# Patient Record
Sex: FEMALE | Race: BLACK OR AFRICAN AMERICAN | Employment: OTHER | URBAN - METROPOLITAN AREA
[De-identification: names, ages, dates, MRNs, and addresses within clinical notes are randomized per-mention and may not be internally consistent; named-entity substitution may affect disease eponyms.]

---

## 2017-01-01 ENCOUNTER — HOSPITAL ENCOUNTER (OUTPATIENT)
Dept: OTHER | Age: 77
Discharge: HOME | End: 2017-01-31
Attending: INTERNAL MEDICINE | Admitting: INTERNAL MEDICINE

## 2017-01-25 LAB
ALBUMIN SERPL-MCNC: 3.3 GM/DL (ref 3.4–5)
ALP BLD-CCNC: 65 IU/L (ref 40–128)
ALT SERPL-CCNC: 5 U/L (ref 10–40)
ANION GAP SERPL CALCULATED.3IONS-SCNC: 10 MMOL/L (ref 4–16)
AST SERPL-CCNC: 13 IU/L (ref 15–37)
BASOPHILS ABSOLUTE: 0 K/CU MM
BASOPHILS RELATIVE PERCENT: 0.1 % (ref 0–1)
BILIRUB SERPL-MCNC: 0.2 MG/DL (ref 0–1)
BUN BLDV-MCNC: 15 MG/DL (ref 6–23)
CALCIUM SERPL-MCNC: 8.3 MG/DL (ref 8.3–10.6)
CHLORIDE BLD-SCNC: 101 MMOL/L (ref 99–110)
CO2: 31 MMOL/L (ref 21–32)
CREAT SERPL-MCNC: 1.1 MG/DL (ref 0.6–1.1)
DIFFERENTIAL TYPE: ABNORMAL
EOSINOPHILS ABSOLUTE: 0.5 K/CU MM
EOSINOPHILS RELATIVE PERCENT: 7.1 % (ref 0–3)
GFR AFRICAN AMERICAN: 59 ML/MIN/1.73M2
GFR NON-AFRICAN AMERICAN: 48 ML/MIN/1.73M2
GLUCOSE FASTING: 85 MG/DL (ref 70–99)
HCT VFR BLD CALC: 30.3 % (ref 37–47)
HEMOGLOBIN: 8.4 GM/DL (ref 12.5–16)
IMMATURE NEUTROPHIL %: 0.3 % (ref 0–0.43)
LYMPHOCYTES ABSOLUTE: 0.9 K/CU MM
LYMPHOCYTES RELATIVE PERCENT: 12.5 % (ref 24–44)
MCH RBC QN AUTO: 27.3 PG (ref 27–31)
MCHC RBC AUTO-ENTMCNC: 27.7 % (ref 32–36)
MCV RBC AUTO: 98.4 FL (ref 78–100)
MONOCYTES ABSOLUTE: 0.6 K/CU MM
MONOCYTES RELATIVE PERCENT: 8.8 % (ref 0–4)
NUCLEATED RBC %: 0 %
PDW BLD-RTO: 15.6 % (ref 11.7–14.9)
PLATELET # BLD: 209 K/CU MM (ref 140–440)
PMV BLD AUTO: 12 FL (ref 7.5–11.1)
POTASSIUM SERPL-SCNC: 4.2 MMOL/L (ref 3.5–5.1)
RBC # BLD: 3.08 M/CU MM (ref 4.2–5.4)
SEGMENTED NEUTROPHILS ABSOLUTE COUNT: 5 K/CU MM
SEGMENTED NEUTROPHILS RELATIVE PERCENT: 71.2 % (ref 36–66)
SODIUM BLD-SCNC: 142 MMOL/L (ref 135–145)
TOTAL IMMATURE NEUTOROPHIL: 0.02 K/CU MM
TOTAL NUCLEATED RBC: 0 K/CU MM
TOTAL PROTEIN: 5.6 GM/DL (ref 6.4–8.2)
WBC # BLD: 7 K/CU MM (ref 4–10.5)

## 2017-02-01 ENCOUNTER — HOSPITAL ENCOUNTER (OUTPATIENT)
Dept: OTHER | Age: 77
Discharge: HOME | End: 2017-02-28
Attending: INTERNAL MEDICINE | Admitting: INTERNAL MEDICINE

## 2017-02-01 LAB — TSH HIGH SENSITIVITY: 1.37 UIU/ML (ref 0.27–4.2)

## 2017-02-08 LAB
ALBUMIN SERPL-MCNC: 2.9 GM/DL (ref 3.4–5)
ALP BLD-CCNC: 70 IU/L (ref 40–129)
ALT SERPL-CCNC: <5 U/L (ref 10–40)
ANION GAP SERPL CALCULATED.3IONS-SCNC: 11 MMOL/L (ref 4–16)
AST SERPL-CCNC: 13 IU/L (ref 15–37)
BASOPHILS ABSOLUTE: 0 K/CU MM
BASOPHILS RELATIVE PERCENT: 0.4 % (ref 0–1)
BILIRUB SERPL-MCNC: 0.2 MG/DL (ref 0–1)
BUN BLDV-MCNC: 10 MG/DL (ref 6–23)
CALCIUM SERPL-MCNC: 8.2 MG/DL (ref 8.3–10.6)
CHLORIDE BLD-SCNC: 104 MMOL/L (ref 99–110)
CO2: 27 MMOL/L (ref 21–32)
CREAT SERPL-MCNC: 1.1 MG/DL (ref 0.6–1.1)
DIFFERENTIAL TYPE: ABNORMAL
EOSINOPHILS ABSOLUTE: 0.3 K/CU MM
EOSINOPHILS RELATIVE PERCENT: 7.5 % (ref 0–3)
GFR AFRICAN AMERICAN: 59 ML/MIN/1.73M2
GFR NON-AFRICAN AMERICAN: 48 ML/MIN/1.73M2
GLUCOSE FASTING: 80 MG/DL (ref 70–99)
HCT VFR BLD CALC: 28.2 % (ref 37–47)
HEMOGLOBIN: 7.9 GM/DL (ref 12.5–16)
IMMATURE NEUTROPHIL %: 0.4 % (ref 0–0.43)
LYMPHOCYTES ABSOLUTE: 1 K/CU MM
LYMPHOCYTES RELATIVE PERCENT: 22.2 % (ref 24–44)
MCH RBC QN AUTO: 27.2 PG (ref 27–31)
MCHC RBC AUTO-ENTMCNC: 28 % (ref 32–36)
MCV RBC AUTO: 97.2 FL (ref 78–100)
MONOCYTES ABSOLUTE: 0.6 K/CU MM
MONOCYTES RELATIVE PERCENT: 13.6 % (ref 0–4)
NUCLEATED RBC %: 0 %
PDW BLD-RTO: 14.9 % (ref 11.7–14.9)
PLATELET # BLD: 212 K/CU MM (ref 140–440)
PMV BLD AUTO: 11.5 FL (ref 7.5–11.1)
POTASSIUM SERPL-SCNC: 4.4 MMOL/L (ref 3.5–5.1)
RBC # BLD: 2.9 M/CU MM (ref 4.2–5.4)
SEGMENTED NEUTROPHILS ABSOLUTE COUNT: 2.5 K/CU MM
SEGMENTED NEUTROPHILS RELATIVE PERCENT: 55.9 % (ref 36–66)
SODIUM BLD-SCNC: 142 MMOL/L (ref 135–145)
TOTAL IMMATURE NEUTOROPHIL: 0.02 K/CU MM
TOTAL NUCLEATED RBC: 0 K/CU MM
TOTAL PROTEIN: 5.1 GM/DL (ref 6.4–8.2)
WBC # BLD: 4.6 K/CU MM (ref 4–10.5)

## 2017-02-17 LAB
HCT VFR BLD CALC: 31.6 % (ref 37–47)
HEMOGLOBIN: 8.9 GM/DL (ref 12.5–16)

## 2017-02-22 LAB
ALBUMIN SERPL-MCNC: 3.2 GM/DL (ref 3.4–5)
ALP BLD-CCNC: 76 IU/L (ref 40–128)
ALT SERPL-CCNC: <5 U/L (ref 10–40)
ANION GAP SERPL CALCULATED.3IONS-SCNC: 15 MMOL/L (ref 4–16)
AST SERPL-CCNC: 14 IU/L (ref 15–37)
BASOPHILS ABSOLUTE: 0 K/CU MM
BASOPHILS RELATIVE PERCENT: 0.3 % (ref 0–1)
BILIRUB SERPL-MCNC: 0.2 MG/DL (ref 0–1)
BUN BLDV-MCNC: 12 MG/DL (ref 6–23)
CALCIUM SERPL-MCNC: 8.6 MG/DL (ref 8.3–10.6)
CHLORIDE BLD-SCNC: 100 MMOL/L (ref 99–110)
CO2: 27 MMOL/L (ref 21–32)
CREAT SERPL-MCNC: 1.1 MG/DL (ref 0.6–1.1)
DIFFERENTIAL TYPE: ABNORMAL
DIFFERENTIAL TYPE: ABNORMAL
EOSINOPHILS ABSOLUTE: 0.4 K/CU MM
EOSINOPHILS RELATIVE PERCENT: 6.1 % (ref 0–3)
GFR AFRICAN AMERICAN: 59 ML/MIN/1.73M2
GFR NON-AFRICAN AMERICAN: 48 ML/MIN/1.73M2
GLUCOSE FASTING: 85 MG/DL (ref 70–99)
HCT VFR BLD CALC: 31 % (ref 37–47)
HEMOGLOBIN: 8.7 GM/DL (ref 12.5–16)
IMMATURE NEUTROPHIL %: 0.2 % (ref 0–0.43)
LYMPHOCYTES ABSOLUTE: 1.1 K/CU MM
LYMPHOCYTES RELATIVE PERCENT: 17.9 % (ref 24–44)
MCH RBC QN AUTO: 27.1 PG (ref 27–31)
MCHC RBC AUTO-ENTMCNC: 28.1 % (ref 32–36)
MCV RBC AUTO: 96.6 FL (ref 78–100)
MONOCYTES ABSOLUTE: 0.6 K/CU MM
MONOCYTES RELATIVE PERCENT: 10.5 % (ref 0–4)
NUCLEATED RBC %: 0 %
PDW BLD-RTO: 15 % (ref 11.7–14.9)
PLATELET # BLD: 213 K/CU MM (ref 140–440)
PMV BLD AUTO: 11.5 FL (ref 7.5–11.1)
POTASSIUM SERPL-SCNC: 4.3 MMOL/L (ref 3.5–5.1)
RBC # BLD: 3.21 M/CU MM (ref 4.2–5.4)
SEGMENTED NEUTROPHILS ABSOLUTE COUNT: 3.8 K/CU MM
SEGMENTED NEUTROPHILS ABSOLUTE COUNT: ABNORMAL
SEGMENTED NEUTROPHILS RELATIVE PERCENT: 65 % (ref 36–66)
SODIUM BLD-SCNC: 142 MMOL/L (ref 135–145)
TOTAL IMMATURE NEUTOROPHIL: 0.01 K/CU MM
TOTAL NUCLEATED RBC: 0 K/CU MM
TOTAL PROTEIN: 5.6 GM/DL (ref 6.4–8.2)
WBC # BLD: 5.9 K/CU MM (ref 4–10.5)

## 2017-03-01 ENCOUNTER — HOSPITAL ENCOUNTER (OUTPATIENT)
Dept: OTHER | Age: 77
Discharge: HOME | End: 2017-03-31
Attending: INTERNAL MEDICINE | Admitting: INTERNAL MEDICINE

## 2017-03-01 LAB — TSH HIGH SENSITIVITY: 1.16 UIU/ML (ref 0.27–4.2)

## 2017-03-08 LAB
ALBUMIN SERPL-MCNC: 3.3 GM/DL (ref 3.4–5)
ALP BLD-CCNC: 64 IU/L (ref 40–128)
ALT SERPL-CCNC: <5 U/L (ref 10–40)
ANION GAP SERPL CALCULATED.3IONS-SCNC: 14 MMOL/L (ref 4–16)
AST SERPL-CCNC: 12 IU/L (ref 15–37)
BASOPHILS ABSOLUTE: 0 K/CU MM
BASOPHILS RELATIVE PERCENT: 0.3 % (ref 0–1)
BILIRUB SERPL-MCNC: 0.6 MG/DL (ref 0–1)
BUN BLDV-MCNC: 13 MG/DL (ref 6–23)
CALCIUM SERPL-MCNC: 8.2 MG/DL (ref 8.3–10.6)
CHLORIDE BLD-SCNC: 94 MMOL/L (ref 99–110)
CO2: 31 MMOL/L (ref 21–32)
CREAT SERPL-MCNC: 1.1 MG/DL (ref 0.6–1.1)
DIFFERENTIAL TYPE: ABNORMAL
EOSINOPHILS ABSOLUTE: 0.2 K/CU MM
EOSINOPHILS RELATIVE PERCENT: 3 % (ref 0–3)
GFR AFRICAN AMERICAN: 59 ML/MIN/1.73M2
GFR NON-AFRICAN AMERICAN: 48 ML/MIN/1.73M2
GLUCOSE BLD-MCNC: 86 MG/DL (ref 70–140)
HCT VFR BLD CALC: 31.3 % (ref 37–47)
HEMOGLOBIN: 9.3 GM/DL (ref 12.5–16)
IMMATURE NEUTROPHIL %: 0.4 % (ref 0–0.43)
LYMPHOCYTES ABSOLUTE: 1 K/CU MM
LYMPHOCYTES RELATIVE PERCENT: 14 % (ref 24–44)
MCH RBC QN AUTO: 27.4 PG (ref 27–31)
MCHC RBC AUTO-ENTMCNC: 29.7 % (ref 32–36)
MCV RBC AUTO: 92.1 FL (ref 78–100)
MONOCYTES ABSOLUTE: 0.8 K/CU MM
MONOCYTES RELATIVE PERCENT: 11.2 % (ref 0–4)
NUCLEATED RBC %: 0 %
PDW BLD-RTO: 15.1 % (ref 11.7–14.9)
PLATELET # BLD: 138 K/CU MM (ref 140–440)
PMV BLD AUTO: 12.1 FL (ref 7.5–11.1)
POTASSIUM SERPL-SCNC: 3.6 MMOL/L (ref 3.5–5.1)
RBC # BLD: 3.4 M/CU MM (ref 4.2–5.4)
SEGMENTED NEUTROPHILS ABSOLUTE COUNT: 5.2 K/CU MM
SEGMENTED NEUTROPHILS RELATIVE PERCENT: 71.1 % (ref 36–66)
SODIUM BLD-SCNC: 139 MMOL/L (ref 135–145)
TOTAL IMMATURE NEUTOROPHIL: 0.03 K/CU MM
TOTAL NUCLEATED RBC: 0 K/CU MM
TOTAL PROTEIN: 5.5 GM/DL (ref 6.4–8.2)
WBC # BLD: 7.3 K/CU MM (ref 4–10.5)

## 2017-03-22 LAB
ALBUMIN SERPL-MCNC: 3.2 GM/DL (ref 3.4–5)
ALP BLD-CCNC: 74 IU/L (ref 40–128)
ALT SERPL-CCNC: <5 U/L (ref 10–40)
ANION GAP SERPL CALCULATED.3IONS-SCNC: 11 MMOL/L (ref 4–16)
AST SERPL-CCNC: 10 IU/L (ref 15–37)
BASOPHILS ABSOLUTE: 0 K/CU MM
BASOPHILS RELATIVE PERCENT: 0.2 % (ref 0–1)
BILIRUB SERPL-MCNC: 0.4 MG/DL (ref 0–1)
BUN BLDV-MCNC: 12 MG/DL (ref 6–23)
CALCIUM SERPL-MCNC: 8.2 MG/DL (ref 8.3–10.6)
CHLORIDE BLD-SCNC: 99 MMOL/L (ref 99–110)
CO2: 28 MMOL/L (ref 21–32)
CREAT SERPL-MCNC: 1.1 MG/DL (ref 0.6–1.1)
DIFFERENTIAL TYPE: ABNORMAL
EOSINOPHILS ABSOLUTE: 0.3 K/CU MM
EOSINOPHILS RELATIVE PERCENT: 4.6 % (ref 0–3)
GFR AFRICAN AMERICAN: 59 ML/MIN/1.73M2
GFR NON-AFRICAN AMERICAN: 48 ML/MIN/1.73M2
GLUCOSE BLD-MCNC: 84 MG/DL (ref 70–140)
HCT VFR BLD CALC: 28.8 % (ref 37–47)
HEMOGLOBIN: 8.2 GM/DL (ref 12.5–16)
IMMATURE NEUTROPHIL %: 0.3 % (ref 0–0.43)
LYMPHOCYTES ABSOLUTE: 0.9 K/CU MM
LYMPHOCYTES RELATIVE PERCENT: 13.7 % (ref 24–44)
MCH RBC QN AUTO: 27 PG (ref 27–31)
MCHC RBC AUTO-ENTMCNC: 28.5 % (ref 32–36)
MCV RBC AUTO: 94.7 FL (ref 78–100)
MONOCYTES ABSOLUTE: 0.6 K/CU MM
MONOCYTES RELATIVE PERCENT: 9.6 % (ref 0–4)
NUCLEATED RBC %: 0 %
PDW BLD-RTO: 14.6 % (ref 11.7–14.9)
PLATELET # BLD: 195 K/CU MM (ref 140–440)
PMV BLD AUTO: 11.7 FL (ref 7.5–11.1)
POTASSIUM SERPL-SCNC: 4 MMOL/L (ref 3.5–5.1)
RBC # BLD: 3.04 M/CU MM (ref 4.2–5.4)
SEGMENTED NEUTROPHILS ABSOLUTE COUNT: 4.5 K/CU MM
SEGMENTED NEUTROPHILS RELATIVE PERCENT: 71.6 % (ref 36–66)
SODIUM BLD-SCNC: 138 MMOL/L (ref 135–145)
TOTAL IMMATURE NEUTOROPHIL: 0.02 K/CU MM
TOTAL NUCLEATED RBC: 0 K/CU MM
TOTAL PROTEIN: 5.2 GM/DL (ref 6.4–8.2)
WBC # BLD: 6.3 K/CU MM (ref 4–10.5)

## 2017-04-01 ENCOUNTER — HOSPITAL ENCOUNTER (OUTPATIENT)
Dept: OTHER | Age: 77
Discharge: HOME | End: 2017-04-30
Attending: INTERNAL MEDICINE | Admitting: INTERNAL MEDICINE

## 2017-04-05 LAB
ALBUMIN SERPL-MCNC: 3.4 GM/DL (ref 3.4–5)
ALP BLD-CCNC: 75 IU/L (ref 40–128)
ALT SERPL-CCNC: <5 U/L (ref 10–40)
ANION GAP SERPL CALCULATED.3IONS-SCNC: 15 MMOL/L (ref 4–16)
AST SERPL-CCNC: 15 IU/L (ref 15–37)
BASOPHILS ABSOLUTE: 0 K/CU MM
BASOPHILS RELATIVE PERCENT: 0.2 % (ref 0–1)
BILIRUB SERPL-MCNC: 0.2 MG/DL (ref 0–1)
BUN BLDV-MCNC: 13 MG/DL (ref 6–23)
CALCIUM SERPL-MCNC: 8.4 MG/DL (ref 8.3–10.6)
CHLORIDE BLD-SCNC: 101 MMOL/L (ref 99–110)
CO2: 26 MMOL/L (ref 21–32)
CREAT SERPL-MCNC: 1.1 MG/DL (ref 0.6–1.1)
DIFFERENTIAL TYPE: ABNORMAL
DIFFERENTIAL TYPE: ABNORMAL
EOSINOPHILS ABSOLUTE: 0.3 K/CU MM
EOSINOPHILS RELATIVE PERCENT: 4.8 % (ref 0–3)
GFR AFRICAN AMERICAN: 59 ML/MIN/1.73M2
GFR NON-AFRICAN AMERICAN: 48 ML/MIN/1.73M2
GLUCOSE BLD-MCNC: 91 MG/DL (ref 70–140)
HCT VFR BLD CALC: 31.4 % (ref 37–47)
HEMOGLOBIN: 8.6 GM/DL (ref 12.5–16)
IMMATURE NEUTROPHIL %: 0.4 % (ref 0–0.43)
LYMPHOCYTES ABSOLUTE: 1.1 K/CU MM
LYMPHOCYTES RELATIVE PERCENT: 19.2 % (ref 24–44)
MCH RBC QN AUTO: 27.1 PG (ref 27–31)
MCHC RBC AUTO-ENTMCNC: 27.4 % (ref 32–36)
MCV RBC AUTO: 99.1 FL (ref 78–100)
MONOCYTES ABSOLUTE: 0.5 K/CU MM
MONOCYTES RELATIVE PERCENT: 8.2 % (ref 0–4)
NUCLEATED RBC %: 0 %
PDW BLD-RTO: 15.3 % (ref 11.7–14.9)
PLATELET # BLD: 196 K/CU MM (ref 140–440)
PMV BLD AUTO: 12.3 FL (ref 7.5–11.1)
POTASSIUM SERPL-SCNC: 4.3 MMOL/L (ref 3.5–5.1)
RBC # BLD: 3.17 M/CU MM (ref 4.2–5.4)
RBC # BLD: SLIGHT 10*6/UL
SEGMENTED NEUTROPHILS ABSOLUTE COUNT: 3.7 K/CU MM
SEGMENTED NEUTROPHILS ABSOLUTE COUNT: ABNORMAL
SEGMENTED NEUTROPHILS RELATIVE PERCENT: 67.2 % (ref 36–66)
SODIUM BLD-SCNC: 142 MMOL/L (ref 135–145)
TOTAL IMMATURE NEUTOROPHIL: 0.02 K/CU MM
TOTAL NUCLEATED RBC: 0 K/CU MM
TOTAL PROTEIN: 5.6 GM/DL (ref 6.4–8.2)
TSH HIGH SENSITIVITY: 1.61 UIU/ML (ref 0.27–4.2)
WBC # BLD: 5.6 K/CU MM (ref 4–10.5)

## 2017-04-19 LAB
ALBUMIN SERPL-MCNC: 3.3 GM/DL (ref 3.4–5)
ALP BLD-CCNC: 73 IU/L (ref 40–128)
ALT SERPL-CCNC: <5 U/L (ref 10–40)
ANION GAP SERPL CALCULATED.3IONS-SCNC: 12 MMOL/L (ref 4–16)
AST SERPL-CCNC: 11 IU/L (ref 15–37)
BASOPHILS ABSOLUTE: 0 K/CU MM
BASOPHILS RELATIVE PERCENT: 0.3 % (ref 0–1)
BILIRUB SERPL-MCNC: 0.2 MG/DL (ref 0–1)
BUN BLDV-MCNC: 13 MG/DL (ref 6–23)
CALCIUM SERPL-MCNC: 8.2 MG/DL (ref 8.3–10.6)
CHLORIDE BLD-SCNC: 102 MMOL/L (ref 99–110)
CO2: 29 MMOL/L (ref 21–32)
CREAT SERPL-MCNC: 1 MG/DL (ref 0.6–1.1)
DIFFERENTIAL TYPE: ABNORMAL
EOSINOPHILS ABSOLUTE: 0.3 K/CU MM
EOSINOPHILS RELATIVE PERCENT: 8.6 % (ref 0–3)
GFR AFRICAN AMERICAN: >60 ML/MIN/1.73M2
GFR NON-AFRICAN AMERICAN: 54 ML/MIN/1.73M2
GLUCOSE BLD-MCNC: 86 MG/DL (ref 70–140)
HCT VFR BLD CALC: 29.3 % (ref 37–47)
HEMOGLOBIN: 8.4 GM/DL (ref 12.5–16)
HYPOCHROMIA: ABNORMAL
IMMATURE NEUTROPHIL %: 0.3 % (ref 0–0.43)
LYMPHOCYTES ABSOLUTE: 0.9 K/CU MM
LYMPHOCYTES RELATIVE PERCENT: 23.2 % (ref 24–44)
MACROCYTES: ABNORMAL
MCH RBC QN AUTO: 27.7 PG (ref 27–31)
MCHC RBC AUTO-ENTMCNC: 28.7 % (ref 32–36)
MCV RBC AUTO: 96.7 FL (ref 78–100)
MONOCYTES ABSOLUTE: 0.5 K/CU MM
MONOCYTES RELATIVE PERCENT: 13.1 % (ref 0–4)
NUCLEATED RBC %: 0 %
PDW BLD-RTO: 14.5 % (ref 11.7–14.9)
PLATELET # BLD: 204 K/CU MM (ref 140–440)
PMV BLD AUTO: 11.3 FL (ref 7.5–11.1)
POLYCHROMASIA: ABNORMAL
POTASSIUM SERPL-SCNC: 4.1 MMOL/L (ref 3.5–5.1)
RBC # BLD: 3.03 M/CU MM (ref 4.2–5.4)
SEGMENTED NEUTROPHILS ABSOLUTE COUNT: 2.3 K/CU MM
SEGMENTED NEUTROPHILS ABSOLUTE COUNT: ABNORMAL
SEGMENTED NEUTROPHILS RELATIVE PERCENT: 54.5 % (ref 36–66)
SODIUM BLD-SCNC: 143 MMOL/L (ref 135–145)
TOTAL IMMATURE NEUTOROPHIL: 0.01 K/CU MM
TOTAL NUCLEATED RBC: 0 K/CU MM
TOTAL PROTEIN: 5.6 GM/DL (ref 6.4–8.2)
WBC # BLD: 4 K/CU MM (ref 4–10.5)

## 2017-05-01 ENCOUNTER — HOSPITAL ENCOUNTER (OUTPATIENT)
Dept: OTHER | Age: 77
Discharge: HOME | End: 2017-05-31
Attending: INTERNAL MEDICINE | Admitting: INTERNAL MEDICINE

## 2017-05-03 LAB
ALBUMIN SERPL-MCNC: 3.4 GM/DL (ref 3.4–5)
ALP BLD-CCNC: 71 IU/L (ref 40–128)
ALT SERPL-CCNC: <5 U/L (ref 10–40)
ANION GAP SERPL CALCULATED.3IONS-SCNC: 12 MMOL/L (ref 4–16)
AST SERPL-CCNC: 12 IU/L (ref 15–37)
BASOPHILS ABSOLUTE: 0 K/CU MM
BASOPHILS RELATIVE PERCENT: 0.2 % (ref 0–1)
BILIRUB SERPL-MCNC: 0.2 MG/DL (ref 0–1)
BUN BLDV-MCNC: 14 MG/DL (ref 6–23)
CALCIUM SERPL-MCNC: 8.5 MG/DL (ref 8.3–10.6)
CHLORIDE BLD-SCNC: 103 MMOL/L (ref 99–110)
CO2: 29 MMOL/L (ref 21–32)
CREAT SERPL-MCNC: 1.1 MG/DL (ref 0.6–1.1)
DIFFERENTIAL TYPE: ABNORMAL
EOSINOPHILS ABSOLUTE: 0.3 K/CU MM
EOSINOPHILS RELATIVE PERCENT: 5.3 % (ref 0–3)
GFR AFRICAN AMERICAN: 59 ML/MIN/1.73M2
GFR NON-AFRICAN AMERICAN: 48 ML/MIN/1.73M2
GLUCOSE BLD-MCNC: 81 MG/DL (ref 70–140)
HCT VFR BLD CALC: 28.5 % (ref 37–47)
HEMOGLOBIN: 8.2 GM/DL (ref 12.5–16)
IMMATURE NEUTROPHIL %: 0.2 % (ref 0–0.43)
LYMPHOCYTES ABSOLUTE: 1 K/CU MM
LYMPHOCYTES RELATIVE PERCENT: 20 % (ref 24–44)
MCH RBC QN AUTO: 28 PG (ref 27–31)
MCHC RBC AUTO-ENTMCNC: 28.8 % (ref 32–36)
MCV RBC AUTO: 97.3 FL (ref 78–100)
MONOCYTES ABSOLUTE: 0.5 K/CU MM
MONOCYTES RELATIVE PERCENT: 10.9 % (ref 0–4)
NUCLEATED RBC %: 0 %
PDW BLD-RTO: 14.1 % (ref 11.7–14.9)
PLATELET # BLD: 196 K/CU MM (ref 140–440)
PMV BLD AUTO: 11.6 FL (ref 7.5–11.1)
POTASSIUM SERPL-SCNC: 4 MMOL/L (ref 3.5–5.1)
RBC # BLD: 2.93 M/CU MM (ref 4.2–5.4)
SEGMENTED NEUTROPHILS ABSOLUTE COUNT: 3 K/CU MM
SEGMENTED NEUTROPHILS RELATIVE PERCENT: 63.4 % (ref 36–66)
SODIUM BLD-SCNC: 144 MMOL/L (ref 135–145)
TOTAL IMMATURE NEUTOROPHIL: 0.01 K/CU MM
TOTAL NUCLEATED RBC: 0 K/CU MM
TOTAL PROTEIN: 5.8 GM/DL (ref 6.4–8.2)
TSH HIGH SENSITIVITY: 1.39 UIU/ML (ref 0.27–4.2)
WBC # BLD: 4.8 K/CU MM (ref 4–10.5)

## 2017-05-17 LAB
ALBUMIN SERPL-MCNC: 3.4 GM/DL (ref 3.4–5)
ALP BLD-CCNC: 77 IU/L (ref 40–129)
ALT SERPL-CCNC: 6 U/L (ref 10–40)
ANION GAP SERPL CALCULATED.3IONS-SCNC: 11 MMOL/L (ref 4–16)
AST SERPL-CCNC: 15 IU/L (ref 15–37)
BASOPHILS ABSOLUTE: 0 K/CU MM
BASOPHILS RELATIVE PERCENT: 0.4 % (ref 0–1)
BILIRUB SERPL-MCNC: 0.1 MG/DL (ref 0–1)
BUN BLDV-MCNC: 23 MG/DL (ref 6–23)
CALCIUM SERPL-MCNC: 8.1 MG/DL (ref 8.3–10.6)
CHLORIDE BLD-SCNC: 101 MMOL/L (ref 99–110)
CO2: 27 MMOL/L (ref 21–32)
CREAT SERPL-MCNC: 1.3 MG/DL (ref 0.6–1.1)
DIFFERENTIAL TYPE: ABNORMAL
EOSINOPHILS ABSOLUTE: 0.3 K/CU MM
EOSINOPHILS RELATIVE PERCENT: 5.7 % (ref 0–3)
GFR AFRICAN AMERICAN: 48 ML/MIN/1.73M2
GFR NON-AFRICAN AMERICAN: 40 ML/MIN/1.73M2
GLUCOSE BLD-MCNC: 121 MG/DL (ref 70–140)
HCT VFR BLD CALC: 29.2 % (ref 37–47)
HEMOGLOBIN: 8.2 GM/DL (ref 12.5–16)
IMMATURE NEUTROPHIL %: 0.4 % (ref 0–0.43)
LYMPHOCYTES ABSOLUTE: 1 K/CU MM
LYMPHOCYTES RELATIVE PERCENT: 19.1 % (ref 24–44)
MCH RBC QN AUTO: 28 PG (ref 27–31)
MCHC RBC AUTO-ENTMCNC: 28.1 % (ref 32–36)
MCV RBC AUTO: 99.7 FL (ref 78–100)
MONOCYTES ABSOLUTE: 0.5 K/CU MM
MONOCYTES RELATIVE PERCENT: 10 % (ref 0–4)
NUCLEATED RBC %: 0 %
PDW BLD-RTO: 14 % (ref 11.7–14.9)
PLATELET # BLD: 219 K/CU MM (ref 140–440)
PMV BLD AUTO: 11 FL (ref 7.5–11.1)
POTASSIUM SERPL-SCNC: 4.3 MMOL/L (ref 3.5–5.1)
RBC # BLD: 2.93 M/CU MM (ref 4.2–5.4)
SEGMENTED NEUTROPHILS ABSOLUTE COUNT: 3.3 K/CU MM
SEGMENTED NEUTROPHILS RELATIVE PERCENT: 64.4 % (ref 36–66)
SODIUM BLD-SCNC: 139 MMOL/L (ref 135–145)
TOTAL IMMATURE NEUTOROPHIL: 0.02 K/CU MM
TOTAL NUCLEATED RBC: 0 K/CU MM
TOTAL PROTEIN: 5.6 GM/DL (ref 6.4–8.2)
WBC # BLD: 5.1 K/CU MM (ref 4–10.5)

## 2017-05-31 LAB
ALBUMIN SERPL-MCNC: 3.8 GM/DL (ref 3.4–5)
ALP BLD-CCNC: 94 IU/L (ref 40–129)
ALT SERPL-CCNC: 6 U/L (ref 10–40)
ANION GAP SERPL CALCULATED.3IONS-SCNC: 14 MMOL/L (ref 4–16)
AST SERPL-CCNC: 19 IU/L (ref 15–37)
BASOPHILS ABSOLUTE: 0 K/CU MM
BASOPHILS RELATIVE PERCENT: 0.2 % (ref 0–1)
BILIRUB SERPL-MCNC: 0.1 MG/DL (ref 0–1)
BUN BLDV-MCNC: 20 MG/DL (ref 6–23)
CALCIUM SERPL-MCNC: 8.6 MG/DL (ref 8.3–10.6)
CHLORIDE BLD-SCNC: 96 MMOL/L (ref 99–110)
CO2: 28 MMOL/L (ref 21–32)
CREAT SERPL-MCNC: 1.3 MG/DL (ref 0.6–1.1)
DIFFERENTIAL TYPE: ABNORMAL
EOSINOPHILS ABSOLUTE: 0.2 K/CU MM
EOSINOPHILS RELATIVE PERCENT: 4.8 % (ref 0–3)
GFR AFRICAN AMERICAN: 48 ML/MIN/1.73M2
GFR NON-AFRICAN AMERICAN: 40 ML/MIN/1.73M2
GLUCOSE BLD-MCNC: 96 MG/DL (ref 70–140)
HCT VFR BLD CALC: 32.9 % (ref 37–47)
HEMOGLOBIN: 9.3 GM/DL (ref 12.5–16)
IMMATURE NEUTROPHIL %: 0.2 % (ref 0–0.43)
LYMPHOCYTES ABSOLUTE: 0.8 K/CU MM
LYMPHOCYTES RELATIVE PERCENT: 17.6 % (ref 24–44)
MCH RBC QN AUTO: 28 PG (ref 27–31)
MCHC RBC AUTO-ENTMCNC: 28.3 % (ref 32–36)
MCV RBC AUTO: 99.1 FL (ref 78–100)
MONOCYTES ABSOLUTE: 0.5 K/CU MM
MONOCYTES RELATIVE PERCENT: 10.1 % (ref 0–4)
NUCLEATED RBC %: 0 %
PDW BLD-RTO: 13.3 % (ref 11.7–14.9)
PLATELET # BLD: 224 K/CU MM (ref 140–440)
PMV BLD AUTO: 11.1 FL (ref 7.5–11.1)
POTASSIUM SERPL-SCNC: 4.6 MMOL/L (ref 3.5–5.1)
RBC # BLD: 3.32 M/CU MM (ref 4.2–5.4)
SEGMENTED NEUTROPHILS ABSOLUTE COUNT: 3.2 K/CU MM
SEGMENTED NEUTROPHILS RELATIVE PERCENT: 67.1 % (ref 36–66)
SODIUM BLD-SCNC: 138 MMOL/L (ref 135–145)
TOTAL IMMATURE NEUTOROPHIL: 0.01 K/CU MM
TOTAL NUCLEATED RBC: 0 K/CU MM
TOTAL PROTEIN: 6.8 GM/DL (ref 6.4–8.2)
WBC # BLD: 4.8 K/CU MM (ref 4–10.5)

## 2017-06-01 ENCOUNTER — HOSPITAL ENCOUNTER (OUTPATIENT)
Dept: OTHER | Age: 77
Discharge: HOME | End: 2017-06-30
Attending: INTERNAL MEDICINE | Admitting: INTERNAL MEDICINE

## 2017-06-07 LAB — TSH HIGH SENSITIVITY: 2.06 UIU/ML (ref 0.27–4.2)

## 2017-06-14 LAB
ALBUMIN SERPL-MCNC: 3.5 GM/DL (ref 3.4–5)
ALP BLD-CCNC: 89 IU/L (ref 40–128)
ALT SERPL-CCNC: <5 U/L (ref 10–40)
ANION GAP SERPL CALCULATED.3IONS-SCNC: 12 MMOL/L (ref 4–16)
AST SERPL-CCNC: 12 IU/L (ref 15–37)
BASOPHILS ABSOLUTE: 0 K/CU MM
BASOPHILS RELATIVE PERCENT: 0.1 % (ref 0–1)
BILIRUB SERPL-MCNC: 0.4 MG/DL (ref 0–1)
BUN BLDV-MCNC: 13 MG/DL (ref 6–23)
CALCIUM SERPL-MCNC: 8.2 MG/DL (ref 8.3–10.6)
CHLORIDE BLD-SCNC: 99 MMOL/L (ref 99–110)
CO2: 28 MMOL/L (ref 21–32)
CREAT SERPL-MCNC: 1.1 MG/DL (ref 0.6–1.1)
DIFFERENTIAL TYPE: ABNORMAL
EOSINOPHILS ABSOLUTE: 0.2 K/CU MM
EOSINOPHILS RELATIVE PERCENT: 2.1 % (ref 0–3)
GFR AFRICAN AMERICAN: 59 ML/MIN/1.73M2
GFR NON-AFRICAN AMERICAN: 48 ML/MIN/1.73M2
GLUCOSE BLD-MCNC: 102 MG/DL (ref 70–140)
HCT VFR BLD CALC: 30.1 % (ref 37–47)
HEMOGLOBIN: 8.9 GM/DL (ref 12.5–16)
IMMATURE NEUTROPHIL %: 0.4 % (ref 0–0.43)
LYMPHOCYTES ABSOLUTE: 0.7 K/CU MM
LYMPHOCYTES RELATIVE PERCENT: 8.2 % (ref 24–44)
MCH RBC QN AUTO: 27.9 PG (ref 27–31)
MCHC RBC AUTO-ENTMCNC: 29.6 % (ref 32–36)
MCV RBC AUTO: 94.4 FL (ref 78–100)
MONOCYTES ABSOLUTE: 0.9 K/CU MM
MONOCYTES RELATIVE PERCENT: 10.4 % (ref 0–4)
NUCLEATED RBC %: 0 %
PDW BLD-RTO: 13.1 % (ref 11.7–14.9)
PLATELET # BLD: 204 K/CU MM (ref 140–440)
PMV BLD AUTO: 11 FL (ref 7.5–11.1)
POTASSIUM SERPL-SCNC: 4.1 MMOL/L (ref 3.5–5.1)
RBC # BLD: 3.19 M/CU MM (ref 4.2–5.4)
SEGMENTED NEUTROPHILS ABSOLUTE COUNT: 6.7 K/CU MM
SEGMENTED NEUTROPHILS RELATIVE PERCENT: 78.8 % (ref 36–66)
SODIUM BLD-SCNC: 139 MMOL/L (ref 135–145)
TOTAL IMMATURE NEUTOROPHIL: 0.03 K/CU MM
TOTAL NUCLEATED RBC: 0 K/CU MM
TOTAL PROTEIN: 6.2 GM/DL (ref 6.4–8.2)
WBC # BLD: 8.5 K/CU MM (ref 4–10.5)

## 2017-06-28 LAB
ALBUMIN SERPL-MCNC: 3.3 GM/DL (ref 3.4–5)
ALP BLD-CCNC: 83 IU/L (ref 40–129)
ALT SERPL-CCNC: <5 U/L (ref 10–40)
ANION GAP SERPL CALCULATED.3IONS-SCNC: 11 MMOL/L (ref 4–16)
AST SERPL-CCNC: 15 IU/L (ref 15–37)
BASOPHILS ABSOLUTE: 0 K/CU MM
BASOPHILS RELATIVE PERCENT: 0.3 % (ref 0–1)
BILIRUB SERPL-MCNC: 0.2 MG/DL (ref 0–1)
BUN BLDV-MCNC: 22 MG/DL (ref 6–23)
CALCIUM SERPL-MCNC: 8 MG/DL (ref 8.3–10.6)
CHLORIDE BLD-SCNC: 100 MMOL/L (ref 99–110)
CO2: 31 MMOL/L (ref 21–32)
CREAT SERPL-MCNC: 1.2 MG/DL (ref 0.6–1.1)
DIFFERENTIAL TYPE: ABNORMAL
EOSINOPHILS ABSOLUTE: 0.2 K/CU MM
EOSINOPHILS RELATIVE PERCENT: 4.1 % (ref 0–3)
GFR AFRICAN AMERICAN: 53 ML/MIN/1.73M2
GFR NON-AFRICAN AMERICAN: 44 ML/MIN/1.73M2
GLUCOSE BLD-MCNC: 126 MG/DL (ref 70–140)
HCT VFR BLD CALC: 28.6 % (ref 37–47)
HEMOGLOBIN: 8.3 GM/DL (ref 12.5–16)
IMMATURE NEUTROPHIL %: 0.3 % (ref 0–0.43)
LYMPHOCYTES ABSOLUTE: 1 K/CU MM
LYMPHOCYTES RELATIVE PERCENT: 16.3 % (ref 24–44)
MCH RBC QN AUTO: 27.7 PG (ref 27–31)
MCHC RBC AUTO-ENTMCNC: 29 % (ref 32–36)
MCV RBC AUTO: 95.3 FL (ref 78–100)
MONOCYTES ABSOLUTE: 0.5 K/CU MM
MONOCYTES RELATIVE PERCENT: 8.5 % (ref 0–4)
NUCLEATED RBC %: 0 %
PDW BLD-RTO: 14.6 % (ref 11.7–14.9)
PLATELET # BLD: 248 K/CU MM (ref 140–440)
PMV BLD AUTO: 10.7 FL (ref 7.5–11.1)
POTASSIUM SERPL-SCNC: 4.2 MMOL/L (ref 3.5–5.1)
RBC # BLD: 3 M/CU MM (ref 4.2–5.4)
SEGMENTED NEUTROPHILS ABSOLUTE COUNT: 4.2 K/CU MM
SEGMENTED NEUTROPHILS RELATIVE PERCENT: 70.5 % (ref 36–66)
SODIUM BLD-SCNC: 142 MMOL/L (ref 135–145)
TOTAL IMMATURE NEUTOROPHIL: 0.02 K/CU MM
TOTAL NUCLEATED RBC: 0 K/CU MM
TOTAL PROTEIN: 5.9 GM/DL (ref 6.4–8.2)
WBC # BLD: 5.9 K/CU MM (ref 4–10.5)

## 2017-07-01 ENCOUNTER — HOSPITAL ENCOUNTER (OUTPATIENT)
Dept: OTHER | Age: 77
Discharge: HOME | End: 2017-07-31
Attending: INTERNAL MEDICINE | Admitting: INTERNAL MEDICINE

## 2017-07-05 LAB
CHOLESTEROL: 111 MG/DL
HDLC SERPL-MCNC: 62 MG/DL
LDL CHOLESTEROL CALCULATED: 36 MG/DL
TRIGL SERPL-MCNC: 66 MG/DL
TSH HIGH SENSITIVITY: 1.55 UIU/ML (ref 0.27–4.2)

## 2017-07-12 LAB
ALBUMIN SERPL-MCNC: 3.3 GM/DL (ref 3.4–5)
ALP BLD-CCNC: 84 IU/L (ref 40–128)
ALT SERPL-CCNC: 5 U/L (ref 10–40)
ANION GAP SERPL CALCULATED.3IONS-SCNC: 11 MMOL/L (ref 4–16)
AST SERPL-CCNC: 16 IU/L (ref 15–37)
BASOPHILS ABSOLUTE: 0 K/CU MM
BASOPHILS RELATIVE PERCENT: 0.5 % (ref 0–1)
BILIRUB SERPL-MCNC: 0.2 MG/DL (ref 0–1)
BUN BLDV-MCNC: 20 MG/DL (ref 6–23)
CALCIUM SERPL-MCNC: 8.2 MG/DL (ref 8.3–10.6)
CHLORIDE BLD-SCNC: 102 MMOL/L (ref 99–110)
CO2: 28 MMOL/L (ref 21–32)
CREAT SERPL-MCNC: 1.3 MG/DL (ref 0.6–1.1)
DIFFERENTIAL TYPE: ABNORMAL
EOSINOPHILS ABSOLUTE: 0.3 K/CU MM
EOSINOPHILS RELATIVE PERCENT: 7.4 % (ref 0–3)
GFR AFRICAN AMERICAN: 48 ML/MIN/1.73M2
GFR NON-AFRICAN AMERICAN: 40 ML/MIN/1.73M2
GLUCOSE BLD-MCNC: 87 MG/DL (ref 70–140)
HCT VFR BLD CALC: 30.4 % (ref 37–47)
HEMOGLOBIN: 8.6 GM/DL (ref 12.5–16)
IMMATURE NEUTROPHIL %: 0.2 % (ref 0–0.43)
LYMPHOCYTES ABSOLUTE: 0.8 K/CU MM
LYMPHOCYTES RELATIVE PERCENT: 19.3 % (ref 24–44)
MCH RBC QN AUTO: 27.7 PG (ref 27–31)
MCHC RBC AUTO-ENTMCNC: 28.3 % (ref 32–36)
MCV RBC AUTO: 98.1 FL (ref 78–100)
MONOCYTES ABSOLUTE: 0.5 K/CU MM
MONOCYTES RELATIVE PERCENT: 11.3 % (ref 0–4)
NUCLEATED RBC %: 0 %
PDW BLD-RTO: 13.9 % (ref 11.7–14.9)
PLATELET # BLD: 193 K/CU MM (ref 140–440)
PMV BLD AUTO: 11.2 FL (ref 7.5–11.1)
POTASSIUM SERPL-SCNC: 4.5 MMOL/L (ref 3.5–5.1)
RBC # BLD: 3.1 M/CU MM (ref 4.2–5.4)
SEGMENTED NEUTROPHILS ABSOLUTE COUNT: 2.7 K/CU MM
SEGMENTED NEUTROPHILS RELATIVE PERCENT: 61.3 % (ref 36–66)
SODIUM BLD-SCNC: 141 MMOL/L (ref 135–145)
TOTAL IMMATURE NEUTOROPHIL: 0.01 K/CU MM
TOTAL NUCLEATED RBC: 0 K/CU MM
TOTAL PROTEIN: 6 GM/DL (ref 6.4–8.2)
WBC # BLD: 4.4 K/CU MM (ref 4–10.5)

## 2017-07-26 LAB
ALBUMIN SERPL-MCNC: 3.1 GM/DL (ref 3.4–5)
ALP BLD-CCNC: 79 IU/L (ref 40–129)
ALT SERPL-CCNC: <5 U/L (ref 10–40)
ANION GAP SERPL CALCULATED.3IONS-SCNC: 8 MMOL/L (ref 4–16)
AST SERPL-CCNC: 11 IU/L (ref 15–37)
BASOPHILS ABSOLUTE: 0 K/CU MM
BASOPHILS RELATIVE PERCENT: 0.4 % (ref 0–1)
BILIRUB SERPL-MCNC: 0.2 MG/DL (ref 0–1)
BUN BLDV-MCNC: 12 MG/DL (ref 6–23)
CALCIUM SERPL-MCNC: 8 MG/DL (ref 8.3–10.6)
CHLORIDE BLD-SCNC: 104 MMOL/L (ref 99–110)
CO2: 30 MMOL/L (ref 21–32)
CREAT SERPL-MCNC: 1 MG/DL (ref 0.6–1.1)
DIFFERENTIAL TYPE: ABNORMAL
EOSINOPHILS ABSOLUTE: 0.3 K/CU MM
EOSINOPHILS RELATIVE PERCENT: 5.6 % (ref 0–3)
GFR AFRICAN AMERICAN: >60 ML/MIN/1.73M2
GFR NON-AFRICAN AMERICAN: 54 ML/MIN/1.73M2
GLUCOSE BLD-MCNC: 88 MG/DL (ref 70–140)
HCT VFR BLD CALC: 28.8 % (ref 37–47)
HEMOGLOBIN: 8.3 GM/DL (ref 12.5–16)
IMMATURE NEUTROPHIL %: 0.2 % (ref 0–0.43)
LYMPHOCYTES ABSOLUTE: 0.9 K/CU MM
LYMPHOCYTES RELATIVE PERCENT: 18.6 % (ref 24–44)
MCH RBC QN AUTO: 27.8 PG (ref 27–31)
MCHC RBC AUTO-ENTMCNC: 28.8 % (ref 32–36)
MCV RBC AUTO: 96.3 FL (ref 78–100)
MONOCYTES ABSOLUTE: 0.5 K/CU MM
MONOCYTES RELATIVE PERCENT: 9.8 % (ref 0–4)
NUCLEATED RBC %: 0 %
PDW BLD-RTO: 13.8 % (ref 11.7–14.9)
PLATELET # BLD: 185 K/CU MM (ref 140–440)
PMV BLD AUTO: 11.3 FL (ref 7.5–11.1)
POTASSIUM SERPL-SCNC: 4.2 MMOL/L (ref 3.5–5.1)
RBC # BLD: 2.99 M/CU MM (ref 4.2–5.4)
SEGMENTED NEUTROPHILS ABSOLUTE COUNT: 3.1 K/CU MM
SEGMENTED NEUTROPHILS RELATIVE PERCENT: 65.4 % (ref 36–66)
SODIUM BLD-SCNC: 142 MMOL/L (ref 135–145)
TOTAL IMMATURE NEUTOROPHIL: 0.01 K/CU MM
TOTAL NUCLEATED RBC: 0 K/CU MM
TOTAL PROTEIN: 5.3 GM/DL (ref 6.4–8.2)
WBC # BLD: 4.8 K/CU MM (ref 4–10.5)

## 2017-08-01 ENCOUNTER — HOSPITAL ENCOUNTER (OUTPATIENT)
Dept: OTHER | Age: 77
Discharge: HOME | End: 2017-08-31
Attending: INTERNAL MEDICINE | Admitting: INTERNAL MEDICINE

## 2017-08-02 LAB — TSH HIGH SENSITIVITY: 2.54 UIU/ML (ref 0.27–4.2)

## 2017-08-09 LAB
ALBUMIN SERPL-MCNC: 3.3 GM/DL (ref 3.4–5)
ALP BLD-CCNC: 83 IU/L (ref 40–129)
ALT SERPL-CCNC: 6 U/L (ref 10–40)
ANION GAP SERPL CALCULATED.3IONS-SCNC: 12 MMOL/L (ref 4–16)
AST SERPL-CCNC: 15 IU/L (ref 15–37)
BASOPHILS ABSOLUTE: 0 K/CU MM
BASOPHILS RELATIVE PERCENT: 0.5 % (ref 0–1)
BILIRUB SERPL-MCNC: 0.1 MG/DL (ref 0–1)
BUN BLDV-MCNC: 12 MG/DL (ref 6–23)
CALCIUM SERPL-MCNC: 8.1 MG/DL (ref 8.3–10.6)
CHLORIDE BLD-SCNC: 101 MMOL/L (ref 99–110)
CO2: 29 MMOL/L (ref 21–32)
CREAT SERPL-MCNC: 1 MG/DL (ref 0.6–1.1)
DIFFERENTIAL TYPE: ABNORMAL
EOSINOPHILS ABSOLUTE: 0.3 K/CU MM
EOSINOPHILS RELATIVE PERCENT: 5.8 % (ref 0–3)
GFR AFRICAN AMERICAN: >60 ML/MIN/1.73M2
GFR NON-AFRICAN AMERICAN: 54 ML/MIN/1.73M2
GLUCOSE BLD-MCNC: 121 MG/DL (ref 70–140)
HCT VFR BLD CALC: 30.4 % (ref 37–47)
HEMOGLOBIN: 8.7 GM/DL (ref 12.5–16)
IMMATURE NEUTROPHIL %: 0.2 % (ref 0–0.43)
LYMPHOCYTES ABSOLUTE: 0.8 K/CU MM
LYMPHOCYTES RELATIVE PERCENT: 18.8 % (ref 24–44)
MCH RBC QN AUTO: 27.4 PG (ref 27–31)
MCHC RBC AUTO-ENTMCNC: 28.6 % (ref 32–36)
MCV RBC AUTO: 95.6 FL (ref 78–100)
MONOCYTES ABSOLUTE: 0.6 K/CU MM
MONOCYTES RELATIVE PERCENT: 13.7 % (ref 0–4)
NUCLEATED RBC %: 0 %
PDW BLD-RTO: 13.3 % (ref 11.7–14.9)
PLATELET # BLD: 181 K/CU MM (ref 140–440)
PMV BLD AUTO: 11.3 FL (ref 7.5–11.1)
POTASSIUM SERPL-SCNC: 4.3 MMOL/L (ref 3.5–5.1)
RBC # BLD: 3.18 M/CU MM (ref 4.2–5.4)
SEGMENTED NEUTROPHILS ABSOLUTE COUNT: 2.6 K/CU MM
SEGMENTED NEUTROPHILS RELATIVE PERCENT: 61 % (ref 36–66)
SODIUM BLD-SCNC: 142 MMOL/L (ref 135–145)
TOTAL IMMATURE NEUTOROPHIL: 0.01 K/CU MM
TOTAL NUCLEATED RBC: 0 K/CU MM
TOTAL PROTEIN: 5.6 GM/DL (ref 6.4–8.2)
WBC # BLD: 4.3 K/CU MM (ref 4–10.5)

## 2017-08-23 LAB
ALBUMIN SERPL-MCNC: 3.3 GM/DL (ref 3.4–5)
ALP BLD-CCNC: 84 IU/L (ref 40–128)
ALT SERPL-CCNC: 5 U/L (ref 10–40)
ANION GAP SERPL CALCULATED.3IONS-SCNC: 10 MMOL/L (ref 4–16)
AST SERPL-CCNC: 13 IU/L (ref 15–37)
BASOPHILS ABSOLUTE: 0 K/CU MM
BASOPHILS RELATIVE PERCENT: 0.2 % (ref 0–1)
BILIRUB SERPL-MCNC: 0.2 MG/DL (ref 0–1)
BUN BLDV-MCNC: 13 MG/DL (ref 6–23)
CALCIUM SERPL-MCNC: 8 MG/DL (ref 8.3–10.6)
CHLORIDE BLD-SCNC: 103 MMOL/L (ref 99–110)
CO2: 28 MMOL/L (ref 21–32)
CREAT SERPL-MCNC: 1 MG/DL (ref 0.6–1.1)
DIFFERENTIAL TYPE: ABNORMAL
EOSINOPHILS ABSOLUTE: 0.3 K/CU MM
EOSINOPHILS RELATIVE PERCENT: 5.6 % (ref 0–3)
GFR AFRICAN AMERICAN: >60 ML/MIN/1.73M2
GFR NON-AFRICAN AMERICAN: 54 ML/MIN/1.73M2
GLUCOSE BLD-MCNC: 88 MG/DL (ref 70–140)
HCT VFR BLD CALC: 32 % (ref 37–47)
HEMOGLOBIN: 9.3 GM/DL (ref 12.5–16)
IMMATURE NEUTROPHIL %: 0.4 % (ref 0–0.43)
LYMPHOCYTES ABSOLUTE: 1 K/CU MM
LYMPHOCYTES RELATIVE PERCENT: 22.2 % (ref 24–44)
MCH RBC QN AUTO: 27.6 PG (ref 27–31)
MCHC RBC AUTO-ENTMCNC: 29.1 % (ref 32–36)
MCV RBC AUTO: 95 FL (ref 78–100)
MONOCYTES ABSOLUTE: 0.5 K/CU MM
MONOCYTES RELATIVE PERCENT: 10.6 % (ref 0–4)
NUCLEATED RBC %: 0 %
PDW BLD-RTO: 13.2 % (ref 11.7–14.9)
PLATELET # BLD: 179 K/CU MM (ref 140–440)
PMV BLD AUTO: 11.4 FL (ref 7.5–11.1)
POTASSIUM SERPL-SCNC: 4.3 MMOL/L (ref 3.5–5.1)
RBC # BLD: 3.37 M/CU MM (ref 4.2–5.4)
SEGMENTED NEUTROPHILS ABSOLUTE COUNT: 2.8 K/CU MM
SEGMENTED NEUTROPHILS RELATIVE PERCENT: 61 % (ref 36–66)
SODIUM BLD-SCNC: 141 MMOL/L (ref 135–145)
TOTAL IMMATURE NEUTOROPHIL: 0.02 K/CU MM
TOTAL NUCLEATED RBC: 0 K/CU MM
TOTAL PROTEIN: 5.9 GM/DL (ref 6.4–8.2)
WBC # BLD: 4.6 K/CU MM (ref 4–10.5)

## 2017-09-01 ENCOUNTER — HOSPITAL ENCOUNTER (OUTPATIENT)
Dept: OTHER | Age: 77
Discharge: HOME | End: 2017-09-30
Attending: INTERNAL MEDICINE | Admitting: INTERNAL MEDICINE

## 2017-09-06 LAB
ALBUMIN SERPL-MCNC: 3.1 GM/DL (ref 3.4–5)
ALP BLD-CCNC: 84 IU/L (ref 40–129)
ALT SERPL-CCNC: <5 U/L (ref 10–40)
ANION GAP SERPL CALCULATED.3IONS-SCNC: 9 MMOL/L (ref 4–16)
ANISOCYTOSIS: ABNORMAL
AST SERPL-CCNC: 12 IU/L (ref 15–37)
BASOPHILS ABSOLUTE: 0 K/CU MM
BASOPHILS RELATIVE PERCENT: 0.2 % (ref 0–1)
BILIRUB SERPL-MCNC: 0.3 MG/DL (ref 0–1)
BUN BLDV-MCNC: 14 MG/DL (ref 6–23)
CALCIUM SERPL-MCNC: 8.1 MG/DL (ref 8.3–10.6)
CHLORIDE BLD-SCNC: 104 MMOL/L (ref 99–110)
CO2: 30 MMOL/L (ref 21–32)
CREAT SERPL-MCNC: 1 MG/DL (ref 0.6–1.1)
DIFFERENTIAL TYPE: ABNORMAL
EOSINOPHILS ABSOLUTE: 0.3 K/CU MM
EOSINOPHILS RELATIVE PERCENT: 5.9 % (ref 0–3)
GFR AFRICAN AMERICAN: >60 ML/MIN/1.73M2
GFR NON-AFRICAN AMERICAN: 54 ML/MIN/1.73M2
GLUCOSE BLD-MCNC: 84 MG/DL (ref 70–140)
HCT VFR BLD CALC: 30.2 % (ref 37–47)
HEMOGLOBIN: 8.6 GM/DL (ref 12.5–16)
HYPOCHROMIA: ABNORMAL
IMMATURE NEUTROPHIL %: 0.4 % (ref 0–0.43)
LYMPHOCYTES ABSOLUTE: 1 K/CU MM
LYMPHOCYTES RELATIVE PERCENT: 19.5 % (ref 24–44)
MCH RBC QN AUTO: 27.2 PG (ref 27–31)
MCHC RBC AUTO-ENTMCNC: 28.5 % (ref 32–36)
MCV RBC AUTO: 95.6 FL (ref 78–100)
MONOCYTES ABSOLUTE: 0.6 K/CU MM
MONOCYTES RELATIVE PERCENT: 12 % (ref 0–4)
PDW BLD-RTO: 13.3 % (ref 11.7–14.9)
PLATELET # BLD: 161 K/CU MM (ref 140–440)
PMV BLD AUTO: 11.3 FL (ref 7.5–11.1)
POTASSIUM SERPL-SCNC: 4.3 MMOL/L (ref 3.5–5.1)
RBC # BLD: 3.16 M/CU MM (ref 4.2–5.4)
RBC # BLD: ABNORMAL 10*6/UL
SEGMENTED NEUTROPHILS ABSOLUTE COUNT: 3.2 K/CU MM
SEGMENTED NEUTROPHILS RELATIVE PERCENT: 62 % (ref 36–66)
SODIUM BLD-SCNC: 143 MMOL/L (ref 135–145)
TOTAL IMMATURE NEUTOROPHIL: 0.02 K/CU MM
TOTAL PROTEIN: 5.5 GM/DL (ref 6.4–8.2)
TSH HIGH SENSITIVITY: 1.04 UIU/ML (ref 0.27–4.2)
WBC # BLD: 5.1 K/CU MM (ref 4–10.5)

## 2017-10-01 ENCOUNTER — HOSPITAL ENCOUNTER (OUTPATIENT)
Dept: OTHER | Age: 77
Discharge: HOME | End: 2017-10-31
Attending: INTERNAL MEDICINE | Admitting: INTERNAL MEDICINE

## 2017-10-04 LAB
ALBUMIN SERPL-MCNC: 3.8 GM/DL (ref 3.4–5)
ALP BLD-CCNC: 99 IU/L (ref 40–129)
ALT SERPL-CCNC: 6 U/L (ref 10–40)
ANION GAP SERPL CALCULATED.3IONS-SCNC: 14 MMOL/L (ref 4–16)
AST SERPL-CCNC: 17 IU/L (ref 15–37)
BASOPHILS ABSOLUTE: 0 K/CU MM
BASOPHILS RELATIVE PERCENT: 0.4 % (ref 0–1)
BILIRUB SERPL-MCNC: 0.3 MG/DL (ref 0–1)
BUN BLDV-MCNC: 16 MG/DL (ref 6–23)
CALCIUM SERPL-MCNC: 8.3 MG/DL (ref 8.3–10.6)
CHLORIDE BLD-SCNC: 101 MMOL/L (ref 99–110)
CO2: 26 MMOL/L (ref 21–32)
CREAT SERPL-MCNC: 1 MG/DL (ref 0.6–1.1)
DIFFERENTIAL TYPE: ABNORMAL
EOSINOPHILS ABSOLUTE: 0.3 K/CU MM
EOSINOPHILS RELATIVE PERCENT: 5.5 % (ref 0–3)
GFR AFRICAN AMERICAN: >60 ML/MIN/1.73M2
GFR NON-AFRICAN AMERICAN: 54 ML/MIN/1.73M2
GLUCOSE BLD-MCNC: 97 MG/DL (ref 70–140)
HCT VFR BLD CALC: 30.4 % (ref 37–47)
HEMOGLOBIN: 9.1 GM/DL (ref 12.5–16)
IMMATURE NEUTROPHIL %: 0.2 % (ref 0–0.43)
LYMPHOCYTES ABSOLUTE: 0.8 K/CU MM
LYMPHOCYTES RELATIVE PERCENT: 14.3 % (ref 24–44)
MCH RBC QN AUTO: 29 PG (ref 27–31)
MCHC RBC AUTO-ENTMCNC: 29.9 % (ref 32–36)
MCV RBC AUTO: 96.8 FL (ref 78–100)
MONOCYTES ABSOLUTE: 0.5 K/CU MM
MONOCYTES RELATIVE PERCENT: 8.5 % (ref 0–4)
NUCLEATED RBC %: 0 %
PDW BLD-RTO: 14 % (ref 11.7–14.9)
PLATELET # BLD: 188 K/CU MM (ref 140–440)
PMV BLD AUTO: 11.3 FL (ref 7.5–11.1)
POTASSIUM SERPL-SCNC: 4.2 MMOL/L (ref 3.5–5.1)
RBC # BLD: 3.14 M/CU MM (ref 4.2–5.4)
SEGMENTED NEUTROPHILS ABSOLUTE COUNT: 4.1 K/CU MM
SEGMENTED NEUTROPHILS RELATIVE PERCENT: 71.1 % (ref 36–66)
SODIUM BLD-SCNC: 141 MMOL/L (ref 135–145)
TOTAL IMMATURE NEUTOROPHIL: 0.01 K/CU MM
TOTAL NUCLEATED RBC: 0 K/CU MM
TOTAL PROTEIN: 6.3 GM/DL (ref 6.4–8.2)
TSH HIGH SENSITIVITY: 2.2 UIU/ML (ref 0.27–4.2)
WBC # BLD: 5.7 K/CU MM (ref 4–10.5)

## 2017-10-18 LAB
ALBUMIN SERPL-MCNC: 3.7 GM/DL (ref 3.4–5)
ALP BLD-CCNC: 93 IU/L (ref 40–128)
ALT SERPL-CCNC: <5 U/L (ref 10–40)
ANION GAP SERPL CALCULATED.3IONS-SCNC: 12 MMOL/L (ref 4–16)
AST SERPL-CCNC: 12 IU/L (ref 15–37)
BASOPHILS ABSOLUTE: 0 K/CU MM
BASOPHILS RELATIVE PERCENT: 0.2 % (ref 0–1)
BILIRUB SERPL-MCNC: 0.2 MG/DL (ref 0–1)
BUN BLDV-MCNC: 18 MG/DL (ref 6–23)
CALCIUM SERPL-MCNC: 8.2 MG/DL (ref 8.3–10.6)
CHLORIDE BLD-SCNC: 101 MMOL/L (ref 99–110)
CO2: 29 MMOL/L (ref 21–32)
CREAT SERPL-MCNC: 1.1 MG/DL (ref 0.6–1.1)
DIFFERENTIAL TYPE: ABNORMAL
EOSINOPHILS ABSOLUTE: 0.3 K/CU MM
EOSINOPHILS RELATIVE PERCENT: 5.3 % (ref 0–3)
GFR AFRICAN AMERICAN: 58 ML/MIN/1.73M2
GFR NON-AFRICAN AMERICAN: 48 ML/MIN/1.73M2
GLUCOSE BLD-MCNC: 98 MG/DL (ref 70–140)
HCT VFR BLD CALC: 30.4 % (ref 37–47)
HEMOGLOBIN: 8.8 GM/DL (ref 12.5–16)
IMMATURE NEUTROPHIL %: 0.3 % (ref 0–0.43)
LYMPHOCYTES ABSOLUTE: 0.9 K/CU MM
LYMPHOCYTES RELATIVE PERCENT: 14.4 % (ref 24–44)
MCH RBC QN AUTO: 28.4 PG (ref 27–31)
MCHC RBC AUTO-ENTMCNC: 28.9 % (ref 32–36)
MCV RBC AUTO: 98.1 FL (ref 78–100)
MONOCYTES ABSOLUTE: 0.6 K/CU MM
MONOCYTES RELATIVE PERCENT: 9.3 % (ref 0–4)
NUCLEATED RBC %: 0 %
PDW BLD-RTO: 13.7 % (ref 11.7–14.9)
PLATELET # BLD: 199 K/CU MM (ref 140–440)
PMV BLD AUTO: 11.3 FL (ref 7.5–11.1)
POTASSIUM SERPL-SCNC: 4.1 MMOL/L (ref 3.5–5.1)
RBC # BLD: 3.1 M/CU MM (ref 4.2–5.4)
SEGMENTED NEUTROPHILS ABSOLUTE COUNT: 4.2 K/CU MM
SEGMENTED NEUTROPHILS RELATIVE PERCENT: 70.5 % (ref 36–66)
SODIUM BLD-SCNC: 142 MMOL/L (ref 135–145)
TOTAL IMMATURE NEUTOROPHIL: 0.02 K/CU MM
TOTAL NUCLEATED RBC: 0 K/CU MM
TOTAL PROTEIN: 6.1 GM/DL (ref 6.4–8.2)
WBC # BLD: 5.9 K/CU MM (ref 4–10.5)

## 2017-11-01 ENCOUNTER — HOSPITAL ENCOUNTER (OUTPATIENT)
Dept: OTHER | Age: 77
Discharge: HOME | End: 2017-11-30
Attending: INTERNAL MEDICINE | Admitting: INTERNAL MEDICINE

## 2017-11-01 ENCOUNTER — HOSPITAL ENCOUNTER (OUTPATIENT)
Dept: OTHER | Age: 77
Discharge: HOME | End: 2017-11-01
Attending: INTERNAL MEDICINE | Admitting: INTERNAL MEDICINE

## 2017-11-01 LAB
ALBUMIN SERPL-MCNC: 3.5 GM/DL (ref 3.4–5)
ALP BLD-CCNC: 88 IU/L (ref 40–128)
ALT SERPL-CCNC: <5 U/L (ref 10–40)
ANION GAP SERPL CALCULATED.3IONS-SCNC: 11 MMOL/L (ref 4–16)
AST SERPL-CCNC: 12 IU/L (ref 15–37)
BASOPHILS ABSOLUTE: 0 K/CU MM
BASOPHILS RELATIVE PERCENT: 0.2 % (ref 0–1)
BILIRUB SERPL-MCNC: 0.2 MG/DL (ref 0–1)
BUN BLDV-MCNC: 18 MG/DL (ref 6–23)
CALCIUM SERPL-MCNC: 7.9 MG/DL (ref 8.3–10.6)
CHLORIDE BLD-SCNC: 100 MMOL/L (ref 99–110)
CO2: 32 MMOL/L (ref 21–32)
CREAT SERPL-MCNC: 1.1 MG/DL (ref 0.6–1.1)
DIFFERENTIAL TYPE: ABNORMAL
EOSINOPHILS ABSOLUTE: 0.4 K/CU MM
EOSINOPHILS RELATIVE PERCENT: 6.6 % (ref 0–3)
GFR AFRICAN AMERICAN: 58 ML/MIN/1.73M2
GFR NON-AFRICAN AMERICAN: 48 ML/MIN/1.73M2
GLUCOSE BLD-MCNC: 133 MG/DL (ref 70–140)
HCT VFR BLD CALC: 29.2 % (ref 37–47)
HEMOGLOBIN: 8.3 GM/DL (ref 12.5–16)
IMMATURE NEUTROPHIL %: 0.2 % (ref 0–0.43)
LYMPHOCYTES ABSOLUTE: 0.9 K/CU MM
LYMPHOCYTES RELATIVE PERCENT: 16.5 % (ref 24–44)
MCH RBC QN AUTO: 28.3 PG (ref 27–31)
MCHC RBC AUTO-ENTMCNC: 28.4 % (ref 32–36)
MCV RBC AUTO: 99.7 FL (ref 78–100)
MONOCYTES ABSOLUTE: 0.7 K/CU MM
MONOCYTES RELATIVE PERCENT: 12.2 % (ref 0–4)
NUCLEATED RBC %: 0 %
PDW BLD-RTO: 13.1 % (ref 11.7–14.9)
PLATELET # BLD: 195 K/CU MM (ref 140–440)
PMV BLD AUTO: 11.1 FL (ref 7.5–11.1)
POTASSIUM SERPL-SCNC: 4.3 MMOL/L (ref 3.5–5.1)
RBC # BLD: 2.93 M/CU MM (ref 4.2–5.4)
SEGMENTED NEUTROPHILS ABSOLUTE COUNT: 3.4 K/CU MM
SEGMENTED NEUTROPHILS RELATIVE PERCENT: 64.3 % (ref 36–66)
SODIUM BLD-SCNC: 143 MMOL/L (ref 135–145)
TOTAL IMMATURE NEUTOROPHIL: 0.01 K/CU MM
TOTAL NUCLEATED RBC: 0 K/CU MM
TOTAL PROTEIN: 5.9 GM/DL (ref 6.4–8.2)
TSH HIGH SENSITIVITY: 1.53 UIU/ML (ref 0.27–4.2)
WBC # BLD: 5.3 K/CU MM (ref 4–10.5)

## 2017-11-15 LAB
ALBUMIN SERPL-MCNC: 3.3 GM/DL (ref 3.4–5)
ALP BLD-CCNC: 87 IU/L (ref 40–128)
ALT SERPL-CCNC: 8 U/L (ref 10–40)
ANION GAP SERPL CALCULATED.3IONS-SCNC: 11 MMOL/L (ref 4–16)
AST SERPL-CCNC: 11 IU/L (ref 15–37)
BASOPHILS ABSOLUTE: 0 K/CU MM
BASOPHILS RELATIVE PERCENT: 0.1 % (ref 0–1)
BILIRUB SERPL-MCNC: 0.3 MG/DL (ref 0–1)
BUN BLDV-MCNC: 12 MG/DL (ref 6–23)
CALCIUM SERPL-MCNC: 8 MG/DL (ref 8.3–10.6)
CHLORIDE BLD-SCNC: 102 MMOL/L (ref 99–110)
CO2: 31 MMOL/L (ref 21–32)
CREAT SERPL-MCNC: 0.9 MG/DL (ref 0.6–1.1)
DIFFERENTIAL TYPE: ABNORMAL
EOSINOPHILS ABSOLUTE: 0.4 K/CU MM
EOSINOPHILS RELATIVE PERCENT: 6.1 % (ref 0–3)
GFR AFRICAN AMERICAN: >60 ML/MIN/1.73M2
GFR NON-AFRICAN AMERICAN: >60 ML/MIN/1.73M2
GLUCOSE BLD-MCNC: 99 MG/DL (ref 70–140)
HCT VFR BLD CALC: 26.3 % (ref 37–47)
HEMOGLOBIN: 7.6 GM/DL (ref 12.5–16)
IMMATURE NEUTROPHIL %: 0.1 % (ref 0–0.43)
LYMPHOCYTES ABSOLUTE: 0.6 K/CU MM
LYMPHOCYTES RELATIVE PERCENT: 9.5 % (ref 24–44)
MCH RBC QN AUTO: 27.4 PG (ref 27–31)
MCHC RBC AUTO-ENTMCNC: 28.9 % (ref 32–36)
MCV RBC AUTO: 94.9 FL (ref 78–100)
MONOCYTES ABSOLUTE: 1 K/CU MM
MONOCYTES RELATIVE PERCENT: 14.8 % (ref 0–4)
NUCLEATED RBC %: 0 %
PDW BLD-RTO: 13.4 % (ref 11.7–14.9)
PLATELET # BLD: 244 K/CU MM (ref 140–440)
PMV BLD AUTO: 11.2 FL (ref 7.5–11.1)
POTASSIUM SERPL-SCNC: 4.1 MMOL/L (ref 3.5–5.1)
RBC # BLD: 2.77 M/CU MM (ref 4.2–5.4)
SEGMENTED NEUTROPHILS ABSOLUTE COUNT: 4.7 K/CU MM
SEGMENTED NEUTROPHILS RELATIVE PERCENT: 69.4 % (ref 36–66)
SODIUM BLD-SCNC: 144 MMOL/L (ref 135–145)
TOTAL IMMATURE NEUTOROPHIL: 0.01 K/CU MM
TOTAL NUCLEATED RBC: 0 K/CU MM
TOTAL PROTEIN: 6 GM/DL (ref 6.4–8.2)
WBC # BLD: 6.7 K/CU MM (ref 4–10.5)

## 2017-11-20 PROBLEM — J18.9 PNEUMONIA: Status: ACTIVE | Noted: 2017-11-20

## 2017-11-29 LAB
ALBUMIN SERPL-MCNC: 3.3 GM/DL (ref 3.4–5)
ALP BLD-CCNC: 78 IU/L (ref 40–128)
ALT SERPL-CCNC: 6 U/L (ref 10–40)
ANION GAP SERPL CALCULATED.3IONS-SCNC: 12 MMOL/L (ref 4–16)
AST SERPL-CCNC: 15 IU/L (ref 15–37)
BASOPHILS ABSOLUTE: 0 K/CU MM
BASOPHILS RELATIVE PERCENT: 0.3 % (ref 0–1)
BILIRUB SERPL-MCNC: 0.2 MG/DL (ref 0–1)
BUN BLDV-MCNC: 15 MG/DL (ref 6–23)
CALCIUM SERPL-MCNC: 7.7 MG/DL (ref 8.3–10.6)
CHLORIDE BLD-SCNC: 99 MMOL/L (ref 99–110)
CO2: 32 MMOL/L (ref 21–32)
CREAT SERPL-MCNC: 1 MG/DL (ref 0.6–1.1)
DIFFERENTIAL TYPE: ABNORMAL
EOSINOPHILS ABSOLUTE: 0.5 K/CU MM
EOSINOPHILS RELATIVE PERCENT: 5.8 % (ref 0–3)
GFR AFRICAN AMERICAN: >60 ML/MIN/1.73M2
GFR NON-AFRICAN AMERICAN: 54 ML/MIN/1.73M2
GLUCOSE BLD-MCNC: 127 MG/DL (ref 70–99)
HCT VFR BLD CALC: 30.1 % (ref 37–47)
HEMOGLOBIN: 8.4 GM/DL (ref 12.5–16)
IMMATURE NEUTROPHIL %: 0.4 % (ref 0–0.43)
LYMPHOCYTES ABSOLUTE: 0.8 K/CU MM
LYMPHOCYTES RELATIVE PERCENT: 9.9 % (ref 24–44)
MCH RBC QN AUTO: 26.3 PG (ref 27–31)
MCHC RBC AUTO-ENTMCNC: 27.9 % (ref 32–36)
MCV RBC AUTO: 94.4 FL (ref 78–100)
MONOCYTES ABSOLUTE: 0.9 K/CU MM
MONOCYTES RELATIVE PERCENT: 11.3 % (ref 0–4)
NUCLEATED RBC %: 0 %
PDW BLD-RTO: 15.5 % (ref 11.7–14.9)
PLATELET # BLD: 254 K/CU MM (ref 140–440)
PMV BLD AUTO: 11.7 FL (ref 7.5–11.1)
POTASSIUM SERPL-SCNC: 4 MMOL/L (ref 3.5–5.1)
RBC # BLD: 3.19 M/CU MM (ref 4.2–5.4)
SEGMENTED NEUTROPHILS ABSOLUTE COUNT: 5.7 K/CU MM
SEGMENTED NEUTROPHILS RELATIVE PERCENT: 72.3 % (ref 36–66)
SODIUM BLD-SCNC: 143 MMOL/L (ref 135–145)
TOTAL IMMATURE NEUTOROPHIL: 0.03 K/CU MM
TOTAL NUCLEATED RBC: 0 K/CU MM
TOTAL PROTEIN: 5.7 GM/DL (ref 6.4–8.2)
WBC # BLD: 7.9 K/CU MM (ref 4–10.5)

## 2017-12-01 ENCOUNTER — HOSPITAL ENCOUNTER (OUTPATIENT)
Dept: OTHER | Age: 77
Discharge: HOME | End: 2017-12-31
Attending: INTERNAL MEDICINE | Admitting: INTERNAL MEDICINE

## 2017-12-01 ENCOUNTER — HOSPITAL ENCOUNTER (OUTPATIENT)
Dept: OTHER | Age: 77
Discharge: HOME | End: 2017-12-01
Attending: INTERNAL MEDICINE | Admitting: INTERNAL MEDICINE

## 2017-12-06 LAB — TSH HIGH SENSITIVITY: 1.12 UIU/ML (ref 0.27–4.2)

## 2017-12-13 LAB
ALBUMIN SERPL-MCNC: 3.1 GM/DL (ref 3.4–5)
ALP BLD-CCNC: 76 IU/L (ref 40–128)
ALT SERPL-CCNC: <5 U/L (ref 10–40)
ANION GAP SERPL CALCULATED.3IONS-SCNC: 11 MMOL/L (ref 4–16)
AST SERPL-CCNC: 10 IU/L (ref 15–37)
BASOPHILS ABSOLUTE: 0 K/CU MM
BASOPHILS RELATIVE PERCENT: 0.5 % (ref 0–1)
BILIRUB SERPL-MCNC: 0.4 MG/DL (ref 0–1)
BUN BLDV-MCNC: 19 MG/DL (ref 6–23)
CALCIUM SERPL-MCNC: 8.1 MG/DL (ref 8.3–10.6)
CHLORIDE BLD-SCNC: 100 MMOL/L (ref 99–110)
CO2: 31 MMOL/L (ref 21–32)
CREAT SERPL-MCNC: 1.1 MG/DL (ref 0.6–1.1)
DIFFERENTIAL TYPE: ABNORMAL
EOSINOPHILS ABSOLUTE: 0.5 K/CU MM
EOSINOPHILS RELATIVE PERCENT: 8.1 % (ref 0–3)
GFR AFRICAN AMERICAN: 58 ML/MIN/1.73M2
GFR NON-AFRICAN AMERICAN: 48 ML/MIN/1.73M2
GLUCOSE BLD-MCNC: 87 MG/DL (ref 70–99)
HCT VFR BLD CALC: 34 % (ref 37–47)
HEMOGLOBIN: 9.9 GM/DL (ref 12.5–16)
IMMATURE NEUTROPHIL %: 0.3 % (ref 0–0.43)
LYMPHOCYTES ABSOLUTE: 1 K/CU MM
LYMPHOCYTES RELATIVE PERCENT: 16.9 % (ref 24–44)
MCH RBC QN AUTO: 26.1 PG (ref 27–31)
MCHC RBC AUTO-ENTMCNC: 29.1 % (ref 32–36)
MCV RBC AUTO: 89.5 FL (ref 78–100)
MONOCYTES ABSOLUTE: 0.6 K/CU MM
MONOCYTES RELATIVE PERCENT: 10.5 % (ref 0–4)
NUCLEATED RBC %: 0 %
PDW BLD-RTO: 14.8 % (ref 11.7–14.9)
PLATELET # BLD: 149 K/CU MM (ref 140–440)
PMV BLD AUTO: 12.3 FL (ref 7.5–11.1)
POTASSIUM SERPL-SCNC: 3.4 MMOL/L (ref 3.5–5.1)
RBC # BLD: 3.8 M/CU MM (ref 4.2–5.4)
SEGMENTED NEUTROPHILS ABSOLUTE COUNT: 3.8 K/CU MM
SEGMENTED NEUTROPHILS RELATIVE PERCENT: 63.7 % (ref 36–66)
SODIUM BLD-SCNC: 142 MMOL/L (ref 135–145)
TOTAL IMMATURE NEUTOROPHIL: 0.02 K/CU MM
TOTAL NUCLEATED RBC: 0 K/CU MM
TOTAL PROTEIN: 5.5 GM/DL (ref 6.4–8.2)
WBC # BLD: 5.9 K/CU MM (ref 4–10.5)

## 2017-12-22 PROBLEM — I50.33 ACUTE ON CHRONIC DIASTOLIC ACC/AHA STAGE C CONGESTIVE HEART FAILURE (HCC): Status: ACTIVE | Noted: 2017-12-22

## 2017-12-27 LAB
ALBUMIN SERPL-MCNC: 3.3 GM/DL (ref 3.4–5)
ALP BLD-CCNC: 78 IU/L (ref 40–128)
ALT SERPL-CCNC: <5 U/L (ref 10–40)
ANION GAP SERPL CALCULATED.3IONS-SCNC: 11 MMOL/L (ref 4–16)
ANISOCYTOSIS: ABNORMAL
AST SERPL-CCNC: 14 IU/L (ref 15–37)
BASOPHILS ABSOLUTE: 0 K/CU MM
BASOPHILS RELATIVE PERCENT: 0.2 % (ref 0–1)
BILIRUB SERPL-MCNC: 0.3 MG/DL (ref 0–1)
BUN BLDV-MCNC: 16 MG/DL (ref 6–23)
CALCIUM SERPL-MCNC: 8.1 MG/DL (ref 8.3–10.6)
CHLORIDE BLD-SCNC: 100 MMOL/L (ref 99–110)
CO2: 29 MMOL/L (ref 21–32)
CREAT SERPL-MCNC: 1.2 MG/DL (ref 0.6–1.1)
DIFFERENTIAL TYPE: ABNORMAL
EOSINOPHILS ABSOLUTE: 0.4 K/CU MM
EOSINOPHILS RELATIVE PERCENT: 7.5 % (ref 0–3)
GFR AFRICAN AMERICAN: 53 ML/MIN/1.73M2
GFR NON-AFRICAN AMERICAN: 44 ML/MIN/1.73M2
GLUCOSE BLD-MCNC: 90 MG/DL (ref 70–99)
HCT VFR BLD CALC: 34.9 % (ref 37–47)
HEMOGLOBIN: 9.7 GM/DL (ref 12.5–16)
IMMATURE NEUTROPHIL %: 0.4 % (ref 0–0.43)
LYMPHOCYTES ABSOLUTE: 0.8 K/CU MM
LYMPHOCYTES RELATIVE PERCENT: 14.9 % (ref 24–44)
MCH RBC QN AUTO: 25.6 PG (ref 27–31)
MCHC RBC AUTO-ENTMCNC: 27.8 % (ref 32–36)
MCV RBC AUTO: 92.1 FL (ref 78–100)
MONOCYTES ABSOLUTE: 0.6 K/CU MM
MONOCYTES RELATIVE PERCENT: 11.6 % (ref 0–4)
NUCLEATED RBC %: 0 %
OVALOCYTES: ABNORMAL
PDW BLD-RTO: 15 % (ref 11.7–14.9)
PLATELET # BLD: 256 K/CU MM (ref 140–440)
PMV BLD AUTO: 11.5 FL (ref 7.5–11.1)
POLYCHROMASIA: ABNORMAL
POTASSIUM SERPL-SCNC: 4.5 MMOL/L (ref 3.5–5.1)
RBC # BLD: 3.79 M/CU MM (ref 4.2–5.4)
SEGMENTED NEUTROPHILS ABSOLUTE COUNT: 3.3 K/CU MM
SEGMENTED NEUTROPHILS RELATIVE PERCENT: 65.4 % (ref 36–66)
SODIUM BLD-SCNC: 140 MMOL/L (ref 135–145)
TOTAL IMMATURE NEUTOROPHIL: 0.02 K/CU MM
TOTAL NUCLEATED RBC: 0 K/CU MM
TOTAL PROTEIN: 5.7 GM/DL (ref 6.4–8.2)
WBC # BLD: 5.1 K/CU MM (ref 4–10.5)

## 2018-01-01 ENCOUNTER — HOSPITAL ENCOUNTER (OUTPATIENT)
Dept: OTHER | Age: 78
Discharge: HOME | End: 2018-01-31
Attending: INTERNAL MEDICINE | Admitting: INTERNAL MEDICINE

## 2018-01-03 LAB — TSH HIGH SENSITIVITY: 0.93 UIU/ML (ref 0.27–4.2)

## 2018-01-10 LAB
ALBUMIN SERPL-MCNC: 3.3 GM/DL (ref 3.4–5)
ALP BLD-CCNC: 81 IU/L (ref 40–128)
ALT SERPL-CCNC: <5 U/L (ref 10–40)
ANION GAP SERPL CALCULATED.3IONS-SCNC: 11 MMOL/L (ref 4–16)
AST SERPL-CCNC: 10 IU/L (ref 15–37)
BASOPHILS ABSOLUTE: 0 K/CU MM
BASOPHILS RELATIVE PERCENT: 0.5 % (ref 0–1)
BILIRUB SERPL-MCNC: 0.3 MG/DL (ref 0–1)
BUN BLDV-MCNC: 16 MG/DL (ref 6–23)
CALCIUM SERPL-MCNC: 8.7 MG/DL (ref 8.3–10.6)
CHLORIDE BLD-SCNC: 106 MMOL/L (ref 99–110)
CO2: 27 MMOL/L (ref 21–32)
CREAT SERPL-MCNC: 1.2 MG/DL (ref 0.6–1.1)
DIFFERENTIAL TYPE: ABNORMAL
EOSINOPHILS ABSOLUTE: 0.4 K/CU MM
EOSINOPHILS RELATIVE PERCENT: 10 % (ref 0–3)
GFR AFRICAN AMERICAN: 53 ML/MIN/1.73M2
GFR NON-AFRICAN AMERICAN: 44 ML/MIN/1.73M2
GLUCOSE BLD-MCNC: 91 MG/DL (ref 70–99)
HCT VFR BLD CALC: 29.9 % (ref 37–47)
HEMOGLOBIN: 8.4 GM/DL (ref 12.5–16)
IMMATURE NEUTROPHIL %: 0.5 % (ref 0–0.43)
LYMPHOCYTES ABSOLUTE: 0.7 K/CU MM
LYMPHOCYTES RELATIVE PERCENT: 16.2 % (ref 24–44)
MCH RBC QN AUTO: 25.2 PG (ref 27–31)
MCHC RBC AUTO-ENTMCNC: 28.1 % (ref 32–36)
MCV RBC AUTO: 89.8 FL (ref 78–100)
MONOCYTES ABSOLUTE: 0.7 K/CU MM
MONOCYTES RELATIVE PERCENT: 15 % (ref 0–4)
NUCLEATED RBC %: 0 %
PDW BLD-RTO: 15.2 % (ref 11.7–14.9)
PLATELET # BLD: 208 K/CU MM (ref 140–440)
PMV BLD AUTO: 11.8 FL (ref 7.5–11.1)
POTASSIUM SERPL-SCNC: 4.8 MMOL/L (ref 3.5–5.1)
RBC # BLD: 3.33 M/CU MM (ref 4.2–5.4)
SEGMENTED NEUTROPHILS ABSOLUTE COUNT: 2.5 K/CU MM
SEGMENTED NEUTROPHILS RELATIVE PERCENT: 57.8 % (ref 36–66)
SODIUM BLD-SCNC: 144 MMOL/L (ref 135–145)
TOTAL IMMATURE NEUTOROPHIL: 0.02 K/CU MM
TOTAL NUCLEATED RBC: 0 K/CU MM
TOTAL PROTEIN: 5.6 GM/DL (ref 6.4–8.2)
WBC # BLD: 4.4 K/CU MM (ref 4–10.5)

## 2018-01-24 LAB
ALBUMIN SERPL-MCNC: 3.4 GM/DL (ref 3.4–5)
ALP BLD-CCNC: 67 IU/L (ref 40–129)
ALT SERPL-CCNC: <5 U/L (ref 10–40)
ANION GAP SERPL CALCULATED.3IONS-SCNC: 12 MMOL/L (ref 4–16)
ANISOCYTOSIS: ABNORMAL
AST SERPL-CCNC: 10 IU/L (ref 15–37)
BASOPHILS ABSOLUTE: 0 K/CU MM
BASOPHILS RELATIVE PERCENT: 0.4 % (ref 0–1)
BILIRUB SERPL-MCNC: 0.2 MG/DL (ref 0–1)
BUN BLDV-MCNC: 25 MG/DL (ref 6–23)
CALCIUM SERPL-MCNC: 8.5 MG/DL (ref 8.3–10.6)
CHLORIDE BLD-SCNC: 103 MMOL/L (ref 99–110)
CO2: 24 MMOL/L (ref 21–32)
CREAT SERPL-MCNC: 1.5 MG/DL (ref 0.6–1.1)
DIFFERENTIAL TYPE: ABNORMAL
EOSINOPHILS ABSOLUTE: 0.4 K/CU MM
EOSINOPHILS RELATIVE PERCENT: 8 % (ref 0–3)
GFR AFRICAN AMERICAN: 41 ML/MIN/1.73M2
GFR NON-AFRICAN AMERICAN: 34 ML/MIN/1.73M2
GLUCOSE BLD-MCNC: 97 MG/DL (ref 70–99)
HCT VFR BLD CALC: 30 % (ref 37–47)
HEMOGLOBIN: 8.5 GM/DL (ref 12.5–16)
HYPOCHROMIA: ABNORMAL
IMMATURE NEUTROPHIL %: 0.4 % (ref 0–0.43)
LYMPHOCYTES ABSOLUTE: 0.8 K/CU MM
LYMPHOCYTES RELATIVE PERCENT: 15 % (ref 24–44)
MCH RBC QN AUTO: 25.7 PG (ref 27–31)
MCHC RBC AUTO-ENTMCNC: 28.3 % (ref 32–36)
MCV RBC AUTO: 90.6 FL (ref 78–100)
MONOCYTES ABSOLUTE: 0.6 K/CU MM
MONOCYTES RELATIVE PERCENT: 12.8 % (ref 0–4)
NUCLEATED RBC %: 0 %
OVALOCYTES: ABNORMAL
PDW BLD-RTO: 15.3 % (ref 11.7–14.9)
PLATELET # BLD: 198 K/CU MM (ref 140–440)
PMV BLD AUTO: 12 FL (ref 7.5–11.1)
POLYCHROMASIA: ABNORMAL
POTASSIUM SERPL-SCNC: 4.8 MMOL/L (ref 3.5–5.1)
RBC # BLD: 3.31 M/CU MM (ref 4.2–5.4)
SEGMENTED NEUTROPHILS ABSOLUTE COUNT: 3.2 K/CU MM
SEGMENTED NEUTROPHILS RELATIVE PERCENT: 63.4 % (ref 36–66)
SODIUM BLD-SCNC: 139 MMOL/L (ref 135–145)
TOTAL IMMATURE NEUTOROPHIL: 0.02 K/CU MM
TOTAL NUCLEATED RBC: 0 K/CU MM
TOTAL PROTEIN: 5.4 GM/DL (ref 6.4–8.2)
WBC # BLD: 5 K/CU MM (ref 4–10.5)

## 2018-02-01 ENCOUNTER — HOSPITAL ENCOUNTER (OUTPATIENT)
Dept: OTHER | Age: 78
Discharge: HOME | End: 2018-02-28
Attending: INTERNAL MEDICINE | Admitting: INTERNAL MEDICINE

## 2018-02-07 LAB
ALBUMIN SERPL-MCNC: 3.5 GM/DL (ref 3.4–5)
ALP BLD-CCNC: 62 IU/L (ref 40–128)
ALT SERPL-CCNC: <5 U/L (ref 10–40)
ANION GAP SERPL CALCULATED.3IONS-SCNC: 13 MMOL/L (ref 4–16)
AST SERPL-CCNC: 10 IU/L (ref 15–37)
BASOPHILS ABSOLUTE: 0 K/CU MM
BASOPHILS RELATIVE PERCENT: 0.7 % (ref 0–1)
BILIRUB SERPL-MCNC: 0.2 MG/DL (ref 0–1)
BUN BLDV-MCNC: 22 MG/DL (ref 6–23)
CALCIUM SERPL-MCNC: 8.3 MG/DL (ref 8.3–10.6)
CHLORIDE BLD-SCNC: 102 MMOL/L (ref 99–110)
CO2: 26 MMOL/L (ref 21–32)
CREAT SERPL-MCNC: 1.3 MG/DL (ref 0.6–1.1)
DIFFERENTIAL TYPE: ABNORMAL
EOSINOPHILS ABSOLUTE: 0.3 K/CU MM
EOSINOPHILS RELATIVE PERCENT: 7.4 % (ref 0–3)
GFR AFRICAN AMERICAN: 48 ML/MIN/1.73M2
GFR NON-AFRICAN AMERICAN: 40 ML/MIN/1.73M2
GLUCOSE BLD-MCNC: 91 MG/DL (ref 70–99)
HCT VFR BLD CALC: 29.9 % (ref 37–47)
HEMOGLOBIN: 8.6 GM/DL (ref 12.5–16)
IMMATURE NEUTROPHIL %: 0.2 % (ref 0–0.43)
LYMPHOCYTES ABSOLUTE: 0.7 K/CU MM
LYMPHOCYTES RELATIVE PERCENT: 17.5 % (ref 24–44)
MCH RBC QN AUTO: 26.1 PG (ref 27–31)
MCHC RBC AUTO-ENTMCNC: 28.8 % (ref 32–36)
MCV RBC AUTO: 90.9 FL (ref 78–100)
MONOCYTES ABSOLUTE: 0.6 K/CU MM
MONOCYTES RELATIVE PERCENT: 13.9 % (ref 0–4)
NUCLEATED RBC %: 0 %
PDW BLD-RTO: 15.7 % (ref 11.7–14.9)
PLATELET # BLD: 170 K/CU MM (ref 140–440)
PMV BLD AUTO: 11.8 FL (ref 7.5–11.1)
POTASSIUM SERPL-SCNC: 4.5 MMOL/L (ref 3.5–5.1)
RBC # BLD: 3.29 M/CU MM (ref 4.2–5.4)
SEGMENTED NEUTROPHILS ABSOLUTE COUNT: 2.5 K/CU MM
SEGMENTED NEUTROPHILS RELATIVE PERCENT: 60.3 % (ref 36–66)
SODIUM BLD-SCNC: 141 MMOL/L (ref 135–145)
TOTAL IMMATURE NEUTOROPHIL: 0.01 K/CU MM
TOTAL NUCLEATED RBC: 0 K/CU MM
TOTAL PROTEIN: 5.7 GM/DL (ref 6.4–8.2)
TSH HIGH SENSITIVITY: 1.14 UIU/ML (ref 0.27–4.2)
WBC # BLD: 4.2 K/CU MM (ref 4–10.5)

## 2018-02-21 LAB
ALBUMIN SERPL-MCNC: 3.4 GM/DL (ref 3.4–5)
ALP BLD-CCNC: 55 IU/L (ref 40–128)
ALT SERPL-CCNC: <5 U/L (ref 10–40)
ANION GAP SERPL CALCULATED.3IONS-SCNC: 11 MMOL/L (ref 4–16)
AST SERPL-CCNC: 10 IU/L (ref 15–37)
BASOPHILS ABSOLUTE: 0 K/CU MM
BASOPHILS RELATIVE PERCENT: 0.5 % (ref 0–1)
BILIRUB SERPL-MCNC: 0.2 MG/DL (ref 0–1)
BUN BLDV-MCNC: 34 MG/DL (ref 6–23)
CALCIUM SERPL-MCNC: 7.9 MG/DL (ref 8.3–10.6)
CHLORIDE BLD-SCNC: 103 MMOL/L (ref 99–110)
CO2: 24 MMOL/L (ref 21–32)
CREAT SERPL-MCNC: 1.4 MG/DL (ref 0.6–1.1)
DIFFERENTIAL TYPE: ABNORMAL
EOSINOPHILS ABSOLUTE: 0.4 K/CU MM
EOSINOPHILS RELATIVE PERCENT: 8.1 % (ref 0–3)
GFR AFRICAN AMERICAN: 44 ML/MIN/1.73M2
GFR NON-AFRICAN AMERICAN: 37 ML/MIN/1.73M2
GLUCOSE BLD-MCNC: 85 MG/DL (ref 70–99)
HCT VFR BLD CALC: 28.8 % (ref 37–47)
HEMOGLOBIN: 8.4 GM/DL (ref 12.5–16)
IMMATURE NEUTROPHIL %: 0 % (ref 0–0.43)
LYMPHOCYTES ABSOLUTE: 0.9 K/CU MM
LYMPHOCYTES RELATIVE PERCENT: 20.6 % (ref 24–44)
MCH RBC QN AUTO: 27.2 PG (ref 27–31)
MCHC RBC AUTO-ENTMCNC: 29.2 % (ref 32–36)
MCV RBC AUTO: 93.2 FL (ref 78–100)
MONOCYTES ABSOLUTE: 0.6 K/CU MM
MONOCYTES RELATIVE PERCENT: 13.4 % (ref 0–4)
NUCLEATED RBC %: 0 %
PDW BLD-RTO: 15.1 % (ref 11.7–14.9)
PLATELET # BLD: 154 K/CU MM (ref 140–440)
PMV BLD AUTO: 12.2 FL (ref 7.5–11.1)
POTASSIUM SERPL-SCNC: 5 MMOL/L (ref 3.5–5.1)
RBC # BLD: 3.09 M/CU MM (ref 4.2–5.4)
SEGMENTED NEUTROPHILS ABSOLUTE COUNT: 2.5 K/CU MM
SEGMENTED NEUTROPHILS RELATIVE PERCENT: 57.4 % (ref 36–66)
SODIUM BLD-SCNC: 138 MMOL/L (ref 135–145)
TOTAL IMMATURE NEUTOROPHIL: 0 K/CU MM
TOTAL NUCLEATED RBC: 0 K/CU MM
TOTAL PROTEIN: 5.5 GM/DL (ref 6.4–8.2)
WBC # BLD: 4.3 K/CU MM (ref 4–10.5)

## 2018-03-01 ENCOUNTER — HOSPITAL ENCOUNTER (OUTPATIENT)
Dept: OTHER | Age: 78
Discharge: HOME | End: 2018-03-31
Attending: INTERNAL MEDICINE | Admitting: INTERNAL MEDICINE

## 2018-03-07 LAB
ALBUMIN SERPL-MCNC: 3.6 GM/DL (ref 3.4–5)
ALP BLD-CCNC: 59 IU/L (ref 40–128)
ALT SERPL-CCNC: <5 U/L (ref 10–40)
ANION GAP SERPL CALCULATED.3IONS-SCNC: 13 MMOL/L (ref 4–16)
AST SERPL-CCNC: 10 IU/L (ref 15–37)
BASOPHILS ABSOLUTE: 0 K/CU MM
BASOPHILS RELATIVE PERCENT: 0.5 % (ref 0–1)
BILIRUB SERPL-MCNC: 0.2 MG/DL (ref 0–1)
BUN BLDV-MCNC: 33 MG/DL (ref 6–23)
CALCIUM SERPL-MCNC: 8.4 MG/DL (ref 8.3–10.6)
CHLORIDE BLD-SCNC: 101 MMOL/L (ref 99–110)
CO2: 26 MMOL/L (ref 21–32)
CREAT SERPL-MCNC: 1.5 MG/DL (ref 0.6–1.1)
DIFFERENTIAL TYPE: ABNORMAL
EOSINOPHILS ABSOLUTE: 0.3 K/CU MM
EOSINOPHILS RELATIVE PERCENT: 4.8 % (ref 0–3)
GFR AFRICAN AMERICAN: 41 ML/MIN/1.73M2
GFR NON-AFRICAN AMERICAN: 34 ML/MIN/1.73M2
GLUCOSE BLD-MCNC: 95 MG/DL (ref 70–99)
HCT VFR BLD CALC: 28.6 % (ref 37–47)
HEMOGLOBIN: 8 GM/DL (ref 12.5–16)
IMMATURE NEUTROPHIL %: 0.5 % (ref 0–0.43)
LYMPHOCYTES ABSOLUTE: 1.1 K/CU MM
LYMPHOCYTES RELATIVE PERCENT: 19.3 % (ref 24–44)
MCH RBC QN AUTO: 26 PG (ref 27–31)
MCHC RBC AUTO-ENTMCNC: 28 % (ref 32–36)
MCV RBC AUTO: 92.9 FL (ref 78–100)
MONOCYTES ABSOLUTE: 0.6 K/CU MM
MONOCYTES RELATIVE PERCENT: 10.4 % (ref 0–4)
NUCLEATED RBC %: 0 %
PDW BLD-RTO: 14.4 % (ref 11.7–14.9)
PLATELET # BLD: 196 K/CU MM (ref 140–440)
PMV BLD AUTO: 11.1 FL (ref 7.5–11.1)
POTASSIUM SERPL-SCNC: 4.6 MMOL/L (ref 3.5–5.1)
RBC # BLD: 3.08 M/CU MM (ref 4.2–5.4)
SEGMENTED NEUTROPHILS ABSOLUTE COUNT: 3.7 K/CU MM
SEGMENTED NEUTROPHILS RELATIVE PERCENT: 64.5 % (ref 36–66)
SODIUM BLD-SCNC: 140 MMOL/L (ref 135–145)
TOTAL IMMATURE NEUTOROPHIL: 0.03 K/CU MM
TOTAL NUCLEATED RBC: 0 K/CU MM
TOTAL PROTEIN: 5.9 GM/DL (ref 6.4–8.2)
TSH HIGH SENSITIVITY: 2.37 UIU/ML (ref 0.27–4.2)
WBC # BLD: 5.7 K/CU MM (ref 4–10.5)

## 2018-03-21 LAB
ALBUMIN SERPL-MCNC: 3.5 GM/DL (ref 3.4–5)
ALP BLD-CCNC: 59 IU/L (ref 40–128)
ALT SERPL-CCNC: <5 U/L (ref 10–40)
ANION GAP SERPL CALCULATED.3IONS-SCNC: 13 MMOL/L (ref 4–16)
AST SERPL-CCNC: 11 IU/L (ref 15–37)
BASOPHILS ABSOLUTE: 0 K/CU MM
BASOPHILS RELATIVE PERCENT: 0.2 % (ref 0–1)
BILIRUB SERPL-MCNC: 0.3 MG/DL (ref 0–1)
BUN BLDV-MCNC: 27 MG/DL (ref 6–23)
CALCIUM SERPL-MCNC: 8.2 MG/DL (ref 8.3–10.6)
CHLORIDE BLD-SCNC: 103 MMOL/L (ref 99–110)
CO2: 24 MMOL/L (ref 21–32)
CREAT SERPL-MCNC: 1.4 MG/DL (ref 0.6–1.1)
DIFFERENTIAL TYPE: ABNORMAL
EOSINOPHILS ABSOLUTE: 0.3 K/CU MM
EOSINOPHILS RELATIVE PERCENT: 7.1 % (ref 0–3)
GFR AFRICAN AMERICAN: 44 ML/MIN/1.73M2
GFR NON-AFRICAN AMERICAN: 37 ML/MIN/1.73M2
GLUCOSE BLD-MCNC: 93 MG/DL (ref 70–99)
HCT VFR BLD CALC: 27.9 % (ref 37–47)
HEMOGLOBIN: 8.1 GM/DL (ref 12.5–16)
IMMATURE NEUTROPHIL %: 0.2 % (ref 0–0.43)
LYMPHOCYTES ABSOLUTE: 0.9 K/CU MM
LYMPHOCYTES RELATIVE PERCENT: 19.7 % (ref 24–44)
MCH RBC QN AUTO: 27.7 PG (ref 27–31)
MCHC RBC AUTO-ENTMCNC: 29 % (ref 32–36)
MCV RBC AUTO: 95.5 FL (ref 78–100)
MONOCYTES ABSOLUTE: 0.6 K/CU MM
MONOCYTES RELATIVE PERCENT: 14.4 % (ref 0–4)
NUCLEATED RBC %: 0 %
PDW BLD-RTO: 14.2 % (ref 11.7–14.9)
PLATELET # BLD: 177 K/CU MM (ref 140–440)
PMV BLD AUTO: 11.2 FL (ref 7.5–11.1)
POTASSIUM SERPL-SCNC: 4.9 MMOL/L (ref 3.5–5.1)
RBC # BLD: 2.92 M/CU MM (ref 4.2–5.4)
SEGMENTED NEUTROPHILS ABSOLUTE COUNT: 2.6 K/CU MM
SEGMENTED NEUTROPHILS RELATIVE PERCENT: 58.4 % (ref 36–66)
SODIUM BLD-SCNC: 140 MMOL/L (ref 135–145)
TOTAL IMMATURE NEUTOROPHIL: 0.01 K/CU MM
TOTAL NUCLEATED RBC: 0 K/CU MM
TOTAL PROTEIN: 5.8 GM/DL (ref 6.4–8.2)
WBC # BLD: 4.4 K/CU MM (ref 4–10.5)

## 2018-04-01 ENCOUNTER — HOSPITAL ENCOUNTER (OUTPATIENT)
Dept: OTHER | Age: 78
Discharge: HOME | End: 2018-04-30
Attending: INTERNAL MEDICINE | Admitting: INTERNAL MEDICINE

## 2018-04-04 LAB
ALBUMIN SERPL-MCNC: 3.4 GM/DL (ref 3.4–5)
ALP BLD-CCNC: 57 IU/L (ref 40–128)
ALT SERPL-CCNC: <5 U/L (ref 10–40)
ANION GAP SERPL CALCULATED.3IONS-SCNC: 13 MMOL/L (ref 4–16)
AST SERPL-CCNC: 10 IU/L (ref 15–37)
BASOPHILS ABSOLUTE: 0 K/CU MM
BASOPHILS RELATIVE PERCENT: 0.2 % (ref 0–1)
BILIRUB SERPL-MCNC: 0.3 MG/DL (ref 0–1)
BUN BLDV-MCNC: 29 MG/DL (ref 6–23)
CALCIUM SERPL-MCNC: 8.6 MG/DL (ref 8.3–10.6)
CHLORIDE BLD-SCNC: 100 MMOL/L (ref 99–110)
CHOLESTEROL: 110 MG/DL
CO2: 26 MMOL/L (ref 21–32)
CREAT SERPL-MCNC: 1.5 MG/DL (ref 0.6–1.1)
DIFFERENTIAL TYPE: ABNORMAL
EOSINOPHILS ABSOLUTE: 0.4 K/CU MM
EOSINOPHILS RELATIVE PERCENT: 6.4 % (ref 0–3)
GFR AFRICAN AMERICAN: 41 ML/MIN/1.73M2
GFR NON-AFRICAN AMERICAN: 34 ML/MIN/1.73M2
GLUCOSE BLD-MCNC: 90 MG/DL (ref 70–99)
HCT VFR BLD CALC: 27.4 % (ref 37–47)
HDLC SERPL-MCNC: 62 MG/DL
HEMOGLOBIN: 8.1 GM/DL (ref 12.5–16)
IMMATURE NEUTROPHIL %: 0.2 % (ref 0–0.43)
LDL CHOLESTEROL CALCULATED: 36 MG/DL
LYMPHOCYTES ABSOLUTE: 0.9 K/CU MM
LYMPHOCYTES RELATIVE PERCENT: 16.2 % (ref 24–44)
MCH RBC QN AUTO: 28.3 PG (ref 27–31)
MCHC RBC AUTO-ENTMCNC: 29.6 % (ref 32–36)
MCV RBC AUTO: 95.8 FL (ref 78–100)
MONOCYTES ABSOLUTE: 0.8 K/CU MM
MONOCYTES RELATIVE PERCENT: 13.6 % (ref 0–4)
NUCLEATED RBC %: 0 %
PDW BLD-RTO: 13.6 % (ref 11.7–14.9)
PLATELET # BLD: 167 K/CU MM (ref 140–440)
PMV BLD AUTO: 12.2 FL (ref 7.5–11.1)
POTASSIUM SERPL-SCNC: 4.6 MMOL/L (ref 3.5–5.1)
RBC # BLD: 2.86 M/CU MM (ref 4.2–5.4)
SEGMENTED NEUTROPHILS ABSOLUTE COUNT: 3.7 K/CU MM
SEGMENTED NEUTROPHILS RELATIVE PERCENT: 63.4 % (ref 36–66)
SODIUM BLD-SCNC: 139 MMOL/L (ref 135–145)
TOTAL IMMATURE NEUTOROPHIL: 0.01 K/CU MM
TOTAL NUCLEATED RBC: 0 K/CU MM
TOTAL PROTEIN: 5.7 GM/DL (ref 6.4–8.2)
TRIGL SERPL-MCNC: 58 MG/DL
TSH HIGH SENSITIVITY: 1.19 UIU/ML (ref 0.27–4.2)
WBC # BLD: 5.8 K/CU MM (ref 4–10.5)

## 2018-04-18 LAB
ALBUMIN SERPL-MCNC: 3.5 GM/DL (ref 3.4–5)
ALP BLD-CCNC: 51 IU/L (ref 40–128)
ALT SERPL-CCNC: <5 U/L (ref 10–40)
ANION GAP SERPL CALCULATED.3IONS-SCNC: 11 MMOL/L (ref 4–16)
AST SERPL-CCNC: 10 IU/L (ref 15–37)
BASOPHILS ABSOLUTE: 0 K/CU MM
BASOPHILS RELATIVE PERCENT: 0.4 % (ref 0–1)
BILIRUB SERPL-MCNC: 0.2 MG/DL (ref 0–1)
BUN BLDV-MCNC: 32 MG/DL (ref 6–23)
CALCIUM SERPL-MCNC: 8.2 MG/DL (ref 8.3–10.6)
CHLORIDE BLD-SCNC: 103 MMOL/L (ref 99–110)
CO2: 27 MMOL/L (ref 21–32)
CREAT SERPL-MCNC: 1.2 MG/DL (ref 0.6–1.1)
DIFFERENTIAL TYPE: ABNORMAL
EOSINOPHILS ABSOLUTE: 0.3 K/CU MM
EOSINOPHILS RELATIVE PERCENT: 7 % (ref 0–3)
GFR AFRICAN AMERICAN: 53 ML/MIN/1.73M2
GFR NON-AFRICAN AMERICAN: 44 ML/MIN/1.73M2
GLUCOSE BLD-MCNC: 93 MG/DL (ref 70–99)
HCT VFR BLD CALC: 27.5 % (ref 37–47)
HEMOGLOBIN: 7.9 GM/DL (ref 12.5–16)
IMMATURE NEUTROPHIL %: 0.2 % (ref 0–0.43)
LYMPHOCYTES ABSOLUTE: 0.8 K/CU MM
LYMPHOCYTES RELATIVE PERCENT: 17.5 % (ref 24–44)
MCH RBC QN AUTO: 27.4 PG (ref 27–31)
MCHC RBC AUTO-ENTMCNC: 28.7 % (ref 32–36)
MCV RBC AUTO: 95.5 FL (ref 78–100)
MONOCYTES ABSOLUTE: 0.6 K/CU MM
MONOCYTES RELATIVE PERCENT: 12.8 % (ref 0–4)
NUCLEATED RBC %: 0 %
PDW BLD-RTO: 13.3 % (ref 11.7–14.9)
PLATELET # BLD: 188 K/CU MM (ref 140–440)
PMV BLD AUTO: 11.8 FL (ref 7.5–11.1)
POTASSIUM SERPL-SCNC: 4.4 MMOL/L (ref 3.5–5.1)
RBC # BLD: 2.88 M/CU MM (ref 4.2–5.4)
SEGMENTED NEUTROPHILS ABSOLUTE COUNT: 2.8 K/CU MM
SEGMENTED NEUTROPHILS RELATIVE PERCENT: 62.1 % (ref 36–66)
SODIUM BLD-SCNC: 141 MMOL/L (ref 135–145)
TOTAL IMMATURE NEUTOROPHIL: 0.01 K/CU MM
TOTAL NUCLEATED RBC: 0 K/CU MM
TOTAL PROTEIN: 5.6 GM/DL (ref 6.4–8.2)
WBC # BLD: 4.5 K/CU MM (ref 4–10.5)

## 2018-04-20 LAB
ALBUMIN SERPL-MCNC: 3.9 GM/DL (ref 3.4–5)
ALP BLD-CCNC: 61 IU/L (ref 40–129)
ALT SERPL-CCNC: <5 U/L (ref 10–40)
ANION GAP SERPL CALCULATED.3IONS-SCNC: 13 MMOL/L (ref 4–16)
AST SERPL-CCNC: 14 IU/L (ref 15–37)
BASOPHILS ABSOLUTE: 0 K/CU MM
BASOPHILS RELATIVE PERCENT: 0.3 % (ref 0–1)
BILIRUB SERPL-MCNC: 0.1 MG/DL (ref 0–1)
BUN BLDV-MCNC: 37 MG/DL (ref 6–23)
CALCIUM SERPL-MCNC: 8.6 MG/DL (ref 8.3–10.6)
CHLORIDE BLD-SCNC: 99 MMOL/L (ref 99–110)
CO2: 25 MMOL/L (ref 21–32)
CREAT SERPL-MCNC: 1.6 MG/DL (ref 0.6–1.1)
DIFFERENTIAL TYPE: ABNORMAL
EOSINOPHILS ABSOLUTE: 0.3 K/CU MM
EOSINOPHILS RELATIVE PERCENT: 5.1 % (ref 0–3)
GFR AFRICAN AMERICAN: 38 ML/MIN/1.73M2
GFR NON-AFRICAN AMERICAN: 31 ML/MIN/1.73M2
GLUCOSE BLD-MCNC: 101 MG/DL (ref 70–99)
HCT VFR BLD CALC: 32.7 % (ref 37–47)
HEMOGLOBIN: 9.3 GM/DL (ref 12.5–16)
IMMATURE NEUTROPHIL %: 0.3 % (ref 0–0.43)
LYMPHOCYTES ABSOLUTE: 0.7 K/CU MM
LYMPHOCYTES RELATIVE PERCENT: 11 % (ref 24–44)
MCH RBC QN AUTO: 27.8 PG (ref 27–31)
MCHC RBC AUTO-ENTMCNC: 28.4 % (ref 32–36)
MCV RBC AUTO: 97.9 FL (ref 78–100)
MONOCYTES ABSOLUTE: 0.5 K/CU MM
MONOCYTES RELATIVE PERCENT: 8.6 % (ref 0–4)
NUCLEATED RBC %: 0 %
PDW BLD-RTO: 13.4 % (ref 11.7–14.9)
PLATELET # BLD: 215 K/CU MM (ref 140–440)
PMV BLD AUTO: 11.4 FL (ref 7.5–11.1)
POTASSIUM SERPL-SCNC: 4.5 MMOL/L (ref 3.5–5.1)
RBC # BLD: 3.34 M/CU MM (ref 4.2–5.4)
SEGMENTED NEUTROPHILS ABSOLUTE COUNT: 4.7 K/CU MM
SEGMENTED NEUTROPHILS RELATIVE PERCENT: 74.7 % (ref 36–66)
SODIUM BLD-SCNC: 137 MMOL/L (ref 135–145)
TOTAL IMMATURE NEUTOROPHIL: 0.02 K/CU MM
TOTAL NUCLEATED RBC: 0 K/CU MM
TOTAL PROTEIN: 6.6 GM/DL (ref 6.4–8.2)
WBC # BLD: 6.3 K/CU MM (ref 4–10.5)

## 2018-04-21 LAB
BACTERIA: ABNORMAL /HPF
BILIRUBIN URINE: NEGATIVE MG/DL
BLOOD, URINE: NEGATIVE
CELLULAR CASTS: 3 /LPF
CLARITY: CLEAR
COLOR: ABNORMAL
GLUCOSE, URINE: NEGATIVE MG/DL
HYALINE CASTS: >20 /LPF
KETONES, URINE: NEGATIVE MG/DL
LEUKOCYTE ESTERASE, URINE: ABNORMAL
MUCUS: ABNORMAL HPF
NITRITE URINE, QUANTITATIVE: NEGATIVE
PH, URINE: 5 (ref 5–8)
PROTEIN UA: NEGATIVE MG/DL
RBC URINE: ABNORMAL /HPF (ref 0–6)
SPECIFIC GRAVITY UA: 1.01 (ref 1–1.03)
SQUAMOUS EPITHELIAL: 1 /HPF
TRANSITIONAL EPITHELIAL: <1 /HPF
TRICHOMONAS: ABNORMAL /HPF
UROBILINOGEN, URINE: NORMAL MG/DL (ref 0.2–1)
WBC CLUMP: ABNORMAL /HPF
WBC UA: 15 /HPF (ref 0–5)

## 2018-04-23 LAB
CULTURE: NORMAL
ORGANISM: NORMAL
REPORT STATUS: NORMAL
REQUEST PROBLEM: NORMAL
SPECIMEN: NORMAL
TOTAL COLONY COUNT: NORMAL

## 2018-05-01 ENCOUNTER — HOSPITAL ENCOUNTER (OUTPATIENT)
Dept: OTHER | Age: 78
Discharge: HOME | End: 2018-05-31
Attending: INTERNAL MEDICINE | Admitting: INTERNAL MEDICINE

## 2018-05-02 LAB
ALBUMIN SERPL-MCNC: 3.5 GM/DL (ref 3.4–5)
ALP BLD-CCNC: 58 IU/L (ref 40–128)
ALT SERPL-CCNC: <5 U/L (ref 10–40)
ANION GAP SERPL CALCULATED.3IONS-SCNC: 13 MMOL/L (ref 4–16)
AST SERPL-CCNC: 10 IU/L (ref 15–37)
BASOPHILS ABSOLUTE: 0 K/CU MM
BASOPHILS RELATIVE PERCENT: 0.4 % (ref 0–1)
BILIRUB SERPL-MCNC: 0.3 MG/DL (ref 0–1)
BUN BLDV-MCNC: 18 MG/DL (ref 6–23)
CALCIUM SERPL-MCNC: 8.7 MG/DL (ref 8.3–10.6)
CHLORIDE BLD-SCNC: 104 MMOL/L (ref 99–110)
CHOLESTEROL: 117 MG/DL
CO2: 27 MMOL/L (ref 21–32)
CREAT SERPL-MCNC: 1.2 MG/DL (ref 0.6–1.1)
DIFFERENTIAL TYPE: ABNORMAL
EOSINOPHILS ABSOLUTE: 0.3 K/CU MM
EOSINOPHILS RELATIVE PERCENT: 5.9 % (ref 0–3)
GFR AFRICAN AMERICAN: 53 ML/MIN/1.73M2
GFR NON-AFRICAN AMERICAN: 44 ML/MIN/1.73M2
GLUCOSE BLD-MCNC: 90 MG/DL (ref 70–99)
HCT VFR BLD CALC: 29.6 % (ref 37–47)
HDLC SERPL-MCNC: 67 MG/DL
HEMOGLOBIN: 8.5 GM/DL (ref 12.5–16)
IMMATURE NEUTROPHIL %: 0.2 % (ref 0–0.43)
LDL CHOLESTEROL CALCULATED: 40 MG/DL
LYMPHOCYTES ABSOLUTE: 0.9 K/CU MM
LYMPHOCYTES RELATIVE PERCENT: 15.6 % (ref 24–44)
MCH RBC QN AUTO: 27.9 PG (ref 27–31)
MCHC RBC AUTO-ENTMCNC: 28.7 % (ref 32–36)
MCV RBC AUTO: 97 FL (ref 78–100)
MONOCYTES ABSOLUTE: 0.6 K/CU MM
MONOCYTES RELATIVE PERCENT: 11.5 % (ref 0–4)
NUCLEATED RBC %: 0 %
PDW BLD-RTO: 13.2 % (ref 11.7–14.9)
PLATELET # BLD: 172 K/CU MM (ref 140–440)
PMV BLD AUTO: 12 FL (ref 7.5–11.1)
POTASSIUM SERPL-SCNC: 4 MMOL/L (ref 3.5–5.1)
RBC # BLD: 3.05 M/CU MM (ref 4.2–5.4)
SEGMENTED NEUTROPHILS ABSOLUTE COUNT: 3.7 K/CU MM
SEGMENTED NEUTROPHILS RELATIVE PERCENT: 66.4 % (ref 36–66)
SODIUM BLD-SCNC: 144 MMOL/L (ref 135–145)
TOTAL IMMATURE NEUTOROPHIL: 0.01 K/CU MM
TOTAL NUCLEATED RBC: 0 K/CU MM
TOTAL PROTEIN: 5.9 GM/DL (ref 6.4–8.2)
TRIGL SERPL-MCNC: 51 MG/DL
TSH HIGH SENSITIVITY: 1.11 UIU/ML (ref 0.27–4.2)
WBC # BLD: 5.6 K/CU MM (ref 4–10.5)

## 2018-05-16 LAB
ALBUMIN SERPL-MCNC: 3.6 GM/DL (ref 3.4–5)
ALP BLD-CCNC: 62 IU/L (ref 40–128)
ALT SERPL-CCNC: <5 U/L (ref 10–40)
ANION GAP SERPL CALCULATED.3IONS-SCNC: 10 MMOL/L (ref 4–16)
AST SERPL-CCNC: 11 IU/L (ref 15–37)
BASOPHILS ABSOLUTE: 0 K/CU MM
BASOPHILS RELATIVE PERCENT: 0.4 % (ref 0–1)
BILIRUB SERPL-MCNC: 0.2 MG/DL (ref 0–1)
BUN BLDV-MCNC: 21 MG/DL (ref 6–23)
CALCIUM SERPL-MCNC: 8.2 MG/DL (ref 8.3–10.6)
CHLORIDE BLD-SCNC: 103 MMOL/L (ref 99–110)
CO2: 30 MMOL/L (ref 21–32)
CREAT SERPL-MCNC: 1 MG/DL (ref 0.6–1.1)
DIFFERENTIAL TYPE: ABNORMAL
EOSINOPHILS ABSOLUTE: 0.4 K/CU MM
EOSINOPHILS RELATIVE PERCENT: 7 % (ref 0–3)
GFR AFRICAN AMERICAN: >60 ML/MIN/1.73M2
GFR NON-AFRICAN AMERICAN: 54 ML/MIN/1.73M2
GLUCOSE BLD-MCNC: 94 MG/DL (ref 70–99)
HCT VFR BLD CALC: 29.1 % (ref 37–47)
HEMOGLOBIN: 8.4 GM/DL (ref 12.5–16)
IMMATURE NEUTROPHIL %: 0.4 % (ref 0–0.43)
LYMPHOCYTES ABSOLUTE: 0.8 K/CU MM
LYMPHOCYTES RELATIVE PERCENT: 15.9 % (ref 24–44)
MCH RBC QN AUTO: 28.1 PG (ref 27–31)
MCHC RBC AUTO-ENTMCNC: 28.9 % (ref 32–36)
MCV RBC AUTO: 97.3 FL (ref 78–100)
MONOCYTES ABSOLUTE: 0.5 K/CU MM
MONOCYTES RELATIVE PERCENT: 10.7 % (ref 0–4)
NUCLEATED RBC %: 0 %
PDW BLD-RTO: 13.2 % (ref 11.7–14.9)
PLATELET # BLD: 182 K/CU MM (ref 140–440)
PMV BLD AUTO: 12 FL (ref 7.5–11.1)
POTASSIUM SERPL-SCNC: 4.2 MMOL/L (ref 3.5–5.1)
RBC # BLD: 2.99 M/CU MM (ref 4.2–5.4)
SEGMENTED NEUTROPHILS ABSOLUTE COUNT: 3.3 K/CU MM
SEGMENTED NEUTROPHILS RELATIVE PERCENT: 65.6 % (ref 36–66)
SODIUM BLD-SCNC: 143 MMOL/L (ref 135–145)
TOTAL IMMATURE NEUTOROPHIL: 0.02 K/CU MM
TOTAL NUCLEATED RBC: 0 K/CU MM
TOTAL PROTEIN: 5.7 GM/DL (ref 6.4–8.2)
WBC # BLD: 5 K/CU MM (ref 4–10.5)

## 2018-05-30 LAB
ALBUMIN SERPL-MCNC: 3.6 GM/DL (ref 3.4–5)
ALP BLD-CCNC: 63 IU/L (ref 40–128)
ALT SERPL-CCNC: <5 U/L (ref 10–40)
ANION GAP SERPL CALCULATED.3IONS-SCNC: 11 MMOL/L (ref 4–16)
AST SERPL-CCNC: 12 IU/L (ref 15–37)
BASOPHILS ABSOLUTE: 0 K/CU MM
BASOPHILS RELATIVE PERCENT: 0.2 % (ref 0–1)
BILIRUB SERPL-MCNC: 0.2 MG/DL (ref 0–1)
BUN BLDV-MCNC: 28 MG/DL (ref 6–23)
CALCIUM SERPL-MCNC: 8.5 MG/DL (ref 8.3–10.6)
CHLORIDE BLD-SCNC: 104 MMOL/L (ref 99–110)
CO2: 28 MMOL/L (ref 21–32)
CREAT SERPL-MCNC: 1.1 MG/DL (ref 0.6–1.1)
DIFFERENTIAL TYPE: ABNORMAL
EOSINOPHILS ABSOLUTE: 0.3 K/CU MM
EOSINOPHILS RELATIVE PERCENT: 5.6 % (ref 0–3)
GFR AFRICAN AMERICAN: 58 ML/MIN/1.73M2
GFR NON-AFRICAN AMERICAN: 48 ML/MIN/1.73M2
GLUCOSE BLD-MCNC: 99 MG/DL (ref 70–99)
HCT VFR BLD CALC: 30.5 % (ref 37–47)
HEMOGLOBIN: 8.8 GM/DL (ref 12.5–16)
IMMATURE NEUTROPHIL %: 0.3 % (ref 0–0.43)
LYMPHOCYTES ABSOLUTE: 0.6 K/CU MM
LYMPHOCYTES RELATIVE PERCENT: 10 % (ref 24–44)
MACROCYTES: ABNORMAL
MCH RBC QN AUTO: 27.7 PG (ref 27–31)
MCHC RBC AUTO-ENTMCNC: 28.9 % (ref 32–36)
MCV RBC AUTO: 95.9 FL (ref 78–100)
MONOCYTES ABSOLUTE: 0.5 K/CU MM
MONOCYTES RELATIVE PERCENT: 9 % (ref 0–4)
NUCLEATED RBC %: 0 %
PDW BLD-RTO: 13.2 % (ref 11.7–14.9)
PLATELET # BLD: 195 K/CU MM (ref 140–440)
PMV BLD AUTO: 12.1 FL (ref 7.5–11.1)
POLYCHROMASIA: ABNORMAL
POTASSIUM SERPL-SCNC: 4.4 MMOL/L (ref 3.5–5.1)
RBC # BLD: 3.18 M/CU MM (ref 4.2–5.4)
SEGMENTED NEUTROPHILS ABSOLUTE COUNT: 4.6 K/CU MM
SEGMENTED NEUTROPHILS ABSOLUTE COUNT: ABNORMAL
SEGMENTED NEUTROPHILS RELATIVE PERCENT: 74.9 % (ref 36–66)
SODIUM BLD-SCNC: 143 MMOL/L (ref 135–145)
TOTAL IMMATURE NEUTOROPHIL: 0.02 K/CU MM
TOTAL NUCLEATED RBC: 0 K/CU MM
TOTAL PROTEIN: 5.9 GM/DL (ref 6.4–8.2)
WBC # BLD: 6 K/CU MM (ref 4–10.5)

## 2018-06-01 ENCOUNTER — HOSPITAL ENCOUNTER (OUTPATIENT)
Dept: OTHER | Age: 78
Discharge: HOME | End: 2018-06-30
Attending: INTERNAL MEDICINE | Admitting: INTERNAL MEDICINE

## 2018-06-06 LAB
CHOLESTEROL: 118 MG/DL
HDLC SERPL-MCNC: 67 MG/DL
LDL CHOLESTEROL CALCULATED: 39 MG/DL
TRIGL SERPL-MCNC: 60 MG/DL
TSH HIGH SENSITIVITY: 0.95 UIU/ML (ref 0.27–4.2)

## 2018-06-13 LAB
ALBUMIN SERPL-MCNC: 3.5 GM/DL (ref 3.4–5)
ALP BLD-CCNC: 68 IU/L (ref 40–128)
ALT SERPL-CCNC: 6 U/L (ref 10–40)
ANION GAP SERPL CALCULATED.3IONS-SCNC: 9 MMOL/L (ref 4–16)
AST SERPL-CCNC: 13 IU/L (ref 15–37)
BASOPHILS ABSOLUTE: 0 K/CU MM
BASOPHILS RELATIVE PERCENT: 0.4 % (ref 0–1)
BILIRUB SERPL-MCNC: 0.2 MG/DL (ref 0–1)
BUN BLDV-MCNC: 29 MG/DL (ref 6–23)
CALCIUM SERPL-MCNC: 8.1 MG/DL (ref 8.3–10.6)
CHLORIDE BLD-SCNC: 102 MMOL/L (ref 99–110)
CO2: 30 MMOL/L (ref 21–32)
CREAT SERPL-MCNC: 1.1 MG/DL (ref 0.6–1.1)
DIFFERENTIAL TYPE: ABNORMAL
EOSINOPHILS ABSOLUTE: 0.4 K/CU MM
EOSINOPHILS RELATIVE PERCENT: 6.8 % (ref 0–3)
GFR AFRICAN AMERICAN: 58 ML/MIN/1.73M2
GFR NON-AFRICAN AMERICAN: 48 ML/MIN/1.73M2
GLUCOSE BLD-MCNC: 102 MG/DL (ref 70–99)
HCT VFR BLD CALC: 30.5 % (ref 37–47)
HEMOGLOBIN: 8.7 GM/DL (ref 12.5–16)
IMMATURE NEUTROPHIL %: 0.2 % (ref 0–0.43)
LYMPHOCYTES ABSOLUTE: 0.8 K/CU MM
LYMPHOCYTES RELATIVE PERCENT: 15.3 % (ref 24–44)
MCH RBC QN AUTO: 28.1 PG (ref 27–31)
MCHC RBC AUTO-ENTMCNC: 28.5 % (ref 32–36)
MCV RBC AUTO: 98.4 FL (ref 78–100)
MONOCYTES ABSOLUTE: 0.6 K/CU MM
MONOCYTES RELATIVE PERCENT: 11.3 % (ref 0–4)
NUCLEATED RBC %: 0 %
PDW BLD-RTO: 13 % (ref 11.7–14.9)
PLATELET # BLD: 167 K/CU MM (ref 140–440)
PMV BLD AUTO: 11.7 FL (ref 7.5–11.1)
POTASSIUM SERPL-SCNC: 3.9 MMOL/L (ref 3.5–5.1)
RBC # BLD: 3.1 M/CU MM (ref 4.2–5.4)
SEGMENTED NEUTROPHILS ABSOLUTE COUNT: 3.5 K/CU MM
SEGMENTED NEUTROPHILS RELATIVE PERCENT: 66 % (ref 36–66)
SODIUM BLD-SCNC: 141 MMOL/L (ref 135–145)
TOTAL IMMATURE NEUTOROPHIL: 0.01 K/CU MM
TOTAL NUCLEATED RBC: 0 K/CU MM
TOTAL PROTEIN: 5.9 GM/DL (ref 6.4–8.2)
WBC # BLD: 5.3 K/CU MM (ref 4–10.5)

## 2018-06-27 LAB
ALBUMIN SERPL-MCNC: 3.5 GM/DL (ref 3.4–5)
ALP BLD-CCNC: 64 IU/L (ref 40–128)
ALT SERPL-CCNC: <5 U/L (ref 10–40)
ANION GAP SERPL CALCULATED.3IONS-SCNC: 12 MMOL/L (ref 4–16)
AST SERPL-CCNC: 13 IU/L (ref 15–37)
BASOPHILS ABSOLUTE: 0 K/CU MM
BASOPHILS RELATIVE PERCENT: 0.4 % (ref 0–1)
BILIRUB SERPL-MCNC: 0.2 MG/DL (ref 0–1)
BUN BLDV-MCNC: 26 MG/DL (ref 6–23)
CALCIUM SERPL-MCNC: 8.1 MG/DL (ref 8.3–10.6)
CHLORIDE BLD-SCNC: 101 MMOL/L (ref 99–110)
CO2: 27 MMOL/L (ref 21–32)
CREAT SERPL-MCNC: 1.1 MG/DL (ref 0.6–1.1)
DIFFERENTIAL TYPE: ABNORMAL
EOSINOPHILS ABSOLUTE: 0.4 K/CU MM
EOSINOPHILS RELATIVE PERCENT: 6.4 % (ref 0–3)
GFR AFRICAN AMERICAN: 58 ML/MIN/1.73M2
GFR NON-AFRICAN AMERICAN: 48 ML/MIN/1.73M2
GLUCOSE BLD-MCNC: 107 MG/DL (ref 70–99)
HCT VFR BLD CALC: 32.5 % (ref 37–47)
HEMOGLOBIN: 8.8 GM/DL (ref 12.5–16)
IMMATURE NEUTROPHIL %: 0.4 % (ref 0–0.43)
LYMPHOCYTES ABSOLUTE: 0.8 K/CU MM
LYMPHOCYTES RELATIVE PERCENT: 14.2 % (ref 24–44)
MCH RBC QN AUTO: 27.2 PG (ref 27–31)
MCHC RBC AUTO-ENTMCNC: 27.1 % (ref 32–36)
MCV RBC AUTO: 100.6 FL (ref 78–100)
MONOCYTES ABSOLUTE: 0.7 K/CU MM
MONOCYTES RELATIVE PERCENT: 12 % (ref 0–4)
NUCLEATED RBC %: 0 %
PDW BLD-RTO: 13.2 % (ref 11.7–14.9)
PLATELET # BLD: 178 K/CU MM (ref 140–440)
PMV BLD AUTO: 11.5 FL (ref 7.5–11.1)
POTASSIUM SERPL-SCNC: 4.4 MMOL/L (ref 3.5–5.1)
RBC # BLD: 3.23 M/CU MM (ref 4.2–5.4)
SEGMENTED NEUTROPHILS ABSOLUTE COUNT: 3.7 K/CU MM
SEGMENTED NEUTROPHILS RELATIVE PERCENT: 66.6 % (ref 36–66)
SODIUM BLD-SCNC: 140 MMOL/L (ref 135–145)
TOTAL IMMATURE NEUTOROPHIL: 0.02 K/CU MM
TOTAL NUCLEATED RBC: 0 K/CU MM
TOTAL PROTEIN: 5.9 GM/DL (ref 6.4–8.2)
WBC # BLD: 5.5 K/CU MM (ref 4–10.5)

## 2018-07-01 ENCOUNTER — HOSPITAL ENCOUNTER (OUTPATIENT)
Dept: OTHER | Age: 78
Discharge: HOME | End: 2018-07-31
Attending: INTERNAL MEDICINE | Admitting: INTERNAL MEDICINE

## 2018-07-03 LAB
CHOLESTEROL: 119 MG/DL
HDLC SERPL-MCNC: 67 MG/DL
LDL CHOLESTEROL CALCULATED: 41 MG/DL
TRIGL SERPL-MCNC: 55 MG/DL
TSH HIGH SENSITIVITY: 1.42 UIU/ML (ref 0.27–4.2)

## 2018-07-11 LAB
ALBUMIN SERPL-MCNC: 3.5 GM/DL (ref 3.4–5)
ALP BLD-CCNC: 70 IU/L (ref 40–128)
ALT SERPL-CCNC: 6 U/L (ref 10–40)
ANION GAP SERPL CALCULATED.3IONS-SCNC: 15 MMOL/L (ref 4–16)
AST SERPL-CCNC: 14 IU/L (ref 15–37)
BASOPHILS ABSOLUTE: 0 K/CU MM
BASOPHILS RELATIVE PERCENT: 0.5 % (ref 0–1)
BILIRUB SERPL-MCNC: 0.2 MG/DL (ref 0–1)
BUN BLDV-MCNC: 31 MG/DL (ref 6–23)
CALCIUM SERPL-MCNC: 8.4 MG/DL (ref 8.3–10.6)
CHLORIDE BLD-SCNC: 100 MMOL/L (ref 99–110)
CO2: 27 MMOL/L (ref 21–32)
CREAT SERPL-MCNC: 1.2 MG/DL (ref 0.6–1.1)
DIFFERENTIAL TYPE: ABNORMAL
EOSINOPHILS ABSOLUTE: 0.4 K/CU MM
EOSINOPHILS RELATIVE PERCENT: 6.2 % (ref 0–3)
GFR AFRICAN AMERICAN: 53 ML/MIN/1.73M2
GFR NON-AFRICAN AMERICAN: 44 ML/MIN/1.73M2
GLUCOSE BLD-MCNC: 88 MG/DL (ref 70–99)
HCT VFR BLD CALC: 34.1 % (ref 37–47)
HEMOGLOBIN: 9.6 GM/DL (ref 12.5–16)
IMMATURE NEUTROPHIL %: 0.5 % (ref 0–0.43)
LYMPHOCYTES ABSOLUTE: 0.9 K/CU MM
LYMPHOCYTES RELATIVE PERCENT: 15.7 % (ref 24–44)
MCH RBC QN AUTO: 27.7 PG (ref 27–31)
MCHC RBC AUTO-ENTMCNC: 28.2 % (ref 32–36)
MCV RBC AUTO: 98.6 FL (ref 78–100)
MONOCYTES ABSOLUTE: 0.6 K/CU MM
MONOCYTES RELATIVE PERCENT: 11 % (ref 0–4)
NUCLEATED RBC %: 0 %
PDW BLD-RTO: 13.1 % (ref 11.7–14.9)
PLATELET # BLD: 180 K/CU MM (ref 140–440)
PMV BLD AUTO: 11.9 FL (ref 7.5–11.1)
POTASSIUM SERPL-SCNC: 4.3 MMOL/L (ref 3.5–5.1)
RBC # BLD: 3.46 M/CU MM (ref 4.2–5.4)
SEGMENTED NEUTROPHILS ABSOLUTE COUNT: 3.8 K/CU MM
SEGMENTED NEUTROPHILS RELATIVE PERCENT: 66.1 % (ref 36–66)
SODIUM BLD-SCNC: 142 MMOL/L (ref 135–145)
TOTAL IMMATURE NEUTOROPHIL: 0.03 K/CU MM
TOTAL NUCLEATED RBC: 0 K/CU MM
TOTAL PROTEIN: 6.2 GM/DL (ref 6.4–8.2)
WBC # BLD: 5.8 K/CU MM (ref 4–10.5)

## 2018-07-25 LAB
ALBUMIN SERPL-MCNC: 3.6 GM/DL (ref 3.4–5)
ALP BLD-CCNC: 65 IU/L (ref 40–128)
ALT SERPL-CCNC: 6 U/L (ref 10–40)
ANION GAP SERPL CALCULATED.3IONS-SCNC: 15 MMOL/L (ref 4–16)
AST SERPL-CCNC: 13 IU/L (ref 15–37)
BASOPHILS ABSOLUTE: 0 K/CU MM
BASOPHILS RELATIVE PERCENT: 0.4 % (ref 0–1)
BILIRUB SERPL-MCNC: 0.2 MG/DL (ref 0–1)
BUN BLDV-MCNC: 30 MG/DL (ref 6–23)
CALCIUM SERPL-MCNC: 8.7 MG/DL (ref 8.3–10.6)
CHLORIDE BLD-SCNC: 99 MMOL/L (ref 99–110)
CO2: 29 MMOL/L (ref 21–32)
CREAT SERPL-MCNC: 1.2 MG/DL (ref 0.6–1.1)
DIFFERENTIAL TYPE: ABNORMAL
EOSINOPHILS ABSOLUTE: 0.4 K/CU MM
EOSINOPHILS RELATIVE PERCENT: 6.7 % (ref 0–3)
GFR AFRICAN AMERICAN: 53 ML/MIN/1.73M2
GFR NON-AFRICAN AMERICAN: 44 ML/MIN/1.73M2
GLUCOSE BLD-MCNC: 79 MG/DL (ref 70–99)
HCT VFR BLD CALC: 32.1 % (ref 37–47)
HEMOGLOBIN: 9.1 GM/DL (ref 12.5–16)
IMMATURE NEUTROPHIL %: 0.4 % (ref 0–0.43)
LYMPHOCYTES ABSOLUTE: 0.7 K/CU MM
LYMPHOCYTES RELATIVE PERCENT: 11.9 % (ref 24–44)
MCH RBC QN AUTO: 27.6 PG (ref 27–31)
MCHC RBC AUTO-ENTMCNC: 28.3 % (ref 32–36)
MCV RBC AUTO: 97.3 FL (ref 78–100)
MONOCYTES ABSOLUTE: 0.7 K/CU MM
MONOCYTES RELATIVE PERCENT: 11.9 % (ref 0–4)
NUCLEATED RBC %: 0 %
PDW BLD-RTO: 13.3 % (ref 11.7–14.9)
PLATELET # BLD: 190 K/CU MM (ref 140–440)
PMV BLD AUTO: 11.9 FL (ref 7.5–11.1)
POTASSIUM SERPL-SCNC: 4.1 MMOL/L (ref 3.5–5.1)
RBC # BLD: 3.3 M/CU MM (ref 4.2–5.4)
SEGMENTED NEUTROPHILS ABSOLUTE COUNT: 3.9 K/CU MM
SEGMENTED NEUTROPHILS RELATIVE PERCENT: 68.7 % (ref 36–66)
SODIUM BLD-SCNC: 143 MMOL/L (ref 135–145)
TOTAL IMMATURE NEUTOROPHIL: 0.02 K/CU MM
TOTAL NUCLEATED RBC: 0 K/CU MM
TOTAL PROTEIN: 6.1 GM/DL (ref 6.4–8.2)
TOTAL RETICULOCYTE COUNT: 0.11 K/CU MM
WBC # BLD: 5.7 K/CU MM (ref 4–10.5)

## 2018-08-01 ENCOUNTER — HOSPITAL ENCOUNTER (OUTPATIENT)
Dept: OTHER | Age: 78
Discharge: HOME | End: 2018-08-31
Attending: INTERNAL MEDICINE | Admitting: INTERNAL MEDICINE

## 2018-08-01 LAB
CHOLESTEROL: 122 MG/DL
HDLC SERPL-MCNC: 63 MG/DL
LDL CHOLESTEROL CALCULATED: 46 MG/DL
TRIGL SERPL-MCNC: 63 MG/DL
TSH HIGH SENSITIVITY: 1.58 UIU/ML (ref 0.27–4.2)

## 2018-08-08 LAB
ALBUMIN SERPL-MCNC: 3.6 GM/DL (ref 3.4–5)
ALP BLD-CCNC: 68 IU/L (ref 40–128)
ALT SERPL-CCNC: 6 U/L (ref 10–40)
ANION GAP SERPL CALCULATED.3IONS-SCNC: 12 MMOL/L (ref 4–16)
AST SERPL-CCNC: 13 IU/L (ref 15–37)
BASOPHILS ABSOLUTE: 0 K/CU MM
BASOPHILS RELATIVE PERCENT: 0.4 % (ref 0–1)
BILIRUB SERPL-MCNC: 0.2 MG/DL (ref 0–1)
BUN BLDV-MCNC: 35 MG/DL (ref 6–23)
CALCIUM SERPL-MCNC: 8.5 MG/DL (ref 8.3–10.6)
CHLORIDE BLD-SCNC: 102 MMOL/L (ref 99–110)
CO2: 28 MMOL/L (ref 21–32)
CREAT SERPL-MCNC: 1.1 MG/DL (ref 0.6–1.1)
DIFFERENTIAL TYPE: ABNORMAL
EOSINOPHILS ABSOLUTE: 0.4 K/CU MM
EOSINOPHILS RELATIVE PERCENT: 6.3 % (ref 0–3)
GFR AFRICAN AMERICAN: 58 ML/MIN/1.73M2
GFR NON-AFRICAN AMERICAN: 48 ML/MIN/1.73M2
GLUCOSE BLD-MCNC: 102 MG/DL (ref 70–99)
HCT VFR BLD CALC: 31.1 % (ref 37–47)
HEMOGLOBIN: 9.1 GM/DL (ref 12.5–16)
IMMATURE NEUTROPHIL %: 0.5 % (ref 0–0.43)
LYMPHOCYTES ABSOLUTE: 0.7 K/CU MM
LYMPHOCYTES RELATIVE PERCENT: 12.2 % (ref 24–44)
MCH RBC QN AUTO: 28.2 PG (ref 27–31)
MCHC RBC AUTO-ENTMCNC: 29.3 % (ref 32–36)
MCV RBC AUTO: 96.3 FL (ref 78–100)
MONOCYTES ABSOLUTE: 0.6 K/CU MM
MONOCYTES RELATIVE PERCENT: 10.2 % (ref 0–4)
NUCLEATED RBC %: 0 %
PDW BLD-RTO: 13.2 % (ref 11.7–14.9)
PLATELET # BLD: 191 K/CU MM (ref 140–440)
PMV BLD AUTO: 11.3 FL (ref 7.5–11.1)
POTASSIUM SERPL-SCNC: 4.1 MMOL/L (ref 3.5–5.1)
RBC # BLD: 3.23 M/CU MM (ref 4.2–5.4)
SEGMENTED NEUTROPHILS ABSOLUTE COUNT: 3.9 K/CU MM
SEGMENTED NEUTROPHILS RELATIVE PERCENT: 70.4 % (ref 36–66)
SODIUM BLD-SCNC: 142 MMOL/L (ref 135–145)
TOTAL IMMATURE NEUTOROPHIL: 0.03 K/CU MM
TOTAL NUCLEATED RBC: 0 K/CU MM
TOTAL PROTEIN: 5.7 GM/DL (ref 6.4–8.2)
WBC # BLD: 5.6 K/CU MM (ref 4–10.5)

## 2018-08-22 LAB
ALBUMIN SERPL-MCNC: 3.5 GM/DL (ref 3.4–5)
ALP BLD-CCNC: 65 IU/L (ref 40–128)
ALT SERPL-CCNC: 6 U/L (ref 10–40)
ANION GAP SERPL CALCULATED.3IONS-SCNC: 14 MMOL/L (ref 4–16)
AST SERPL-CCNC: 13 IU/L (ref 15–37)
BASOPHILS ABSOLUTE: 0 K/CU MM
BASOPHILS RELATIVE PERCENT: 0.2 % (ref 0–1)
BILIRUB SERPL-MCNC: 0.2 MG/DL (ref 0–1)
BUN BLDV-MCNC: 30 MG/DL (ref 6–23)
CALCIUM SERPL-MCNC: 8.7 MG/DL (ref 8.3–10.6)
CHLORIDE BLD-SCNC: 100 MMOL/L (ref 99–110)
CO2: 31 MMOL/L (ref 21–32)
CREAT SERPL-MCNC: 1.2 MG/DL (ref 0.6–1.1)
DIFFERENTIAL TYPE: ABNORMAL
EOSINOPHILS ABSOLUTE: 0.4 K/CU MM
EOSINOPHILS RELATIVE PERCENT: 6.8 % (ref 0–3)
GFR AFRICAN AMERICAN: 53 ML/MIN/1.73M2
GFR NON-AFRICAN AMERICAN: 44 ML/MIN/1.73M2
GLUCOSE BLD-MCNC: 97 MG/DL (ref 70–99)
HCT VFR BLD CALC: 32.3 % (ref 37–47)
HEMOGLOBIN: 9.1 GM/DL (ref 12.5–16)
IMMATURE NEUTROPHIL %: 0.2 % (ref 0–0.43)
LYMPHOCYTES ABSOLUTE: 0.7 K/CU MM
LYMPHOCYTES RELATIVE PERCENT: 13.7 % (ref 24–44)
MCH RBC QN AUTO: 27.5 PG (ref 27–31)
MCHC RBC AUTO-ENTMCNC: 28.2 % (ref 32–36)
MCV RBC AUTO: 97.6 FL (ref 78–100)
MONOCYTES ABSOLUTE: 0.6 K/CU MM
MONOCYTES RELATIVE PERCENT: 12.4 % (ref 0–4)
NUCLEATED RBC %: 0 %
PDW BLD-RTO: 13.2 % (ref 11.7–14.9)
PLATELET # BLD: 181 K/CU MM (ref 140–440)
PMV BLD AUTO: 11.8 FL (ref 7.5–11.1)
POTASSIUM SERPL-SCNC: 4.1 MMOL/L (ref 3.5–5.1)
RBC # BLD: 3.31 M/CU MM (ref 4.2–5.4)
SEGMENTED NEUTROPHILS ABSOLUTE COUNT: 3.5 K/CU MM
SEGMENTED NEUTROPHILS RELATIVE PERCENT: 66.7 % (ref 36–66)
SODIUM BLD-SCNC: 145 MMOL/L (ref 135–145)
TOTAL IMMATURE NEUTOROPHIL: 0.01 K/CU MM
TOTAL NUCLEATED RBC: 0 K/CU MM
TOTAL PROTEIN: 5.7 GM/DL (ref 6.4–8.2)
WBC # BLD: 5.2 K/CU MM (ref 4–10.5)

## 2018-09-01 ENCOUNTER — HOSPITAL ENCOUNTER (OUTPATIENT)
Dept: OTHER | Age: 78
Discharge: HOME | End: 2018-09-01
Attending: INTERNAL MEDICINE | Admitting: INTERNAL MEDICINE

## 2018-09-05 LAB
ALBUMIN SERPL-MCNC: 3.4 GM/DL (ref 3.4–5)
ALP BLD-CCNC: 65 IU/L (ref 40–128)
ALT SERPL-CCNC: 6 U/L (ref 10–40)
ANION GAP SERPL CALCULATED.3IONS-SCNC: 12 MMOL/L (ref 4–16)
AST SERPL-CCNC: 12 IU/L (ref 15–37)
BASOPHILS ABSOLUTE: 0 K/CU MM
BASOPHILS RELATIVE PERCENT: 0.3 % (ref 0–1)
BILIRUB SERPL-MCNC: 0.3 MG/DL (ref 0–1)
BUN BLDV-MCNC: 29 MG/DL (ref 6–23)
CALCIUM SERPL-MCNC: 9.1 MG/DL (ref 8.3–10.6)
CHLORIDE BLD-SCNC: 105 MMOL/L (ref 99–110)
CHOLESTEROL: 109 MG/DL
CO2: 26 MMOL/L (ref 21–32)
CREAT SERPL-MCNC: 1.1 MG/DL (ref 0.6–1.1)
DIFFERENTIAL TYPE: ABNORMAL
EOSINOPHILS ABSOLUTE: 0.3 K/CU MM
EOSINOPHILS RELATIVE PERCENT: 4.6 % (ref 0–3)
GFR AFRICAN AMERICAN: 58 ML/MIN/1.73M2
GFR NON-AFRICAN AMERICAN: 48 ML/MIN/1.73M2
GLUCOSE BLD-MCNC: 93 MG/DL (ref 70–99)
HCT VFR BLD CALC: 31.3 % (ref 37–47)
HDLC SERPL-MCNC: 63 MG/DL
HEMOGLOBIN: 9.3 GM/DL (ref 12.5–16)
IMMATURE NEUTROPHIL %: 0.5 % (ref 0–0.43)
LDL CHOLESTEROL CALCULATED: 36 MG/DL
LYMPHOCYTES ABSOLUTE: 0.7 K/CU MM
LYMPHOCYTES RELATIVE PERCENT: 11.7 % (ref 24–44)
MCH RBC QN AUTO: 28.4 PG (ref 27–31)
MCHC RBC AUTO-ENTMCNC: 29.7 % (ref 32–36)
MCV RBC AUTO: 95.4 FL (ref 78–100)
MONOCYTES ABSOLUTE: 0.6 K/CU MM
MONOCYTES RELATIVE PERCENT: 10.7 % (ref 0–4)
NUCLEATED RBC %: 0 %
PDW BLD-RTO: 13.1 % (ref 11.7–14.9)
PLATELET # BLD: 182 K/CU MM (ref 140–440)
PMV BLD AUTO: 11.9 FL (ref 7.5–11.1)
POTASSIUM SERPL-SCNC: 4.1 MMOL/L (ref 3.5–5.1)
RBC # BLD: 3.28 M/CU MM (ref 4.2–5.4)
SEGMENTED NEUTROPHILS ABSOLUTE COUNT: 4.3 K/CU MM
SEGMENTED NEUTROPHILS RELATIVE PERCENT: 72.2 % (ref 36–66)
SODIUM BLD-SCNC: 143 MMOL/L (ref 135–145)
TOTAL IMMATURE NEUTOROPHIL: 0.03 K/CU MM
TOTAL NUCLEATED RBC: 0 K/CU MM
TOTAL PROTEIN: 5.6 GM/DL (ref 6.4–8.2)
TRIGL SERPL-MCNC: 52 MG/DL
TSH HIGH SENSITIVITY: 1.31 UIU/ML (ref 0.27–4.2)
WBC # BLD: 5.9 K/CU MM (ref 4–10.5)

## 2018-09-26 ENCOUNTER — HOSPITAL ENCOUNTER (OUTPATIENT)
Age: 78
Discharge: HOME | End: 2018-09-26
Payer: COMMERCIAL

## 2018-10-10 ENCOUNTER — HOSPITAL ENCOUNTER (OUTPATIENT)
Age: 78
Setting detail: SPECIMEN
Discharge: HOME | End: 2018-10-10
Payer: COMMERCIAL

## 2018-10-10 LAB
ALBUMIN SERPL-MCNC: 3.4 GM/DL (ref 3.4–5)
ALP BLD-CCNC: 72 IU/L (ref 40–128)
ALT SERPL-CCNC: 6 U/L (ref 10–40)
ANION GAP SERPL CALCULATED.3IONS-SCNC: 14 MMOL/L (ref 4–16)
AST SERPL-CCNC: 14 IU/L (ref 15–37)
BASOPHILS ABSOLUTE: 0 K/CU MM
BASOPHILS RELATIVE PERCENT: 0.5 % (ref 0–1)
BILIRUB SERPL-MCNC: 0.3 MG/DL (ref 0–1)
BUN BLDV-MCNC: 31 MG/DL (ref 6–23)
CALCIUM SERPL-MCNC: 8.5 MG/DL (ref 8.3–10.6)
CHLORIDE BLD-SCNC: 98 MMOL/L (ref 99–110)
CHOLESTEROL: 126 MG/DL
CO2: 27 MMOL/L (ref 21–32)
CREAT SERPL-MCNC: 1.3 MG/DL (ref 0.6–1.1)
DIFFERENTIAL TYPE: ABNORMAL
EOSINOPHILS ABSOLUTE: 0.4 K/CU MM
EOSINOPHILS RELATIVE PERCENT: 6.5 % (ref 0–3)
GFR AFRICAN AMERICAN: 48 ML/MIN/1.73M2
GFR NON-AFRICAN AMERICAN: 40 ML/MIN/1.73M2
GLUCOSE BLD-MCNC: 93 MG/DL (ref 70–99)
HCT VFR BLD CALC: 34.7 % (ref 37–47)
HDLC SERPL-MCNC: 63 MG/DL
HEMOGLOBIN: 9.8 GM/DL (ref 12.5–16)
IMMATURE NEUTROPHIL %: 0.4 % (ref 0–0.43)
LDL CHOLESTEROL DIRECT: 55 MG/DL
LYMPHOCYTES ABSOLUTE: 0.7 K/CU MM
LYMPHOCYTES RELATIVE PERCENT: 12.7 % (ref 24–44)
MCH RBC QN AUTO: 27.3 PG (ref 27–31)
MCHC RBC AUTO-ENTMCNC: 28.2 % (ref 32–36)
MCV RBC AUTO: 96.7 FL (ref 78–100)
MONOCYTES ABSOLUTE: 0.6 K/CU MM
MONOCYTES RELATIVE PERCENT: 10.5 % (ref 0–4)
NUCLEATED RBC %: 0 %
PDW BLD-RTO: 13.5 % (ref 11.7–14.9)
PLATELET # BLD: 172 K/CU MM (ref 140–440)
PMV BLD AUTO: 12.1 FL (ref 7.5–11.1)
POTASSIUM SERPL-SCNC: 4.1 MMOL/L (ref 3.5–5.1)
RBC # BLD: 3.59 M/CU MM (ref 4.2–5.4)
SEGMENTED NEUTROPHILS ABSOLUTE COUNT: 3.8 K/CU MM
SEGMENTED NEUTROPHILS RELATIVE PERCENT: 69.4 % (ref 36–66)
SODIUM BLD-SCNC: 139 MMOL/L (ref 135–145)
TOTAL IMMATURE NEUTOROPHIL: 0.02 K/CU MM
TOTAL NUCLEATED RBC: 0 K/CU MM
TOTAL PROTEIN: 6 GM/DL (ref 6.4–8.2)
TRIGL SERPL-MCNC: 63 MG/DL
TSH HIGH SENSITIVITY: 1.85 UIU/ML (ref 0.27–4.2)
WBC # BLD: 5.5 K/CU MM (ref 4–10.5)

## 2018-10-10 PROCEDURE — 80053 COMPREHEN METABOLIC PANEL: CPT

## 2018-10-10 PROCEDURE — 80061 LIPID PANEL: CPT

## 2018-10-10 PROCEDURE — 83721 ASSAY OF BLOOD LIPOPROTEIN: CPT

## 2018-10-10 PROCEDURE — 36415 COLL VENOUS BLD VENIPUNCTURE: CPT

## 2018-10-10 PROCEDURE — 84443 ASSAY THYROID STIM HORMONE: CPT

## 2018-10-10 PROCEDURE — 85025 COMPLETE CBC W/AUTO DIFF WBC: CPT

## 2018-10-24 ENCOUNTER — HOSPITAL ENCOUNTER (OUTPATIENT)
Age: 78
Setting detail: SPECIMEN
Discharge: HOME | End: 2018-10-24
Payer: COMMERCIAL

## 2018-10-24 LAB
ALBUMIN SERPL-MCNC: 3.4 GM/DL (ref 3.4–5)
ALP BLD-CCNC: 71 IU/L (ref 40–128)
ALT SERPL-CCNC: 7 U/L (ref 10–40)
ANION GAP SERPL CALCULATED.3IONS-SCNC: 9 MMOL/L (ref 4–16)
AST SERPL-CCNC: 16 IU/L (ref 15–37)
BASOPHILS ABSOLUTE: 0 K/CU MM
BASOPHILS RELATIVE PERCENT: 0.3 % (ref 0–1)
BILIRUB SERPL-MCNC: 0.3 MG/DL (ref 0–1)
BUN BLDV-MCNC: 28 MG/DL (ref 6–23)
CALCIUM SERPL-MCNC: 8.6 MG/DL (ref 8.3–10.6)
CHLORIDE BLD-SCNC: 99 MMOL/L (ref 99–110)
CO2: 28 MMOL/L (ref 21–32)
CREAT SERPL-MCNC: 1.1 MG/DL (ref 0.6–1.1)
DIFFERENTIAL TYPE: ABNORMAL
EOSINOPHILS ABSOLUTE: 0.4 K/CU MM
EOSINOPHILS RELATIVE PERCENT: 6.4 % (ref 0–3)
GFR AFRICAN AMERICAN: 58 ML/MIN/1.73M2
GFR NON-AFRICAN AMERICAN: 48 ML/MIN/1.73M2
GLUCOSE BLD-MCNC: 91 MG/DL (ref 70–99)
HCT VFR BLD CALC: 32.9 % (ref 37–47)
HEMOGLOBIN: 9.3 GM/DL (ref 12.5–16)
IMMATURE NEUTROPHIL %: 0.5 % (ref 0–0.43)
LYMPHOCYTES ABSOLUTE: 0.8 K/CU MM
LYMPHOCYTES RELATIVE PERCENT: 12.4 % (ref 24–44)
MCH RBC QN AUTO: 26.9 PG (ref 27–31)
MCHC RBC AUTO-ENTMCNC: 28.3 % (ref 32–36)
MCV RBC AUTO: 95.1 FL (ref 78–100)
MONOCYTES ABSOLUTE: 0.7 K/CU MM
MONOCYTES RELATIVE PERCENT: 11.6 % (ref 0–4)
NUCLEATED RBC %: 0 %
PDW BLD-RTO: 13.6 % (ref 11.7–14.9)
PLATELET # BLD: 165 K/CU MM (ref 140–440)
PMV BLD AUTO: 11.7 FL (ref 7.5–11.1)
POTASSIUM SERPL-SCNC: 4 MMOL/L (ref 3.5–5.1)
RBC # BLD: 3.46 M/CU MM (ref 4.2–5.4)
SEGMENTED NEUTROPHILS ABSOLUTE COUNT: 4.3 K/CU MM
SEGMENTED NEUTROPHILS RELATIVE PERCENT: 68.8 % (ref 36–66)
SODIUM BLD-SCNC: 136 MMOL/L (ref 135–145)
TOTAL IMMATURE NEUTOROPHIL: 0.03 K/CU MM
TOTAL NUCLEATED RBC: 0 K/CU MM
TOTAL PROTEIN: 5.9 GM/DL (ref 6.4–8.2)
TOTAL RETICULOCYTE COUNT: 0.09 K/CU MM
WBC # BLD: 6.3 K/CU MM (ref 4–10.5)

## 2018-10-24 PROCEDURE — 85025 COMPLETE CBC W/AUTO DIFF WBC: CPT

## 2018-10-24 PROCEDURE — 80053 COMPREHEN METABOLIC PANEL: CPT

## 2018-10-24 PROCEDURE — 36415 COLL VENOUS BLD VENIPUNCTURE: CPT

## 2018-11-07 ENCOUNTER — HOSPITAL ENCOUNTER (OUTPATIENT)
Age: 78
Setting detail: SPECIMEN
Discharge: HOME | End: 2018-11-07
Payer: COMMERCIAL

## 2018-11-07 LAB
ALBUMIN SERPL-MCNC: 3.7 GM/DL (ref 3.4–5)
ALP BLD-CCNC: 75 IU/L (ref 40–128)
ALT SERPL-CCNC: 7 U/L (ref 10–40)
ANION GAP SERPL CALCULATED.3IONS-SCNC: 11 MMOL/L (ref 4–16)
AST SERPL-CCNC: 15 IU/L (ref 15–37)
BASOPHILS ABSOLUTE: 0 K/CU MM
BASOPHILS RELATIVE PERCENT: 0.3 % (ref 0–1)
BILIRUB SERPL-MCNC: 0.3 MG/DL (ref 0–1)
BUN BLDV-MCNC: 26 MG/DL (ref 6–23)
CALCIUM SERPL-MCNC: 8.6 MG/DL (ref 8.3–10.6)
CHLORIDE BLD-SCNC: 102 MMOL/L (ref 99–110)
CHOLESTEROL: 117 MG/DL
CO2: 29 MMOL/L (ref 21–32)
CREAT SERPL-MCNC: 1.2 MG/DL (ref 0.6–1.1)
DIFFERENTIAL TYPE: ABNORMAL
EOSINOPHILS ABSOLUTE: 0.4 K/CU MM
EOSINOPHILS RELATIVE PERCENT: 6 % (ref 0–3)
GFR AFRICAN AMERICAN: 53 ML/MIN/1.73M2
GFR NON-AFRICAN AMERICAN: 44 ML/MIN/1.73M2
GLUCOSE BLD-MCNC: 102 MG/DL (ref 70–99)
HCT VFR BLD CALC: 33.7 % (ref 37–47)
HDLC SERPL-MCNC: 63 MG/DL
HEMOGLOBIN: 9.6 GM/DL (ref 12.5–16)
IMMATURE NEUTROPHIL %: 0.3 % (ref 0–0.43)
LDL CHOLESTEROL DIRECT: 49 MG/DL
LYMPHOCYTES ABSOLUTE: 0.8 K/CU MM
LYMPHOCYTES RELATIVE PERCENT: 11.5 % (ref 24–44)
MCH RBC QN AUTO: 27.2 PG (ref 27–31)
MCHC RBC AUTO-ENTMCNC: 28.5 % (ref 32–36)
MCV RBC AUTO: 95.5 FL (ref 78–100)
MONOCYTES ABSOLUTE: 0.7 K/CU MM
MONOCYTES RELATIVE PERCENT: 10.3 % (ref 0–4)
NUCLEATED RBC %: 0 %
PDW BLD-RTO: 13.5 % (ref 11.7–14.9)
PLATELET # BLD: 176 K/CU MM (ref 140–440)
PMV BLD AUTO: 11.7 FL (ref 7.5–11.1)
POTASSIUM SERPL-SCNC: 4.2 MMOL/L (ref 3.5–5.1)
RBC # BLD: 3.53 M/CU MM (ref 4.2–5.4)
SEGMENTED NEUTROPHILS ABSOLUTE COUNT: 4.9 K/CU MM
SEGMENTED NEUTROPHILS RELATIVE PERCENT: 71.6 % (ref 36–66)
SODIUM BLD-SCNC: 142 MMOL/L (ref 135–145)
TOTAL IMMATURE NEUTOROPHIL: 0.02 K/CU MM
TOTAL NUCLEATED RBC: 0 K/CU MM
TOTAL PROTEIN: 6.2 GM/DL (ref 6.4–8.2)
TRIGL SERPL-MCNC: 61 MG/DL
TSH HIGH SENSITIVITY: 0.89 UIU/ML (ref 0.27–4.2)
WBC # BLD: 6.8 K/CU MM (ref 4–10.5)

## 2018-11-07 PROCEDURE — 85025 COMPLETE CBC W/AUTO DIFF WBC: CPT

## 2018-11-07 PROCEDURE — 36415 COLL VENOUS BLD VENIPUNCTURE: CPT

## 2018-11-07 PROCEDURE — 80061 LIPID PANEL: CPT

## 2018-11-07 PROCEDURE — 80053 COMPREHEN METABOLIC PANEL: CPT

## 2018-11-07 PROCEDURE — 83721 ASSAY OF BLOOD LIPOPROTEIN: CPT

## 2018-11-07 PROCEDURE — 84443 ASSAY THYROID STIM HORMONE: CPT

## 2018-11-21 ENCOUNTER — HOSPITAL ENCOUNTER (OUTPATIENT)
Age: 78
Setting detail: SPECIMEN
Discharge: HOME | End: 2018-11-21
Payer: COMMERCIAL

## 2018-11-21 LAB
ALBUMIN SERPL-MCNC: 3.4 GM/DL (ref 3.4–5)
ALP BLD-CCNC: 67 IU/L (ref 40–129)
ALT SERPL-CCNC: 6 U/L (ref 10–40)
ANION GAP SERPL CALCULATED.3IONS-SCNC: 8 MMOL/L (ref 4–16)
AST SERPL-CCNC: 13 IU/L (ref 15–37)
BASOPHILS ABSOLUTE: 0 K/CU MM
BASOPHILS RELATIVE PERCENT: 0.2 % (ref 0–1)
BILIRUB SERPL-MCNC: 0.3 MG/DL (ref 0–1)
BUN BLDV-MCNC: 19 MG/DL (ref 6–23)
CALCIUM SERPL-MCNC: 8.2 MG/DL (ref 8.3–10.6)
CHLORIDE BLD-SCNC: 100 MMOL/L (ref 99–110)
CO2: 33 MMOL/L (ref 21–32)
CREAT SERPL-MCNC: 1.1 MG/DL (ref 0.6–1.1)
DIFFERENTIAL TYPE: ABNORMAL
EOSINOPHILS ABSOLUTE: 0.5 K/CU MM
EOSINOPHILS RELATIVE PERCENT: 8.6 % (ref 0–3)
GFR AFRICAN AMERICAN: 58 ML/MIN/1.73M2
GFR NON-AFRICAN AMERICAN: 48 ML/MIN/1.73M2
GLUCOSE BLD-MCNC: 87 MG/DL (ref 70–99)
HCT VFR BLD CALC: 30.4 % (ref 37–47)
HEMOGLOBIN: 8.6 GM/DL (ref 12.5–16)
IMMATURE NEUTROPHIL %: 0.4 % (ref 0–0.43)
LYMPHOCYTES ABSOLUTE: 0.6 K/CU MM
LYMPHOCYTES RELATIVE PERCENT: 11.7 % (ref 24–44)
MCH RBC QN AUTO: 27.5 PG (ref 27–31)
MCHC RBC AUTO-ENTMCNC: 28.3 % (ref 32–36)
MCV RBC AUTO: 97.1 FL (ref 78–100)
MONOCYTES ABSOLUTE: 0.6 K/CU MM
MONOCYTES RELATIVE PERCENT: 11.2 % (ref 0–4)
NUCLEATED RBC %: 0 %
PDW BLD-RTO: 13.9 % (ref 11.7–14.9)
PLATELET # BLD: 161 K/CU MM (ref 140–440)
PMV BLD AUTO: 11.2 FL (ref 7.5–11.1)
POTASSIUM SERPL-SCNC: 3.9 MMOL/L (ref 3.5–5.1)
RBC # BLD: 3.13 M/CU MM (ref 4.2–5.4)
SEGMENTED NEUTROPHILS ABSOLUTE COUNT: 3.7 K/CU MM
SEGMENTED NEUTROPHILS RELATIVE PERCENT: 67.9 % (ref 36–66)
SODIUM BLD-SCNC: 141 MMOL/L (ref 135–145)
TOTAL IMMATURE NEUTOROPHIL: 0.02 K/CU MM
TOTAL NUCLEATED RBC: 0 K/CU MM
TOTAL PROTEIN: 5.7 GM/DL (ref 6.4–8.2)
WBC # BLD: 5.5 K/CU MM (ref 4–10.5)

## 2018-11-21 PROCEDURE — 36415 COLL VENOUS BLD VENIPUNCTURE: CPT

## 2018-11-21 PROCEDURE — 80053 COMPREHEN METABOLIC PANEL: CPT

## 2018-11-21 PROCEDURE — 85025 COMPLETE CBC W/AUTO DIFF WBC: CPT

## 2018-12-04 ENCOUNTER — HOSPITAL ENCOUNTER (OUTPATIENT)
Age: 78
Setting detail: SPECIMEN
Discharge: HOME | End: 2018-12-04
Payer: COMMERCIAL

## 2018-12-05 ENCOUNTER — HOSPITAL ENCOUNTER (OUTPATIENT)
Age: 78
Setting detail: SPECIMEN
Discharge: HOME | End: 2018-12-05
Payer: COMMERCIAL

## 2018-12-05 LAB
ALBUMIN SERPL-MCNC: 3.5 GM/DL (ref 3.4–5)
ALP BLD-CCNC: 67 IU/L (ref 40–128)
ALT SERPL-CCNC: 7 U/L (ref 10–40)
ANION GAP SERPL CALCULATED.3IONS-SCNC: 10 MMOL/L (ref 4–16)
AST SERPL-CCNC: 16 IU/L (ref 15–37)
BASOPHILS ABSOLUTE: 0 K/CU MM
BASOPHILS RELATIVE PERCENT: 0.2 % (ref 0–1)
BILIRUB SERPL-MCNC: 0.3 MG/DL (ref 0–1)
BUN BLDV-MCNC: 26 MG/DL (ref 6–23)
CALCIUM SERPL-MCNC: 8.2 MG/DL (ref 8.3–10.6)
CHLORIDE BLD-SCNC: 102 MMOL/L (ref 99–110)
CHOLESTEROL: 109 MG/DL
CO2: 30 MMOL/L (ref 21–32)
CREAT SERPL-MCNC: 1.3 MG/DL (ref 0.6–1.1)
DIFFERENTIAL TYPE: ABNORMAL
EOSINOPHILS ABSOLUTE: 0.4 K/CU MM
EOSINOPHILS RELATIVE PERCENT: 6.9 % (ref 0–3)
GFR AFRICAN AMERICAN: 48 ML/MIN/1.73M2
GFR NON-AFRICAN AMERICAN: 40 ML/MIN/1.73M2
GLUCOSE BLD-MCNC: 101 MG/DL (ref 70–99)
HCT VFR BLD CALC: 31.6 % (ref 37–47)
HDLC SERPL-MCNC: 57 MG/DL
HEMOGLOBIN: 8.8 GM/DL (ref 12.5–16)
IMMATURE NEUTROPHIL %: 0.3 % (ref 0–0.43)
LDL CHOLESTEROL DIRECT: 46 MG/DL
LYMPHOCYTES ABSOLUTE: 1 K/CU MM
LYMPHOCYTES RELATIVE PERCENT: 15 % (ref 24–44)
MCH RBC QN AUTO: 27.1 PG (ref 27–31)
MCHC RBC AUTO-ENTMCNC: 27.8 % (ref 32–36)
MCV RBC AUTO: 97.2 FL (ref 78–100)
MONOCYTES ABSOLUTE: 0.7 K/CU MM
MONOCYTES RELATIVE PERCENT: 10.8 % (ref 0–4)
NUCLEATED RBC %: 0 %
PDW BLD-RTO: 13.8 % (ref 11.7–14.9)
PLATELET # BLD: 178 K/CU MM (ref 140–440)
PMV BLD AUTO: 11.8 FL (ref 7.5–11.1)
POTASSIUM SERPL-SCNC: 4.4 MMOL/L (ref 3.5–5.1)
RBC # BLD: 3.25 M/CU MM (ref 4.2–5.4)
SEGMENTED NEUTROPHILS ABSOLUTE COUNT: 4.3 K/CU MM
SEGMENTED NEUTROPHILS RELATIVE PERCENT: 66.8 % (ref 36–66)
SODIUM BLD-SCNC: 142 MMOL/L (ref 135–145)
TOTAL IMMATURE NEUTOROPHIL: 0.02 K/CU MM
TOTAL NUCLEATED RBC: 0 K/CU MM
TOTAL PROTEIN: 5.9 GM/DL (ref 6.4–8.2)
TRIGL SERPL-MCNC: 53 MG/DL
TSH HIGH SENSITIVITY: 1.34 UIU/ML (ref 0.27–4.2)
WBC # BLD: 6.4 K/CU MM (ref 4–10.5)

## 2018-12-05 PROCEDURE — 83721 ASSAY OF BLOOD LIPOPROTEIN: CPT

## 2018-12-05 PROCEDURE — 80061 LIPID PANEL: CPT

## 2018-12-05 PROCEDURE — 80053 COMPREHEN METABOLIC PANEL: CPT

## 2018-12-05 PROCEDURE — 85025 COMPLETE CBC W/AUTO DIFF WBC: CPT

## 2018-12-05 PROCEDURE — 36415 COLL VENOUS BLD VENIPUNCTURE: CPT

## 2018-12-05 PROCEDURE — 84443 ASSAY THYROID STIM HORMONE: CPT

## 2018-12-19 ENCOUNTER — HOSPITAL ENCOUNTER (OUTPATIENT)
Age: 78
Setting detail: SPECIMEN
Discharge: HOME | End: 2018-12-19
Payer: COMMERCIAL

## 2018-12-19 LAB
ALBUMIN SERPL-MCNC: 3.5 GM/DL (ref 3.4–5)
ALP BLD-CCNC: 70 IU/L (ref 40–128)
ALT SERPL-CCNC: 6 U/L (ref 10–40)
ANION GAP SERPL CALCULATED.3IONS-SCNC: 12 MMOL/L (ref 4–16)
ANISOCYTOSIS: ABNORMAL
AST SERPL-CCNC: 15 IU/L (ref 15–37)
BASOPHILS ABSOLUTE: 0 K/CU MM
BASOPHILS RELATIVE PERCENT: 0.2 % (ref 0–1)
BILIRUB SERPL-MCNC: 0.3 MG/DL (ref 0–1)
BUN BLDV-MCNC: 17 MG/DL (ref 6–23)
CALCIUM SERPL-MCNC: 8 MG/DL (ref 8.3–10.6)
CHLORIDE BLD-SCNC: 104 MMOL/L (ref 99–110)
CO2: 25 MMOL/L (ref 21–32)
CREAT SERPL-MCNC: 1.1 MG/DL (ref 0.6–1.1)
DIFFERENTIAL TYPE: ABNORMAL
DIFFERENTIAL TYPE: ABNORMAL
EOSINOPHILS ABSOLUTE: 0.4 K/CU MM
EOSINOPHILS RELATIVE PERCENT: 7.3 % (ref 0–3)
GFR AFRICAN AMERICAN: 58 ML/MIN/1.73M2
GFR NON-AFRICAN AMERICAN: 48 ML/MIN/1.73M2
GLUCOSE BLD-MCNC: 91 MG/DL (ref 70–99)
HCT VFR BLD CALC: 33.9 % (ref 37–47)
HEMOGLOBIN: 9.1 GM/DL (ref 12.5–16)
IMMATURE NEUTROPHIL %: 0.6 % (ref 0–0.43)
LYMPHOCYTES ABSOLUTE: 0.7 K/CU MM
LYMPHOCYTES RELATIVE PERCENT: 13.2 % (ref 24–44)
MCH RBC QN AUTO: 27.4 PG (ref 27–31)
MCHC RBC AUTO-ENTMCNC: 26.8 % (ref 32–36)
MCV RBC AUTO: 102.1 FL (ref 78–100)
MONOCYTES ABSOLUTE: 0.7 K/CU MM
MONOCYTES RELATIVE PERCENT: 12.5 % (ref 0–4)
NUCLEATED RBC %: 0 %
PDW BLD-RTO: 13.7 % (ref 11.7–14.9)
PLATELET # BLD: 162 K/CU MM (ref 140–440)
PMV BLD AUTO: 12.1 FL (ref 7.5–11.1)
POTASSIUM SERPL-SCNC: 4.4 MMOL/L (ref 3.5–5.1)
RBC # BLD: 3.32 M/CU MM (ref 4.2–5.4)
RBC # BLD: ABNORMAL 10*6/UL
SEGMENTED NEUTROPHILS ABSOLUTE COUNT: 3.6 K/CU MM
SEGMENTED NEUTROPHILS RELATIVE PERCENT: 66.2 % (ref 36–66)
SODIUM BLD-SCNC: 141 MMOL/L (ref 135–145)
TOTAL IMMATURE NEUTOROPHIL: 0.03 K/CU MM
TOTAL NUCLEATED RBC: 0 K/CU MM
TOTAL PROTEIN: 5.8 GM/DL (ref 6.4–8.2)
WBC # BLD: 5.4 K/CU MM (ref 4–10.5)

## 2018-12-19 PROCEDURE — 85025 COMPLETE CBC W/AUTO DIFF WBC: CPT

## 2018-12-19 PROCEDURE — 36415 COLL VENOUS BLD VENIPUNCTURE: CPT

## 2018-12-19 PROCEDURE — 80053 COMPREHEN METABOLIC PANEL: CPT

## 2019-01-02 ENCOUNTER — HOSPITAL ENCOUNTER (OUTPATIENT)
Age: 79
Setting detail: SPECIMEN
Discharge: HOME | End: 2019-01-02
Payer: COMMERCIAL

## 2019-01-02 LAB
ALBUMIN SERPL-MCNC: 3.4 GM/DL (ref 3.4–5)
ALP BLD-CCNC: 71 IU/L (ref 40–128)
ALT SERPL-CCNC: 6 U/L (ref 10–40)
ANION GAP SERPL CALCULATED.3IONS-SCNC: 9 MMOL/L (ref 4–16)
AST SERPL-CCNC: 14 IU/L (ref 15–37)
BASOPHILS ABSOLUTE: 0 K/CU MM
BASOPHILS RELATIVE PERCENT: 0.2 % (ref 0–1)
BILIRUB SERPL-MCNC: 0.3 MG/DL (ref 0–1)
BUN BLDV-MCNC: 21 MG/DL (ref 6–23)
CALCIUM SERPL-MCNC: 8.2 MG/DL (ref 8.3–10.6)
CHLORIDE BLD-SCNC: 105 MMOL/L (ref 99–110)
CHOLESTEROL: 114 MG/DL
CO2: 30 MMOL/L (ref 21–32)
CREAT SERPL-MCNC: 1.3 MG/DL (ref 0.6–1.1)
DIFFERENTIAL TYPE: ABNORMAL
EOSINOPHILS ABSOLUTE: 0.4 K/CU MM
EOSINOPHILS RELATIVE PERCENT: 8.1 % (ref 0–3)
GFR AFRICAN AMERICAN: 48 ML/MIN/1.73M2
GFR NON-AFRICAN AMERICAN: 40 ML/MIN/1.73M2
GLUCOSE BLD-MCNC: 92 MG/DL (ref 70–99)
HCT VFR BLD CALC: 32.8 % (ref 37–47)
HDLC SERPL-MCNC: 58 MG/DL
HEMOGLOBIN: 9.1 GM/DL (ref 12.5–16)
IMMATURE NEUTROPHIL %: 0.4 % (ref 0–0.43)
LDL CHOLESTEROL DIRECT: 48 MG/DL
LYMPHOCYTES ABSOLUTE: 0.6 K/CU MM
LYMPHOCYTES RELATIVE PERCENT: 11 % (ref 24–44)
MCH RBC QN AUTO: 27.2 PG (ref 27–31)
MCHC RBC AUTO-ENTMCNC: 27.7 % (ref 32–36)
MCV RBC AUTO: 97.9 FL (ref 78–100)
MONOCYTES ABSOLUTE: 0.5 K/CU MM
MONOCYTES RELATIVE PERCENT: 9.7 % (ref 0–4)
NUCLEATED RBC %: 0 %
PDW BLD-RTO: 13.7 % (ref 11.7–14.9)
PLATELET # BLD: 193 K/CU MM (ref 140–440)
PMV BLD AUTO: 12.1 FL (ref 7.5–11.1)
POTASSIUM SERPL-SCNC: 4.3 MMOL/L (ref 3.5–5.1)
RBC # BLD: 3.35 M/CU MM (ref 4.2–5.4)
SEGMENTED NEUTROPHILS ABSOLUTE COUNT: 3.7 K/CU MM
SEGMENTED NEUTROPHILS RELATIVE PERCENT: 70.6 % (ref 36–66)
SODIUM BLD-SCNC: 144 MMOL/L (ref 135–145)
TOTAL IMMATURE NEUTOROPHIL: 0.02 K/CU MM
TOTAL NUCLEATED RBC: 0 K/CU MM
TOTAL PROTEIN: 6 GM/DL (ref 6.4–8.2)
TRIGL SERPL-MCNC: 58 MG/DL
TSH HIGH SENSITIVITY: 0.79 UIU/ML (ref 0.27–4.2)
WBC # BLD: 5.3 K/CU MM (ref 4–10.5)

## 2019-01-02 PROCEDURE — 80053 COMPREHEN METABOLIC PANEL: CPT

## 2019-01-02 PROCEDURE — 85025 COMPLETE CBC W/AUTO DIFF WBC: CPT

## 2019-01-02 PROCEDURE — 36415 COLL VENOUS BLD VENIPUNCTURE: CPT

## 2019-01-02 PROCEDURE — 83721 ASSAY OF BLOOD LIPOPROTEIN: CPT

## 2019-01-02 PROCEDURE — 80061 LIPID PANEL: CPT

## 2019-01-02 PROCEDURE — 84443 ASSAY THYROID STIM HORMONE: CPT

## 2019-01-16 ENCOUNTER — HOSPITAL ENCOUNTER (OUTPATIENT)
Age: 79
Setting detail: SPECIMEN
Discharge: HOME | End: 2019-01-16
Payer: COMMERCIAL

## 2019-01-16 LAB
ALBUMIN SERPL-MCNC: 3.5 GM/DL (ref 3.4–5)
ALP BLD-CCNC: 73 IU/L (ref 40–128)
ALT SERPL-CCNC: 7 U/L (ref 10–40)
ANION GAP SERPL CALCULATED.3IONS-SCNC: 11 MMOL/L (ref 4–16)
AST SERPL-CCNC: 13 IU/L (ref 15–37)
BASOPHILS ABSOLUTE: 0 K/CU MM
BASOPHILS RELATIVE PERCENT: 0.3 % (ref 0–1)
BILIRUB SERPL-MCNC: 0.4 MG/DL (ref 0–1)
BUN BLDV-MCNC: 18 MG/DL (ref 6–23)
CALCIUM SERPL-MCNC: 8.1 MG/DL (ref 8.3–10.6)
CHLORIDE BLD-SCNC: 101 MMOL/L (ref 99–110)
CO2: 30 MMOL/L (ref 21–32)
CREAT SERPL-MCNC: 1.2 MG/DL (ref 0.6–1.1)
DIFFERENTIAL TYPE: ABNORMAL
EOSINOPHILS ABSOLUTE: 0.5 K/CU MM
EOSINOPHILS RELATIVE PERCENT: 8.3 % (ref 0–3)
GFR AFRICAN AMERICAN: 53 ML/MIN/1.73M2
GFR NON-AFRICAN AMERICAN: 44 ML/MIN/1.73M2
GLUCOSE BLD-MCNC: 87 MG/DL (ref 70–99)
HCT VFR BLD CALC: 33.7 % (ref 37–47)
HEMOGLOBIN: 9.2 GM/DL (ref 12.5–16)
IMMATURE NEUTROPHIL %: 0.3 % (ref 0–0.43)
LYMPHOCYTES ABSOLUTE: 0.5 K/CU MM
LYMPHOCYTES RELATIVE PERCENT: 9.4 % (ref 24–44)
MCH RBC QN AUTO: 27.3 PG (ref 27–31)
MCHC RBC AUTO-ENTMCNC: 27.3 % (ref 32–36)
MCV RBC AUTO: 100 FL (ref 78–100)
MONOCYTES ABSOLUTE: 0.6 K/CU MM
MONOCYTES RELATIVE PERCENT: 10.2 % (ref 0–4)
NUCLEATED RBC %: 0 %
PDW BLD-RTO: 13.8 % (ref 11.7–14.9)
PLATELET # BLD: 171 K/CU MM (ref 140–440)
PMV BLD AUTO: 12.1 FL (ref 7.5–11.1)
POTASSIUM SERPL-SCNC: 4.2 MMOL/L (ref 3.5–5.1)
RBC # BLD: 3.37 M/CU MM (ref 4.2–5.4)
SEGMENTED NEUTROPHILS ABSOLUTE COUNT: 4.1 K/CU MM
SEGMENTED NEUTROPHILS RELATIVE PERCENT: 71.5 % (ref 36–66)
SODIUM BLD-SCNC: 142 MMOL/L (ref 135–145)
TOTAL IMMATURE NEUTOROPHIL: 0.02 K/CU MM
TOTAL NUCLEATED RBC: 0 K/CU MM
TOTAL PROTEIN: 6 GM/DL (ref 6.4–8.2)
WBC # BLD: 5.8 K/CU MM (ref 4–10.5)

## 2019-01-16 PROCEDURE — 85025 COMPLETE CBC W/AUTO DIFF WBC: CPT

## 2019-01-16 PROCEDURE — 80053 COMPREHEN METABOLIC PANEL: CPT

## 2019-01-16 PROCEDURE — 36415 COLL VENOUS BLD VENIPUNCTURE: CPT

## 2019-11-11 ENCOUNTER — HOSPITAL ENCOUNTER (OUTPATIENT)
Dept: MAMMOGRAPHY | Age: 79
Discharge: HOME | End: 2019-11-11
Payer: COMMERCIAL

## 2019-11-11 DIAGNOSIS — Z12.31 VISIT FOR SCREENING MAMMOGRAM: ICD-10-CM

## 2019-11-11 PROCEDURE — 77067 SCR MAMMO BI INCL CAD: CPT

## 2019-11-18 ENCOUNTER — HOSPITAL ENCOUNTER (OUTPATIENT)
Dept: WOMENS IMAGING | Age: 79
Discharge: HOME | End: 2019-11-18
Payer: COMMERCIAL

## 2019-11-18 DIAGNOSIS — R92.0 MICROCALCIFICATIONS OF THE BREAST: ICD-10-CM

## 2019-11-18 PROCEDURE — 77065 DX MAMMO INCL CAD UNI: CPT

## 2020-08-27 ENCOUNTER — HOSPITAL ENCOUNTER (OUTPATIENT)
Dept: WOMENS IMAGING | Age: 80
Discharge: HOME | End: 2020-08-27
Payer: MEDICARE

## 2020-08-27 PROCEDURE — 77065 DX MAMMO INCL CAD UNI: CPT

## 2020-11-24 ENCOUNTER — APPOINTMENT (OUTPATIENT)
Dept: GENERAL RADIOLOGY | Age: 80
DRG: 377 | End: 2020-11-24
Payer: MEDICARE

## 2020-11-24 ENCOUNTER — HOSPITAL ENCOUNTER (INPATIENT)
Age: 80
LOS: 6 days | Discharge: NURSING, REHAB OR LONG-TERM CARE | DRG: 377 | End: 2020-11-30
Attending: EMERGENCY MEDICINE | Admitting: INTERNAL MEDICINE
Payer: MEDICARE

## 2020-11-24 ENCOUNTER — APPOINTMENT (OUTPATIENT)
Dept: CT IMAGING | Age: 80
DRG: 377 | End: 2020-11-24
Payer: MEDICARE

## 2020-11-24 PROBLEM — K92.2 GIB (GASTROINTESTINAL BLEEDING): Status: ACTIVE | Noted: 2020-11-24

## 2020-11-24 LAB
ABO/RH: NORMAL
ALBUMIN SERPL-MCNC: 3.7 GM/DL (ref 3.4–5)
ALP BLD-CCNC: 95 IU/L (ref 40–128)
ALT SERPL-CCNC: 8 U/L (ref 10–40)
ANION GAP SERPL CALCULATED.3IONS-SCNC: 16 MMOL/L (ref 4–16)
ANTIBODY SCREEN: NEGATIVE
APTT: 22.5 SECONDS (ref 25.1–37.1)
AST SERPL-CCNC: 22 IU/L (ref 15–37)
BACTERIA: ABNORMAL /HPF
BASOPHILS ABSOLUTE: 0 K/CU MM
BASOPHILS RELATIVE PERCENT: 0.2 % (ref 0–1)
BILIRUB SERPL-MCNC: 0.5 MG/DL (ref 0–1)
BILIRUBIN URINE: NEGATIVE MG/DL
BLOOD, URINE: ABNORMAL
BUN BLDV-MCNC: 54 MG/DL (ref 6–23)
CALCIUM SERPL-MCNC: 9.5 MG/DL (ref 8.3–10.6)
CHLORIDE BLD-SCNC: 94 MMOL/L (ref 99–110)
CLARITY: CLEAR
CO2: 21 MMOL/L (ref 21–32)
COLOR: YELLOW
CREAT SERPL-MCNC: 2 MG/DL (ref 0.6–1.1)
DIFFERENTIAL TYPE: ABNORMAL
EOSINOPHILS ABSOLUTE: 0.3 K/CU MM
EOSINOPHILS RELATIVE PERCENT: 2.5 % (ref 0–3)
GFR AFRICAN AMERICAN: 29 ML/MIN/1.73M2
GFR NON-AFRICAN AMERICAN: 24 ML/MIN/1.73M2
GLUCOSE BLD-MCNC: 114 MG/DL (ref 70–99)
GLUCOSE, URINE: NEGATIVE MG/DL
HCT VFR BLD CALC: 45 % (ref 37–47)
HEMOCCULT SP1 STL QL: POSITIVE
HEMOGLOBIN: 12.8 GM/DL (ref 12.5–16)
HYALINE CASTS: 5 /LPF
IMMATURE NEUTROPHIL %: 1.7 % (ref 0–0.43)
INR BLD: 0.93 INDEX
INR BLD: 1.02 INDEX
KETONES, URINE: NEGATIVE MG/DL
LACTATE: 1.8 MMOL/L (ref 0.4–2)
LEUKOCYTE ESTERASE, URINE: ABNORMAL
LIPASE: 46 IU/L (ref 13–60)
LYMPHOCYTES ABSOLUTE: 1.1 K/CU MM
LYMPHOCYTES RELATIVE PERCENT: 9.2 % (ref 24–44)
MCH RBC QN AUTO: 28.1 PG (ref 27–31)
MCHC RBC AUTO-ENTMCNC: 28.4 % (ref 32–36)
MCV RBC AUTO: 98.7 FL (ref 78–100)
MONOCYTES ABSOLUTE: 1.2 K/CU MM
MONOCYTES RELATIVE PERCENT: 9.8 % (ref 0–4)
MUCUS: ABNORMAL HPF
NITRITE URINE, QUANTITATIVE: NEGATIVE
NUCLEATED RBC %: 0.2 %
OCCULT BLOOD 2: POSITIVE
PDW BLD-RTO: 15.3 % (ref 11.7–14.9)
PH, URINE: 5 (ref 5–8)
PLATELET # BLD: 123 K/CU MM (ref 140–440)
PMV BLD AUTO: 11.8 FL (ref 7.5–11.1)
POTASSIUM SERPL-SCNC: 4.8 MMOL/L (ref 3.5–5.1)
PROTEIN UA: NEGATIVE MG/DL
PROTHROMBIN TIME: 11.3 SECONDS (ref 11.7–14.5)
PROTHROMBIN TIME: 12.3 SECONDS (ref 11.7–14.5)
RBC # BLD: 4.56 M/CU MM (ref 4.2–5.4)
RBC URINE: 1 /HPF (ref 0–6)
REASON FOR REJECTION: NORMAL
REASON FOR REJECTION: NORMAL
REJECTED TEST: NORMAL
REJECTED TEST: NORMAL
SARS-COV-2, NAAT: NOT DETECTED
SEGMENTED NEUTROPHILS ABSOLUTE COUNT: 9.5 K/CU MM
SEGMENTED NEUTROPHILS RELATIVE PERCENT: 76.6 % (ref 36–66)
SODIUM BLD-SCNC: 131 MMOL/L (ref 135–145)
SOURCE: NORMAL
SPECIFIC GRAVITY UA: 1.01 (ref 1–1.03)
SQUAMOUS EPITHELIAL: <1 /HPF
TOTAL IMMATURE NEUTOROPHIL: 0.21 K/CU MM
TOTAL NUCLEATED RBC: 0 K/CU MM
TOTAL PROTEIN: 6.7 GM/DL (ref 6.4–8.2)
TRICHOMONAS: ABNORMAL /HPF
TROPONIN T: 0.05 NG/ML
UROBILINOGEN, URINE: NORMAL MG/DL (ref 0.2–1)
WBC # BLD: 12.4 K/CU MM (ref 4–10.5)
WBC UA: 1 /HPF (ref 0–5)

## 2020-11-24 PROCEDURE — 2140000000 HC CCU INTERMEDIATE R&B

## 2020-11-24 PROCEDURE — 6360000002 HC RX W HCPCS: Performed by: EMERGENCY MEDICINE

## 2020-11-24 PROCEDURE — 2580000003 HC RX 258: Performed by: EMERGENCY MEDICINE

## 2020-11-24 PROCEDURE — 84484 ASSAY OF TROPONIN QUANT: CPT

## 2020-11-24 PROCEDURE — 83605 ASSAY OF LACTIC ACID: CPT

## 2020-11-24 PROCEDURE — 96375 TX/PRO/DX INJ NEW DRUG ADDON: CPT

## 2020-11-24 PROCEDURE — U0002 COVID-19 LAB TEST NON-CDC: HCPCS

## 2020-11-24 PROCEDURE — 85610 PROTHROMBIN TIME: CPT

## 2020-11-24 PROCEDURE — 96365 THER/PROPH/DIAG IV INF INIT: CPT

## 2020-11-24 PROCEDURE — 85730 THROMBOPLASTIN TIME PARTIAL: CPT

## 2020-11-24 PROCEDURE — 96366 THER/PROPH/DIAG IV INF ADDON: CPT

## 2020-11-24 PROCEDURE — G0328 FECAL BLOOD SCRN IMMUNOASSAY: HCPCS

## 2020-11-24 PROCEDURE — 86900 BLOOD TYPING SEROLOGIC ABO: CPT

## 2020-11-24 PROCEDURE — 6360000002 HC RX W HCPCS: Performed by: PHYSICIAN ASSISTANT

## 2020-11-24 PROCEDURE — 81001 URINALYSIS AUTO W/SCOPE: CPT

## 2020-11-24 PROCEDURE — 83690 ASSAY OF LIPASE: CPT

## 2020-11-24 PROCEDURE — 74176 CT ABD & PELVIS W/O CONTRAST: CPT

## 2020-11-24 PROCEDURE — 80053 COMPREHEN METABOLIC PANEL: CPT

## 2020-11-24 PROCEDURE — 85025 COMPLETE CBC W/AUTO DIFF WBC: CPT

## 2020-11-24 PROCEDURE — 71045 X-RAY EXAM CHEST 1 VIEW: CPT

## 2020-11-24 PROCEDURE — C9113 INJ PANTOPRAZOLE SODIUM, VIA: HCPCS | Performed by: EMERGENCY MEDICINE

## 2020-11-24 PROCEDURE — 99285 EMERGENCY DEPT VISIT HI MDM: CPT

## 2020-11-24 PROCEDURE — 86850 RBC ANTIBODY SCREEN: CPT

## 2020-11-24 PROCEDURE — 86901 BLOOD TYPING SEROLOGIC RH(D): CPT

## 2020-11-24 PROCEDURE — 36415 COLL VENOUS BLD VENIPUNCTURE: CPT

## 2020-11-24 RX ORDER — 0.9 % SODIUM CHLORIDE 0.9 %
1000 INTRAVENOUS SOLUTION INTRAVENOUS ONCE
Status: COMPLETED | OUTPATIENT
Start: 2020-11-24 | End: 2020-11-24

## 2020-11-24 RX ORDER — FENTANYL CITRATE 50 UG/ML
25 INJECTION, SOLUTION INTRAMUSCULAR; INTRAVENOUS ONCE
Status: COMPLETED | OUTPATIENT
Start: 2020-11-24 | End: 2020-11-24

## 2020-11-24 RX ORDER — ONDANSETRON 2 MG/ML
4 INJECTION INTRAMUSCULAR; INTRAVENOUS EVERY 30 MIN PRN
Status: DISCONTINUED | OUTPATIENT
Start: 2020-11-24 | End: 2020-11-25

## 2020-11-24 RX ADMIN — SODIUM CHLORIDE 8 MG/HR: 9 INJECTION, SOLUTION INTRAVENOUS at 20:14

## 2020-11-24 RX ADMIN — FENTANYL CITRATE 25 MCG: 50 INJECTION INTRAMUSCULAR; INTRAVENOUS at 16:15

## 2020-11-24 RX ADMIN — SODIUM CHLORIDE 1000 ML: 9 INJECTION, SOLUTION INTRAVENOUS at 16:15

## 2020-11-24 RX ADMIN — ONDANSETRON 4 MG: 2 INJECTION INTRAMUSCULAR; INTRAVENOUS at 16:14

## 2020-11-24 RX ADMIN — SODIUM CHLORIDE 1000 ML: 9 INJECTION, SOLUTION INTRAVENOUS at 19:21

## 2020-11-24 ASSESSMENT — PAIN DESCRIPTION - LOCATION: LOCATION: ABDOMEN

## 2020-11-24 ASSESSMENT — PAIN DESCRIPTION - ORIENTATION: ORIENTATION: RIGHT;UPPER;LOWER

## 2020-11-24 ASSESSMENT — PAIN SCALES - GENERAL
PAINLEVEL_OUTOF10: 9

## 2020-11-24 NOTE — ED NOTES
PICC RN to room, pt has a working IV, PICC not needed at this time per Dr. Guera Morrison, RN  11/24/20 8852

## 2020-11-24 NOTE — ED NOTES
Pt had moderate amount of dark loose stool. Sample sent to lab. Mai care provided. Handley placed per Dr. Harsah Sahu.       Dilia Chahal RN  11/24/20 8384

## 2020-11-24 NOTE — ED NOTES
IV attempt x 4 to right and left AC w/o success. Pressure dressings applied to sites. Patient also stuck 3 time for blood draw. Request of PICC line given to provider. Nimisha Segundo.  NICA Park  11/24/20 5850

## 2020-11-24 NOTE — ED TRIAGE NOTES
Pt arrived to ED from [address] c/o n/v x 2-3 weeks and blood in stool.   Pt alert and oriented at this time

## 2020-11-24 NOTE — ED PROVIDER NOTES
EMERGENCY DEPARTMENT ENCOUNTER    Patient: Deirdre Castellon  MRN: [id number]  : 1940  Date of Evaluation: 2020  ED Provider:  [address] [place]  Chief Complaint   Patient presents with    Emesis     x 2-3 weeks    Rectal Bleeding       HPI  Deirdre Castellon is a 79 y.o. female who presents with complaints of persistent, moderate to severe nausea for the last 4 weeks which has been associated with intermittent nonbloody nonbilious emesis over that time. There were also reports of rectal bleeding however the patient denies this. She denies any abdominal pain on review of systems. She does report some mid central chest pressure which has been mild and intermittent for the last 2 weeks. No known modifying factors. Denies any other associated symptoms or complaints or concerns.       REVIEW OF SYSTEMS    Constitutional: negative for fever, chills  Neurological: negative for HA, focal weakness, loss of sensation  Ophthalmic: negative for vision change  ENT: negative for congestion, rhinorrhea  Cardiovascular: negative for palpitations, edema  Respiratory: negative for SOB, cough  GI: negative for abdominal pain, diarrhea, constipation  : negative for dysuria, hematuria  Musculoskeletal: negative for myalgias, decreased ROM, joint swelling  Dermatological: negative for rash, wounds  Heme: Negative for bleeding, bruising      PAST MEDICAL HISTORY  Past Medical History:   Diagnosis Date    ASHD (arteriosclerotic heart disease)     Bilateral edema of lower extremity 2015    CHF (congestive heart failure) ([facility])     COPD (chronic obstructive pulmonary disease) ([facility])     Depression     Hypertension        CURRENT MEDICATIONS  [unfilled]    ALLERGIES  Allergies   Allergen Reactions    Codeine Itching    Moxifloxacin Other (See Comments)     Listed as allergy from e[facility]    Oxycodone Other (See Comments)     Listed as allergy from e[facility]     Pcn [Penicillins] Hives and Rash    Warfarin And Related Other (See Comments)     listed as allergy from e[facility]       SURGICAL HISTORY  Past Surgical History:   Procedure Laterality Date    ANUS SURGERY      fistula repair    APPENDECTOMY      HYSTERECTOMY      TONSILLECTOMY         FAMILY HISTORY  Family History   Problem Relation Age of Onset    High Blood Pressure Mother     Heart Disease Mother     Early Death Father     Substance Abuse Father     Diabetes Sister        SOCIAL HISTORY  Social History     Socioeconomic History    Marital status:      Spouse name: Shefali Paige Number of children: 3    Years of education: Not on file    Highest education level: Not on file   Occupational History    Not on file   Social Needs    Financial resource strain: Not on file    Food insecurity     Worry: Not on file     Inability: Not on file   [employer] needs     Medical: Not on file     Non-medical: Not on file   Tobacco Use    Smoking status: Former Smoker     Packs/day: 1.00     Types: Cigarettes     Last attempt to quit: 9/2/2010     Years since quitting: 10.2    Smokeless tobacco: Never Used   Substance and Sexual Activity    Alcohol use:  Yes     Alcohol/week: 0.0 standard drinks     Comment: wine twice a year    Drug use: No    Sexual activity: Yes     Partners: Male   Lifestyle    Physical activity     Days per week: Not on file     Minutes per session: Not on file    Stress: Not on file   Relationships    Social connections     Talks on phone: Not on file     Gets together: Not on file     Attends [employer] service: Not on file     Active member of club or organization: Not on file     Attends meetings of clubs or organizations: Not on file     Relationship status: Not on file    Intimate partner violence     Fear of current or ex partner: Not on file     Emotionally abused: Not on file     Physically abused: Not on file     Forced sexual activity: Not on file   Other Topics Concern    Not on file   Social History Narrative    Not on file         **Past medical, family and social histories, and nursing notes reviewed and verified by me**      PHYSICAL EXAM  VITAL SIGNS:   ED Triage Vitals [11/24/20 1340]   Enc Vitals Group      BP (!) 96/49      Pulse 75      Resp 16      Temp 97.6 °F (36.4 °C)      Temp Source Oral      SpO2 96 %      Weight 165 lb (74.8 kg)      Height 5' 1\" (1.549 m)      Head Circumference       Peak Flow       Pain Score       Pain Loc       Pain Edu? Excl. in [address]? Vitals during ED course were reviewed and are as charted. Constitutional: Minimal distress, Non-toxic appearance  Eyes: Conjunctiva normal, No discharge  HENT: Normocephalic, Atraumatic, bilateral external ears normal, posterior oropharynx is nonerythematous and without exudate, uvula is midline, no trismus, no \"hot potato voice\" or dysphonia, oropharynx moist  Neck: Supple, no stridor, no grossly visible or palpable masses  Cardiovascular: Regular rate and rhythm, No murmurs, No rubs, No gallops  Pulmonary/Chest: Normal breath sounds, No respiratory distress or accessory muscle use, No wheezing, crackles or rhonchi. Abdomen: Diffuse abdominal tenderness palpation without peritoneal signs, otherwise abdomen is soft, nondistended and nonrigid, No masses, normal bowel sounds  Back: No midline point tenderness, No paraspinous muscle tenderness.  No CVA tenderness  Extremities: No gross deformities, no edema, no tenderness  Neurologic: Normal motor function, Normal sensory function, No focal deficits  Skin: Warm, Dry, No erythema, No rash, No cyanosis, No mottling  Lymphatic: No lymphadenopathy in the following location(s): cervical  Psychiatric: Alert and oriented x3, Affect normal              RADIOLOGY/PROCEDURES/LABS/MEDICATIONS ADMINISTERED:    I have reviewed and interpreted all of the currently available lab results from this visit (if applicable):  Results for orders placed or performed during the hospital encounter of 11/24/20   CBC Auto Differential   Result Value Ref Range    WBC 12.4 (H) 4.0 - 10.5 K/CU MM    RBC 4.56 4.2 - 5.4 M/CU MM    Hemoglobin 12.8 12.5 - 16.0 GM/DL    Hematocrit 45.0 37 - 47 %    MCV 98.7 78 - 100 FL    MCH 28.1 27 - 31 PG    MCHC 28.4 (L) 32.0 - 36.0 %    RDW 15.3 (H) 11.7 - 14.9 %    Platelets 934 (L) 940 - 440 K/CU MM    MPV 11.8 (H) 7.5 - 11.1 FL    Differential Type AUTOMATED DIFFERENTIAL     Segs Relative 76.6 (H) 36 - 66 %    Lymphocytes % 9.2 (L) 24 - 44 %    Monocytes % 9.8 (H) 0 - 4 %    Eosinophils % 2.5 0 - 3 %    Basophils % 0.2 0 - 1 %    Segs Absolute 9.5 K/CU MM    Lymphocytes Absolute 1.1 K/CU MM    Monocytes Absolute 1.2 K/CU MM    Eosinophils Absolute 0.3 K/CU MM    Basophils Absolute 0.0 K/CU MM    Nucleated RBC % 0.2 %    Total Nucleated RBC 0.0 K/CU MM    Total Immature Neutrophil 0.21 K/CU MM    Immature Neutrophil % 1.7 (H) 0 - 0.43 %   CMP   Result Value Ref Range    Sodium 131 (L) 135 - 145 MMOL/L    Potassium 4.8 3.5 - 5.1 MMOL/L    Chloride 94 (L) 99 - 110 mMol/L    CO2 21 21 - 32 MMOL/L    BUN 54 (H) 6 - 23 MG/DL    CREATININE 2.0 (H) 0.6 - 1.1 MG/DL    Glucose 114 (H) 70 - 99 MG/DL    Calcium 9.5 8.3 - 10.6 MG/DL    Alb 3.7 3.4 - 5.0 GM/DL    Total Protein 6.7 6.4 - 8.2 GM/DL    Total Bilirubin 0.5 0.0 - 1.0 MG/DL    ALT 8 (L) 10 - 40 U/L    AST 22 15 - 37 IU/L    Alkaline Phosphatase 95 40 - 128 IU/L    GFR Non- 24 (L) >60 mL/min/1.73m2    GFR  29 (L) >60 mL/min/1.73m2    Anion Gap 16 4 - 16   Lipase   Result Value Ref Range    Lipase 46 13 - 60 IU/L   Urinalysis   Result Value Ref Range    Color, UA YELLOW YELLOW    Clarity, UA CLEAR CLEAR    Glucose, Urine NEGATIVE NEGATIVE MG/DL    Bilirubin Urine NEGATIVE NEGATIVE MG/DL    Ketones, Urine NEGATIVE NEGATIVE MG/DL    Specific Gravity, UA 1.014 1.001 - 1.035    Blood, Urine SMALL (A) NEGATIVE    pH, Urine 5.0 5.0 - 8.0    Protein, UA NEGATIVE NEGATIVE MG/DL    Urobilinogen, Urine NORMAL 0.2 - 1.0 MG/DL    Nitrite Urine, Quantitative NEGATIVE NEGATIVE    Leukocyte Esterase, Urine TRACE (A) NEGATIVE    RBC, UA 1 0 - 6 /HPF    WBC, UA 1 0 - 5 /HPF    Bacteria, UA FEW (A) NEGATIVE /HPF    Squam Epithel, UA <1 /HPF    Mucus, UA RARE (A) NEGATIVE HPF    Trichomonas, UA NONE SEEN NONE SEEN /HPF    Hyaline Casts, UA 5 /LPF   Lactic Acid, Plasma   Result Value Ref Range    Lactate 1.8 0.4 - 2.0 mMOL/L   Protime-INR   Result Value Ref Range    Protime 11.3 (L) 11.7 - 14.5 SECONDS    INR 0.93 INDEX   Occult blood x 3, stool   Result Value Ref Range    Occult Blood, Stool #1 POSITIVE (A) NEGATIVE    Occult Blood 2 POSITIVE (A) NEGATIVE   SPECIMEN REJECTION   Result Value Ref Range    Rejected Test TROT     Reason for Rejection UNABLE TO PERFORM TESTING:    Troponin   Result Value Ref Range    Troponin T 0.049 (H) <0.01 NG/ML   SPECIMEN REJECTION   Result Value Ref Range    Rejected Test COAG     Reason for Rejection UNABLE TO PERFORM TESTING:    Protime-INR   Result Value Ref Range    Protime 12.3 11.7 - 14.5 SECONDS    INR 1.02 INDEX   APTT   Result Value Ref Range    aPTT 22.5 (L) 25.1 - 37.1 SECONDS   COVID-19    Specimen: Nasopharynx/Oropharynx   Result Value Ref Range    Source THROAT     SARS-CoV-2, NAAT NOT DETECTED    TYPE AND SCREEN   Result Value Ref Range    ABO/Rh B POSITIVE     Antibody Screen NEGATIVE           ABNORMAL LABS:  Labs Reviewed   CBC WITH AUTO DIFFERENTIAL - Abnormal; Notable for the following components:       Result Value    WBC 12.4 (*)     MCHC 28.4 (*)     RDW 15.3 (*)     Platelets 834 (*)     MPV 11.8 (*)     Segs Relative 76.6 (*)     Lymphocytes % 9.2 (*)     Monocytes % 9.8 (*)     Immature Neutrophil % 1.7 (*)     All other components within normal limits   COMPREHENSIVE METABOLIC PANEL - Abnormal; Notable for the following components:    Sodium 131 (*)     Chloride 94 (*)     BUN 54 (*)     CREATININE 2.0 (*)     Glucose 114 (*)     ALT 8 (*)     GFR Non- 24 (*)     GFR  29 (*)     All other components within normal limits   URINALYSIS - Abnormal; Notable for the following components:    Blood, Urine SMALL (*)     Leukocyte Esterase, Urine TRACE (*)     Bacteria, UA FEW (*)     Mucus, UA RARE (*)     All other components within normal limits   PROTIME-INR - Abnormal; Notable for the following components:    Protime 11.3 (*)     All other components within normal limits   OCCULT BLOOD X 3, STOOL - Abnormal; Notable for the following components:    Occult Blood, Stool #1 POSITIVE (*)     Occult Blood 2 POSITIVE (*)     All other components within normal limits   TROPONIN - Abnormal; Notable for the following components:    Troponin T 0.049 (*)     All other components within normal limits   APTT - Abnormal; Notable for the following components:    aPTT 22.5 (*)     All other components within normal limits   LIPASE   LACTIC ACID, PLASMA   SPECIMEN REJECTION   SPECIMEN REJECTION   PROTIME-INR   COVID-19   TYPE AND SCREEN         IMAGING STUDIES ORDERED:  IP CONSULT TO IV TEAM  IP CONSULT TO GI  IP CONSULT TO HOSPITALIST  CT ABDOMEN PELVIS WO CONTRAST  XR CHEST PORTABLE    I have personally viewed the imaging studies. The radiologist interpretation is:   XR CHEST PORTABLE   Final Result   Diffusely prominent interstitium could relate to pulmonary vascular   congestion or chronic interstitial process. Cardiomegaly. Bibasilar subsegmental atelectasis/scarring. No lobar consolidation. CT ABDOMEN PELVIS WO CONTRAST Additional Contrast? None   Final Result   Airspace opacities at the posterior left lung base, likely related to   pneumonia versus atelectasis. Follow-up is recommended to ensure resolution   and exclude underlying mass. Moderate colonic diverticulosis without acute diverticulitis. 1.3 cm intermediate attenuation lesion at the lower pole left kidney, new or   increased since the prior study.   Follow-up CT or MRI renal mass protocol is   recommended to exclude renal neoplasm. Mild ectatic bilateral common iliac arteries measuring 1.4-1.5 cm. Follow-up   is recommended. MEDICATIONS ADMINISTERED:  Medications   ondansetron (ZOFRAN) injection 4 mg (4 mg Intravenous Given 11/24/20 1614)   pantoprazole (PROTONIX) 80 mg in sodium chloride 0.9 % 100 mL infusion (8 mg/hr Intravenous New Bag 11/24/20 2014)   0.9 % sodium chloride bolus (0 mLs Intravenous Stopped 11/24/20 1715)   fentaNYL (SUBLIMAZE) injection 25 mcg (25 mcg Intravenous Given 11/24/20 1615)   0.9 % sodium chloride bolus (1,000 mLs Intravenous New Bag 11/24/20 1921)         COURSE & MEDICAL DECISION MAKING  Last vitals: BP (!) 115/57   Pulse 70   Temp 97.6 °F (36.4 °C) (Oral)   Resp 13   Ht 5' 1\" (1.549 m)   Wt 165 lb (74.8 kg)   SpO2 97%   BMI 31.18 kg/m²     64-year-old female with melena with hypotension which has improved with IV fluids. No evidence for any significant anemia. No evidence for an acute intra-abdominal process. Is noted to have evidence of diverticulosis without diverticulitis. Differential diagnoses considered included, but were not limited to, acute appendicitis, acute cholecystitis/cholangitis, acute hepatitis, Acute pancreatitis, acute coronary syndrome, acute intra-abdominal catastrophe, acute vascular emergency, bowel obstruction, perforated bowel, incarcerated or strangulated hernia, colitis, diverticulitis, ischemic bowel. I discussed the case with her gastroenterologist, Dr. Tonny Gavin, who advised starting her on Protonix drip. He is stated he will consult and likely perform endoscopy tomorrow morning. Additional workup and treatment in the ED as documented above. Pt will be admitted for further evaluation and treatment. I discussed the case with the hospitalist, who agreed to admit the patient. Plan discussed with pt and/or family who expressed understanding and agreement with plan.  Admitted in stable condition. Clinical Impression:  1. Rectal bleeding    2. Hypotension, unspecified hypotension type        Disposition referral (if applicable):  No follow-up provider specified. Disposition medications (if applicable):  New Prescriptions    No medications on file       ED Provider Disposition Time  DISPOSITION            Electronically signed by: Sumanth Brenner M.D., 11/24/2020 10:39 PM      This dictation was created with voice recognition software. While attempts have been made to review the dictation as it is transcribed, on occasion the spoken word can be misinterpreted by the technology leading to omissions or inappropriate words, phrases or sentences.         Megan Angeles MD  11/24/20 7267

## 2020-11-25 ENCOUNTER — ANESTHESIA (OUTPATIENT)
Dept: ENDOSCOPY | Age: 80
DRG: 377 | End: 2020-11-25
Payer: MEDICARE

## 2020-11-25 ENCOUNTER — ANESTHESIA EVENT (OUTPATIENT)
Dept: ENDOSCOPY | Age: 80
DRG: 377 | End: 2020-11-25
Payer: MEDICARE

## 2020-11-25 VITALS — DIASTOLIC BLOOD PRESSURE: 52 MMHG | OXYGEN SATURATION: 96 % | SYSTOLIC BLOOD PRESSURE: 113 MMHG

## 2020-11-25 VITALS
SYSTOLIC BLOOD PRESSURE: 97 MMHG | OXYGEN SATURATION: 94 % | RESPIRATION RATE: 12 BRPM | DIASTOLIC BLOOD PRESSURE: 60 MMHG

## 2020-11-25 LAB
ANION GAP SERPL CALCULATED.3IONS-SCNC: 10 MMOL/L (ref 4–16)
BUN BLDV-MCNC: 34 MG/DL (ref 6–23)
CALCIUM SERPL-MCNC: 8.2 MG/DL (ref 8.3–10.6)
CHLORIDE BLD-SCNC: 103 MMOL/L (ref 99–110)
CO2: 25 MMOL/L (ref 21–32)
CREAT SERPL-MCNC: 1.3 MG/DL (ref 0.6–1.1)
DOSE AMOUNT: ABNORMAL
DOSE TIME: ABNORMAL
GFR AFRICAN AMERICAN: 48 ML/MIN/1.73M2
GFR NON-AFRICAN AMERICAN: 40 ML/MIN/1.73M2
GLUCOSE BLD-MCNC: 79 MG/DL (ref 70–99)
HCT VFR BLD CALC: 32 % (ref 37–47)
HCT VFR BLD CALC: 35.9 % (ref 37–47)
HEMOGLOBIN: 10.2 GM/DL (ref 12.5–16)
HEMOGLOBIN: 9.6 GM/DL (ref 12.5–16)
IRON: 28 UG/DL (ref 37–145)
PCT TRANSFERRIN: 22 % (ref 10–44)
POTASSIUM SERPL-SCNC: 4.1 MMOL/L (ref 3.5–5.1)
SODIUM BLD-SCNC: 138 MMOL/L (ref 135–145)
THEOPHYLLINE LEVEL: 1.3 UG/ML (ref 10–20)
TOTAL IRON BINDING CAPACITY: 130 UG/DL (ref 250–450)
UNSATURATED IRON BINDING CAPACITY: 102 UG/DL (ref 110–370)

## 2020-11-25 PROCEDURE — 3609017100 HC EGD: Performed by: INTERNAL MEDICINE

## 2020-11-25 PROCEDURE — 82570 ASSAY OF URINE CREATININE: CPT

## 2020-11-25 PROCEDURE — 36410 VNPNXR 3YR/> PHY/QHP DX/THER: CPT

## 2020-11-25 PROCEDURE — 85018 HEMOGLOBIN: CPT

## 2020-11-25 PROCEDURE — 6370000000 HC RX 637 (ALT 250 FOR IP): Performed by: INTERNAL MEDICINE

## 2020-11-25 PROCEDURE — 6360000002 HC RX W HCPCS: Performed by: INTERNAL MEDICINE

## 2020-11-25 PROCEDURE — 84300 ASSAY OF URINE SODIUM: CPT

## 2020-11-25 PROCEDURE — 76937 US GUIDE VASCULAR ACCESS: CPT

## 2020-11-25 PROCEDURE — 84484 ASSAY OF TROPONIN QUANT: CPT

## 2020-11-25 PROCEDURE — 3700000000 HC ANESTHESIA ATTENDED CARE: Performed by: INTERNAL MEDICINE

## 2020-11-25 PROCEDURE — 2580000003 HC RX 258: Performed by: ANESTHESIOLOGY

## 2020-11-25 PROCEDURE — 83550 IRON BINDING TEST: CPT

## 2020-11-25 PROCEDURE — 0DB98ZX EXCISION OF DUODENUM, VIA NATURAL OR ARTIFICIAL OPENING ENDOSCOPIC, DIAGNOSTIC: ICD-10-PCS | Performed by: INTERNAL MEDICINE

## 2020-11-25 PROCEDURE — 3609012400 HC EGD TRANSORAL BIOPSY SINGLE/MULTIPLE: Performed by: INTERNAL MEDICINE

## 2020-11-25 PROCEDURE — 88305 TISSUE EXAM BY PATHOLOGIST: CPT

## 2020-11-25 PROCEDURE — 80048 BASIC METABOLIC PNL TOTAL CA: CPT

## 2020-11-25 PROCEDURE — 85014 HEMATOCRIT: CPT

## 2020-11-25 PROCEDURE — 0DB78ZX EXCISION OF STOMACH, PYLORUS, VIA NATURAL OR ARTIFICIAL OPENING ENDOSCOPIC, DIAGNOSTIC: ICD-10-PCS | Performed by: INTERNAL MEDICINE

## 2020-11-25 PROCEDURE — 6360000002 HC RX W HCPCS: Performed by: EMERGENCY MEDICINE

## 2020-11-25 PROCEDURE — 3700000001 HC ADD 15 MINUTES (ANESTHESIA): Performed by: INTERNAL MEDICINE

## 2020-11-25 PROCEDURE — 84156 ASSAY OF PROTEIN URINE: CPT

## 2020-11-25 PROCEDURE — 2709999900 HC NON-CHARGEABLE SUPPLY: Performed by: INTERNAL MEDICINE

## 2020-11-25 PROCEDURE — 80198 ASSAY OF THEOPHYLLINE: CPT

## 2020-11-25 PROCEDURE — 83540 ASSAY OF IRON: CPT

## 2020-11-25 PROCEDURE — 2500000003 HC RX 250 WO HCPCS: Performed by: NURSE ANESTHETIST, CERTIFIED REGISTERED

## 2020-11-25 PROCEDURE — 2580000003 HC RX 258: Performed by: INTERNAL MEDICINE

## 2020-11-25 PROCEDURE — 1200000000 HC SEMI PRIVATE

## 2020-11-25 PROCEDURE — 2700000000 HC OXYGEN THERAPY PER DAY

## 2020-11-25 PROCEDURE — 2140000000 HC CCU INTERMEDIATE R&B

## 2020-11-25 PROCEDURE — 2580000003 HC RX 258: Performed by: EMERGENCY MEDICINE

## 2020-11-25 PROCEDURE — 05HY33Z INSERTION OF INFUSION DEVICE INTO UPPER VEIN, PERCUTANEOUS APPROACH: ICD-10-PCS | Performed by: EMERGENCY MEDICINE

## 2020-11-25 PROCEDURE — 94761 N-INVAS EAR/PLS OXIMETRY MLT: CPT

## 2020-11-25 PROCEDURE — 88342 IMHCHEM/IMCYTCHM 1ST ANTB: CPT

## 2020-11-25 PROCEDURE — C9113 INJ PANTOPRAZOLE SODIUM, VIA: HCPCS | Performed by: EMERGENCY MEDICINE

## 2020-11-25 PROCEDURE — C1751 CATH, INF, PER/CENT/MIDLINE: HCPCS

## 2020-11-25 PROCEDURE — 6360000002 HC RX W HCPCS: Performed by: NURSE ANESTHETIST, CERTIFIED REGISTERED

## 2020-11-25 RX ORDER — PAROXETINE HYDROCHLORIDE 20 MG/1
20 TABLET, FILM COATED ORAL EVERY MORNING
Status: DISCONTINUED | OUTPATIENT
Start: 2020-11-25 | End: 2020-11-29

## 2020-11-25 RX ORDER — ACETAMINOPHEN 650 MG/1
650 SUPPOSITORY RECTAL EVERY 6 HOURS PRN
Status: DISCONTINUED | OUTPATIENT
Start: 2020-11-25 | End: 2020-11-30 | Stop reason: HOSPADM

## 2020-11-25 RX ORDER — SODIUM CHLORIDE 0.9 % (FLUSH) 0.9 %
10 SYRINGE (ML) INJECTION EVERY 12 HOURS SCHEDULED
Status: DISCONTINUED | OUTPATIENT
Start: 2020-11-25 | End: 2020-11-30 | Stop reason: HOSPADM

## 2020-11-25 RX ORDER — BUDESONIDE AND FORMOTEROL FUMARATE DIHYDRATE 160; 4.5 UG/1; UG/1
2 AEROSOL RESPIRATORY (INHALATION) 2 TIMES DAILY
Status: DISCONTINUED | OUTPATIENT
Start: 2020-11-25 | End: 2020-11-29

## 2020-11-25 RX ORDER — FERROUS SULFATE 325(65) MG
325 TABLET ORAL 2 TIMES DAILY WITH MEALS
Status: DISCONTINUED | OUTPATIENT
Start: 2020-11-25 | End: 2020-11-27

## 2020-11-25 RX ORDER — LIDOCAINE HYDROCHLORIDE 10 MG/ML
5 INJECTION, SOLUTION EPIDURAL; INFILTRATION; INTRACAUDAL; PERINEURAL ONCE
Status: DISCONTINUED | OUTPATIENT
Start: 2020-11-25 | End: 2020-11-30 | Stop reason: HOSPADM

## 2020-11-25 RX ORDER — PROMETHAZINE HYDROCHLORIDE 25 MG/1
12.5 TABLET ORAL EVERY 6 HOURS PRN
Status: DISCONTINUED | OUTPATIENT
Start: 2020-11-25 | End: 2020-11-30 | Stop reason: HOSPADM

## 2020-11-25 RX ORDER — SODIUM CHLORIDE 0.9 % (FLUSH) 0.9 %
10 SYRINGE (ML) INJECTION PRN
Status: DISCONTINUED | OUTPATIENT
Start: 2020-11-25 | End: 2020-11-30 | Stop reason: HOSPADM

## 2020-11-25 RX ORDER — ACETAMINOPHEN 325 MG/1
650 TABLET ORAL EVERY 6 HOURS PRN
Status: DISCONTINUED | OUTPATIENT
Start: 2020-11-25 | End: 2020-11-30 | Stop reason: HOSPADM

## 2020-11-25 RX ORDER — SODIUM CHLORIDE, SODIUM LACTATE, POTASSIUM CHLORIDE, CALCIUM CHLORIDE 600; 310; 30; 20 MG/100ML; MG/100ML; MG/100ML; MG/100ML
INJECTION, SOLUTION INTRAVENOUS CONTINUOUS
Status: DISCONTINUED | OUTPATIENT
Start: 2020-11-25 | End: 2020-11-25

## 2020-11-25 RX ORDER — LIDOCAINE HYDROCHLORIDE 20 MG/ML
INJECTION, SOLUTION INFILTRATION; PERINEURAL PRN
Status: DISCONTINUED | OUTPATIENT
Start: 2020-11-25 | End: 2020-11-25 | Stop reason: SDUPTHER

## 2020-11-25 RX ORDER — OXYBUTYNIN CHLORIDE 10 MG/1
10 TABLET, EXTENDED RELEASE ORAL NIGHTLY
Status: DISCONTINUED | OUTPATIENT
Start: 2020-11-25 | End: 2020-11-29

## 2020-11-25 RX ORDER — PROPOFOL 10 MG/ML
INJECTION, EMULSION INTRAVENOUS PRN
Status: DISCONTINUED | OUTPATIENT
Start: 2020-11-25 | End: 2020-11-25 | Stop reason: SDUPTHER

## 2020-11-25 RX ORDER — ATORVASTATIN CALCIUM 40 MG/1
40 TABLET, FILM COATED ORAL DAILY
Status: DISCONTINUED | OUTPATIENT
Start: 2020-11-25 | End: 2020-11-30 | Stop reason: HOSPADM

## 2020-11-25 RX ORDER — LEVOTHYROXINE SODIUM 0.07 MG/1
75 TABLET ORAL DAILY
Status: DISCONTINUED | OUTPATIENT
Start: 2020-11-25 | End: 2020-11-30 | Stop reason: HOSPADM

## 2020-11-25 RX ORDER — ONDANSETRON 2 MG/ML
4 INJECTION INTRAMUSCULAR; INTRAVENOUS EVERY 6 HOURS PRN
Status: DISCONTINUED | OUTPATIENT
Start: 2020-11-25 | End: 2020-11-30 | Stop reason: HOSPADM

## 2020-11-25 RX ADMIN — OXYBUTYNIN CHLORIDE 10 MG: 10 TABLET, EXTENDED RELEASE ORAL at 20:48

## 2020-11-25 RX ADMIN — PROPOFOL 30 MG: 10 INJECTION, EMULSION INTRAVENOUS at 14:57

## 2020-11-25 RX ADMIN — LIDOCAINE HYDROCHLORIDE 100 MG: 20 INJECTION, SOLUTION INFILTRATION; PERINEURAL at 14:55

## 2020-11-25 RX ADMIN — SODIUM CHLORIDE, POTASSIUM CHLORIDE, SODIUM LACTATE AND CALCIUM CHLORIDE: 600; 310; 30; 20 INJECTION, SOLUTION INTRAVENOUS at 14:52

## 2020-11-25 RX ADMIN — THEOPHYLLINE ANHYDROUS 100 MG: 100 CAPSULE, EXTENDED RELEASE ORAL at 20:48

## 2020-11-25 RX ADMIN — PROPOFOL 30 MG: 10 INJECTION, EMULSION INTRAVENOUS at 14:55

## 2020-11-25 RX ADMIN — SODIUM CHLORIDE 8 MG/HR: 9 INJECTION, SOLUTION INTRAVENOUS at 15:56

## 2020-11-25 RX ADMIN — FERROUS SULFATE TAB 325 MG (65 MG ELEMENTAL FE) 325 MG: 325 (65 FE) TAB at 17:13

## 2020-11-25 RX ADMIN — ACETAMINOPHEN 650 MG: 325 TABLET ORAL at 20:52

## 2020-11-25 RX ADMIN — SODIUM CHLORIDE, POTASSIUM CHLORIDE, SODIUM LACTATE AND CALCIUM CHLORIDE: 600; 310; 30; 20 INJECTION, SOLUTION INTRAVENOUS at 06:57

## 2020-11-25 RX ADMIN — SODIUM CHLORIDE 300 MG: 9 INJECTION, SOLUTION INTRAVENOUS at 17:08

## 2020-11-25 ASSESSMENT — PAIN SCALES - GENERAL
PAINLEVEL_OUTOF10: 0
PAINLEVEL_OUTOF10: 0
PAINLEVEL_OUTOF10: 6

## 2020-11-25 NOTE — CARE COORDINATION
Spoke with patient's nurse Bushra Valentin today and she confirmed that patient has been medicated and not able to speak with  at this time. Phoned patient's  Hanna Krishnan to discuss discharge planning and had to leave a VM . From chart review , patient has admitted from [address] [place] [address]. Phoned Mitzi Arias at [address] [place] [address] in Admissions and had to leave a  . Patient has PCP listed and Jennifer [facility] [employer] insurance.  Case Management to follow

## 2020-11-25 NOTE — ANESTHESIA POSTPROCEDURE EVALUATION
Department of Anesthesiology  Postprocedure Note    Patient: Jenni Augustine  MRN: [id number]  YOB: 1940  Date of evaluation: 11/25/2020  Time:  3:07 PM     Procedure Summary     Date:  11/25/20 Room / Location:  [address]    Anesthesia Start:  1452 Anesthesia Stop:  8839    Procedure:  EGD BIOPSY (N/A ) Diagnosis:  (gi bleed)    Surgeon:  Kimberly Forrest MD Responsible Provider:  AMPARO Anderson CRNA    Anesthesia Type:  general ASA Status:  4 - Emergent          Anesthesia Type: general    Santiago Phase I:      Santiago Phase II:      Last vitals: Reviewed and per EMR flowsheets.        Anesthesia Post Evaluation    Patient location during evaluation: bedside  Patient participation: complete - patient participated  Level of consciousness: awake and alert  Pain score: 0  Airway patency: patent  Nausea & Vomiting: no vomiting and no nausea  Complications: no  Cardiovascular status: blood pressure returned to baseline and hemodynamically stable  Respiratory status: acceptable, spontaneous ventilation, nonlabored ventilation and nasal cannula  Hydration status: stable

## 2020-11-25 NOTE — ANESTHESIA PRE PROCEDURE
Department of Anesthesiology  Preprocedure Note       Name:  Cristine Burnette   Age:  79 y.o.  :  1940                                          MRN:  [id number]         Date:  2020      Surgeon: Yue Harrison):  Chryl Fothergill, MD    Procedure: Procedure(s):  EGD ESOPHAGOGASTRODUODENOSCOPY    Medications prior to admission:   Prior to Admission medications    Medication Sig Start Date End Date Taking?  Authorizing Provider   lisinopril (PRINIVIL;ZESTRIL) 5 MG tablet Take 1 tablet by mouth daily 17   Blade Monte MD   fluticasone-vilanterol (BREO ELLIPTA) 100-25 MCG/INH AEPB inhaler Inhale 1 puff into the lungs daily    Historical Provider, MD   linaclotide (LINZESS) 145 MCG capsule Take 145 mcg by mouth every morning (before breakfast)    Historical Provider, MD   calcium citrate-vitamin D (CITRICAL + D) 315-250 MG-UNIT TABS per tablet Take 1 tablet by mouth 2 times daily (with meals)    Historical Provider, MD   albuterol sulfate  (90 Base) MCG/ACT inhaler Inhale 2 puffs into the lungs every 4 hours as needed for Wheezing    Historical Provider, MD   hydrALAZINE (APRESOLINE) 10 MG tablet Take 10 mg by mouth 2 times daily    Historical Provider, MD   PARoxetine (PAXIL) 20 MG tablet Take 20 mg by mouth every morning    Historical Provider, MD   benzonatate (TESSALON) 100 MG capsule Take 100 mg by mouth 3 times daily as needed for Cough    Historical Provider, MD   ondansetron (ZOFRAN ODT) 4 MG disintegrating tablet Take 1 tablet by mouth every 8 hours as needed for Nausea 17   Placido Justice DO   albuterol (PROVENTIL) (2.5 MG/3ML) 0.083% nebulizer solution Take 3 mLs by nebulization every 6 hours as needed for Wheezing 17   Placido Justice DO   carvedilol (COREG) 6.25 MG tablet Take 6.25 mg by mouth 2 times daily (with meals)    Historical Provider, MD   potassium chloride (KLOR-CON M) 10 MEQ extended release tablet Take 10 mEq by mouth daily    Historical Provider, MD   Cranberry 450 MG CAPS Take 900 mg by mouth 2 times daily     Historical Provider, MD   Furosemide (LASIX PO) Take 60 mg by mouth 2 times daily    Historical Provider, MD   levothyroxine (SYNTHROID) 75 MCG tablet Take 75 mcg by mouth Daily    Historical Provider, MD   Acetaminophen (TYLENOL PO) Take 650 mg by mouth every 6 hours as needed (PAIN OR FEVER)     Historical Provider, MD   omeprazole (PRILOSEC) 20 MG capsule Take 20 mg by mouth Daily    Historical Provider, MD   atorvastatin (LIPITOR) 40 MG tablet Take 40 mg by mouth daily    Historical Provider, MD   lactobacillus (CULTURELLE) capsule Take 1 capsule by mouth daily (with breakfast) 6/26/16   Isaac Fears, DO   docusate sodium (COLACE) 100 MG capsule Take 1 capsule by mouth 2 times daily 6/26/16   Isaac Fears, DO   dicyclomine (BENTYL) 10 MG capsule Take 1 capsule by mouth 3 times daily (before meals) 6/26/16   Isaac Fears, DO   GuaiFENesin (MUCINEX PO) Take 1,200 mg by mouth 2 times daily    Historical Provider, MD   OXYGEN Inhale 3 L into the lungs continuous     Historical Provider, MD   isosorbide mononitrate (IMDUR) 30 MG CR tablet Take 1 tablet by mouth daily 9/11/15   Rajesh Moreno MD   promethazine (PHENERGAN) 12.5 MG tablet Take 1 tablet by mouth every 4 hours as needed for Nausea 9/11/15   Rajesh Moreno MD   ferrous sulfate 325 (65 FE) MG tablet Take 1 tablet by mouth every 12 hours With a meal. 9/11/15   Rajesh Moreno MD   ipratropium-albuterol (DUONEB) 0.5-2.5 (3) MG/3ML SOLN nebulizer solution Inhale 1 vial into the lungs every 4 hours    Historical Provider, MD       Current medications:    Current Facility-Administered Medications   Medication Dose Route Frequency Provider Last Rate Last Dose    atorvastatin (LIPITOR) tablet 40 mg  40 mg Oral Daily Ron Orr MD        budesonide-formoterol (SYMBICORT) 160-4.5 MCG/ACT inhaler 2 puff  2 puff Inhalation BID Ron Orr MD        ferrous sulfate (IRON 325) tablet 325 mg  325 mg Oral BID  Ron Erma Gray MD        levothyroxine (SYNTHROID) tablet 75 mcg  75 mcg Oral Daily Ron Erma Gray MD        PARoxetine (PAXIL) tablet 20 mg  20 mg Oral QAM Ron Erma Gray MD        oxybutynin (DITROPAN-XL) extended release tablet 10 mg  10 mg Oral Nightly Ron Erma Gray MD        theophylline (YANA-24) extended release capsule 100 mg  100 mg Oral BID Ron Erma Gray MD        sodium chloride flush 0.9 % injection 10 mL  10 mL Intravenous 2 times per day Rachel Mcfarlane MD        sodium chloride flush 0.9 % injection 10 mL  10 mL Intravenous PRN Ron Erma Gray MD        acetaminophen (TYLENOL) tablet 650 mg  650 mg Oral Q6H PRN Ron Erma Gray MD        Or    acetaminophen (TYLENOL) suppository 650 mg  650 mg Rectal Q6H PRN Ron Erma Gray MD        promethazine (PHENERGAN) tablet 12.5 mg  12.5 mg Oral Q6H PRN Ron Erma Gray MD        Or    ondansetron (ZOFRAN) injection 4 mg  4 mg Intravenous Q6H PRN Ron Erma Gray MD        lactated ringers infusion   Intravenous Continuous Shiv Ochoa  mL/hr at 11/25/20 0657      pantoprazole (PROTONIX) 80 mg in sodium chloride 0.9 % 100 mL infusion  8 mg/hr Intravenous Continuous Andrea Sommers MD 10 mL/hr at 11/24/20 2014 8 mg/hr at 11/24/20 2014       Allergies:     Allergies   Allergen Reactions    Codeine Itching    Moxifloxacin Other (See Comments)     Listed as allergy from ecf    Oxycodone Other (See Comments)     Listed as allergy from ecf     Pcn [Penicillins] Hives and Rash    Warfarin And Related Other (See Comments)     listed as allergy from ecf       Problem List:    Patient Active Problem List   Diagnosis Code    Coronary atherosclerosis of native coronary artery I25.10    Anemia D64.9    Bilateral edema of lower extremity R60.0    Moderate COPD (chronic obstructive pulmonary disease) ([address] J44.9    COPD exacerbation ([facility] [address] J44.1    Morbid obesity due to excess calories (HCC) E66.01    Acute on chronic diastolic congestive heart failure (HCC) I50.33    Depression F32.9    Hypertension I10    Pneumonia J18.9    Acute on chronic diastolic ACC/AHA stage C congestive heart failure (HCC) I50.33    GIB (gastrointestinal bleeding) K92.2       Past Medical History:        Diagnosis Date    ASHD (arteriosclerotic heart disease)     Bilateral edema of lower extremity 12/16/2015    [facility]     COPD (chronic obstructive pulmonary disease) ([facility] [address]     Depression     Hypertension        Past Surgical History:        Procedure Laterality Date    ANUS SURGERY      fistula repair    APPENDECTOMY      HYSTERECTOMY      TONSILLECTOMY         Social History:    Social History     Tobacco Use    Smoking status: Former Smoker     Packs/day: 1.00     Types: Cigarettes     Last attempt to quit: 9/2/2010     Years since quitting: 10.2    Smokeless tobacco: Never Used   Substance Use Topics    Alcohol use:  Yes     Alcohol/week: 0.0 standard drinks     Comment: wine twice a year                                Counseling given: Not Answered      Vital Signs (Current):   Vitals:    11/24/20 2346 11/25/20 0058 11/25/20 0400 11/25/20 0615   BP: 122/75 (!) 119/57 (!) 90/52 (!) 123/59   Pulse: 73 73 69 66   Resp: 13 11 15 16   Temp:  36.3 °C (97.4 °F) 36.7 °C (98 °F) 36.5 °C (97.7 °F)   TempSrc:  Oral Axillary Oral   SpO2: 96% 95% 98% 94%   Weight:    174 lb 3.2 oz (79 kg)   Height:                                                  BP Readings from Last 3 Encounters:   11/25/20 (!) 123/59   12/24/17 (!) 95/59   11/28/17 (!) 115/58       NPO Status: Time of last liquid consumption: 0000                        Time of last solid consumption: 0000                        Date of last liquid consumption: 11/24/20(pt unable to answer)                        Date of last solid food consumption: 11/24/20    BMI:   Wt Readings from Last 3 Encounters:   11/25/20 174 lb 3.2 oz (79 kg)   12/23/17 189 lb 5 oz (85.9 kg)   11/28/17 201 lb 8 oz (91.4 kg)     Body mass index is 32.91 kg/m². CBC:   Lab Results   Component Value Date    WBC 12.4 11/24/2020    RBC 4.56 11/24/2020    HGB 12.8 11/24/2020    HCT 45.0 11/24/2020    MCV 98.7 11/24/2020    RDW 15.3 11/24/2020     11/24/2020       CMP:   Lab Results   Component Value Date     11/24/2020    K 4.8 11/24/2020    CL 94 11/24/2020    CO2 21 11/24/2020    BUN 54 11/24/2020    CREATININE 2.0 11/24/2020    GFRAA 29 11/24/2020    LABGLOM 24 11/24/2020    GLUCOSE 114 11/24/2020    PROT 6.7 11/24/2020    CALCIUM 9.5 11/24/2020    BILITOT 0.5 11/24/2020    ALKPHOS 95 11/24/2020    AST 22 11/24/2020    ALT 8 11/24/2020       POC Tests: No results for input(s): POCGLU, POCNA, POCK, POCCL, POCBUN, POCHEMO, POCHCT in the last 72 hours.     Coags:   Lab Results   Component Value Date    PROTIME 12.3 11/24/2020    INR 1.02 11/24/2020    APTT 22.5 11/24/2020       HCG (If Applicable): No results found for: PREGTESTUR, PREGSERUM, HCG, HCGQUANT     ABGs:   Lab Results   Component Value Date    PO2ART 167 09/01/2016    KLG2NVZ 40.0 09/01/2016    HAJ3GCT 29.1 09/01/2016        Type & Screen (If Applicable):  No results found for: LABABO, LABRH    Drug/Infectious Status (If Applicable):  No results found for: HIV, HEPCAB    COVID-19 Screening (If Applicable):   Lab Results   Component Value Date    COVID19 NOT DETECTED 11/24/2020         Anesthesia Evaluation  Patient summary reviewed and Nursing notes reviewed no history of anesthetic complications:   Airway: Mallampati: III  TM distance: >3 FB   Neck ROM: full  Mouth opening: < 3 FB Dental:    (+) edentulous      Pulmonary: breath sounds clear to auscultation  (+) pneumonia ( COVID + 11/20/2020 per note on chart):  COPD:                             Cardiovascular:  Exercise tolerance: poor (<4 METS),   (+) hypertension:, CAD:, CHF ( Bilateral edema of lower extremity): diastolic,         Rhythm: regular  Rate: normal           Beta Blocker:  Dose within 24 Hrs         Neuro/Psych:   (+) depression/anxiety             GI/Hepatic/Renal:            ROS comment: GIB. Endo/Other:                     Abdominal:   (+) obese,         Vascular:                                      Anesthesia Plan      general     ASA 4 - emergent     (Spoke with  for consent as well as patient)  Induction: intravenous. Anesthetic plan and risks discussed with patient. Plan discussed with CRNA.                   AMPARO Arellano - CRNA   11/25/2020

## 2020-11-25 NOTE — CONSULTS
[address] [facility] Gastroenterology  Gastroenterology Consultation    2020  7:12 AM    Patient:    Florentino Rodriguez  : 1940   79 y.o. MRN: [id number]  Admitted: 2020  1:31 PM ATT: Jacobo Butt MD   ENDO/NONE  AdmitDx: Rectal bleeding [K62.5]  GIB (gastrointestinal bleeding) [K92.2]  Kidney lesion [N28.9]  Hypotension, unspecified hypotension type [I95.9]  PCP: Korina Friedman MD    Reason for Consult:  [place], Vomiting, Sia    IMPRESSION and RECOMMENDATIONS:  GI bleed  Probbaly UPPER GI  NO More bleed in hospital  Will watch H/H  EGD today    Reassuring COVID NEG rapid  Reports POS COVID Nov 4th    AS it is almost 21 days from last COVID test she should be resonably safe to do EGD        . Patient Active Problem List   Diagnosis Code    Coronary atherosclerosis of native coronary artery I25.10    Anemia D64.9    Bilateral edema of lower extremity R60.0    Moderate COPD (chronic obstructive pulmonary disease) ([address] J44.9    COPD exacerbation ([address] J44.1    Morbid obesity due to excess calories (HCC) E66.01    Acute on chronic diastolic congestive heart failure (HCC) I50.33    Depression F32.9    Hypertension I10    Pneumonia J18.9    Acute on chronic diastolic ACC/AHA stage C congestive heart failure (HCC) I50.33    GIB (gastrointestinal bleeding) K92.2             Requesting Physician:  Jacobo Butt MD      History Obtained From:  Patient and review of all records    HISTORY OF PRESENT ILLNESS:                The patient is a 79 y.o. female with significant past medical history as below who presents with above mentioned causes in reason for consult. Florentino Rodriguez is a 79 y.o. female admitted for nausea and vomiting this been going for the last 4 weeks, she reports as nonbloody nonbilious.   ED provider states that there is reports of rectal bleeding, patient does endorse black stools, denies any associated abdominal pain.    Today Lethargic, No abd pain    Past Medical History:        Diagnosis Date    ASHD (arteriosclerotic heart disease)     Bilateral edema of lower extremity 12/16/2015    [facility]     COPD (chronic obstructive pulmonary disease) ([facility] [address]     Depression     Hypertension        Past Surgical History:        Procedure Laterality Date    ANUS SURGERY      fistula repair    APPENDECTOMY      HYSTERECTOMY      TONSILLECTOMY           Current Medications:    Medications    Prior to Admission medications    Medication Sig Start Date End Date Taking?  Authorizing Provider   lisinopril (PRINIVIL;ZESTRIL) 5 MG tablet Take 1 tablet by mouth daily 12/25/17   Audrey Steward MD   fluticasone-vilanterol (BREO ELLIPTA) 100-25 MCG/INH AEPB inhaler Inhale 1 puff into the lungs daily    Historical Provider, MD   linaclotide (LINZESS) 145 MCG capsule Take 145 mcg by mouth every morning (before breakfast)    Historical Provider, MD   calcium citrate-vitamin D (CITRICAL + D) 315-250 MG-UNIT TABS per tablet Take 1 tablet by mouth 2 times daily (with meals)    Historical Provider, MD   albuterol sulfate  (90 Base) MCG/ACT inhaler Inhale 2 puffs into the lungs every 4 hours as needed for Wheezing    Historical Provider, MD   hydrALAZINE (APRESOLINE) 10 MG tablet Take 10 mg by mouth 2 times daily    Historical Provider, MD   PARoxetine (PAXIL) 20 MG tablet Take 20 mg by mouth every morning    Historical Provider, MD   benzonatate (TESSALON) 100 MG capsule Take 100 mg by mouth 3 times daily as needed for Cough    Historical Provider, MD   ondansetron (ZOFRAN ODT) 4 MG disintegrating tablet Take 1 tablet by mouth every 8 hours as needed for Nausea 1/12/17   [place], DO   albuterol (PROVENTIL) (2.5 MG/3ML) 0.083% nebulizer solution Take 3 mLs by nebulization every 6 hours as needed for Wheezing 1/12/17   [place], DO   carvedilol (COREG) 6.25 MG tablet Take 6.25 mg by mouth 2 times daily (with meals)    Historical Provider, MD   potassium chloride (KLOR-CON M) 10 MEQ extended release tablet Take 10 mEq by mouth daily    Historical Provider, MD   Cranberry 450 MG CAPS Take 900 mg by mouth 2 times daily     Historical Provider, MD   Furosemide (LASIX PO) Take 60 mg by mouth 2 times daily    Historical Provider, MD   levothyroxine (SYNTHROID) 75 MCG tablet Take 75 mcg by mouth Daily    Historical Provider, MD   Acetaminophen (TYLENOL PO) Take 650 mg by mouth every 6 hours as needed (PAIN OR FEVER)     Historical Provider, MD   omeprazole (PRILOSEC) 20 MG capsule Take 20 mg by mouth Daily    Historical Provider, MD   atorvastatin (LIPITOR) 40 MG tablet Take 40 mg by mouth daily    Historical Provider, MD   lactobacillus (CULTURELLE) capsule Take 1 capsule by mouth daily (with breakfast) 6/26/16   Ashok Freire DO   docusate sodium (COLACE) 100 MG capsule Take 1 capsule by mouth 2 times daily 6/26/16   Ashok Freire DO   dicyclomine (BENTYL) 10 MG capsule Take 1 capsule by mouth 3 times daily (before meals) 6/26/16   Ashok Freire DO   GuaiFENesin (MUCINEX PO) Take 1,200 mg by mouth 2 times daily    Historical Provider, MD   OXYGEN Inhale 3 L into the lungs continuous     Historical Provider, MD   isosorbide mononitrate (IMDUR) 30 MG CR tablet Take 1 tablet by mouth daily 9/11/15   Benny Shanks MD   promethazine (PHENERGAN) 12.5 MG tablet Take 1 tablet by mouth every 4 hours as needed for Nausea 9/11/15   Benny Shanks MD   ferrous sulfate 325 (65 FE) MG tablet Take 1 tablet by mouth every 12 hours With a meal. 9/11/15   Benny Shanks MD   ipratropium-albuterol (DUONEB) 0.5-2.5 (3) MG/3ML SOLN nebulizer solution Inhale 1 vial into the lungs every 4 hours    Historical Provider, MD      Scheduled Medications:    atorvastatin  40 mg Oral Daily    budesonide-formoterol  2 puff Inhalation BID    ferrous sulfate  325 mg Oral BID WC    levothyroxine  75 mcg Oral Daily    PARoxetine  20 mg Oral QAM    oxybutynin  10 mg Oral Nightly    theophylline  100 mg Oral BID    sodium chloride flush  10 mL Intravenous 2 times per day     Infusions:    lactated ringers 100 mL/hr at 11/25/20 0657    pantoprozole (PROTONIX) infusion 8 mg/hr (11/24/20 2014)     PRN Medications: sodium chloride flush, acetaminophen **OR** acetaminophen, promethazine **OR** ondansetron  Allergies: Allergies   Allergen Reactions    Codeine Itching    Moxifloxacin Other (See Comments)     Listed as allergy from ecf    Oxycodone Other (See Comments)     Listed as allergy from ecf     Pcn [Penicillins] Hives and Rash    Warfarin And Related Other (See Comments)     listed as allergy from ecf         Allergies:  Codeine; Moxifloxacin; Oxycodone; Pcn [penicillins]; and Warfarin and related    Social History:   TOBACCO:   reports that she quit smoking about 10 years ago. Her smoking use included cigarettes. She smoked 1.00 pack per day. She has never used smokeless tobacco.  ETOH:   reports current alcohol use. Family History:       Problem Relation Age of Onset    High Blood Pressure Mother     Heart Disease Mother     Early Death Father     Substance Abuse Father     Diabetes Sister      No family history of colon cancer, Crohn's disease, or ulcerative colitis.     REVIEW OF SYSTEMS:      Neg part from HPI    PHYSICAL EXAM:      Vitals:    BP (!) 123/59   Pulse 66   Temp 97.7 °F (36.5 °C) (Oral)   Resp 16   Ht 5' 1\" (1.549 m)   Wt 174 lb 3.2 oz (79 kg)   SpO2 94%   BMI 32.91 kg/m²     General Appearance:    Alert, lethargic, appears stated age   HEENT:    Normocephalic, atraumatic, PERRL, Conjunctiva clear, Ears Normal, Normal nares,  gums normal   Neck:   Supple, no JVD, No lymph nodes   Lungs:     Clear to auscultation bilaterally,    Chest Wall:    No tenderness or deformity    Heart:     S1 and S2 normal,   Abdomen:     Soft, non-tender, bowel sounds active all four quadrants,     no masses, no organomegaly, no ascitis Rectal:    Patient refused   Extremities:  , no cyanosis or edema       Skin:   Skin , no major lesions   Lymph nodes:   No abnormality   Neurologic:   No focal deficits, moving all four extremities            DATA:    ABGs:   Lab Results   Component Value Date    PO2ART 167 09/01/2016    CTU2MCB 40.0 09/01/2016     CBC:   Recent Labs     11/24/20  1359   WBC 12.4*   HGB 12.8   *     BMP:    Recent Labs     11/24/20  1359   *   K 4.8   CL 94*   CO2 21   BUN 54*   CREATININE 2.0*   GLUCOSE 114*     Magnesium:   Lab Results   Component Value Date    MG 1.9 12/24/2017     Hepatic:   Recent Labs     11/24/20  1359   AST 22   ALT 8*   BILITOT 0.5   ALKPHOS 95     Recent Labs     11/24/20  1359   LIPASE 46     Recent Labs     11/24/20  1439 11/24/20  1558   PROTIME 11.3* 12.3   INR 0.93 1.02     No results for input(s): PTT in the last 72 hours. Lipids: No results for input(s): CHOL, HDL in the last 72 hours.     Invalid input(s): LDLCALCU  INR:   Recent Labs     11/24/20  1439 11/24/20  1558   INR 0.93 1.02     TSH: No results found for: TSH    Intake/Output Summary (Last 24 hours) at 11/25/2020 3222  Last data filed at 11/24/2020 1734  Gross per 24 hour   Intake --   Output 600 ml   Net -600 ml      lactated ringers 100 mL/hr at 11/25/20 0657    pantoprozole (PROTONIX) infusion 8 mg/hr (11/24/20 2014)       Imaging Studies  reviewed        Javier Craig  11/25/2020  7:12 AM

## 2020-11-25 NOTE — ANESTHESIA POSTPROCEDURE EVALUATION
Department of Anesthesiology  Postprocedure Note    Patient: Sari Maldonado  MRN: [id number]  YOB: 1940  Date of evaluation: 11/25/2020  Time:  8:45 AM     Procedure Summary     Date:  11/25/20 Room / Location:  [address] [place] [address]    Anesthesia Start:  Carlos Ferguson Anesthesia Stop:  Bill Mehta    Procedure:  EGD DIAGNOSTIC ONLY (N/A ) Diagnosis:  (----)    Surgeon:  Sima Dixon MD Responsible Provider:  AMPARO Han CRNA    Anesthesia Type:  general ASA Status:  4 - Emergent          Anesthesia Type: No value filed. Santiago Phase I:      Santiago Phase II:      Last vitals: Reviewed and per EMR flowsheets.        Anesthesia Post Evaluation    Patient location during evaluation: bedside  Patient participation: complete - patient participated  Level of consciousness: awake and alert  Pain score: 0  Airway patency: patent  Nausea & Vomiting: no nausea and no vomiting  Complications: no  Cardiovascular status: blood pressure returned to baseline and hemodynamically stable  Respiratory status: acceptable, spontaneous ventilation and nasal cannula  Hydration status: euvolemic

## 2020-11-25 NOTE — H&P
Financial resource strain: Not on file    Food insecurity     Worry: Not on file     Inability: Not on file    Transportation needs     Medical: Not on file     Non-medical: Not on file   Tobacco Use    Smoking status: Former Smoker     Packs/day: 1.00     Types: Cigarettes     Last attempt to quit: 9/2/2010     Years since quitting: 10.2    Smokeless tobacco: Never Used   Substance and Sexual Activity    Alcohol use: Yes     Alcohol/week: 0.0 standard drinks     Comment: wine twice a year    Drug use: No    Sexual activity: Yes     Partners: Male   Lifestyle    Physical activity     Days per week: Not on file     Minutes per session: Not on file    Stress: Not on file   Relationships    Social connections     Talks on phone: Not on file     Gets together: Not on file     Attends Druze service: Not on file     Active member of club or organization: Not on file     Attends meetings of clubs or organizations: Not on file     Relationship status: Not on file    Intimate partner violence     Fear of current or ex partner: Not on file     Emotionally abused: Not on file     Physically abused: Not on file     Forced sexual activity: Not on file   Other Topics Concern    Not on file   Social History Narrative    Not on file       MEDICATIONS   Medications Prior to Admission  Not in a hospital admission.     Current Medications  Current Facility-Administered Medications   Medication Dose Route Frequency Provider Last Rate Last Dose    ondansetron (ZOFRAN) injection 4 mg  4 mg Intravenous Q30 Min PRN Viktor Chung PA-C   4 mg at 11/24/20 1614    pantoprazole (PROTONIX) 80 mg in sodium chloride 0.9 % 100 mL infusion  8 mg/hr Intravenous Continuous Andrea Sommers MD 10 mL/hr at 11/24/20 2014 8 mg/hr at 11/24/20 2014     Current Outpatient Medications   Medication Sig Dispense Refill    lisinopril (PRINIVIL;ZESTRIL) 5 MG tablet Take 1 tablet by mouth daily 30 tablet 3    fluticasone-vilanterol (BREO ELLIPTA) 100-25 MCG/INH AEPB inhaler Inhale 1 puff into the lungs daily      linaclotide (LINZESS) 145 MCG capsule Take 145 mcg by mouth every morning (before breakfast)      calcium citrate-vitamin D (CITRICAL + D) 315-250 MG-UNIT TABS per tablet Take 1 tablet by mouth 2 times daily (with meals)      albuterol sulfate  (90 Base) MCG/ACT inhaler Inhale 2 puffs into the lungs every 4 hours as needed for Wheezing      hydrALAZINE (APRESOLINE) 10 MG tablet Take 10 mg by mouth 2 times daily      PARoxetine (PAXIL) 20 MG tablet Take 20 mg by mouth every morning      benzonatate (TESSALON) 100 MG capsule Take 100 mg by mouth 3 times daily as needed for Cough      ondansetron (ZOFRAN ODT) 4 MG disintegrating tablet Take 1 tablet by mouth every 8 hours as needed for Nausea 15 tablet 0    albuterol (PROVENTIL) (2.5 MG/3ML) 0.083% nebulizer solution Take 3 mLs by nebulization every 6 hours as needed for Wheezing 120 each 3    carvedilol (COREG) 6.25 MG tablet Take 6.25 mg by mouth 2 times daily (with meals)      potassium chloride (KLOR-CON M) 10 MEQ extended release tablet Take 10 mEq by mouth daily      Cranberry 450 MG CAPS Take 900 mg by mouth 2 times daily       Furosemide (LASIX PO) Take 60 mg by mouth 2 times daily      levothyroxine (SYNTHROID) 75 MCG tablet Take 75 mcg by mouth Daily      Acetaminophen (TYLENOL PO) Take 650 mg by mouth every 6 hours as needed (PAIN OR FEVER)       omeprazole (PRILOSEC) 20 MG capsule Take 20 mg by mouth Daily      atorvastatin (LIPITOR) 40 MG tablet Take 40 mg by mouth daily      lactobacillus (CULTURELLE) capsule Take 1 capsule by mouth daily (with breakfast) 30 capsule     docusate sodium (COLACE) 100 MG capsule Take 1 capsule by mouth 2 times daily 30 capsule 0    dicyclomine (BENTYL) 10 MG capsule Take 1 capsule by mouth 3 times daily (before meals) 120 capsule 3    GuaiFENesin (MUCINEX PO) Take 1,200 mg by mouth 2 times daily      OXYGEN Inhale 3 L into the lungs continuous       isosorbide mononitrate (IMDUR) 30 MG CR tablet Take 1 tablet by mouth daily 30 tablet 3    promethazine (PHENERGAN) 12.5 MG tablet Take 1 tablet by mouth every 4 hours as needed for Nausea 60 tablet 3    ferrous sulfate 325 (65 FE) MG tablet Take 1 tablet by mouth every 12 hours With a meal. 60 tablet 3    ipratropium-albuterol (DUONEB) 0.5-2.5 (3) MG/3ML SOLN nebulizer solution Inhale 1 vial into the lungs every 4 hours           Allergies  Allergies   Allergen Reactions    Codeine Itching    Moxifloxacin Other (See Comments)     Listed as allergy from ecf    Oxycodone Other (See Comments)     Listed as allergy from ecf     Pcn [Penicillins] Hives and Rash    Warfarin And Related Other (See Comments)     listed as allergy from f       REVIEW OF SYSTEMS   Within above limitations. 14 point review of systems reviewed. Pertinent positive or negative as per HPI or otherwise negative per 14 point systems review. PHYSICAL EXAM     Wt Readings from Last 3 Encounters:   11/24/20 165 lb (74.8 kg)   12/23/17 189 lb 5 oz (85.9 kg)   11/28/17 201 lb 8 oz (91.4 kg)       Blood pressure (!) 115/57, pulse 70, temperature 97.6 °F (36.4 °C), temperature source Oral, resp. rate 13, height 5' 1\" (1.549 m), weight 165 lb (74.8 kg), SpO2 97 %, not currently breastfeeding. General - AAO x 2, has somewhat good insight into her condition  Psych - Appropriate affect/speech. No agitation  Eyes - Eye lids intact. No scleral icterus  ENT - Lips wnl. External ear clear/dry/intact. No thyromegaly on inspection  Neuro - No gross peripheral or central neuro deficits on inspection  Heart - Sinus. RRR. S1 and S2 present. No added HS/murmurs appreciated. No elevated JVD appreciated  Lung - Adequate air entry b/l, No crackles/wheezes appreciated  GI - Soft. No guarding/rigidity. No hepatosplenomegaly/ascites. BS+   - No CVA/suprapubic tenderness or palpable bladder distension  Skin - Intact.  No rash/petechiae/ecchymosis. Warm extremities  MSK - Joints with normal ROM. No joint swellings    Lines/Drains/Airways/Wounds:  [unfilled]    LABS AND IMAGING   CBC  [unfilled]    Last 3 Hemoglobin  Lab Results   Component Value Date    HGB 12.8 11/24/2020    HGB 9.2 01/16/2019    HGB 9.1 01/02/2019     Last 3 WBC/ANC  Lab Results   Component Value Date    WBC 12.4 11/24/2020    WBC 5.8 01/16/2019    WBC 5.3 01/02/2019     No components found for: GRNLOCTYABS  Last 3 Platelets  No results found for: PLATELET  Chemistry  [unfilled]  [unfilled]  Lab Results   Component Value Date     09/02/2016     04/28/2016     Coagulation Studies  Lab Results   Component Value Date    INR 1.02 11/24/2020     Liver Function Studies  Lab Results   Component Value Date    ALT 8 11/24/2020    AST 22 11/24/2020    ALKPHOS 95 11/24/2020       Recent Imaging        Relevant labs and imaging reviewed    ASSESSMENT AND PLAN   March Natalee is a 79 y.o. female p/w    N/V with decreased p.o. intake and melanotic stools  -Given 2 L IV fluid bolus in ED, as patient was initially hypotensive,  -Placed on Protonix drip per gastroenterology consult  -Serial H&H  -N.p.o.  -We will refrain from giving further IV fluids, small boluses as appropriate, given history of CHF  -Antiemetics    SALTY possibly d/t overdiuresis?,  In setting of anorexia  - avoid nephrotoxic medication  - IVF, given in ED  - monitor I/Os  - nephrology consult    H/o COVID + on the 20th of Nov, on O2 here, being weaned off in ED, COVID negative here.   Discussed with nursing supervisor, given patient's Covid negative here, states that okay to be admitted to non-Covid unit  -If patient have worsening or decompensation in respiratory status, consider ID consult and pulmonary consult    H/o CHF-consider cardiology consult  H/o COPD, on theophylline, check levels    Hypertension, and anti-hypertensive held    Case d/w ED provider    DVT ppx: SCDs  Code status: Full code    [phone] [facility], Internal Medicine  11/24/2020 at 11:00 PM

## 2020-11-25 NOTE — CARE COORDINATION
Received return call from patient's  Deneen Andrea. He confirmed that patient is from [address] [place] [address] and stated that patient has been in a nursing home for the past 4 years. Deneen Andrea stated that he is not sure about patient's current mobility status at the [facility] due to not being able to visit due to [place] but added he does try to  speak with patient on the phone as able. Deneen Leomore stated that he wished patient could come home but is aware that patient is weak and not eating much at this time. Case Management to follow to continue to assist with discharge plan back to [address] [place] [address].

## 2020-11-25 NOTE — PROGRESS NOTES
REPORT CALLED TO JOEL YOUSIF. PT HAS PICC CONSULT ORDERED PER DR TURNER. PT WAS INCONTINENT OF STOOL. PT CLEANED UP, BED LINENS CHANGED.

## 2020-11-25 NOTE — PROGRESS NOTES
REPORT CALLED TO ANALISA YOUSIF ON 3N. PT TOLERATED PROCEDURE WELL. PER DR TURNER, ADVANCE DIET AS TOLERATED.

## 2020-11-25 NOTE — CONSULTS
Nephrology Service Consultation    Patient:  Margarita Smith  MRN: [id number]  Consulting physician:  Diamante Francisco MD  Reason for Consult:   SALTY on stage 3A CKD     History Obtained From:  patient  PCP: Polo Lynch MD    HISTORY OF PRESENT ILLNESS:   The patient is a 79 y.o. female who was transported   to [facility] ED on 11/24. It is reported that the patient   Had been contending with bouts of nausea and vomiting   For approximately 4 weeks as well as rectal bleeding with   black stools. During her ED visit, she had reported  Episodic central chest pain for approximately 2 weeks   Preceding her ED visit. REVIEW OF SYSTEMS:  The ten point review of systems   are negative except as mentioned above. Medical History: COPD / mood disorder / ELIER: use of NIPPV? History of chronic volume overload with CHF with previous decompensation  Chronic lower extremity edema / HTN / CAD (mild to moderate disease)      Surgical History: heart catheterization (2015) / hysterectomy   Anal fistula repair / appendectomy /     Renal History: stage 3A CKD with estimated baseline creatinine: 1.1 - 1.2            SOCIAL HISTORY:   Tobacco: previous smoker     Alcohol: recent use     Demographic History; prior to admission: residing at home     Review of pertinent lab work and diagnostics available thus far:   Review of recent chest X-ray from 11/24:   Diffusely prominent interstitium could relate to pulmonary vascular   congestion or chronic interstitial process. Cardiomegaly. Bibasilar subsegmental atelectasis/scarring.  No lobar consolidation  ------------------------------------------------------------------------------------------  Review of non-contrast CT of abdomen / pelvis from 11/24: Airspace opacities at the posterior left lung base, likely related to   pneumonia versus atelectasis.  Follow-up is recommended to ensure resolution   and exclude underlying mass.      Moderate colonic diverticulosis without acute diverticulitis. 1.3 cm intermediate attenuation lesion at the lower pole left kidney, new or   increased since the prior study.  Follow-up CT or MRI renal mass protocol is   recommended to exclude renal neoplasm. Mild ectatic bilateral common iliac arteries measuring 1.4-1.5 cm.  Follow-up   is recommended. Medications:   Scheduled Meds:   atorvastatin  40 mg Oral Daily    budesonide-formoterol  2 puff Inhalation BID    ferrous sulfate  325 mg Oral BID WC    levothyroxine  75 mcg Oral Daily    PARoxetine  20 mg Oral QAM    oxybutynin  10 mg Oral Nightly    theophylline  100 mg Oral BID    sodium chloride flush  10 mL Intravenous 2 times per day     Continuous Infusions:   pantoprozole (PROTONIX) infusion 8 mg/hr (11/24/20 2014)     PRN Meds:.sodium chloride flush, acetaminophen **OR** acetaminophen, promethazine **OR** ondansetron    Allergies:  Codeine; Moxifloxacin; Oxycodone; Pcn [penicillins]; and Warfarin and related    Family History:       Problem Relation Age of Onset    High Blood Pressure Mother     Heart Disease Mother     Early Death Father     Substance Abuse Father     Diabetes Sister      Physical Exam:    Vitals: BP (!) 123/59   Pulse 66   Temp 97.7 °F (36.5 °C) (Oral)   Resp 16   Ht 5' 1\" (1.549 m)   Wt 174 lb 3.2 oz (79 kg)   SpO2 94%   BMI 32.91 kg/m²     General appearance: Patient is drowsy; awakens briefly this morning   HEENT: Head: Normal, normocephalic, atraumatic.   Neck: supple, symmetrical, trachea midline  Cardiovascular: S1 and S2: normal / no rub  Pulmonary: diminished lung sounds bilaterally  Abdomen:  soft / non-tender   Extremities: ++ edema to bilateral lower legs     CBC:   Recent Labs     11/24/20  1359   WBC 12.4*   HGB 12.8   *     BMP:    Recent Labs     11/24/20  1359   *   K 4.8   CL 94*   CO2 21   BUN 54*   CREATININE 2.0*   GLUCOSE 114*     Hepatic:   Recent Labs     11/24/20  1359   AST 22   ALT 8*   BILITOT 0.5   ALKPHOS 95     Troponin: No results for input(s): TROPONINI in the last 72 hours. Mg, Phos: No results for input(s): MG, PHOS in the last 72 hours. ABGs:   Lab Results   Component Value Date    PO2ART 167 09/01/2016    MBV8DVF 40.0 09/01/2016     INR:   Recent Labs     11/24/20  1439 11/24/20  1558   INR 0.93 1.02     -----------------------------------------------------------------  Patient Active Problem List   Diagnosis Code    Coronary atherosclerosis of native coronary artery I25.10    Anemia D64.9    Bilateral edema of lower extremity R60.0    Moderate COPD (chronic obstructive pulmonary disease) ([facility]) J44.9    COPD exacerbation ([address] J44.1    Morbid obesity due to excess calories ([facility]) E66.01    Acute on chronic diastolic congestive heart failure ([facility]) I50.33    Depression F32.9    Hypertension I10    Pneumonia J18.9    Acute on chronic diastolic ACC/AHA stage C congestive heart failure ([facility]) I50.33    GIB (gastrointestinal bleeding) K92.2     Assessment and Recommendations     Impression   1. SALTY on stage 3A CKD (ATN vs. Pre-renal azotemia)  -likely multi-factorial etiology for SALTY including: intravascular   Volume depletion accentuated by lisinopril and lasix. Also consider diminished renal perfusion secondary to hypotension     2. GI bleed  3. CHF with history of chronic lower extremity edema   4. Hypotension   5. COPD   6. 1.3 cm attenuation to the lower pole of the left kidney     PLAN  1.   -uop: 900 ml in the last 24 hours via davalos; dark shaquille in appearance  -UA: small blood / no albumin / trace leuks  -UA micro: 5 hyaline casts; otherwise unremarkable  -additional labs: upc, chemistry panel qam, magnesium, urine sodium    -it appears that she had received 2 liters of NS in the ED   -orders to start LR at 100 / hour   -latest serum creatinine 2.0 with normal K / CO2  2.   -anticipate EGD this morning   -had been on protonix drip   -latest Hb: 12.8; follow hemoglobin trend   3. -monitor: O2 saturations / urine output and volume status   -has not been restarted on lasix at present time   4. -BP trend: systolic BP's have recently been 90 - 122  -no routine BP medications at present time   5.   -presently on theophylline and symbicort   6.   -radiology recommends follow-up CT or MRI renal mass protocol   To exclude renal neoplasm. Will need follow-up on an outpatient basis. Electronically signed by AMPARO Corey - CNP      Nephrology Attending Progress Note  11/25/2020 12:57 PM  Subjective: Interval History: I have personally performed face to face diagnostic evaluation on this patient. I have personally reviewed pertinent labs and imaging and agree with the care plan above. My additional findings are as follows: The patient is a 79 y.o. female who presents with n/v and appear gi bleed admit work up with arf in above setting.  Pt with copd , htn cad, ckd 3 creat 1.1-1.2    Objective:   Vitals: BP 94/60   Pulse 71   Temp 97.9 °F (36.6 °C) (Oral)   Resp 18   Ht 5' 1\" (1.549 m)   Wt 174 lb 3.2 oz (79 kg)   SpO2 96%   BMI 32.91 kg/m²   Weak sleepy seen after egd  Soft nt  + edema    Assessment and Plan:    1 bp low stable and maintain hydration  2 monitor uop and fu K  3 sp egd and monitor as hb better now and on PPI  4 o2 stable  5 treat copd  6 monitor renal lesion with urology as outpt  7 arf in above setting with ckd 3 no sig proteinuria and monitor- held ace and diuretic  8 monitor na with ivf  Will follow       Electronically signed by Kiya Elizondo MD on 11/25/2020 at 12:57 PM

## 2020-11-25 NOTE — CARE COORDINATION
Received VM from Emelyn Davis at [facility]. She stated that patient is LTC and on a bed hold and can return when medically ready. She added that if patient would need IV ATB's or therapy patient would need a precert .  Case Management to follow

## 2020-11-25 NOTE — PROGRESS NOTES
Adequate: hears normal conversation without difficulty Hospitalist Progress Note      Name:  Anne Corona /Age/Sex: 1940  (89 y.o. female)   MRN & CSN:  [id number] & [id number] Admission Date/Time: 2020  1:31 PM   Location:  [address] [place] [address]- PCP: Meaghan Almazan MD         Hospital Day: 2    Assessment and Plan:   Anne Corona is a 79 y.o. female p/w GIB     N/V with decreased p.o. intake and melanotic stools  -Given 2 L IV fluid bolus in ED, as patient was initially hypotensive  -Placed on Protonix drip per GI recs  -Serial H&H  -N.p.o.  -EGD today     SALTY possibly d/t overdiuresis?,  In setting of anorexia  - avoid nephrotoxic medication  - monitor I/Os  - nephrology following, appreciate recs  - Holding Lasix and Lisinopril     H/o COVID + on the , on O2 here, being weaned off in ED, COVID negative here. Discussed with nursing supervisor by night time admitting attending, given patient's Covid negative here, states that okay to be admitted to non-Covid unit  -If patient have worsening or decompensation in respiratory status, consider ID consult and pulmonary consult     H/o CHF-stable; continue diuretics as able  H/o COPD, on theophylline     Hypertension- anti-hypertensive held    Diet Diet NPO Effective Now Exceptions are: Ice Chips   DVT Prophylaxis [] Lovenox, []  Heparin, [] SCDs, [] Ambulation   GI Prophylaxis [] PPI,  [] H2 Blocker,  [] Carafate,  [] Diet/Tube Feeds   Code Status Full Code   Disposition Patient requires continued admission due to GIB   MDM [] Low, [x] Moderate,[]  High  Patient's risk as above due to      History of Present Illness:     Patient resting comfortably bed this morning without awaken for interview this morning. However will shake head no when asked about chest pain or abdominal pain. Objective:        Intake/Output Summary (Last 24 hours) at 2020 1201  Last data filed at 2020 1734  Gross per 24 hour   Intake --   Output 600 ml   Net -600 ml      Vitals:   Vitals:    20 0915 BP:    Pulse: 68   Resp:    Temp:    SpO2:      Physical Exam:   GEN Awake female, sitting upright in bed in no apparent distress. Appears given age. EYES Pupils are equally round. No scleral erythema, discharge, or conjunctivitis. HENT Mucous membranes are moist.   NECK Supple, no apparent thyromegaly or masses. RESP Clear to auscultation, no wheezes, rales or rhonchi. Symmetric chest movement while on room air. CARDIO/VASC S1/S2 auscultated. Regular rate without appreciable murmurs, rubs, or gallops. a. GI Abdomen is soft without significant tenderness, masses, or guarding.    HEME/LYMPH No petechia or ecchymosis    Medications:   Medications:    atorvastatin  40 mg Oral Daily    budesonide-formoterol  2 puff Inhalation BID    ferrous sulfate  325 mg Oral BID WC    levothyroxine  75 mcg Oral Daily    PARoxetine  20 mg Oral QAM    oxybutynin  10 mg Oral Nightly    theophylline  100 mg Oral BID    sodium chloride flush  10 mL Intravenous 2 times per day    lidocaine PF  5 mL Intradermal Once    sodium chloride flush  10 mL Intravenous 2 times per day      Infusions:    lactated ringers 100 mL/hr at 11/25/20 0657    pantoprozole (PROTONIX) infusion 8 mg/hr (11/24/20 2014)     PRN Meds: sodium chloride flush, 10 mL, PRN  acetaminophen, 650 mg, Q6H PRN    Or  acetaminophen, 650 mg, Q6H PRN  promethazine, 12.5 mg, Q6H PRN    Or  ondansetron, 4 mg, Q6H PRN  sodium chloride flush, 10 mL, PRN          Electronically signed by Julio Cesar Wise MD on 11/25/2020 at 12:01 PM

## 2020-11-25 NOTE — ANESTHESIA PRE PROCEDURE
Department of Anesthesiology  Preprocedure Note       Name:  Vangie Klinefelter   Age:  79 y.o.  :  1940                                          MRN:  [id number]         Date:  2020      Surgeon: Chito Barcenas):  Richard Flanagan MD    Procedure: Procedure(s):  EGD ESOPHAGOGASTRODUODENOSCOPY    Medications prior to admission:   Prior to Admission medications    Medication Sig Start Date End Date Taking?  Authorizing Provider   lisinopril (PRINIVIL;ZESTRIL) 5 MG tablet Take 1 tablet by mouth daily 17   Janneth Carbajal MD   fluticasone-vilanterol (BREO ELLIPTA) 100-25 MCG/INH AEPB inhaler Inhale 1 puff into the lungs daily    Historical Provider, MD   linaclotide (LINZESS) 145 MCG capsule Take 145 mcg by mouth every morning (before breakfast)    Historical Provider, MD   calcium citrate-vitamin D (CITRICAL + D) 315-250 MG-UNIT TABS per tablet Take 1 tablet by mouth 2 times daily (with meals)    Historical Provider, MD   albuterol sulfate  (90 Base) MCG/ACT inhaler Inhale 2 puffs into the lungs every 4 hours as needed for Wheezing    Historical Provider, MD   hydrALAZINE (APRESOLINE) 10 MG tablet Take 10 mg by mouth 2 times daily    Historical Provider, MD   PARoxetine (PAXIL) 20 MG tablet Take 20 mg by mouth every morning    Historical Provider, MD   benzonatate (TESSALON) 100 MG capsule Take 100 mg by mouth 3 times daily as needed for Cough    Historical Provider, MD   ondansetron (ZOFRAN ODT) 4 MG disintegrating tablet Take 1 tablet by mouth every 8 hours as needed for Nausea 17   Sherita Higgins DO   albuterol (PROVENTIL) (2.5 MG/3ML) 0.083% nebulizer solution Take 3 mLs by nebulization every 6 hours as needed for Wheezing 17   Sherita Higgins,    carvedilol (COREG) 6.25 MG tablet Take 6.25 mg by mouth 2 times daily (with meals)    Historical Provider, MD   potassium chloride (KLOR-CON M) 10 MEQ extended release tablet Take 10 mEq by mouth daily    Historical Provider, MD   Crandimitri 450 MG CAPS Take 900 mg by mouth 2 times daily     Historical Provider, MD   Furosemide (LASIX PO) Take 60 mg by mouth 2 times daily    Historical Provider, MD   levothyroxine (SYNTHROID) 75 MCG tablet Take 75 mcg by mouth Daily    Historical Provider, MD   Acetaminophen (TYLENOL PO) Take 650 mg by mouth every 6 hours as needed (PAIN OR FEVER)     Historical Provider, MD   omeprazole (PRILOSEC) 20 MG capsule Take 20 mg by mouth Daily    Historical Provider, MD   atorvastatin (LIPITOR) 40 MG tablet Take 40 mg by mouth daily    Historical Provider, MD   lactobacillus (CULTURELLE) capsule Take 1 capsule by mouth daily (with breakfast) 6/26/16   Patricia Needles, DO   docusate sodium (COLACE) 100 MG capsule Take 1 capsule by mouth 2 times daily 6/26/16   Patricia Needles, DO   dicyclomine (BENTYL) 10 MG capsule Take 1 capsule by mouth 3 times daily (before meals) 6/26/16   Patricia Needles, DO   GuaiFENesin (MUCINEX PO) Take 1,200 mg by mouth 2 times daily    Historical Provider, MD   OXYGEN Inhale 3 L into the lungs continuous     Historical Provider, MD   isosorbide mononitrate (IMDUR) 30 MG CR tablet Take 1 tablet by mouth daily 9/11/15   Anupam Elena MD   promethazine (PHENERGAN) 12.5 MG tablet Take 1 tablet by mouth every 4 hours as needed for Nausea 9/11/15   Anupam Elena MD   ferrous sulfate 325 (65 FE) MG tablet Take 1 tablet by mouth every 12 hours With a meal. 9/11/15   Anupam Elena MD   ipratropium-albuterol (DUONEB) 0.5-2.5 (3) MG/3ML SOLN nebulizer solution Inhale 1 vial into the lungs every 4 hours    Historical Provider, MD       Current medications:    No current facility-administered medications for this visit. No current outpatient medications on file.      Facility-Administered Medications Ordered in Other Visits   Medication Dose Route Frequency Provider Last Rate Last Dose    atorvastatin (LIPITOR) tablet 40 mg  40 mg Oral Daily Ron Sunshine MD        budesonide-formoterol (SYMBICORT) 160-4.5 MCG/ACT inhaler 2 puff  2 puff Inhalation BID Ron Brianne Dalal MD        ferrous sulfate (IRON 325) tablet 325 mg  325 mg Oral BID WC Ron Brianne Dalal MD        levothyroxine (SYNTHROID) tablet 75 mcg  75 mcg Oral Daily Ron Brianne Dalal MD        PARoxetine (PAXIL) tablet 20 mg  20 mg Oral QAM Ron Brianne Dalal MD        oxybutynin (DITROPAN-XL) extended release tablet 10 mg  10 mg Oral Nightly Ron Brianne Dalal MD        theophylline (YANA-24) extended release capsule 100 mg  100 mg Oral BID Ron Brianne Dalal MD        sodium chloride flush 0.9 % injection 10 mL  10 mL Intravenous 2 times per day Tc Mariana LOMAX MD        sodium chloride flush 0.9 % injection 10 mL  10 mL Intravenous PRN Ron Brianne Dalal MD        acetaminophen (TYLENOL) tablet 650 mg  650 mg Oral Q6H PRN Ron Brianne Dalal MD        Or    acetaminophen (TYLENOL) suppository 650 mg  650 mg Rectal Q6H PRN Ron Brianne Dalal MD        promethazine (PHENERGAN) tablet 12.5 mg  12.5 mg Oral Q6H PRN Ron Brianne Dalal MD        Or    ondansetron (ZOFRAN) injection 4 mg  4 mg Intravenous Q6H PRN Ronrao Dalal MD        lactated ringers infusion   Intravenous Continuous Heather Mendoza  mL/hr at 11/25/20 0657      lidocaine PF 1 % injection 5 mL  5 mL Intradermal Once Fang Bautista MD        sodium chloride flush 0.9 % injection 10 mL  10 mL Intravenous 2 times per day Fang Bautista MD        sodium chloride flush 0.9 % injection 10 mL  10 mL Intravenous PRN Fang Bautista MD        pantoprazole (PROTONIX) 80 mg in sodium chloride 0.9 % 100 mL infusion  8 mg/hr Intravenous Continuous Andrea Sommers MD 10 mL/hr at 11/24/20 2014 8 mg/hr at 11/24/20 2014       Allergies:     Allergies   Allergen Reactions    Codeine Itching    Moxifloxacin Other (See Comments)     Listed as allergy from [facility]    Oxycodone Other (See Comments)     Listed as allergy from [facility]     Pcn [Penicillins] Hives and Rash    Warfarin And Related Other (See Comments)     listed as allergy from [facility]       Problem List:    Patient Active Problem List   Diagnosis Code    Coronary atherosclerosis of native coronary artery I25.10    Anemia D64.9    Bilateral edema of lower extremity R60.0    Moderate COPD (chronic obstructive pulmonary disease) ([facility] [address] J44.9    COPD exacerbation ([facility] [address] J44.1    Morbid obesity due to excess calories (HCC) E66.01    Acute on chronic diastolic congestive heart failure (HCC) I50.33    Depression F32.9    Hypertension I10    Pneumonia J18.9    Acute on chronic diastolic ACC/AHA stage C congestive heart failure (HCC) I50.33    GIB (gastrointestinal bleeding) K92.2       Past Medical History:        Diagnosis Date    ASHD (arteriosclerotic heart disease)     Bilateral edema of lower extremity 12/16/2015    CHF (congestive heart failure) ([facility] [address]     COPD (chronic obstructive pulmonary disease) ([facility] [address]     Depression     Hypertension        Past Surgical History:        Procedure Laterality Date    ANUS SURGERY      fistula repair    APPENDECTOMY      HYSTERECTOMY      TONSILLECTOMY         Social History:    Social History     Tobacco Use    Smoking status: Former Smoker     Packs/day: 1.00     Types: Cigarettes     Last attempt to quit: 9/2/2010     Years since quitting: 10.2    Smokeless tobacco: Never Used   Substance Use Topics    Alcohol use: Yes     Alcohol/week: 0.0 standard drinks     Comment: wine twice a year                                Counseling given: Not Answered      Vital Signs (Current): There were no vitals filed for this visit.                                            BP Readings from Last 3 Encounters:   11/25/20 (!) 123/59   11/25/20 (!) 113/52   12/24/17 (!) 95/59       NPO Status:                                                                                 BMI:   Wt Readings from Last 3 Encounters:   11/25/20 174 lb 3.2 oz (79 kg)   12/23/17 189 lb 5 oz (85.9 kg)   11/28/17 201 lb 8 oz (91.4 kg)     There is no height or weight on file to calculate BMI.    CBC:   Lab Results   Component Value Date    WBC 12.4 11/24/2020    RBC 4.56 11/24/2020    HGB 12.8 11/24/2020    HCT 45.0 11/24/2020    MCV 98.7 11/24/2020    RDW 15.3 11/24/2020     11/24/2020       CMP:   Lab Results   Component Value Date     11/24/2020    K 4.8 11/24/2020    CL 94 11/24/2020    CO2 21 11/24/2020    BUN 54 11/24/2020    CREATININE 2.0 11/24/2020    GFRAA 29 11/24/2020    LABGLOM 24 11/24/2020    GLUCOSE 114 11/24/2020    PROT 6.7 11/24/2020    CALCIUM 9.5 11/24/2020    BILITOT 0.5 11/24/2020    ALKPHOS 95 11/24/2020    AST 22 11/24/2020    ALT 8 11/24/2020       POC Tests: No results for input(s): POCGLU, POCNA, POCK, POCCL, POCBUN, POCHEMO, POCHCT in the last 72 hours.     Coags:   Lab Results   Component Value Date    PROTIME 12.3 11/24/2020    INR 1.02 11/24/2020    APTT 22.5 11/24/2020       HCG (If Applicable): No results found for: PREGTESTUR, PREGSERUM, HCG, HCGQUANT     ABGs:   Lab Results   Component Value Date    PO2ART 167 09/01/2016    PPD4MPI 40.0 09/01/2016    EGF9SWD 29.1 09/01/2016        Type & Screen (If Applicable):  No results found for: LABABO, LABRH    Drug/Infectious Status (If Applicable):  No results found for: HIV, HEPCAB    COVID-19 Screening (If Applicable):   Lab Results   Component Value Date    COVID19 NOT DETECTED 11/24/2020         Anesthesia Evaluation  Patient summary reviewed and Nursing notes reviewed no history of anesthetic complications:   Airway: Mallampati: III  TM distance: >3 FB   Neck ROM: full  Mouth opening: < 3 FB Dental:    (+) edentulous      Pulmonary: breath sounds clear to auscultation  (+) pneumonia ( COVID + 11/20/2020 per note on chart):  COPD:                             Cardiovascular:  Exercise tolerance: poor (<4 METS),   (+) hypertension:, CAD:, CHF ( Bilateral edema of lower extremity): diastolic,         Rhythm: regular  Rate: normal           Beta Blocker:  Dose within 24 Hrs

## 2020-11-25 NOTE — ED NOTES
Spoke with Dr. Daja Goodman, pts. COVID test on 11/4 came back positive. Pts. Rapid today was negative.      Ana Paula Mcmillan  11/24/20 4084

## 2020-11-25 NOTE — ED NOTES
Report called to [facility].      Mortimer Maus  11/24/20 5834

## 2020-11-25 NOTE — PROGRESS NOTES
Left message w/PICC nurse r/t needing IV access. This RN attempted IV insertion x1 without success. Patient remains very drowsy as she was on AM assessment but will wake with gentle stimulation and follow commands.

## 2020-11-25 NOTE — PROGRESS NOTES
[facility]astroenterology      STILL NO IV ACCESS, RN TO CALL PICC TEAM NOW    I have examined the patient before the procedure and there is no change in the history and physical exam recorded by me previously. I have reviewed with the patient and/or family the risks, benefits, and alternatives to the procedure. Danilo Mckenna, [facility]  11/25/2020  9:56 AM     [facility]        I have examined the patient within 24 hours  before the procedure and there is no change in the previous history and physical exam,which has been reviewed. There is no history of sleep apnea, snoring, or stridor. There has been no  previous adverse experience with sedation/anesthesia. There is no increased risk for aspiration of gastric contents. The patient has been instructed that all resuscitative measures (during the operative and immediate perioperative period) will be instituted in the unlikely event that they will be needed. ASA Class: 3  AIRWAY Class: 3     Consent form signed, witnessed and in soft chart           The patient was counseled at length about the risks of prem Covid -!9 in the pascual-operative and post -operative states including the recovery window of their procedure. The patient was made aware that prem Covid -19 after an endoscopic procedure may worsen their prognosis for recovering from the virus and lend to a higher morbidity and mortality risk. The patient was given the options of postponing their procedure. I have reviewed with the patient and/or family the risks, benefits, and alternatives to the procedure. The patient wishes to proceed with the procedure.     MD Yolanda Arias gastroenterology  11/25/2020  3:13 PM

## 2020-11-26 LAB
ANION GAP SERPL CALCULATED.3IONS-SCNC: 12 MMOL/L (ref 4–16)
BUN BLDV-MCNC: 27 MG/DL (ref 6–23)
CALCIUM SERPL-MCNC: 8.6 MG/DL (ref 8.3–10.6)
CHLORIDE BLD-SCNC: 104 MMOL/L (ref 99–110)
CO2: 22 MMOL/L (ref 21–32)
CREAT SERPL-MCNC: 1.2 MG/DL (ref 0.6–1.1)
GFR AFRICAN AMERICAN: 52 ML/MIN/1.73M2
GFR NON-AFRICAN AMERICAN: 43 ML/MIN/1.73M2
GLUCOSE BLD-MCNC: 88 MG/DL (ref 70–99)
HCT VFR BLD CALC: 41.1 % (ref 37–47)
HEMOGLOBIN: 10.7 GM/DL (ref 12.5–16)
MAGNESIUM: 2.4 MG/DL (ref 1.8–2.4)
POTASSIUM SERPL-SCNC: 4.5 MMOL/L (ref 3.5–5.1)
SODIUM BLD-SCNC: 138 MMOL/L (ref 135–145)
TROPONIN T: 0.04 NG/ML

## 2020-11-26 PROCEDURE — 85014 HEMATOCRIT: CPT

## 2020-11-26 PROCEDURE — 84484 ASSAY OF TROPONIN QUANT: CPT

## 2020-11-26 PROCEDURE — 2140000000 HC CCU INTERMEDIATE R&B

## 2020-11-26 PROCEDURE — 6360000002 HC RX W HCPCS: Performed by: EMERGENCY MEDICINE

## 2020-11-26 PROCEDURE — 2580000003 HC RX 258: Performed by: HOSPITALIST

## 2020-11-26 PROCEDURE — 2580000003 HC RX 258: Performed by: EMERGENCY MEDICINE

## 2020-11-26 PROCEDURE — 6360000002 HC RX W HCPCS: Performed by: INTERNAL MEDICINE

## 2020-11-26 PROCEDURE — 36415 COLL VENOUS BLD VENIPUNCTURE: CPT

## 2020-11-26 PROCEDURE — 6370000000 HC RX 637 (ALT 250 FOR IP): Performed by: INTERNAL MEDICINE

## 2020-11-26 PROCEDURE — 2580000003 HC RX 258: Performed by: INTERNAL MEDICINE

## 2020-11-26 PROCEDURE — 85018 HEMOGLOBIN: CPT

## 2020-11-26 PROCEDURE — C9113 INJ PANTOPRAZOLE SODIUM, VIA: HCPCS | Performed by: EMERGENCY MEDICINE

## 2020-11-26 PROCEDURE — 1200000000 HC SEMI PRIVATE

## 2020-11-26 PROCEDURE — 83735 ASSAY OF MAGNESIUM: CPT

## 2020-11-26 PROCEDURE — 80048 BASIC METABOLIC PNL TOTAL CA: CPT

## 2020-11-26 RX ORDER — DEXTROSE AND SODIUM CHLORIDE 5; .9 G/100ML; G/100ML
INJECTION, SOLUTION INTRAVENOUS CONTINUOUS
Status: DISCONTINUED | OUTPATIENT
Start: 2020-11-26 | End: 2020-11-30 | Stop reason: HOSPADM

## 2020-11-26 RX ADMIN — OXYBUTYNIN CHLORIDE 10 MG: 10 TABLET, EXTENDED RELEASE ORAL at 21:38

## 2020-11-26 RX ADMIN — THEOPHYLLINE ANHYDROUS 100 MG: 100 CAPSULE, EXTENDED RELEASE ORAL at 21:38

## 2020-11-26 RX ADMIN — FERROUS SULFATE TAB 325 MG (65 MG ELEMENTAL FE) 325 MG: 325 (65 FE) TAB at 09:00

## 2020-11-26 RX ADMIN — DEXTROSE AND SODIUM CHLORIDE: 5; 900 INJECTION, SOLUTION INTRAVENOUS at 17:46

## 2020-11-26 RX ADMIN — SODIUM CHLORIDE 8 MG/HR: 9 INJECTION, SOLUTION INTRAVENOUS at 06:23

## 2020-11-26 RX ADMIN — ACETAMINOPHEN 650 MG: 325 TABLET ORAL at 17:16

## 2020-11-26 RX ADMIN — PAROXETINE HYDROCHLORIDE 20 MG: 20 TABLET, FILM COATED ORAL at 09:01

## 2020-11-26 RX ADMIN — FERROUS SULFATE TAB 325 MG (65 MG ELEMENTAL FE) 325 MG: 325 (65 FE) TAB at 17:16

## 2020-11-26 RX ADMIN — SODIUM CHLORIDE 300 MG: 9 INJECTION, SOLUTION INTRAVENOUS at 16:09

## 2020-11-26 RX ADMIN — ATORVASTATIN CALCIUM 40 MG: 40 TABLET, FILM COATED ORAL at 09:01

## 2020-11-26 RX ADMIN — SODIUM CHLORIDE 8 MG/HR: 9 INJECTION, SOLUTION INTRAVENOUS at 16:57

## 2020-11-26 RX ADMIN — LEVOTHYROXINE SODIUM 75 MCG: 75 TABLET ORAL at 06:23

## 2020-11-26 RX ADMIN — SODIUM CHLORIDE, PRESERVATIVE FREE 10 ML: 5 INJECTION INTRAVENOUS at 09:00

## 2020-11-26 RX ADMIN — THEOPHYLLINE ANHYDROUS 100 MG: 100 CAPSULE, EXTENDED RELEASE ORAL at 09:02

## 2020-11-26 ASSESSMENT — PAIN SCALES - GENERAL
PAINLEVEL_OUTOF10: 3
PAINLEVEL_OUTOF10: 0

## 2020-11-26 NOTE — PROGRESS NOTES
Hospitalist Progress Note      Name:  Samara Peña /Age/Sex: 1940  (79 y.o. female)   MRN & CSN:  [id number] & [id number] Admission Date/Time: 2020  1:31 PM   Location:  [address] [place] [address] PCP: Gennaro Cowden, MD         Hospital Day: 3    Assessment and Plan:   Samara Peña is a 79 y.o.  female  who presents with <principal problem not specified>    > Acute Gastritis  > Decreased po intake  > Upper GI bleed  > Acute blood loss anemia  -N/V with decreased p.o. intake and melanotic stools  -Given 2 L IV fluid bolus in ED, as patient was initially hypotensive  -Placed on Protonix drip per GI recs  - Gi consulted, EGD with Gastritis, PPI recommended, consulted Dietary , will start dextrose due to low BG and not eating. Consult PT/OT/Case management     >SALTY possibly d/t overdiuresis?,  In setting of anorexia  - avoid nephrotoxic medication  - monitor I/Os  - nephrology following, appreciate recs  - Holding Lasix and Lisinopril     >H/o COVID + on the , on O2 here, being weaned off in ED, COVID negative here.  Discussed with nursing supervisor by night time admitting attending, given patient's Covid negative here, states that okay to be admitted to non-Covid unit  -If patient have worsening or decompensation in respiratory status, consider ID consult and pulmonary consult     >H/o CHF-stable; continue diuretics as able  >H/o COPD, on theophylline     >Hypertension- anti-hypertensive held       Diet DIET FULL LIQUID;   DVT Prophylaxis [] SCDs   GI Prophylaxis [] PPI   Code Status Full Code   Disposition  Home with [facility] pending clinical improvement ( po intake )      History of Present Illness:     Pt S&E.      Pt very weak, speaks softly, somnolent, oriented to name and place, denies any pain, no dyspnea, no abd pain, no n/V, talked to pt's  state pt been deteriorating over last year kadi after covid restrictions, where she was not eating, no participating , state that when he talked to her yesterday she was better was able to recognize him and asked him about his condition, want her to come back home, does not want her to go to SNF    10-14 point ROS reviewed negative, unless as noted above    Objective: Intake/Output Summary (Last 24 hours) at 11/26/2020 0942  Last data filed at 11/25/2020 2047  Gross per 24 hour   Intake 790 ml   Output 1450 ml   Net -660 ml      Vitals:   Vitals:    11/26/20 0845   BP: 130/70   Pulse: 60   Resp: 16   Temp: 97.8 °F (36.6 °C)   SpO2: 99%     Physical Exam:      GEN somnolent female, cooperative, no apparent distress. RESP Decreased air sounds. Symmetric chest movement . CARDIO/VASC S1/S2 auscultated. Regular rate. GI Abdomen is soft without significant tenderness, Bowel sounds are normoactive. MSK No gross joint deformities. SKIN Normal coloration, warm, dry. NEURO/PSYCH somnolent , alert, oriented . Affect appropriate.     Medications:   Medications:    atorvastatin  40 mg Oral Daily    budesonide-formoterol  2 puff Inhalation BID    ferrous sulfate  325 mg Oral BID WC    levothyroxine  75 mcg Oral Daily    PARoxetine  20 mg Oral QAM    oxybutynin  10 mg Oral Nightly    theophylline  100 mg Oral BID    sodium chloride flush  10 mL Intravenous 2 times per day    lidocaine PF  5 mL Intradermal Once    sodium chloride flush  10 mL Intravenous 2 times per day    iron sucrose  300 mg Intravenous Q24H      Infusions:    pantoprozole (PROTONIX) infusion 8 mg/hr (11/26/20 0623)     PRN Meds: sodium chloride flush, 10 mL, PRN  acetaminophen, 650 mg, Q6H PRN    Or  acetaminophen, 650 mg, Q6H PRN  promethazine, 12.5 mg, Q6H PRN    Or  ondansetron, 4 mg, Q6H PRN  sodium chloride flush, 10 mL, PRN      Electronically signed by Vika Swenson MD on 11/26/2020 at 9:42 AM

## 2020-11-26 NOTE — PROGRESS NOTES
Nephrology Progress Note  11/26/2020 10:41 AM  Subjective:      Interval History: Mark Olmstead is a 79 y.o. female with weakness and still fatigue overall        Data:   Scheduled Meds:   atorvastatin  40 mg Oral Daily    budesonide-formoterol  2 puff Inhalation BID    ferrous sulfate  325 mg Oral BID WC    levothyroxine  75 mcg Oral Daily    PARoxetine  20 mg Oral QAM    oxybutynin  10 mg Oral Nightly    theophylline  100 mg Oral BID    sodium chloride flush  10 mL Intravenous 2 times per day    lidocaine PF  5 mL Intradermal Once    sodium chloride flush  10 mL Intravenous 2 times per day    iron sucrose  300 mg Intravenous Q24H     Continuous Infusions:   pantoprozole (PROTONIX) infusion 8 mg/hr (11/26/20 0623)           CBC:   Recent Labs     11/24/20  1359 11/25/20  1140 11/25/20  1905 11/26/20  0416   WBC 12.4*  --   --   --    HGB 12.8 9.6* 10.2* 10.7*   *  --   --   --      BMP:    Recent Labs     11/24/20  1359 11/25/20  1140 11/26/20  0416   * 138 138   K 4.8 4.1 4.5   CL 94* 103 104   CO2 21 25 22   BUN 54* 34* 27*   CREATININE 2.0* 1.3* 1.2*   GLUCOSE 114* 79 88       Renal Labs  Albumin:    Lab Results   Component Value Date    LABALBU 3.7 11/24/2020     Calcium:    Lab Results   Component Value Date    CALCIUM 8.6 11/26/2020     Phosphorus:    Lab Results   Component Value Date    PHOS 6.6 06/20/2016     U/A:    Lab Results   Component Value Date    NITRU NEGATIVE 11/24/2020    COLORU YELLOW 11/24/2020    WBCUA 1 11/24/2020    RBCUA 1 11/24/2020    MUCUS RARE 11/24/2020    TRICHOMONAS NONE SEEN 11/24/2020    BACTERIA FEW 11/24/2020    CLARITYU CLEAR 11/24/2020    SPECGRAV 1.014 11/24/2020    UROBILINOGEN NORMAL 11/24/2020    BILIRUBINUR NEGATIVE 11/24/2020    BLOODU SMALL 11/24/2020    KETUA NEGATIVE 11/24/2020           Objective:   I/O: 11/25 0701 - 11/26 0700  In: 790 [P.O.:590; I.V.:200]  Out: 1450 [Urine:1450]  Vitals: /70   Pulse 60   Temp 97.8 °F (36.6 °C) (Axillary)   Resp 16   Ht 5' 1\" (1.549 m)   Wt 173 lb 12.8 oz (78.8 kg)   SpO2 99%   BMI 32.84 kg/m²   General appearance: awake weak  HEENT: Head: Normal, normocephalic, atraumatic. Neck: supple, symmetrical, trachea midline  Lungs: diminished breath sounds bilaterally  Heart: S1, S2 normal  Abdomen: abnormal findings:  soft nt  Extremities: edema trace  Neurologic: Mental status: alertness: awake      Impression    Airspace opacities at the posterior left lung base, likely related to    pneumonia versus atelectasis.  Follow-up is recommended to ensure resolution    and exclude underlying mass.         Moderate colonic diverticulosis without acute diverticulitis.         1.3 cm intermediate attenuation lesion at the lower pole left kidney, new or    increased since the prior study.  Follow-up CT or MRI renal mass protocol is    recommended to exclude renal neoplasm.         Mild ectatic bilateral common iliac arteries measuring 1.4-1.5 cm.  Follow-up    is recommended.         Assessment and Plan:      IMP:  arf from atn on ckd 3  Gi bleed/anemia  chf with edema  Copd  Renal lesion    Plan     Renal to baseline monitor  Hb stable  o2 monitor good uop  Fu repeat ct scan outpt  Will follow             Consuelo Fernando MD

## 2020-11-27 LAB
HCT VFR BLD CALC: 33.8 % (ref 37–47)
HCT VFR BLD CALC: 51.7 % (ref 37–47)
HEMOGLOBIN: 16.5 GM/DL (ref 12.5–16)
HEMOGLOBIN: 9.4 GM/DL (ref 12.5–16)

## 2020-11-27 PROCEDURE — 6370000000 HC RX 637 (ALT 250 FOR IP): Performed by: INTERNAL MEDICINE

## 2020-11-27 PROCEDURE — 2580000003 HC RX 258: Performed by: INTERNAL MEDICINE

## 2020-11-27 PROCEDURE — 97530 THERAPEUTIC ACTIVITIES: CPT

## 2020-11-27 PROCEDURE — 97162 PT EVAL MOD COMPLEX 30 MIN: CPT

## 2020-11-27 PROCEDURE — 97166 OT EVAL MOD COMPLEX 45 MIN: CPT

## 2020-11-27 PROCEDURE — 6360000002 HC RX W HCPCS: Performed by: INTERNAL MEDICINE

## 2020-11-27 PROCEDURE — 2580000003 HC RX 258: Performed by: HOSPITALIST

## 2020-11-27 PROCEDURE — 94640 AIRWAY INHALATION TREATMENT: CPT

## 2020-11-27 PROCEDURE — 36415 COLL VENOUS BLD VENIPUNCTURE: CPT

## 2020-11-27 PROCEDURE — 85014 HEMATOCRIT: CPT

## 2020-11-27 PROCEDURE — 6370000000 HC RX 637 (ALT 250 FOR IP): Performed by: HOSPITALIST

## 2020-11-27 PROCEDURE — 1200000000 HC SEMI PRIVATE

## 2020-11-27 PROCEDURE — 94761 N-INVAS EAR/PLS OXIMETRY MLT: CPT

## 2020-11-27 PROCEDURE — 85018 HEMOGLOBIN: CPT

## 2020-11-27 RX ORDER — PANTOPRAZOLE SODIUM 40 MG/1
40 TABLET, DELAYED RELEASE ORAL
Status: DISCONTINUED | OUTPATIENT
Start: 2020-11-27 | End: 2020-11-30 | Stop reason: HOSPADM

## 2020-11-27 RX ADMIN — PROMETHAZINE HYDROCHLORIDE 12.5 MG: 25 TABLET ORAL at 20:21

## 2020-11-27 RX ADMIN — SODIUM CHLORIDE, PRESERVATIVE FREE 10 ML: 5 INJECTION INTRAVENOUS at 20:21

## 2020-11-27 RX ADMIN — ACETAMINOPHEN 650 MG: 325 TABLET ORAL at 05:08

## 2020-11-27 RX ADMIN — ACETAMINOPHEN 650 MG: 325 TABLET ORAL at 18:49

## 2020-11-27 RX ADMIN — OXYBUTYNIN CHLORIDE 10 MG: 10 TABLET, EXTENDED RELEASE ORAL at 20:20

## 2020-11-27 RX ADMIN — PANTOPRAZOLE SODIUM 40 MG: 40 TABLET, DELAYED RELEASE ORAL at 18:36

## 2020-11-27 RX ADMIN — DEXTROSE AND SODIUM CHLORIDE: 5; 900 INJECTION, SOLUTION INTRAVENOUS at 20:21

## 2020-11-27 RX ADMIN — ATORVASTATIN CALCIUM 40 MG: 40 TABLET, FILM COATED ORAL at 09:58

## 2020-11-27 RX ADMIN — PAROXETINE HYDROCHLORIDE 20 MG: 20 TABLET, FILM COATED ORAL at 09:58

## 2020-11-27 RX ADMIN — THEOPHYLLINE ANHYDROUS 100 MG: 100 CAPSULE, EXTENDED RELEASE ORAL at 09:58

## 2020-11-27 RX ADMIN — ONDANSETRON 4 MG: 2 INJECTION INTRAMUSCULAR; INTRAVENOUS at 10:00

## 2020-11-27 RX ADMIN — SODIUM CHLORIDE, PRESERVATIVE FREE 10 ML: 5 INJECTION INTRAVENOUS at 09:59

## 2020-11-27 RX ADMIN — LEVOTHYROXINE SODIUM 75 MCG: 75 TABLET ORAL at 05:08

## 2020-11-27 RX ADMIN — FERROUS SULFATE TAB 325 MG (65 MG ELEMENTAL FE) 325 MG: 325 (65 FE) TAB at 09:58

## 2020-11-27 RX ADMIN — BUDESONIDE AND FORMOTEROL FUMARATE DIHYDRATE 2 PUFF: 160; 4.5 AEROSOL RESPIRATORY (INHALATION) at 20:54

## 2020-11-27 RX ADMIN — ONDANSETRON 4 MG: 2 INJECTION INTRAMUSCULAR; INTRAVENOUS at 18:36

## 2020-11-27 ASSESSMENT — PAIN SCALES - GENERAL
PAINLEVEL_OUTOF10: 7
PAINLEVEL_OUTOF10: 0
PAINLEVEL_OUTOF10: 7
PAINLEVEL_OUTOF10: 3
PAINLEVEL_OUTOF10: 8
PAINLEVEL_OUTOF10: 6

## 2020-11-27 ASSESSMENT — PAIN DESCRIPTION - DESCRIPTORS
DESCRIPTORS: ACHING
DESCRIPTORS: ACHING

## 2020-11-27 ASSESSMENT — PAIN DESCRIPTION - FREQUENCY
FREQUENCY: CONTINUOUS
FREQUENCY: CONTINUOUS

## 2020-11-27 ASSESSMENT — PAIN DESCRIPTION - LOCATION
LOCATION: LEG
LOCATION: LEG

## 2020-11-27 ASSESSMENT — PAIN DESCRIPTION - ONSET
ONSET: ON-GOING
ONSET: ON-GOING

## 2020-11-27 ASSESSMENT — PAIN DESCRIPTION - ORIENTATION
ORIENTATION: RIGHT;LEFT
ORIENTATION: RIGHT;LEFT

## 2020-11-27 NOTE — PROGRESS NOTES
Occupational Therapy  [address] [place] [address] OCCUPATIONAL THERAPY EVALUATION    History  Inupiat:  The primary encounter diagnosis was Rectal bleeding. Diagnoses of Hypotension, unspecified hypotension type and Kidney lesion were also pertinent to this visit. Restrictions:                           Communication with other providers:PT    Subjective:  Patient states:  \"I am so dizzy \" Upon sitting up  Pain:  none  Patient goal:  Not tested    Occupational profile (relevant social history and personal factors):    Social/Functional History  Type of Home: Facility(long-term care)  Additional Comments: Pt reports she spends most of her time in bed, requires 2 person assistance for bed mobility and transfers. She does not regulary dress or bathe. She can feed herself. Examination of body systems (includes body structures/functions, activity/participation limitations):  · Orientation: WFL   · Cognition:  WFL   · Observation:  Received pt in bed. Alert and cooperative. VSS. · [facility]   · Hearing:  WFL   · ROM:  WFL BUE  · Strength: not tested, observed grossly WLF  · Sensation: not tested    ADLs  Feeding: likely ELIER    Grooming: likely ELIER in seated    Dressing: Claryce Smoker in seated LB MaxA    Bathing: UB Bhavin in seated LB MaxA    Toileting: MaxA    *Some ADL determined per observation of actual ADL performance, functional mobility, balance, activity tolerance, and cognition.      AM-PAC 6 click short form for inpatient daily activity:  Raw Score: 17  24/24 = unimpaired  23/24 = 1-20% impaired   20/24-22/24 = 21-40% impaired  15/24-19/24 = 41-59% impaired   10/24-14/24 = 60%-79% impaired  7/24-9/24 = 80%-99% impaired  6/24 = 100% impaired    Functional Mobility  Bed mobility:   Supine to sit: Bhavin for hip and trunk sequencing and lift  Sit to supine: ModA for BLE    Sitting balance: good    Transfers:   Sit to stand: CGA 2 partial stands ; could not achieve full upright due to sudden onset of N/V  Stand to sit: CGA from partial stand to EOB    Standing balance: KAMRYN    Ambulation:  KAMRYN    Activity tolerance  Limited due to sudden onset of N/V while sitting EOB    Assessment:  Assessment  Performance deficits / Impairments: Decreased functional mobility , Decreased high-level IADLs, Decreased ADL status, Decreased strength, Decreased safe awareness, Decreased posture, Decreased balance, Decreased endurance  Treatment Diagnosis: anemia, deconditioning  Prognosis: Good  Decision Making: Medium Complexity  REQUIRES OT FOLLOW UP: Yes  Discharge Recommendations: Subacute/Skilled Nursing Facility    Goals:  By d/c or goals met:     Pt will perform all bed mobility with Bhavin in prep for EOB/OOB activity. Pt will perform all functional transfers with Bhavin and appropriate use of LRD to bed, toilet, chair in prep for increased functional independence. Pt will perform UB ADLs with Bhavin to increase functional independence. Pt will perform LB ADLs with ModA to increase functional independence. Pt will perform all aspects of toileting with ModA to increase functional independence. Pt will participate in therex/therax c emphasis on strength, activity tolerance,  safety, ELIER tasks. Plan:  Plan  Times per week: 2x        Treatment today:      Therapeutic Activity Training:   Therapeutic activity training was instructed today. Cues were given for safety, sequence, UE/LE placement, visual cues, and balance. Activities performed today included bed mobility training, sup-sit, sit-stand, SPT. Education: Role of OT, OT POC, d/c needs, home safety    Safety: Left in bed with all needs in reach. bed alarm applied. Gait belt used for transfer and mobility. Time in:  0855  Time out:  0920  Timed treatment minutes:  12  Total treatment time:  25    Electronically signed by:    410Greg Morris, JOSE/L, [place]   [id number]   1:16 PM, 11/27/2020

## 2020-11-27 NOTE — PROGRESS NOTES
modA for bilat LE advancement with cues for sequencing. 2 person to position midline at EOB   · Scooting: fwd to EOB modA, laterally towards HOB SBA (from sitting)  · Transfers: partial stand completed with CGA but pt unable to fully come to upright position with feeling too dizzy. · Sitting balance:  SBA at EOB x ~8 minutes   · Standing balance:  NT   · Gait: NT  · Educated pt on POC , role of PT. Cues for sequencing to inc safety and indep with mobility. [facility] 6 Clicks Inpatient Mobility:  AM-PAC Inpatient Mobility Raw Score : 11    Safety: patient left in bed with alarm, call light within reach, RN notified, gait belt used. Assessment:  Pt is a 79year old female admitted with nausea, acute gastritis, UGIB, and acute blood loss anemia. Hx of covid-19 earlier this month. Recommend subacute rehab once medically stable. Pt is from a LTC facility with baseline function somewhat unclear. Pt and spouse's goal is to return home. Pt is currently requiring up to modA though limited eval due to dizziness today. She would benefit from continued therapy to address her current deficits, dec potential fall risk, dec burden of care, and restore function. Complexity: Moderate  Prognosis: Good, no significant barriers to participation at this time. Plan Times per week: 2+/week, 1 week  Discharge Recommendations: Subacute/Skilled Nursing Facility  Equipment: continue to assess at next level of care     Goals:  Short term goals  Time Frame for Short term goals: 1 week  Short term goal 1: Pt will perform sit><supine Armando  Short term goal 2: Pt will transfer sit><stand Armando  Short term goal 3: Pt will transfer between surfaces modA       Treatment plan:  Bed mobility, transfers, balance, gait, TA, TX,    Recommendations for NURSING mobility: attempt sit to stand with pt prior to transfer to ensure she can manage WB. Anticipate pt being able to perform squat pivot. 2 person for safety on first attempt.      Time:   Time in: 0900  Time out: 0930  Timed treatment minutes: 15  Total time: 30    Electronically signed by:    Ludmila Whelan [id number]  11/27/2020, 12:58 PM

## 2020-11-27 NOTE — PROGRESS NOTES
Nephrology Progress Note  11/27/2020 10:16 AM  Subjective: Interval History: Romain Gill is a 79 y.o. female  Weak and no active bleed and stable in bed      Data:   Scheduled Meds:   atorvastatin  40 mg Oral Daily    budesonide-formoterol  2 puff Inhalation BID    ferrous sulfate  325 mg Oral BID WC    levothyroxine  75 mcg Oral Daily    PARoxetine  20 mg Oral QAM    oxybutynin  10 mg Oral Nightly    theophylline  100 mg Oral BID    sodium chloride flush  10 mL Intravenous 2 times per day    lidocaine PF  5 mL Intradermal Once    sodium chloride flush  10 mL Intravenous 2 times per day     Continuous Infusions:   dextrose 5 % and 0.9 % NaCl 75 mL/hr at 11/26/20 1746    pantoprozole (PROTONIX) infusion 8 mg/hr (11/26/20 1657)           CBC:   Recent Labs     11/24/20  1359  11/25/20  1905 11/26/20  0416 11/27/20  0530   WBC 12.4*  --   --   --   --    HGB 12.8   < > 10.2* 10.7* 16.5*   *  --   --   --   --     < > = values in this interval not displayed. BMP:    Recent Labs     11/24/20  1359 11/25/20  1140 11/26/20  0416   * 138 138   K 4.8 4.1 4.5   CL 94* 103 104   CO2 21 25 22   BUN 54* 34* 27*   CREATININE 2.0* 1.3* 1.2*   GLUCOSE 114* 79 88       Renal Labs  Albumin:    Lab Results   Component Value Date    LABALBU 3.7 11/24/2020     Calcium:    Lab Results   Component Value Date    CALCIUM 8.6 11/26/2020     Phosphorus:    Lab Results   Component Value Date    PHOS 6.6 06/20/2016     U/A:    Lab Results   Component Value Date    NITRU NEGATIVE 11/24/2020    COLORU YELLOW 11/24/2020    WBCUA 1 11/24/2020    RBCUA 1 11/24/2020    MUCUS RARE 11/24/2020    TRICHOMONAS NONE SEEN 11/24/2020    BACTERIA FEW 11/24/2020    CLARITYU CLEAR 11/24/2020    SPECGRAV 1.014 11/24/2020    UROBILINOGEN NORMAL 11/24/2020    BILIRUBINUR NEGATIVE 11/24/2020    BLOODU SMALL 11/24/2020    KETUA NEGATIVE 11/24/2020           Objective:   I/O: No intake/output data recorded.   Vitals: BP (!) 96/59   Pulse 61   Temp 98.6 °F (37 °C) (Oral)   Resp 15   Ht 5' 1\" (1.549 m)   Wt 173 lb 12.8 oz (78.8 kg)   SpO2 95%   BMI 32.84 kg/m²   General appearance: awake weak  HEENT: Head: Normal, normocephalic, atraumatic. Neck: supple, symmetrical, trachea midline  Lungs: diminished breath sounds bilaterally  Heart: S1, S2 normal  Abdomen: abnormal findings:  soft nt  Extremities: edema trace  Neurologic: Mental status: alertness: awake      Impression    Airspace opacities at the posterior left lung base, likely related to    pneumonia versus atelectasis.  Follow-up is recommended to ensure resolution    and exclude underlying mass.         Moderate colonic diverticulosis without acute diverticulitis.         1.3 cm intermediate attenuation lesion at the lower pole left kidney, new or    increased since the prior study.  Follow-up CT or MRI renal mass protocol is    recommended to exclude renal neoplasm.         Mild ectatic bilateral common iliac arteries measuring 1.4-1.5 cm.  Follow-up    is recommended.         Assessment and Plan:      IMP:  arf from atn on ckd 3  Gi bleed/anemia  chf with edema  Copd  Renal lesion    Plan     Creat 1.2 yesterday and mointor  Hb improved and iron as need  o2 stable no wheeze  Fu lesion outpt   Will follow and appear more to baseline         Ruth Michel MD

## 2020-11-27 NOTE — PROGRESS NOTES
Hospitalist Progress Note      Name:  Vangie Klinefelter /Age/Sex: 1940  (79 y.o. female)   MRN & CSN:  [id number] & [id number] Admission Date/Time: 2020  1:31 PM   Location:  [address] [place] [address]- PCP: Pablo Jackson MD         Hospital Day: 4    Assessment and Plan:   Vangie Klinefelter is a 79 y.o.  female  who presents with <principal problem not specified>    > Acute Gastritis  > Decreased po intake  > Upper GI bleed  > Acute blood loss anemia  -N/V with decreased p.o. intake and melanotic stools  -Given 2 L IV fluid bolus in ED, as patient was initially hypotensive  - GI consulted, Placed on Protonix drip  -  EGD with Gastritis, PPI recommended, consulted Dietary , started dextrose due to low BG and not eating. Consult PT/OT/Case management  -  reports nausea. ? Sec to medications ( hold po iron, martha ) H&H stable     >SALTY possibly d/t overdiuresis?,  In setting of anorexia  - avoid nephrotoxic medication  - monitor I/Os  - nephrology following, appreciate recs  - Holding Lasix and Lisinopril     >H/o COVID + on the , on O2 here, being weaned off in ED, COVID negative here.  Discussed with nursing supervisor by night time admitting attending, given patient's Covid negative here, states that okay to be admitted to non-Covid unit  -If patient have worsening or decompensation in respiratory status, consider ID consult and pulmonary consult     >H/o CHF-stable; continue diuretics as able  >H/o COPD, Hold martha due to nausea and ulcer     >Hypertension- anti-hypertensive held       Diet DIET FULL LIQUID;   DVT Prophylaxis [] SCDs   GI Prophylaxis [] PPI   Code Status Full Code   Disposition  back to SNF pending improving po intake, anticipate in 1-2 days      History of Present Illness:     Pt S&E.      No chest pain, no dyspnea, no abd pain, admit nausea, did not eat much, Alert not somnolent today    10-14 point ROS reviewed negative, unless as noted above    Objective: Intake/Output Summary (Last 24 hours) at 11/27/2020 6240  Last data filed at 11/27/2020 0830  Gross per 24 hour   Intake 100 ml   Output --   Net 100 ml      Vitals:   Vitals:    11/27/20 0400   BP: 109/65   Pulse: 61   Resp: 13   Temp:    SpO2:      Physical Exam:      GEN Alert female, cooperative, no apparent distress. RESP Decreased air sounds. Symmetric chest movement . CARDIO/VASC S1/S2 auscultated. Regular rate. GI Abdomen is soft without significant tenderness, Bowel sounds are normoactive. MSK No gross joint deformities. SKIN Normal coloration, warm, dry. NEURO/PSYCH alert, oriented . Affect appropriate.     Medications:   Medications:    atorvastatin  40 mg Oral Daily    budesonide-formoterol  2 puff Inhalation BID    ferrous sulfate  325 mg Oral BID WC    levothyroxine  75 mcg Oral Daily    PARoxetine  20 mg Oral QAM    oxybutynin  10 mg Oral Nightly    theophylline  100 mg Oral BID    sodium chloride flush  10 mL Intravenous 2 times per day    lidocaine PF  5 mL Intradermal Once    sodium chloride flush  10 mL Intravenous 2 times per day      Infusions:    dextrose 5 % and 0.9 % NaCl 75 mL/hr at 11/26/20 1746    pantoprozole (PROTONIX) infusion 8 mg/hr (11/26/20 1657)     PRN Meds: sodium chloride flush, 10 mL, PRN  acetaminophen, 650 mg, Q6H PRN    Or  acetaminophen, 650 mg, Q6H PRN  promethazine, 12.5 mg, Q6H PRN    Or  ondansetron, 4 mg, Q6H PRN  sodium chloride flush, 10 mL, PRN      Electronically signed by Lalo Arenas MD on 11/27/2020 at 9:07 AM

## 2020-11-27 NOTE — CARE COORDINATION
CM called pt , Codi Rosado, to follow up on discharge plan. Codi Rosado stated he knows pt needs more care than he is able to provide for her at home at this time. Codi Rosado is in agreement for patient to return to [address] but would like the goal to bring her home from there. Codi Rosado stated he feels like the pt is not eating, drinking or ambulating because she is afraid she will not be able to return home ever. Codi Rosado asked if nursing and therapist could please remind her that she needs to complete her therapy and have more strength before he can take care of her. Perfect serve sent to Dr. Jesus Costa with update, white board placed this morning for therapy to see when appropriate. Per previous CM charting pt will require a precert to return to [address].

## 2020-11-27 NOTE — PROGRESS NOTES
Comprehensive Nutrition Assessment    Type and Reason for Visit:  Initial, Consult(poor intake/appetite for 5 or more days)    Nutrition Recommendations/Plan:   Continue full liquid diet advancing as tolerates  Will offer oral nutrition supplement, frozen at this time  Assist/supervise meals as needed to encourage intake     Nutrition Assessment:  Admit with weakness, fatigue, eval for GI bleed, +COVID-19 this month. Currently on full liquid diet, s/p EGD-severe gastritis. Recent intake 25-50%. High nutrition risk at this time with hx reduced intake. Malnutrition Assessment:  Malnutrition Status: At risk for malnutrition (Comment)    Context:  Acute Illness       Estimated Daily Nutrient Needs:  Energy (kcal):  1309-6546; Weight Used for Energy Requirements:  Current     Protein (g):  57-62 (1.2-1.3 g/kg); Weight Used for Protein Requirements:  Ideal        Fluid (ml/day):  1500; Method Used for Fluid Requirements:  1 ml/kcal      Nutrition Related Findings:  asleep in bed on visit, hx reduced appetite/intake past month per reports      Wounds:  None       Current Nutrition Therapies:    DIET FULL LIQUID; Anthropometric Measures:  · Height: 5' 1\" (154.9 cm)  · Current Body Weight: 173 lb 11.6 oz (78.8 kg)   · Admission Body Weight: 174 lb 2.6 oz (79 kg)    · Usual Body Weight: (limited wt hx)     · Ideal Body Weight: 105 lbs; % Ideal Body Weight 165.4 %   · BMI: 32.8  · Adjusted Body Weight:  ; No Adjustment   · BMI Categories: Obese Class 1 (BMI 30.0-34. 9)       Nutrition Diagnosis:   · Inadequate oral intake related to altered GI function as evidenced by poor intake prior to admission, nausea, vomiting    Nutrition Interventions:   Food and/or Nutrient Delivery:  Continue Current Diet, Start Oral Nutrition Supplement  Nutrition Education/Counseling:  Education not appropriate   Coordination of Nutrition Care:  Continue to monitor while inpatient    Goals:  Patient will tolerate full liquid diet with meal intake 50-75%       Nutrition Monitoring and Evaluation:   Behavioral-Environmental Outcomes:  None Identified   Food/Nutrient Intake Outcomes:  Diet Advancement/Tolerance, Food and Nutrient Intake  Physical Signs/Symptoms Outcomes:  Biochemical Data, Nausea or Vomiting, Skin, GI Status, Weight, Meal Time Behavior     Discharge Planning:     Too soon to determine     Electronically signed by Lidia Kendrick RD, LD on 11/27/20 at 10:38 AM EST    Contact: [phone]

## 2020-11-28 LAB
HCT VFR BLD CALC: 29.5 % (ref 37–47)
HCT VFR BLD CALC: 30.9 % (ref 37–47)
HEMOGLOBIN: 8.3 GM/DL (ref 12.5–16)
HEMOGLOBIN: 8.8 GM/DL (ref 12.5–16)

## 2020-11-28 PROCEDURE — 2580000003 HC RX 258: Performed by: INTERNAL MEDICINE

## 2020-11-28 PROCEDURE — 85014 HEMATOCRIT: CPT

## 2020-11-28 PROCEDURE — 2580000003 HC RX 258: Performed by: HOSPITALIST

## 2020-11-28 PROCEDURE — 85018 HEMOGLOBIN: CPT

## 2020-11-28 PROCEDURE — 6370000000 HC RX 637 (ALT 250 FOR IP): Performed by: HOSPITALIST

## 2020-11-28 PROCEDURE — 94640 AIRWAY INHALATION TREATMENT: CPT

## 2020-11-28 PROCEDURE — 6370000000 HC RX 637 (ALT 250 FOR IP): Performed by: INTERNAL MEDICINE

## 2020-11-28 PROCEDURE — 1200000000 HC SEMI PRIVATE

## 2020-11-28 PROCEDURE — 36415 COLL VENOUS BLD VENIPUNCTURE: CPT

## 2020-11-28 RX ADMIN — PAROXETINE HYDROCHLORIDE 20 MG: 20 TABLET, FILM COATED ORAL at 11:30

## 2020-11-28 RX ADMIN — ATORVASTATIN CALCIUM 40 MG: 40 TABLET, FILM COATED ORAL at 11:30

## 2020-11-28 RX ADMIN — PANTOPRAZOLE SODIUM 40 MG: 40 TABLET, DELAYED RELEASE ORAL at 17:11

## 2020-11-28 RX ADMIN — PANTOPRAZOLE SODIUM 40 MG: 40 TABLET, DELAYED RELEASE ORAL at 06:06

## 2020-11-28 RX ADMIN — SODIUM CHLORIDE, PRESERVATIVE FREE 10 ML: 5 INJECTION INTRAVENOUS at 11:30

## 2020-11-28 RX ADMIN — PROMETHAZINE HYDROCHLORIDE 12.5 MG: 25 TABLET ORAL at 06:05

## 2020-11-28 RX ADMIN — SODIUM CHLORIDE, PRESERVATIVE FREE 10 ML: 5 INJECTION INTRAVENOUS at 11:31

## 2020-11-28 RX ADMIN — LEVOTHYROXINE SODIUM 75 MCG: 75 TABLET ORAL at 06:06

## 2020-11-28 RX ADMIN — DEXTROSE AND SODIUM CHLORIDE: 5; 900 INJECTION, SOLUTION INTRAVENOUS at 11:57

## 2020-11-28 RX ADMIN — OXYBUTYNIN CHLORIDE 10 MG: 10 TABLET, EXTENDED RELEASE ORAL at 22:02

## 2020-11-28 ASSESSMENT — PAIN DESCRIPTION - DESCRIPTORS: DESCRIPTORS: ACHING

## 2020-11-28 ASSESSMENT — PAIN SCALES - GENERAL
PAINLEVEL_OUTOF10: 0
PAINLEVEL_OUTOF10: 0
PAINLEVEL_OUTOF10: 6

## 2020-11-28 ASSESSMENT — PAIN DESCRIPTION - LOCATION: LOCATION: LEG

## 2020-11-28 ASSESSMENT — PAIN DESCRIPTION - ONSET: ONSET: ON-GOING

## 2020-11-28 ASSESSMENT — PAIN DESCRIPTION - FREQUENCY: FREQUENCY: CONTINUOUS

## 2020-11-28 ASSESSMENT — PAIN DESCRIPTION - ORIENTATION: ORIENTATION: RIGHT;LEFT

## 2020-11-29 LAB
ANION GAP SERPL CALCULATED.3IONS-SCNC: 7 MMOL/L (ref 4–16)
BASOPHILS ABSOLUTE: 0 K/CU MM
BASOPHILS RELATIVE PERCENT: 0.2 % (ref 0–1)
BUN BLDV-MCNC: 5 MG/DL (ref 6–23)
CALCIUM SERPL-MCNC: 7.6 MG/DL (ref 8.3–10.6)
CHLORIDE BLD-SCNC: 110 MMOL/L (ref 99–110)
CO2: 22 MMOL/L (ref 21–32)
CREAT SERPL-MCNC: 0.9 MG/DL (ref 0.6–1.1)
DIFFERENTIAL TYPE: ABNORMAL
EOSINOPHILS ABSOLUTE: 0.3 K/CU MM
EOSINOPHILS RELATIVE PERCENT: 5.4 % (ref 0–3)
FERRITIN: 1016 NG/ML (ref 15–150)
GFR AFRICAN AMERICAN: >60 ML/MIN/1.73M2
GFR NON-AFRICAN AMERICAN: >60 ML/MIN/1.73M2
GLUCOSE BLD-MCNC: 99 MG/DL (ref 70–99)
HCT VFR BLD CALC: 28.7 % (ref 37–47)
HEMOGLOBIN: 7.9 GM/DL (ref 12.5–16)
IMMATURE NEUTROPHIL %: 0.9 % (ref 0–0.43)
LYMPHOCYTES ABSOLUTE: 0.5 K/CU MM
LYMPHOCYTES RELATIVE PERCENT: 9.2 % (ref 24–44)
MCH RBC QN AUTO: 27.6 PG (ref 27–31)
MCHC RBC AUTO-ENTMCNC: 27.5 % (ref 32–36)
MCV RBC AUTO: 100.3 FL (ref 78–100)
MONOCYTES ABSOLUTE: 0.7 K/CU MM
MONOCYTES RELATIVE PERCENT: 11.8 % (ref 0–4)
NUCLEATED RBC %: 0 %
PDW BLD-RTO: 15.3 % (ref 11.7–14.9)
PLATELET # BLD: 134 K/CU MM (ref 140–440)
PMV BLD AUTO: 12.4 FL (ref 7.5–11.1)
POTASSIUM SERPL-SCNC: 3.7 MMOL/L (ref 3.5–5.1)
RBC # BLD: 2.86 M/CU MM (ref 4.2–5.4)
SEGMENTED NEUTROPHILS ABSOLUTE COUNT: 4 K/CU MM
SEGMENTED NEUTROPHILS RELATIVE PERCENT: 72.5 % (ref 36–66)
SODIUM BLD-SCNC: 139 MMOL/L (ref 135–145)
TOTAL IMMATURE NEUTOROPHIL: 0.05 K/CU MM
TOTAL NUCLEATED RBC: 0 K/CU MM
WBC # BLD: 5.5 K/CU MM (ref 4–10.5)

## 2020-11-29 PROCEDURE — 94640 AIRWAY INHALATION TREATMENT: CPT

## 2020-11-29 PROCEDURE — 82728 ASSAY OF FERRITIN: CPT

## 2020-11-29 PROCEDURE — 36415 COLL VENOUS BLD VENIPUNCTURE: CPT

## 2020-11-29 PROCEDURE — 6370000000 HC RX 637 (ALT 250 FOR IP): Performed by: HOSPITALIST

## 2020-11-29 PROCEDURE — 6370000000 HC RX 637 (ALT 250 FOR IP): Performed by: INTERNAL MEDICINE

## 2020-11-29 PROCEDURE — 2580000003 HC RX 258: Performed by: HOSPITALIST

## 2020-11-29 PROCEDURE — 80048 BASIC METABOLIC PNL TOTAL CA: CPT

## 2020-11-29 PROCEDURE — 85025 COMPLETE CBC W/AUTO DIFF WBC: CPT

## 2020-11-29 PROCEDURE — 1200000000 HC SEMI PRIVATE

## 2020-11-29 RX ORDER — TRAMADOL HYDROCHLORIDE 50 MG/1
50 TABLET ORAL 2 TIMES DAILY PRN
Status: DISCONTINUED | OUTPATIENT
Start: 2020-11-29 | End: 2020-11-30 | Stop reason: HOSPADM

## 2020-11-29 RX ORDER — BUDESONIDE AND FORMOTEROL FUMARATE DIHYDRATE 80; 4.5 UG/1; UG/1
1 AEROSOL RESPIRATORY (INHALATION) 2 TIMES DAILY
Status: DISCONTINUED | OUTPATIENT
Start: 2020-11-29 | End: 2020-11-30 | Stop reason: HOSPADM

## 2020-11-29 RX ORDER — MULTIVITAMIN WITH IRON
1 TABLET ORAL DAILY
Status: DISCONTINUED | OUTPATIENT
Start: 2020-11-29 | End: 2020-11-30 | Stop reason: HOSPADM

## 2020-11-29 RX ORDER — MIRTAZAPINE 15 MG/1
15 TABLET, FILM COATED ORAL NIGHTLY
Status: DISCONTINUED | OUTPATIENT
Start: 2020-11-29 | End: 2020-11-30 | Stop reason: HOSPADM

## 2020-11-29 RX ORDER — DOCUSATE SODIUM 100 MG/1
100 CAPSULE, LIQUID FILLED ORAL 2 TIMES DAILY
Status: DISCONTINUED | OUTPATIENT
Start: 2020-11-29 | End: 2020-11-30 | Stop reason: HOSPADM

## 2020-11-29 RX ADMIN — ATORVASTATIN CALCIUM 40 MG: 40 TABLET, FILM COATED ORAL at 10:12

## 2020-11-29 RX ADMIN — DEXTROSE AND SODIUM CHLORIDE: 5; 900 INJECTION, SOLUTION INTRAVENOUS at 01:26

## 2020-11-29 RX ADMIN — LEVOTHYROXINE SODIUM 75 MCG: 75 TABLET ORAL at 06:24

## 2020-11-29 RX ADMIN — PANTOPRAZOLE SODIUM 40 MG: 40 TABLET, DELAYED RELEASE ORAL at 18:34

## 2020-11-29 RX ADMIN — ACETAMINOPHEN 650 MG: 325 TABLET ORAL at 06:47

## 2020-11-29 RX ADMIN — DOCUSATE SODIUM 100 MG: 100 CAPSULE, LIQUID FILLED ORAL at 21:14

## 2020-11-29 RX ADMIN — MIRTAZAPINE 15 MG: 15 TABLET, FILM COATED ORAL at 21:14

## 2020-11-29 RX ADMIN — BUDESONIDE AND FORMOTEROL FUMARATE DIHYDRATE 1 PUFF: 80; 4.5 AEROSOL RESPIRATORY (INHALATION) at 21:44

## 2020-11-29 RX ADMIN — DEXTROSE AND SODIUM CHLORIDE: 5; 900 INJECTION, SOLUTION INTRAVENOUS at 21:17

## 2020-11-29 RX ADMIN — TRAMADOL HYDROCHLORIDE 50 MG: 50 TABLET, FILM COATED ORAL at 21:14

## 2020-11-29 RX ADMIN — THERA TABS 1 TABLET: TAB at 10:12

## 2020-11-29 RX ADMIN — PANTOPRAZOLE SODIUM 40 MG: 40 TABLET, DELAYED RELEASE ORAL at 06:24

## 2020-11-29 ASSESSMENT — PAIN DESCRIPTION - PAIN TYPE
TYPE: CHRONIC PAIN
TYPE: CHRONIC PAIN

## 2020-11-29 ASSESSMENT — PAIN DESCRIPTION - FREQUENCY
FREQUENCY: INTERMITTENT
FREQUENCY: INTERMITTENT

## 2020-11-29 ASSESSMENT — PAIN SCALES - GENERAL
PAINLEVEL_OUTOF10: 2
PAINLEVEL_OUTOF10: 0
PAINLEVEL_OUTOF10: 10
PAINLEVEL_OUTOF10: 9

## 2020-11-29 ASSESSMENT — PAIN DESCRIPTION - PROGRESSION
CLINICAL_PROGRESSION: GRADUALLY WORSENING
CLINICAL_PROGRESSION: GRADUALLY WORSENING

## 2020-11-29 ASSESSMENT — PAIN DESCRIPTION - DESCRIPTORS
DESCRIPTORS: ACHING;DISCOMFORT
DESCRIPTORS: ACHING;DISCOMFORT

## 2020-11-29 ASSESSMENT — PAIN DESCRIPTION - LOCATION
LOCATION: NECK
LOCATION: GENERALIZED

## 2020-11-29 ASSESSMENT — PAIN DESCRIPTION - ONSET
ONSET: AWAKENED FROM SLEEP
ONSET: ON-GOING

## 2020-11-29 ASSESSMENT — PAIN DESCRIPTION - ORIENTATION: ORIENTATION: MID

## 2020-11-29 NOTE — PLAN OF CARE
Problem: Falls - Risk of:  Goal: Will remain free from falls  Description: Will remain free from falls  11/29/2020 0223 by Janiya Hi LPN  Outcome: Ongoing  11/29/2020 0040 by Janiya Hi LPN  Outcome: Ongoing  11/28/2020 1515 by Steve Richmond RN  Outcome: Ongoing  Goal: Absence of physical injury  Description: Absence of physical injury  11/29/2020 0223 by Janiya Hi LPN  Outcome: Ongoing  11/29/2020 0040 by Janiya Hi LPN  Outcome: Ongoing  11/28/2020 1515 by Steve Richmond RN  Outcome: Ongoing     Problem: Skin Integrity:  Goal: Will show no infection signs and symptoms  Description: Will show no infection signs and symptoms  11/29/2020 0223 by Janiya Hi LPN  Outcome: Ongoing  11/29/2020 0040 by Janiya Hi LPN  Outcome: Ongoing  11/28/2020 1515 by Steve Richmond RN  Outcome: Ongoing  Goal: Absence of new skin breakdown  Description: Absence of new skin breakdown  11/29/2020 0223 by Janiya Hi LPN  Outcome: Ongoing  11/29/2020 0040 by Janiya Hi LPN  Outcome: Ongoing  11/28/2020 1515 by Steve Richmond RN  Outcome: Ongoing     Problem: Pain:  Goal: Pain level will decrease  Description: Pain level will decrease  11/29/2020 0223 by Janiya Hi LPN  Outcome: Ongoing  11/29/2020 0040 by Janiya Hi LPN  Outcome: Ongoing  11/28/2020 1515 by Steve Richmond RN  Outcome: Ongoing  Goal: Control of acute pain  Description: Control of acute pain  11/29/2020 0223 by Janiya Hi LPN  Outcome: Ongoing  11/29/2020 0040 by Janiya Hi LPN  Outcome: Ongoing  11/28/2020 1515 by Steve Richmond RN  Outcome: Ongoing  Goal: Control of chronic pain  Description: Control of chronic pain  11/29/2020 0223 by Janiya Hi LPN  Outcome: Ongoing  11/29/2020 0040 by Janiya Hi LPN  Outcome: Ongoing  11/28/2020 1515 by Steve Richmond RN  Outcome: Ongoing

## 2020-11-29 NOTE — PROGRESS NOTES
Hospitalist Progress Note      Name:  Jenni Augustine /Age/Sex: 1940  (79 y.o. female)   MRN & CSN:  [id number] & [id number] Admission Date/Time: 2020  1:31 PM   Location:  [address] [place] [address] PCP: Concetta Nash MD         Hospital Day: 6    Assessment and Plan:   Jenni Augustine is a 79 y.o.  female  who presents with <principal problem not specified>    > Acute Gastritis  > Decreased po intake  > Upper GI bleed  > Acute blood loss anemia  -N/V with decreased p.o. intake and melanotic stools  -Given 2 L IV fluid bolus in ED, as patient was initially hypotensive  - GI consulted, Placed on Protonix drip  -  EGD with Gastritis, PPI recommended, consulted Dietary , started dextrose due to low BG and not eating. Consult PT/OT/Case management  -  reports nausea. ? Sec to medications ( hold po iron, martha ) H&H stable  -  , poor po intake, try change meds decrease symbicort, hold ditropan, paxil, start remeron, consult palliative care.      >SALTY possibly d/t overdiuresis?,  In setting of anorexia  - avoid nephrotoxic medication  - monitor I/Os  - nephrology following, appreciate recs  - Holding Lasix and Lisinopril  - resolved     >H/o COVID + on the , on O2 here, being weaned off in ED, COVID negative here.  Discussed with nursing supervisor by night time admitting attending, given patient's Covid negative here, states that okay to be admitted to non-Covid unit  -If patient have worsening or decompensation in respiratory status, consider ID consult and pulmonary consult     >H/o CHF-stable; continue diuretics as able  >H/o COPD, Hold martha due to nausea and ulcer     >Hypertension- anti-hypertensive held       Diet DIET FULL LIQUID;  Dietary Nutrition Supplements: Frozen Oral Supplement   DVT Prophylaxis [] SCDs   GI Prophylaxis [] PPI   Code Status Full Code   Disposition  back to SNF pending pre cert     History of Present Illness:     Pt S&E.      No appetite, has not been eating much, not motivated, no chest pain, no dyspnea, no abd pain, no N/V, pt has been declining for a year now at the NH, will consult palliative care. 10-14 point ROS reviewed negative, unless as noted above    Objective: Intake/Output Summary (Last 24 hours) at 11/29/2020 0836  Last data filed at 11/29/2020 0508  Gross per 24 hour   Intake --   Output 600 ml   Net -600 ml      Vitals:   Vitals:    11/29/20 0630   BP: (!) 102/59   Pulse: 65   Resp: 16   Temp: 98.2 °F (36.8 °C)   SpO2: 97%     Physical Exam:      GEN Alert female, cooperative, no apparent distress. RESP Decreased air sounds. Symmetric chest movement . CARDIO/VASC S1/S2 auscultated. Regular rate. GI Abdomen is soft without significant tenderness, Bowel sounds are normoactive. MSK No gross joint deformities. SKIN Normal coloration, warm, dry. NEURO/PSYCH alert, oriented . Affect appropriate.     Medications:   Medications:    pantoprazole  40 mg Oral BID AC    atorvastatin  40 mg Oral Daily    budesonide-formoterol  2 puff Inhalation BID    levothyroxine  75 mcg Oral Daily    PARoxetine  20 mg Oral QAM    oxybutynin  10 mg Oral Nightly    sodium chloride flush  10 mL Intravenous 2 times per day    lidocaine PF  5 mL Intradermal Once    sodium chloride flush  10 mL Intravenous 2 times per day      Infusions:    dextrose 5 % and 0.9 % NaCl 75 mL/hr at 11/29/20 0126     PRN Meds: sodium chloride flush, 10 mL, PRN  acetaminophen, 650 mg, Q6H PRN    Or  acetaminophen, 650 mg, Q6H PRN  promethazine, 12.5 mg, Q6H PRN    Or  ondansetron, 4 mg, Q6H PRN  sodium chloride flush, 10 mL, PRN      Electronically signed by Salima Patrick MD on 11/29/2020 at 8:36 AM

## 2020-11-29 NOTE — PLAN OF CARE
Problem: Falls - Risk of:  Goal: Will remain free from falls  Description: Will remain free from falls  11/29/2020 0040 by Mimi Mccall LPN  Outcome: Ongoing  11/28/2020 1515 by Luc Noguera RN  Outcome: Ongoing  Goal: Absence of physical injury  Description: Absence of physical injury  11/29/2020 0040 by Mimi Mccall LPN  Outcome: Ongoing  11/28/2020 1515 by Luc Noguera RN  Outcome: Ongoing     Problem: Skin Integrity:  Goal: Will show no infection signs and symptoms  Description: Will show no infection signs and symptoms  11/29/2020 0040 by Mimi Mccall LPN  Outcome: Ongoing  11/28/2020 1515 by Luc Noguera RN  Outcome: Ongoing  Goal: Absence of new skin breakdown  Description: Absence of new skin breakdown  11/29/2020 0040 by Mimi Mccall LPN  Outcome: Ongoing  11/28/2020 1515 by Luc Noguera RN  Outcome: Ongoing     Problem: Pain:  Goal: Pain level will decrease  Description: Pain level will decrease  11/29/2020 0040 by Mimi Mccall LPN  Outcome: Ongoing  11/28/2020 1515 by Luc Noguera RN  Outcome: Ongoing  Goal: Control of acute pain  Description: Control of acute pain  11/29/2020 0040 by Mimi Mccall LPN  Outcome: Ongoing  11/28/2020 1515 by Luc Noguera RN  Outcome: Ongoing  Goal: Control of chronic pain  Description: Control of chronic pain  11/29/2020 0040 by Mimi Mccall LPN  Outcome: Ongoing  11/28/2020 1515 by Luc Noguera RN  Outcome: Ongoing

## 2020-11-29 NOTE — PROGRESS NOTES
Nephrology Progress Note  11/29/2020 7:34 AM  Subjective: Interval History: Jair Montiel is a 79 y.o. female  Weak in bed    Data:   Scheduled Meds:   pantoprazole  40 mg Oral BID AC    atorvastatin  40 mg Oral Daily    budesonide-formoterol  2 puff Inhalation BID    levothyroxine  75 mcg Oral Daily    PARoxetine  20 mg Oral QAM    oxybutynin  10 mg Oral Nightly    sodium chloride flush  10 mL Intravenous 2 times per day    lidocaine PF  5 mL Intradermal Once    sodium chloride flush  10 mL Intravenous 2 times per day     Continuous Infusions:   dextrose 5 % and 0.9 % NaCl 75 mL/hr at 11/29/20 0126           CBC:   Recent Labs     11/28/20  0528 11/28/20  1216 11/29/20  0408   WBC  --   --  5.5   HGB 8.3* 8.8* 7.9*   PLT  --   --  134*     BMP:    Recent Labs     11/29/20  0408      K 3.7      CO2 22   BUN 5*   CREATININE 0.9   GLUCOSE 99       Renal Labs  Albumin:    Lab Results   Component Value Date    LABALBU 3.7 11/24/2020     Calcium:    Lab Results   Component Value Date    CALCIUM 7.6 11/29/2020     Phosphorus:    Lab Results   Component Value Date    PHOS 6.6 06/20/2016     U/A:    Lab Results   Component Value Date    NITRU NEGATIVE 11/24/2020    COLORU YELLOW 11/24/2020    WBCUA 1 11/24/2020    RBCUA 1 11/24/2020    MUCUS RARE 11/24/2020    TRICHOMONAS NONE SEEN 11/24/2020    BACTERIA FEW 11/24/2020    CLARITYU CLEAR 11/24/2020    SPECGRAV 1.014 11/24/2020    UROBILINOGEN NORMAL 11/24/2020    BILIRUBINUR NEGATIVE 11/24/2020    BLOODU SMALL 11/24/2020    KETUA NEGATIVE 11/24/2020           Objective:   I/O: 11/28 0701 - 11/29 0700  In: -   Out: 600 [Urine:600]  Vitals: BP (!) 102/59   Pulse 65   Temp 98.2 °F (36.8 °C) (Oral)   Resp 16   Ht 5' 1\" (1.549 m)   Wt 173 lb 12.8 oz (78.8 kg)   SpO2 97%   BMI 32.84 kg/m²   General appearance: awake weak  HEENT: Head: Normal, normocephalic, atraumatic.   Neck: supple, symmetrical, trachea midline  Lungs: diminished breath

## 2020-11-29 NOTE — PROGRESS NOTES
Pt is very withdrawn and not willing to talk with me at all. Two family members called to talk with patient and she refused both phone calls. I set pt up for each meal and all she would eat is 1 pudding cup each meal.  Pt is sleeping most of the day and refusing to get out of bed as well.   Margarito Ramirez RN

## 2020-11-30 VITALS
SYSTOLIC BLOOD PRESSURE: 113 MMHG | HEART RATE: 73 BPM | TEMPERATURE: 98 F | WEIGHT: 173.8 LBS | OXYGEN SATURATION: 97 % | HEIGHT: 61 IN | DIASTOLIC BLOOD PRESSURE: 58 MMHG | BODY MASS INDEX: 32.81 KG/M2 | RESPIRATION RATE: 15 BRPM

## 2020-11-30 LAB
HCT VFR BLD CALC: 31.4 % (ref 37–47)
HEMOGLOBIN: 8.5 GM/DL (ref 12.5–16)

## 2020-11-30 PROCEDURE — 6370000000 HC RX 637 (ALT 250 FOR IP): Performed by: INTERNAL MEDICINE

## 2020-11-30 PROCEDURE — 2700000000 HC OXYGEN THERAPY PER DAY

## 2020-11-30 PROCEDURE — 6370000000 HC RX 637 (ALT 250 FOR IP): Performed by: HOSPITALIST

## 2020-11-30 PROCEDURE — 85014 HEMATOCRIT: CPT

## 2020-11-30 PROCEDURE — 94640 AIRWAY INHALATION TREATMENT: CPT

## 2020-11-30 PROCEDURE — 99222 1ST HOSP IP/OBS MODERATE 55: CPT | Performed by: OBSTETRICS & GYNECOLOGY

## 2020-11-30 PROCEDURE — 36415 COLL VENOUS BLD VENIPUNCTURE: CPT

## 2020-11-30 PROCEDURE — 2580000003 HC RX 258: Performed by: HOSPITALIST

## 2020-11-30 PROCEDURE — 94761 N-INVAS EAR/PLS OXIMETRY MLT: CPT

## 2020-11-30 PROCEDURE — 85018 HEMOGLOBIN: CPT

## 2020-11-30 RX ORDER — MIRTAZAPINE 15 MG/1
15 TABLET, FILM COATED ORAL NIGHTLY
Qty: 30 TABLET | Refills: 3 | DISCHARGE
Start: 2020-11-30

## 2020-11-30 RX ORDER — PANTOPRAZOLE SODIUM 40 MG/1
40 TABLET, DELAYED RELEASE ORAL
Qty: 30 TABLET | Refills: 0 | DISCHARGE
Start: 2020-11-30 | End: 2020-12-30

## 2020-11-30 RX ORDER — MULTIVITAMIN WITH IRON
1 TABLET ORAL DAILY
Refills: 0 | DISCHARGE
Start: 2020-12-01

## 2020-11-30 RX ADMIN — BUDESONIDE AND FORMOTEROL FUMARATE DIHYDRATE 1 PUFF: 80; 4.5 AEROSOL RESPIRATORY (INHALATION) at 08:56

## 2020-11-30 RX ADMIN — LEVOTHYROXINE SODIUM 75 MCG: 75 TABLET ORAL at 06:18

## 2020-11-30 RX ADMIN — ATORVASTATIN CALCIUM 40 MG: 40 TABLET, FILM COATED ORAL at 09:45

## 2020-11-30 RX ADMIN — PANTOPRAZOLE SODIUM 40 MG: 40 TABLET, DELAYED RELEASE ORAL at 06:18

## 2020-11-30 RX ADMIN — THERA TABS 1 TABLET: TAB at 09:45

## 2020-11-30 RX ADMIN — DOCUSATE SODIUM 100 MG: 100 CAPSULE, LIQUID FILLED ORAL at 09:45

## 2020-11-30 RX ADMIN — DEXTROSE AND SODIUM CHLORIDE: 5; 900 INJECTION, SOLUTION INTRAVENOUS at 09:45

## 2020-11-30 RX ADMIN — PANTOPRAZOLE SODIUM 40 MG: 40 TABLET, DELAYED RELEASE ORAL at 16:57

## 2020-11-30 ASSESSMENT — PAIN SCALES - WONG BAKER: WONGBAKER_NUMERICALRESPONSE: 0

## 2020-11-30 ASSESSMENT — PAIN SCALES - GENERAL
PAINLEVEL_OUTOF10: 0
PAINLEVEL_OUTOF10: 0

## 2020-11-30 NOTE — PROGRESS NOTES
Report called to Mack Manzanares at [facility] and all questions were answered. Juan Rome will be here to get pt at [address].  Isidro Holland RN

## 2020-11-30 NOTE — CONSULTS
Palliative Medicine Consultation    Reason for Consult:      _X___ Advance Care Planning  _X____Transition of Care Planning  _X____ Psychosocial Support  _X____ Symptom Management:     Recommendations:    1. Continue with excellent care of this patient with gastritis and decreaesd oral intake. 2.  Change code status to [facility].  3.  Consider Psych consult. Patient just started on Remeron. Requesting Physician:  Dr. Jacqueline Larkin:  nausea and vomiting    History Obtained From:  patient, electronic medical record, Dr. Braeden Montgomery:    79year old female who presented to the ED with complaints of nausea and vomiting for 4 weeks prior to admission. Patient was also noted have a rectal bleeding per reports from the ECF. Patient with a history of COVID-19 positivity at the [facility] but negative in the ED. Patient had an EGD which demonstrated gastritis. Patient has had decreased oral intake and does not want to eat. She is requiring D-10 to keep from becoming hypoglycemic. Patient states she has been at the [facility] for 4 months due to hurting her knee and her [de-identified]year old  could not take care of her. She is ambulatory when she wants to be with a walker. She states that she can take care of her ADL's and wants to go home but does not want to eat. \"I'm not hungry. \"  When asked why she does not know. Patient does have a history of depression. Palliative Care was asked to see the patient and family for goals of care and support.               Past Medical History:        Diagnosis Date    ASHD (arteriosclerotic heart disease)     Bilateral edema of lower extremity 12/16/2015    CHF (congestive heart failure) (HCC)     COPD (chronic obstructive pulmonary disease) (HCC)     Depression     Hypertension        Past Surgical History:        Procedure Laterality Date    ANUS SURGERY      fistula repair    APPENDECTOMY      HYSTERECTOMY      TONSILLECTOMY      UPPER throat  RESPIRATORY:  negative for  dry cough and dyspnea  CARDIOVASCULAR:  negative for  chest pain, palpitations  GASTROINTESTINAL:  positive for nausea and vomiting  negative for change in bowel habits and abdominal pain  GENITOURINARY:  negative for frequency and dysuria  ENDOCRINE:  negative for tremor and weight changes  MUSCULOSKELETAL:  negative for  muscle weakness and bone pain  NEUROLOGICAL:  negative for headaches, memory problems and speech problems    Vitals:    Vitals:    11/30/20 0901   BP: 114/63   Pulse: 63   Resp: 13   Temp: 98.6 °F (37 °C)   SpO2: 99%       PHYSICAL EXAM:    GENERAL: lying in bed in NAD  HEENT: No cervical adenopathy, MM dry, PERRL  COR: RRR  LUNGS: diminished breath sounds  ABD: soft, ND/NT, +BS  SKIN: no rashes  PSYCH: seems depressed, no confusion  NEURO: CN II-XII grossly intact    DATA:    CBC:   Lab Results   Component Value Date    WBC 5.5 11/29/2020    RBC 2.86 11/29/2020    HGB 8.5 11/30/2020    HCT 31.4 11/30/2020    .3 11/29/2020    MCH 27.6 11/29/2020    MCHC 27.5 11/29/2020    RDW 15.3 11/29/2020     11/29/2020    MPV 12.4 11/29/2020     CMP:    Lab Results   Component Value Date     11/29/2020    K 3.7 11/29/2020     11/29/2020    CO2 22 11/29/2020    BUN 5 11/29/2020    CREATININE 0.9 11/29/2020    GFRAA >60 11/29/2020    LABGLOM >60 11/29/2020    GLUCOSE 99 11/29/2020    PROT 6.7 11/24/2020    LABALBU 3.7 11/24/2020    CALCIUM 7.6 11/29/2020    BILITOT 0.5 11/24/2020    ALKPHOS 95 11/24/2020    AST 22 11/24/2020    ALT 8 11/24/2020     Albumin:    Lab Results   Component Value Date    LABALBU 3.7 11/24/2020     CT ABD/Pelvis:  11/24/2020  Airspace opacities at the posterior left lung base, likely related to    pneumonia versus atelectasis.  Follow-up is recommended to ensure resolution    and exclude underlying mass.         Moderate colonic diverticulosis without acute diverticulitis.         1.3 cm intermediate attenuation lesion at the lower pole left kidney, new or    increased since the prior study.  Follow-up CT or MRI renal mass protocol is    recommended to exclude renal neoplasm.         Mild ectatic bilateral common iliac arteries measuring 1.4-1.5 cm.  Follow-up    is recommended. IMPRESSION:    79year old female with gastritis and decreased oral intake for Palliative Care encounter. I spoke to the patient at length. She seems very depressed in mood and actions. She does have a history of depression and taking antidepressants. She was just started on Remeron which I would agree with and possibly consider a Psych consult. The patient does not have advance directives but I did speak with her about code status. She does not want to be intubated, have CPR or defibrillation with an acute event. I spoke with Dr. Nathalie Smith to let him know as well.         Electronically signed by Sury Horne MD on 11/30/2020 at 11:39 AM

## 2020-11-30 NOTE — PROGRESS NOTES
Occupational Therapy    Attempted at 1035 hrs c pt received in semi-fowlers in bed, declining Tx session. Pt educated for role of OT and rationale for therapeutic intervention, continued to declined all offers for bed level activities. Continue per OT POC c plan to re-attempt as schedule allows.       Electronically signed by:    SHEILA Shah  11/30/2020, 10:24 AM

## 2020-11-30 NOTE — CARE COORDINATION
CM called Nahomy at [facility], Pt can return LTC. CM set up transportation with [address] for [address] and updated  of transport time.

## 2020-11-30 NOTE — PROGRESS NOTES
Nephrology Progress Note  11/30/2020 11:10 AM  Subjective:      Interval History: Lester Daniels is a 79 y.o. female  Appear stable but depressed and dw her nurse and pt with low interest in care    Data:   Scheduled Meds:   mirtazapine  15 mg Oral Nightly    multivitamin  1 tablet Oral Daily    budesonide-formoterol  1 puff Inhalation BID    docusate sodium  100 mg Oral BID    pantoprazole  40 mg Oral BID AC    atorvastatin  40 mg Oral Daily    levothyroxine  75 mcg Oral Daily    sodium chloride flush  10 mL Intravenous 2 times per day    lidocaine PF  5 mL Intradermal Once    sodium chloride flush  10 mL Intravenous 2 times per day     Continuous Infusions:   dextrose 5 % and 0.9 % NaCl 75 mL/hr at 11/30/20 0945           CBC:   Recent Labs     11/28/20  1216 11/29/20  0408 11/30/20  0925   WBC  --  5.5  --    HGB 8.8* 7.9* 8.5*   PLT  --  134*  --      BMP:    Recent Labs     11/29/20  0408      K 3.7      CO2 22   BUN 5*   CREATININE 0.9   GLUCOSE 99       Renal Labs  Albumin:    Lab Results   Component Value Date    LABALBU 3.7 11/24/2020     Calcium:    Lab Results   Component Value Date    CALCIUM 7.6 11/29/2020     Phosphorus:    Lab Results   Component Value Date    PHOS 6.6 06/20/2016     U/A:    Lab Results   Component Value Date    NITRU NEGATIVE 11/24/2020    COLORU YELLOW 11/24/2020    WBCUA 1 11/24/2020    RBCUA 1 11/24/2020    MUCUS RARE 11/24/2020    TRICHOMONAS NONE SEEN 11/24/2020    BACTERIA FEW 11/24/2020    CLARITYU CLEAR 11/24/2020    SPECGRAV 1.014 11/24/2020    UROBILINOGEN NORMAL 11/24/2020    BILIRUBINUR NEGATIVE 11/24/2020    BLOODU SMALL 11/24/2020    KETUA NEGATIVE 11/24/2020           Objective:   I/O: 11/29 0701 - 11/30 0700  In: -   Out: 650 [Urine:650]  Vitals: /63   Pulse 63   Temp 98.6 °F (37 °C) (Oral)   Resp 13   Ht 5' 1\" (1.549 m)   Wt 173 lb 12.8 oz (78.8 kg)   SpO2 99%   BMI 32.84 kg/m²   General appearance: awake weak  HEENT: Head: Normal, normocephalic, atraumatic. Neck: supple, symmetrical, trachea midline  Lungs: diminished breath sounds bilaterally  Heart: S1, S2 normal  Abdomen: abnormal findings:  soft nt  Extremities: edema trace  Neurologic: Mental status: alertness: awake        Impression    Airspace opacities at the posterior left lung base, likely related to    pneumonia versus atelectasis.  Follow-up is recommended to ensure resolution    and exclude underlying mass.         Moderate colonic diverticulosis without acute diverticulitis.         1.3 cm intermediate attenuation lesion at the lower pole left kidney, new or    increased since the prior study.  Follow-up CT or MRI renal mass protocol is    recommended to exclude renal neoplasm.         Mild ectatic bilateral common iliac arteries measuring 1.4-1.5 cm.  Follow-up    is recommended.         Assessment and Plan:      IMP:  arf from atn on ckd 3  Gi bleed/anemia  chf with edema  Copd  Renal lesion    Plan     1 renal to baseline monitor  2 hb improved mointor and watch for bleed  3 o2 overall stable and edema better  4 not wheeze but flat affect in wanting to take deep breath  5 fu lesion as outpt  Overall rec palliative care eval as not appear much interest to improve or eat  Will follow               Ruth Michel MD

## 2020-11-30 NOTE — DISCHARGE INSTR - COC
Continuity of Care Form    Patient Name: Lit Roman   :  1940  MRN:  [id number]    [address] [place] [address] date:  2020  Discharge date:  ***    Code Status Order: DNR-CCA   Advance Directives:   [facility] Documentation       Date/Time Healthcare Directive Type of Healthcare Directive Copy in [address] Agent's Name Healthcare Agent's Phone Number    20 [phone]  No, patient does not have an advance directive for healthcare treatment -- -- -- -- --            Admitting Physician:  Byron Soto MD  PCP: Tereso Morgan MD    Discharging Nurse: [facility] Unit/Room#: 1255/2927-R  Discharging Unit Phone Number: ***    Emergency Contact:   Extended Emergency Contact Information  Primary Emergency Contact: Cassandra Kelly  Address: [address] [place] [address] Phone: [phone]  Relation: Spouse    Past Surgical History:  Past Surgical History:   Procedure Laterality Date    ANUS SURGERY      fistula repair    APPENDECTOMY      HYSTERECTOMY      TONSILLECTOMY      UPPER GASTROINTESTINAL ENDOSCOPY N/A 2020    EGD BIOPSY performed by Yadira Banuelos MD at [facility] ENDOSCOPY       Immunization History:   Immunization History   Administered Date(s) Administered    Influenza Virus Vaccine 2017    Pneumococcal Conjugate 13-valent (Franceen Sarks) 2016    Pneumococcal Polysaccharide ([id number]) 2015       Active Problems:  Patient Active Problem List   Diagnosis Code    Coronary atherosclerosis of native coronary artery I25.10    Anemia D64.9    Bilateral edema of lower extremity R60.0    Moderate COPD (chronic obstructive pulmonary disease) ([address] J44.9    COPD exacerbation ([address] J44.1    Morbid obesity due to excess calories ([address] E66.01    Acute on chronic diastolic congestive heart failure ([address] I50.33    Depression F32.9    Hypertension I10    Pneumonia J18.9    Acute on chronic diastolic ACC/AHA stage C congestive heart failure (HCC) I50.33    GIB (gastrointestinal bleeding) K92.2       Isolation/Infection:   Isolation            No Isolation          Patient Infection Status       None to display            Nurse Assessment:  Last Vital Signs: /63   Pulse 63   Temp 98.4 °F (36.9 °C) (Oral)   Resp 13   Ht 5' 1\" (1.549 m)   Wt 173 lb 12.8 oz (78.8 kg)   SpO2 99%   BMI 32.84 kg/m²     Last documented pain score (0-10 scale): Pain Level: 0  Last Weight:   Wt Readings from Last 1 Encounters:   11/27/20 173 lb 12.8 oz (78.8 kg)     Mental Status:  oriented and alert    IV Access:  - None    Nursing Mobility/ADLs:  Walking   Assisted  Transfer  Assisted  Bathing  Dependent  Dressing  Dependent  Toileting  Dependent  Feeding  Dependent  Med Admin  Dependent  Med Delivery   whole    Wound Care Documentation and Therapy:  Pressure Ulcer 09/02/16 Buttocks Right Two small, circular (Active)   Number of days: 1349        Elimination:  Continence:   · Bowel: No  · Bladder: No  Urinary Catheter: none  Colostomy/Ileostomy/Ileal Conduit: No       Date of Last BM: ***    Intake/Output Summary (Last 24 hours) at 11/30/2020 1453  Last data filed at 11/29/2020 1837  Gross per 24 hour   Intake --   Output 650 ml   Net -650 ml     I/O last 3 completed shifts:  In: -   Out: 650 [Urine:650]    Safety Concerns:     [address] [employer] Safety Concerns:[id number]:::0}    Impairments/Disabilities:      [address] [employer] Impairments/Disabilities:[id number]:::0}    Nutrition Therapy:  Current Nutrition Therapy:   - Oral Diet:  General  - Oral Nutrition Supplement:  Frozen Oral  twice a day    Routes of Feeding: Oral  Liquids: No Restrictions  Daily Fluid Restriction: no  Last Modified Barium Swallow with Video (Video Swallowing Test): not done    Treatments at the Time of Hospital Discharge:   Respiratory Treatments:   Oxygen Therapy:  is on oxygen at 2 L/min per nasal cannula.   Ventilator:    - No ventilator support    Rehab Therapies: Physical Therapy, Occupational Therapy, and Speech/Language Therapy  Weight Bearing Status/Restrictions: As prior  Other Medical Equipment (for information only, NOT a DME order): As prior  Other Treatments:     Patient's personal belongings (please select all that are sent with patient):  {HERMELINDO DME Belongings:[id number]:::0}    RN SIGNATURE:  Electronically signed by Brent Cornejo RN on 11/30/20 at 3:50 PM EST    CASE MANAGEMENT/SOCIAL WORK SECTION    Inpatient Status Date: ***    Readmission Risk Assessment Score:  Readmission Risk              Risk of Unplanned Readmission:        15           Discharging to Facility/ Agency   · Name:   · Address:  · Phone:  · Fax:    Dialysis Facility (if applicable)   · Name:  · Address:  · Dialysis Schedule:  · Phone:  · Fax:    / signature: {Esignature:[id number]:::0}    PHYSICIAN SECTION    Prognosis: Fair    Condition at Discharge: Stable    Rehab Potential (if transferring to Rehab): Fair    Recommended Labs or Other Treatments After Discharge: CBC, BMp in 1week    Physician Certification: I certify the above information and transfer of Silver Boykin  is necessary for the continuing treatment of the diagnosis listed and that she requires [facility] for greater 30 days.      Update Admission H&P: No change in H&P    PHYSICIAN SIGNATURE:  Electronically signed by Arabella Sin MD on 11/30/20 at 2:54 PM EST

## 2020-12-08 NOTE — PROGRESS NOTES
Physician Progress Note      PATIENT:               Alida Duane, [facility]  CSN #:                  [id number]  :                       1940  ADMIT DATE:       2020 1:31 PM  [address] [place] DATE:        2020 8:30 PM  RESPONDING  PROVIDER #:        Janine Goldstein MD          QUERY TEXT:    Patient admitted with Upper GI bleed. Noted documentation of SALTY with ATN by   Dr. Ken Chandler on 20 and SALTY possibly due to over diureses? in the   setting of anorexia by Dr. Reinhold Castleman in the Discharge Summary dated 20. If possible, please document in progress notes and discharge summary if you   are evaluating and /or treating any of the following: The medical record reflects the following:  Risk Factors: CKD, hypotension  Clinical Indicators: UA: Casts 5, Nephrology documented \"arf from atn on ckd   3\"  Treatment: NS IV bolus, avoid nephrotoxic meds, monitor I/O, hold Lasix and   Lisinopril    Thank you,  Alonso Soulier, RN, CDS  [phone]  Options provided:  -- SALTY with ATN confirmed and SALTY possibly due to over diuresis ruled out  -- SALTY possibly due to over diuresis confirmed and SALTY with ATN ruled out  -- Other - I will add my own diagnosis  -- Disagree - Not applicable / Not valid  -- Disagree - Clinically unable to determine / Unknown  -- Refer to Clinical Documentation Reviewer    PROVIDER RESPONSE TEXT:    After study, SALTY with ATN confirmed and SALTY possibly due to over diuresis   ruled out.     Query created by: Harmony Guerrero on 2020 4:02 PM      Electronically signed by:  Janine Goldstein MD 2020 8:41 AM

## 2021-08-24 NOTE — PROGRESS NOTES
Hospitalist Progress Note      Name:  Romain Gill /Age/Sex: 1940  (79 y.o. female)   MRN & CSN:  [id number] & [id number] Admission Date/Time: 2020  1:31 PM   Location:  [address] [place] [address] PCP: Austyn Calle MD         Hospital Day: 5    Assessment and Plan:   Romain Gill is a 79 y.o.  female  who presents with <principal problem not specified>    > Acute Gastritis  > Decreased po intake  > Upper GI bleed  > Acute blood loss anemia  -N/V with decreased p.o. intake and melanotic stools  -Given 2 L IV fluid bolus in ED, as patient was initially hypotensive  - GI consulted, Placed on Protonix drip  -  EGD with Gastritis, PPI recommended, consulted Dietary , started dextrose due to low BG and not eating. Consult PT/OT/Case management  -  reports nausea. ? Sec to medications ( hold po iron, martha ) H&H stable  -  pt reports no appetite, d/w need to eat better     >SALTY possibly d/t overdiuresis?,  In setting of anorexia  - avoid nephrotoxic medication  - monitor I/Os  - nephrology following, appreciate recs  - Holding Lasix and Lisinopril     >H/o COVID + on the , on O2 here, being weaned off in ED, COVID negative here.  Discussed with nursing supervisor by night time admitting attending, given patient's Covid negative here, states that okay to be admitted to non-Covid unit  -If patient have worsening or decompensation in respiratory status, consider ID consult and pulmonary consult     >H/o CHF-stable; continue diuretics as able  >H/o COPD, Hold martha due to nausea and ulcer     >Hypertension- anti-hypertensive held       Diet DIET FULL LIQUID;  Dietary Nutrition Supplements: Frozen Oral Supplement   DVT Prophylaxis [] SCDs   GI Prophylaxis [] PPI   Code Status Full Code   Disposition  back to SNF pending pre cert     History of Present Illness:     Pt S&E.      No chest pain, no abd pain, no N/V, no F/C, admit weakness,     10-14 point ROS reviewed negative, unless as Physical Therapy  Visit Type: re-evaluation  Co-treat with: Occupational therapist  Precautions:  Medical precautions:  fall risk; standard precautions.    Lines:       Lines in chart and on patient reviewed, cautions maintained throughout session.  Hearing: no hearing deficits  Vision:     Current vision: no visual deficits  Safety Measures: chair alarm    SUBJECTIVE  Patient agreed to participate in therapy this date.  \"I thought I was coming out of surgery with no tube, but I've got another one.\".\"  Patient / Family Goal: return to previous functional status, maximize function and return home    Pain     Location: chest tube site    At onset of session (out of 10): 10  RN informed on pain level     OBJECTIVE   Level of consciousness: alert    Oriented to person, place, time and situation     Arousal alertness: appropriate responses to stimuli    Affect/Behavior: alert and cooperative  Functional Communication/Cognition    Overall status:  Within functional limits    Form of communication:  Verbal   Attention span:  Appears intact    Commands: follows all commands and directions consistently.    Transition between tasks: transitions tasks without difficulty.    Safety judgement: good awareness of safety precautions.    Awareness of deficits: fully aware of deficits.  Range of Motion (measured in degrees unless otherwise noted, active unless indicated)  WNL: LLE, RLE  Strength (out of 5 unless otherwise indicated)   WFL: LLE, RLE  Balance    Sitting: Static: modified independent    Standing - Firm Surface - Eyes Open: Static: supervision single upper extremity support (IV pole)    Transfers:    Assistive devices: gait belt    Sit to stand: supervision    Stand to sit: supervision  Training completed:    Tasks: sit to stand and stand to sit    Education details: body mechanics, patient safety and patient requires additional training  Gait/Ambulation:     Assistance: supervision   Assistive device: gait belt     Distance (ft): 150    Pattern: within functional limits    Type: decreased thom    Deviation in foot placement: wide base of support  Training Completed:    Tasks: gait training on level surfaces    Education details: body mechanics, patient safety and patient requires additional training      Interventions     Skilled input: Verbal instruction/cues and tactile instruction/cues  Verbal Consent: Writer verbally educated and received verbal consent for hand placement, positioning of patient, and techniques to be performed today from patient for clothing adjustments for techniques, therapist position for techniques and hand placement and palpation for techniques as described above and how they are pertinent to the patient's plan of care.       ASSESSMENT    Impairments: pain  Functional Limitations: ambulation and stair climbing    Patient seen for re-evaluation s/p L thoracotomy with decortication, left lower lobectomy, and chest tube placement. Patient primarily limited by pain, patient is safe for discharge with intermittent assist when medically appropriate. Inpatient PT will follow peripherally while in-house.    Recommended Consults: PT: None  Discharge Recommendations   Recommendation for Discharge: PT IL: Patient requires  intermittent assistance to perform mobility and/or ADLs safely        PT/OT Mobility Equipment for Discharge: none   PT/OT ADL Equipment for Discharge: none        Skilled therapy is required to address these limitations in attempt to maximize the patient's independence.  Progress: progressing toward goals    End of Session:   Location: in chair  Safety measures: call light within reach  Handoff to: nurse    PLAN    Suggestions for next session as indicated: PT Frequency: 3 days/week, 1-2 sessions  Frequency Comments: 1/3, LS 8/24, home, NANDINI        Interventions: balance, bed mobility, gait training and functional transfer training  Agreement to plan and goals: patient agrees with goals and  noted above    Objective: Intake/Output Summary (Last 24 hours) at 11/28/2020 0832  Last data filed at 11/28/2020 5628  Gross per 24 hour   Intake 2196.25 ml   Output 1925 ml   Net 271.25 ml      Vitals:   Vitals:    11/28/20 0600   BP: 113/66   Pulse: 63   Resp: 12   Temp:    SpO2:      Physical Exam:      GEN Alert female, cooperative, no apparent distress. RESP Decreased air sounds. Symmetric chest movement . CARDIO/VASC S1/S2 auscultated. Regular rate. GI Abdomen is soft without significant tenderness, Bowel sounds are normoactive. MSK No gross joint deformities. SKIN Normal coloration, warm, dry. NEURO/PSYCH alert, oriented . Affect appropriate.     Medications:   Medications:    pantoprazole  40 mg Oral BID AC    atorvastatin  40 mg Oral Daily    budesonide-formoterol  2 puff Inhalation BID    levothyroxine  75 mcg Oral Daily    PARoxetine  20 mg Oral QAM    oxybutynin  10 mg Oral Nightly    sodium chloride flush  10 mL Intravenous 2 times per day    lidocaine PF  5 mL Intradermal Once    sodium chloride flush  10 mL Intravenous 2 times per day      Infusions:    dextrose 5 % and 0.9 % NaCl 75 mL/hr at 11/27/20 2021     PRN Meds: sodium chloride flush, 10 mL, PRN  acetaminophen, 650 mg, Q6H PRN    Or  acetaminophen, 650 mg, Q6H PRN  promethazine, 12.5 mg, Q6H PRN    Or  ondansetron, 4 mg, Q6H PRN  sodium chloride flush, 10 mL, PRN      Electronically signed by Daphney Colón MD on 11/28/2020 at 8:32 AM treatment plan        GOALS  Review Date: 8/31/2021  Long Term Goals: (to be met by time of discharge from hospital)  Sit to supine: Patient will complete sit to supine modified independent.  Status: progressing/ongoing  Supine to sit: Patient will complete supine to sit modified independent.  Status: progressing/ongoing  Sit to stand: Patient will complete sit to stand transfer with no device, independent.   Status: progressing/ongoing  Stand to sit: Patient will complete stand to sit transfer with no device, independent.   Status: progressing/ongoing  Stand pivot: Patient will complete stand pivot transfer with no device, independent.   Status: progressing/ongoing  Ambulation (even): Patient will ambulate on even surface for 300 feet with no device, independent.   Status: progressing/ongoing  Flight of stairs: Patient will ambulate a flight of stairs with no device supervision.  Status: progressing/ongoing    Documented in the chart in the following areas: Assessment.      Therapy procedure time and total treatment time can be found documented on the Time Entry flowsheet

## 2021-09-08 ENCOUNTER — APPOINTMENT (OUTPATIENT)
Dept: GENERAL RADIOLOGY | Age: 81
DRG: 178 | End: 2021-09-08
Payer: MEDICARE

## 2021-09-08 ENCOUNTER — HOSPITAL ENCOUNTER (INPATIENT)
Age: 81
LOS: 8 days | Discharge: NURSING, REHAB OR LONG-TERM CARE | DRG: 178 | End: 2021-09-16
Attending: EMERGENCY MEDICINE | Admitting: HOSPITALIST
Payer: MEDICARE

## 2021-09-08 DIAGNOSIS — R09.02 HYPOXIA: Primary | ICD-10-CM

## 2021-09-08 DIAGNOSIS — N17.9 AKI (ACUTE KIDNEY INJURY) (HCC): ICD-10-CM

## 2021-09-08 DIAGNOSIS — J90 PLEURAL EFFUSION: ICD-10-CM

## 2021-09-08 DIAGNOSIS — J18.9 PNEUMONIA OF LEFT LUNG DUE TO INFECTIOUS ORGANISM, UNSPECIFIED PART OF LUNG: ICD-10-CM

## 2021-09-08 LAB
ALBUMIN SERPL-MCNC: 3.2 GM/DL (ref 3.4–5)
ALP BLD-CCNC: 92 IU/L (ref 40–128)
ALT SERPL-CCNC: 8 U/L (ref 10–40)
ANION GAP SERPL CALCULATED.3IONS-SCNC: 8 MMOL/L (ref 4–16)
AST SERPL-CCNC: 20 IU/L (ref 15–37)
BASOPHILS ABSOLUTE: 0 K/CU MM
BASOPHILS RELATIVE PERCENT: 0.2 % (ref 0–1)
BILIRUB SERPL-MCNC: 0.2 MG/DL (ref 0–1)
BUN BLDV-MCNC: 30 MG/DL (ref 6–23)
CALCIUM SERPL-MCNC: 9 MG/DL (ref 8.3–10.6)
CHLORIDE BLD-SCNC: 97 MMOL/L (ref 99–110)
CO2: 33 MMOL/L (ref 21–32)
CREAT SERPL-MCNC: 1.3 MG/DL (ref 0.6–1.1)
DIFFERENTIAL TYPE: ABNORMAL
EOSINOPHILS ABSOLUTE: 0.2 K/CU MM
EOSINOPHILS RELATIVE PERCENT: 2.4 % (ref 0–3)
GFR AFRICAN AMERICAN: 48 ML/MIN/1.73M2
GFR NON-AFRICAN AMERICAN: 39 ML/MIN/1.73M2
GLUCOSE BLD-MCNC: 111 MG/DL (ref 70–99)
HCT VFR BLD CALC: 35.9 % (ref 37–47)
HEMOGLOBIN: 10.2 GM/DL (ref 12.5–16)
IMMATURE NEUTROPHIL %: 0.4 % (ref 0–0.43)
LYMPHOCYTES ABSOLUTE: 1 K/CU MM
LYMPHOCYTES RELATIVE PERCENT: 10.2 % (ref 24–44)
MCH RBC QN AUTO: 27.2 PG (ref 27–31)
MCHC RBC AUTO-ENTMCNC: 28.4 % (ref 32–36)
MCV RBC AUTO: 95.7 FL (ref 78–100)
MONOCYTES ABSOLUTE: 0.6 K/CU MM
MONOCYTES RELATIVE PERCENT: 6.7 % (ref 0–4)
NUCLEATED RBC %: 0 %
PDW BLD-RTO: 13.6 % (ref 11.7–14.9)
PLATELET # BLD: 317 K/CU MM (ref 140–440)
PMV BLD AUTO: 10.5 FL (ref 7.5–11.1)
POTASSIUM SERPL-SCNC: 4.6 MMOL/L (ref 3.5–5.1)
PRO-BNP: 110.5 PG/ML
RAPID INFLUENZA  B AGN: NEGATIVE
RAPID INFLUENZA A AGN: NEGATIVE
RBC # BLD: 3.75 M/CU MM (ref 4.2–5.4)
SARS-COV-2, NAAT: NOT DETECTED
SEGMENTED NEUTROPHILS ABSOLUTE COUNT: 7.6 K/CU MM
SEGMENTED NEUTROPHILS RELATIVE PERCENT: 80.1 % (ref 36–66)
SODIUM BLD-SCNC: 138 MMOL/L (ref 135–145)
SOURCE: NORMAL
TOTAL IMMATURE NEUTOROPHIL: 0.04 K/CU MM
TOTAL NUCLEATED RBC: 0 K/CU MM
TOTAL PROTEIN: 7 GM/DL (ref 6.4–8.2)
TROPONIN T: <0.01 NG/ML
TROPONIN T: <0.01 NG/ML
WBC # BLD: 9.5 K/CU MM (ref 4–10.5)

## 2021-09-08 PROCEDURE — 6360000002 HC RX W HCPCS: Performed by: EMERGENCY MEDICINE

## 2021-09-08 PROCEDURE — 84484 ASSAY OF TROPONIN QUANT: CPT

## 2021-09-08 PROCEDURE — 93005 ELECTROCARDIOGRAM TRACING: CPT | Performed by: EMERGENCY MEDICINE

## 2021-09-08 PROCEDURE — 83880 ASSAY OF NATRIURETIC PEPTIDE: CPT

## 2021-09-08 PROCEDURE — 2580000003 HC RX 258: Performed by: EMERGENCY MEDICINE

## 2021-09-08 PROCEDURE — 87804 INFLUENZA ASSAY W/OPTIC: CPT

## 2021-09-08 PROCEDURE — 94761 N-INVAS EAR/PLS OXIMETRY MLT: CPT

## 2021-09-08 PROCEDURE — 71045 X-RAY EXAM CHEST 1 VIEW: CPT

## 2021-09-08 PROCEDURE — 6370000000 HC RX 637 (ALT 250 FOR IP): Performed by: CLINICAL NURSE SPECIALIST

## 2021-09-08 PROCEDURE — 84145 PROCALCITONIN (PCT): CPT

## 2021-09-08 PROCEDURE — 87040 BLOOD CULTURE FOR BACTERIA: CPT

## 2021-09-08 PROCEDURE — 87635 SARS-COV-2 COVID-19 AMP PRB: CPT

## 2021-09-08 PROCEDURE — 36415 COLL VENOUS BLD VENIPUNCTURE: CPT

## 2021-09-08 PROCEDURE — 99285 EMERGENCY DEPT VISIT HI MDM: CPT

## 2021-09-08 PROCEDURE — 87150 DNA/RNA AMPLIFIED PROBE: CPT

## 2021-09-08 PROCEDURE — 85025 COMPLETE CBC W/AUTO DIFF WBC: CPT

## 2021-09-08 PROCEDURE — 80053 COMPREHEN METABOLIC PANEL: CPT

## 2021-09-08 PROCEDURE — 1200000000 HC SEMI PRIVATE

## 2021-09-08 RX ORDER — ALBUTEROL SULFATE 90 UG/1
2 AEROSOL, METERED RESPIRATORY (INHALATION) EVERY 4 HOURS PRN
Status: DISCONTINUED | OUTPATIENT
Start: 2021-09-08 | End: 2021-09-16 | Stop reason: HOSPADM

## 2021-09-08 RX ORDER — ONDANSETRON 2 MG/ML
4 INJECTION INTRAMUSCULAR; INTRAVENOUS EVERY 6 HOURS PRN
Status: DISCONTINUED | OUTPATIENT
Start: 2021-09-08 | End: 2021-09-16 | Stop reason: HOSPADM

## 2021-09-08 RX ORDER — SODIUM CHLORIDE 9 MG/ML
INJECTION, SOLUTION INTRAVENOUS CONTINUOUS
Status: DISCONTINUED | OUTPATIENT
Start: 2021-09-08 | End: 2021-09-12

## 2021-09-08 RX ORDER — POTASSIUM CHLORIDE 20 MEQ/1
10 TABLET, EXTENDED RELEASE ORAL DAILY
Status: DISCONTINUED | OUTPATIENT
Start: 2021-09-08 | End: 2021-09-12

## 2021-09-08 RX ORDER — POLYETHYLENE GLYCOL 3350 17 G/17G
17 POWDER, FOR SOLUTION ORAL DAILY PRN
Status: DISCONTINUED | OUTPATIENT
Start: 2021-09-08 | End: 2021-09-16 | Stop reason: HOSPADM

## 2021-09-08 RX ORDER — FERROUS SULFATE 325(65) MG
325 TABLET ORAL EVERY 12 HOURS
Status: DISCONTINUED | OUTPATIENT
Start: 2021-09-08 | End: 2021-09-16 | Stop reason: HOSPADM

## 2021-09-08 RX ORDER — ALBUTEROL SULFATE 2.5 MG/3ML
2.5 SOLUTION RESPIRATORY (INHALATION) EVERY 4 HOURS PRN
Status: DISCONTINUED | OUTPATIENT
Start: 2021-09-08 | End: 2021-09-16 | Stop reason: HOSPADM

## 2021-09-08 RX ORDER — ACETAMINOPHEN 650 MG/1
650 SUPPOSITORY RECTAL EVERY 6 HOURS PRN
Status: DISCONTINUED | OUTPATIENT
Start: 2021-09-08 | End: 2021-09-16 | Stop reason: HOSPADM

## 2021-09-08 RX ORDER — PANTOPRAZOLE SODIUM 40 MG/1
40 TABLET, DELAYED RELEASE ORAL
Status: DISCONTINUED | OUTPATIENT
Start: 2021-09-08 | End: 2021-09-12

## 2021-09-08 RX ORDER — SODIUM CHLORIDE 0.9 % (FLUSH) 0.9 %
5-40 SYRINGE (ML) INJECTION PRN
Status: DISCONTINUED | OUTPATIENT
Start: 2021-09-08 | End: 2021-09-16 | Stop reason: HOSPADM

## 2021-09-08 RX ORDER — ACETAMINOPHEN 325 MG/1
650 TABLET ORAL EVERY 6 HOURS PRN
Status: DISCONTINUED | OUTPATIENT
Start: 2021-09-08 | End: 2021-09-16 | Stop reason: HOSPADM

## 2021-09-08 RX ORDER — MIRTAZAPINE 15 MG/1
15 TABLET, FILM COATED ORAL NIGHTLY
Status: DISCONTINUED | OUTPATIENT
Start: 2021-09-08 | End: 2021-09-16 | Stop reason: HOSPADM

## 2021-09-08 RX ORDER — ONDANSETRON 4 MG/1
4 TABLET, ORALLY DISINTEGRATING ORAL EVERY 8 HOURS PRN
Status: DISCONTINUED | OUTPATIENT
Start: 2021-09-08 | End: 2021-09-16 | Stop reason: HOSPADM

## 2021-09-08 RX ORDER — CALCIUM CARBONATE-CHOLECALCIFEROL TAB 250 MG-125 UNIT 250-125 MG-UNIT
1 TAB ORAL 2 TIMES DAILY WITH MEALS
Status: DISCONTINUED | OUTPATIENT
Start: 2021-09-08 | End: 2021-09-16 | Stop reason: HOSPADM

## 2021-09-08 RX ORDER — SODIUM CHLORIDE 9 MG/ML
25 INJECTION, SOLUTION INTRAVENOUS PRN
Status: DISCONTINUED | OUTPATIENT
Start: 2021-09-08 | End: 2021-09-16 | Stop reason: HOSPADM

## 2021-09-08 RX ORDER — LACTOBACILLUS RHAMNOSUS GG 10B CELL
1 CAPSULE ORAL
Status: DISCONTINUED | OUTPATIENT
Start: 2021-09-09 | End: 2021-09-16 | Stop reason: HOSPADM

## 2021-09-08 RX ORDER — DOCUSATE SODIUM 100 MG/1
100 CAPSULE, LIQUID FILLED ORAL 2 TIMES DAILY
Status: DISCONTINUED | OUTPATIENT
Start: 2021-09-08 | End: 2021-09-16 | Stop reason: HOSPADM

## 2021-09-08 RX ORDER — VANCOMYCIN 1.5 G/300ML
20 INJECTION, SOLUTION INTRAVENOUS ONCE
Status: COMPLETED | OUTPATIENT
Start: 2021-09-08 | End: 2021-09-08

## 2021-09-08 RX ORDER — LEVOTHYROXINE SODIUM 0.07 MG/1
75 TABLET ORAL DAILY
Status: DISCONTINUED | OUTPATIENT
Start: 2021-09-08 | End: 2021-09-16 | Stop reason: HOSPADM

## 2021-09-08 RX ORDER — ALBUTEROL SULFATE 2.5 MG/3ML
2.5 SOLUTION RESPIRATORY (INHALATION) EVERY 6 HOURS PRN
Status: DISCONTINUED | OUTPATIENT
Start: 2021-09-08 | End: 2021-09-08 | Stop reason: SDUPTHER

## 2021-09-08 RX ORDER — SODIUM CHLORIDE 0.9 % (FLUSH) 0.9 %
5-40 SYRINGE (ML) INJECTION EVERY 12 HOURS SCHEDULED
Status: DISCONTINUED | OUTPATIENT
Start: 2021-09-08 | End: 2021-09-16 | Stop reason: HOSPADM

## 2021-09-08 RX ORDER — ATORVASTATIN CALCIUM 40 MG/1
40 TABLET, FILM COATED ORAL DAILY
Status: DISCONTINUED | OUTPATIENT
Start: 2021-09-08 | End: 2021-09-16 | Stop reason: HOSPADM

## 2021-09-08 RX ADMIN — LEVOTHYROXINE SODIUM 75 MCG: 0.07 TABLET ORAL at 17:16

## 2021-09-08 RX ADMIN — FERROUS SULFATE TAB 325 MG (65 MG ELEMENTAL FE) 325 MG: 325 (65 FE) TAB at 16:40

## 2021-09-08 RX ADMIN — VANCOMYCIN 1500 MG: 1.5 INJECTION, SOLUTION INTRAVENOUS at 15:21

## 2021-09-08 RX ADMIN — PANTOPRAZOLE SODIUM 40 MG: 40 TABLET, DELAYED RELEASE ORAL at 17:16

## 2021-09-08 RX ADMIN — SODIUM CHLORIDE: 9 INJECTION, SOLUTION INTRAVENOUS at 15:21

## 2021-09-08 RX ADMIN — POTASSIUM CHLORIDE 10 MEQ: 1500 TABLET, EXTENDED RELEASE ORAL at 17:16

## 2021-09-08 RX ADMIN — CEFEPIME HYDROCHLORIDE 2000 MG: 2 INJECTION, POWDER, FOR SOLUTION INTRAVENOUS at 15:20

## 2021-09-08 RX ADMIN — Medication 250 MG: at 17:17

## 2021-09-08 RX ADMIN — ATORVASTATIN CALCIUM 40 MG: 40 TABLET, FILM COATED ORAL at 18:31

## 2021-09-08 ASSESSMENT — ENCOUNTER SYMPTOMS
NAUSEA: 0
SHORTNESS OF BREATH: 1
DIARRHEA: 0
BLOOD IN STOOL: 0
VOMITING: 0
COUGH: 1
ABDOMINAL PAIN: 0

## 2021-09-08 ASSESSMENT — PAIN SCALES - GENERAL: PAINLEVEL_OUTOF10: 0

## 2021-09-08 NOTE — ED NOTES
[address] NP with [facility] group returned call      Corinne Conteh  09/08/21 5647

## 2021-09-08 NOTE — ED PROVIDER NOTES
activity: Yes     Partners: Male   Other Topics Concern    Not on file   Social History Narrative    Not on file     Social Determinants of Health     Financial Resource Strain:     Difficulty of Paying Living Expenses:    Food Insecurity:     Worried About Running Out of Food in the Last Year:     [address] in the Last Year:    Transportation Needs:     Lack of Transportation (Medical):      Lack of Transportation (Non-Medical):    Physical Activity:     Days of Exercise per Week:     Minutes of Exercise per Session:    Stress:     Feeling of Stress :    Social Connections:     Frequency of Communication with Friends and Family:     Frequency of Social Gatherings with Friends and Family:     Attends Synagogue Services:     Active Member of Clubs or Organizations:     Attends Club or Organization Meetings:     Marital Status:    Intimate Partner Violence:     Fear of Current or Ex-Partner:     Emotionally Abused:     Physically Abused:     Sexually Abused:      Current Facility-Administered Medications   Medication Dose Route Frequency Provider Last Rate Last Admin    0.9 % sodium chloride infusion   IntraVENous Continuous Bindhu Sajay, APRN 50 mL/hr at 09/08/21 1605 Rate Change at 09/08/21 1605    albuterol sulfate  (90 Base) MCG/ACT inhaler 2 puff  2 puff Inhalation Q4H PRN Bindhu Sajay, APRN        atorvastatin (LIPITOR) tablet 40 mg  40 mg Oral Daily Bindhu Sajay, APRN   40 mg at 09/08/21 1831    Calcium Carb-Cholecalciferol 250-125 MG-UNIT TABS 250 mg  1 tablet Oral BID WC Bindhu Sajay, APRN   250 mg at 09/08/21 1717    docusate sodium (COLACE) capsule 100 mg  100 mg Oral BID Bindhu Sajay, APRN        ferrous sulfate (IRON 325) tablet 325 mg  325 mg Oral Q12H Bindhu Sajay, APRN   325 mg at 09/08/21 1640    [START ON 9/9/2021] lactobacillus (CULTURELLE) capsule 1 capsule  1 capsule Oral Daily with breakfast Bindhu Sajay, APRN        levothyroxine (SYNTHROID) tablet 75 mcg 75 mcg Oral Daily Bindhu Sajay, APRN   75 mcg at 09/08/21 1716    [START ON 9/9/2021] linaclotide (LINZESS) capsule 145 mcg  145 mcg Oral QAM AC Bindhu Sajay, APRN        mirtazapine (REMERON) tablet 15 mg  15 mg Oral Nightly Bindhu Sajay, APRN        pantoprazole (PROTONIX) tablet 40 mg  40 mg Oral BID AC Bindhu Sajay, APRN   40 mg at 09/08/21 1716    potassium chloride (KLOR-CON M) extended release tablet 10 mEq  10 mEq Oral Daily Bindhu Sajay, APRN   10 mEq at 09/08/21 1716    sodium chloride flush 0.9 % injection 5-40 mL  5-40 mL IntraVENous 2 times per day Bindhu Sajay, APRN        sodium chloride flush 0.9 % injection 5-40 mL  5-40 mL IntraVENous PRN Bindhu Sajay, APRN        0.9 % sodium chloride infusion  25 mL IntraVENous PRN Bindhu Sajay, APRN        ondansetron (ZOFRAN-ODT) disintegrating tablet 4 mg  4 mg Oral Q8H PRN Bindhu Sajay, APRN        Or    ondansetron (ZOFRAN) injection 4 mg  4 mg IntraVENous Q6H PRN Bindhu Sajay, APRN        polyethylene glycol (GLYCOLAX) packet 17 g  17 g Oral Daily PRN Bindhu Sajay, APRN        acetaminophen (TYLENOL) tablet 650 mg  650 mg Oral Q6H PRN Bindhu Sajay, APRN        Or    acetaminophen (TYLENOL) suppository 650 mg  650 mg Rectal Q6H PRN Bindhu Sajay, APRN        albuterol (PROVENTIL) nebulizer solution 2.5 mg  2.5 mg Nebulization Q4H PRN Bindhu Sajay, APRN        [START ON 9/9/2021] cefepime (MAXIPIME) 2000 mg IVPB minibag  2,000 mg IntraVENous Q12H Bindhu Sajay, APRN         Current Outpatient Medications   Medication Sig Dispense Refill    mirtazapine (REMERON) 15 MG tablet Take 1 tablet by mouth nightly 30 tablet 3    pantoprazole (PROTONIX) 40 MG tablet Take 1 tablet by mouth 2 times daily (before meals) 30 tablet 0    Multiple Vitamin (MULTIVITAMIN) TABS tablet Take 1 tablet by mouth daily  0    fluticasone-vilanterol (BREO ELLIPTA) 100-25 MCG/INH AEPB inhaler Inhale 1 puff into the lungs daily      linaclotide (LINZESS) 145 MCG capsule Take 145 mcg by mouth every morning (before breakfast)      calcium citrate-vitamin D (CITRICAL + D) 315-250 MG-UNIT TABS per tablet Take 1 tablet by mouth 2 times daily (with meals)      albuterol sulfate  (90 Base) MCG/ACT inhaler Inhale 2 puffs into the lungs every 4 hours as needed for Wheezing      ondansetron (ZOFRAN ODT) 4 MG disintegrating tablet Take 1 tablet by mouth every 8 hours as needed for Nausea 15 tablet 0    albuterol (PROVENTIL) (2.5 MG/3ML) 0.083% nebulizer solution Take 3 mLs by nebulization every 6 hours as needed for Wheezing 120 each 3    potassium chloride (KLOR-CON M) 10 MEQ extended release tablet Take 10 mEq by mouth daily      Cranberry 450 MG CAPS Take 900 mg by mouth 2 times daily       levothyroxine (SYNTHROID) 75 MCG tablet Take 75 mcg by mouth Daily      Acetaminophen (TYLENOL PO) Take 650 mg by mouth every 6 hours as needed (PAIN OR FEVER)       atorvastatin (LIPITOR) 40 MG tablet Take 40 mg by mouth daily      lactobacillus (CULTURELLE) capsule Take 1 capsule by mouth daily (with breakfast) 30 capsule     docusate sodium (COLACE) 100 MG capsule Take 1 capsule by mouth 2 times daily 30 capsule 0    OXYGEN Inhale 3 L into the lungs continuous       promethazine (PHENERGAN) 12.5 MG tablet Take 1 tablet by mouth every 4 hours as needed for Nausea 60 tablet 3    ferrous sulfate 325 (65 FE) MG tablet Take 1 tablet by mouth every 12 hours With a meal. 60 tablet 3     Allergies   Allergen Reactions    Codeine Itching    Moxifloxacin Other (See Comments)     Listed as allergy from Critical access hospital    Oxycodone Other (See Comments)     Listed as allergy from Critical access hospital     Pcn [Penicillins] Hives and Rash    Warfarin And Related Other (See Comments)     listed as allergy from Critical access hospital       Nursing Notes Reviewed    Physical Exam:  Triage VS:    ED Triage Vitals   Enc Vitals Group      BP --       Pulse 09/08/21 1247 65      Resp 09/08/21 1245 20      Temp 09/08/21 1247 98.2 °F (36.8 °C) Temp Source 09/08/21 1247 Oral      SpO2 09/08/21 1247 92 %      Weight 09/08/21 1245 173 lb (78.5 kg)      Height 09/08/21 1245 5' 1\" (1.549 m)      Head Circumference --       Peak Flow --       Pain Score --       Pain Loc --       Pain Edu? --       Excl. in [address]? --        My pulse ox interpretation is - normal    General appearance:  No acute distress. Skin:  Warm. Dry. Eye:  Extraocular movements intact. Ears, nose, mouth and throat:  Oral mucosa moist   Neck:  Trachea midline. Extremity:  No swelling. Normal ROM     Heart:  Regular rate and rhythm, normal S1 & S2, no extra heart sounds. Perfusion:  intact  Respiratory:  Lungs clear to auscultation bilaterally. Respirations nonlabored. Abdominal:   Soft. Nontender. Non distended.   Neurological:  Alert and oriented           Psychiatric:  Appropriate    I have reviewed and interpreted all of the currently available lab results from this visit (if applicable):  Results for orders placed or performed during the hospital encounter of 09/08/21   COVID-19, Rapid    Specimen: Nasopharyngeal   Result Value Ref Range    Source THROAT     SARS-CoV-2, NAAT NOT DETECTED NOT DETECTED   Rapid influenza A/B antigens    Specimen: Nasopharyngeal   Result Value Ref Range    Rapid Influenza A Ag NEGATIVE NEGATIVE    Rapid Influenza B Ag NEGATIVE NEGATIVE   CBC Auto Differential   Result Value Ref Range    WBC 9.5 4.0 - 10.5 K/CU MM    RBC 3.75 (L) 4.2 - 5.4 M/CU MM    Hemoglobin 10.2 (L) 12.5 - 16.0 GM/DL    Hematocrit 35.9 (L) 37 - 47 %    MCV 95.7 78 - 100 FL    MCH 27.2 27 - 31 PG    MCHC 28.4 (L) 32.0 - 36.0 %    RDW 13.6 11.7 - 14.9 %    Platelets 832 262 - 906 K/CU MM    MPV 10.5 7.5 - 11.1 FL    Differential Type AUTOMATED DIFFERENTIAL     Segs Relative 80.1 (H) 36 - 66 %    Lymphocytes % 10.2 (L) 24 - 44 %    Monocytes % 6.7 (H) 0 - 4 %    Eosinophils % 2.4 0 - 3 %    Basophils % 0.2 0 - 1 %    Segs Absolute 7.6 K/CU MM    Lymphocytes Absolute 1.0 K/CU MM    Monocytes Absolute 0.6 K/CU MM    Eosinophils Absolute 0.2 K/CU MM    Basophils Absolute 0.0 K/CU MM    Nucleated RBC % 0.0 %    Total Nucleated RBC 0.0 K/CU MM    Total Immature Neutrophil 0.04 K/CU MM    Immature Neutrophil % 0.4 0 - 0.43 %   Comprehensive Metabolic Panel   Result Value Ref Range    Sodium 138 135 - 145 MMOL/L    Potassium 4.6 3.5 - 5.1 MMOL/L    Chloride 97 (L) 99 - 110 mMol/L    CO2 33 (H) 21 - 32 MMOL/L    BUN 30 (H) 6 - 23 MG/DL    CREATININE 1.3 (H) 0.6 - 1.1 MG/DL    Glucose 111 (H) 70 - 99 MG/DL    Calcium 9.0 8.3 - 10.6 MG/DL    Albumin 3.2 (L) 3.4 - 5.0 GM/DL    Total Protein 7.0 6.4 - 8.2 GM/DL    Total Bilirubin 0.2 0.0 - 1.0 MG/DL    ALT 8 (L) 10 - 40 U/L    AST 20 15 - 37 IU/L    Alkaline Phosphatase 92 40 - 128 IU/L    GFR Non- 39 (L) >60 mL/min/1.73m2    GFR  48 (L) >60 mL/min/1.73m2    Anion Gap 8 4 - 16   Troponin   Result Value Ref Range    Troponin T <0.010 <0.01 NG/ML   Brain Natriuretic Peptide   Result Value Ref Range    Pro-.5 <300 PG/ML   Troponin   Result Value Ref Range    Troponin T <0.010 <0.01 NG/ML   EKG 12 Lead   Result Value Ref Range    Ventricular Rate 65 BPM    Atrial Rate 65 BPM    P-R Interval 178 ms    QRS Duration 86 ms    Q-T Interval 420 ms    QTc Calculation (Bazett) 436 ms    P Axis 57 degrees    R Axis -12 degrees    T Axis 13 degrees    Diagnosis       Normal sinus rhythm  Inferior infarct , age undetermined  Abnormal ECG  When compared with ECG of 22-DEC-2017 08:21,  Inferior infarct is now present        Radiographs (if obtained):  Radiologist's Report Reviewed:  No results found. EKG (if obtained): (All EKG's are interpreted by myself in the absence of a cardiologist)  Normal sinus rhythm with rate of 65 bpm, normal intervals. No ST elevation. No previous to compare. MDM:  42-year-old female with history as above presents with concern for shortness of breath.   She is in no distress on my exam, but is requiring oxygen increased from her baseline, currently on 4 L and typically is on 3. Plan for x-rays, labs, Covid and reassess. Covid testing is negative. Chest x-ray concerning for possible pneumonia with left pleural effusion, has been having coughing, not sure if fevers, is producing some sputum, plan for antibiotics until rule out infection. Does also have a mild SALTY, plan for admission. Patient is agreeable, discussed with hospitalist team    Clinical Impression:  1. Hypoxia    2. Pneumonia of left lung due to infectious organism, unspecified part of lung    3. Pleural effusion    4. SALTY (acute kidney injury) ([facility] [address]      Disposition referral (if applicable):  No follow-up provider specified. Disposition medications (if applicable):  New Prescriptions    No medications on file     ED Provider Disposition Time  DISPOSITION Admitted 09/08/2021 02:59:25 PM      Comment: Please note this report has been produced using speech recognition software and may contain errors related to that system including errors in grammar, punctuation, and spelling, as well as words and phrases that may be inappropriate. Efforts were made to edit the dictations.         Madalyn Penaloza MD  09/08/21 1006

## 2021-09-08 NOTE — ED NOTES
Pt sleeping     Ernesto Rajan, [facility], [facility]  09/08/21 0101

## 2021-09-08 NOTE — H&P
History and Physical      Name:  Noemí Greene /Age/Sex: 1940  ([de-identified] y.o. female)   MRN & CSN:  [id number] & [id number] Admission Date/Time: 2021 12:44 PM   Location:  ED30/ED-30 PCP: Rashad Glass MD       Hospital Day: 1    Assessment and Plan:   Noemí Greene is a [de-identified] y.o.  female  who presents with Pneumonia and chest pain     Assessment and plan   · Shortness of breath most likely secondary to pneumonia-multifactorial COPD/chronic diastolic heart failure:  · Pneumonia:  Resident of  [facility]  Requiring 4 L/NC/O2, baseline supplemental 3 L/NC/O2  X-ray small left pleural effusion and left base opacity  Rapid Covid negative, Hx COVID-19 positive Nov 4t   WBC 9.5 afebrile, not tachycardic not tachypneic    · Chest pain, atypical: With cardiac history of CAD, hypertension  Negative troponin x1, proBNP 110  EKG normal sinus rhythm, HR 65, QTc 436      · Mild SALTY with CKD stage IIIa  CR 1.3/BUN 13 ED with baseline CR 1.1-1.2    · Morbid obesity: BMI 32.69 kg/m2      Chronic conditions: Continue home medications as ordered  · Hypertension: BP stable  · Chronic diastolic heart failure: Not on exacerbation,   · Hyperlipidemia: Continue statin  · CAD/P heart cath   · Depression  · Hx GI bleeding/upper GI bleed: Hb 10.2/HCT 35.9  · Hx COVID-19 positive Nov 4   · ELIER  · Ex-smoker  · Hypothyroidism: Continue Synthroid    Plan   Continue Maxipime and vancomycin, deescalate antibiotics depending upon the blood culture  Inpatient pharmacy consult for vancomycin dosing  Pending blood culture, respiratory culture, Legionella urine strep pneumoniae and influenza AMB  Continue pulse oximetry, respiratory care evaluation, bronchodilator, keep SPO2 more than 92%   CBC and BMP daily  Avoid nephrotoxic drugs, renal dosing  Pending procalcitonin level,   0.9 normal saline at 50 cc/h x 1 day    Diet  dysphagia diet   DVT Prophylaxis [] Lovenox, []  Heparin, [x] SCDs, [] Ambulation   GI Prophylaxis [x] PPI,  [] H2 Blocker,  [] Carafate,  [] Diet/Tube Feeds   Code Status  full code     History of Present Illness:     Chief Complaint: Pneumonia  Jair Montiel is a [de-identified] y.o. female with a history of hypertension, CAD, COPD, pneumonia, ELIER female consult from [place] [address] home with ongoing chest pain and SOB for one and half days. Patient states she was sent because of having fluid in the lungs. She denies chest pain at the time of evaluation. Per chart review ,EMS was called for ongoing chest pain and shortness of breath. Patient currently requiring 4 L/O2/NC per nasal cannula. Remarkable ED findings: BUN 30, CR 1.3, Hb 10.2. Vital signs stable. Rapid Covid negative x-ray showed elevated left hemidiaphragm with small left pleural effusion and left base opacity. Patient received Maxipime and vancomycin in the ED    . Review of Systems   Constitutional: Negative for chills, fatigue and fever. HENT: Negative. Respiratory: Positive for cough and shortness of breath. Cardiovascular: Positive for chest pain. Gastrointestinal: Negative for abdominal pain, blood in stool, diarrhea, nausea and vomiting. Abdominal pain   Genitourinary: Negative for decreased urine volume, dyspareunia and hematuria. Musculoskeletal: Positive for gait problem. Mostly bedbound   Neurological: Positive for weakness. Negative for dizziness and numbness. Psychiatric/Behavioral: The patient is not nervous/anxious. Ten point ROS reviewed negative, unless as noted above    Objective:   No intake or output data in the 24 hours ending 09/08/21 1601   Vitals:   Vitals:    09/08/21 1554   BP: 117/66   Pulse: 64   Resp: 14   Temp: 98.2   SpO2: 96%     Physical Exam:   Physical Exam  Constitutional:       General: She is not in acute distress. Appearance: She is obese. She is not ill-appearing. HENT:      Head: Normocephalic.       Nose: Nose normal.      Mouth/Throat:      Mouth: Mucous membranes are moist.      Pharynx: Oropharynx is clear. Eyes:      Extraocular Movements: Extraocular movements intact. Conjunctiva/sclera: Conjunctivae normal.   Cardiovascular:      Rate and Rhythm: Normal rate and regular rhythm. Pulses: Normal pulses. Heart sounds: Normal heart sounds. Pulmonary:      Effort: Pulmonary effort is normal.      Breath sounds: Decreased breath sounds present. Abdominal:      General: Bowel sounds are normal.      Palpations: Abdomen is soft. Tenderness: There is no abdominal tenderness. There is no guarding or rebound. Musculoskeletal:      Comments: Mild RLE & LLE edema     Skin:     General: Skin is warm. Capillary Refill: Capillary refill takes 2 to 3 seconds. Neurological:      Mental Status: She is alert and oriented to person, place, and time. Mental status is at baseline. Psychiatric:         Mood and Affect: Mood normal.         Behavior: Behavior normal.       Past Medical History:      Past Medical History:   Diagnosis Date    ASHD (arteriosclerotic heart disease)     Bilateral edema of lower extremity 12/16/2015    [facility]est[facility]     COPD [facility]chronic obstructive pulmonary disease) [facility]     Depression     Hypertension      PSHX:  has a past surgical history that includes Anus surgery; Hysterectomy; Appendectomy; Tonsillectomy; and Upper gastrointestinal endoscopy (N/A, 11/25/2020). Allergies: Allergies   Allergen Reactions    Codeine Itching    Moxifloxacin Other (See Comments)     Listed as allergy from e[facility]    Oxycodone Other (See Comments)     Listed as allergy from e[facility]     Pcn [Penicillins] Hives and Rash    Warfarin And Related Other (See Comments)     listed as allergy from e[facility]       FAM HX: family history includes Diabetes in her sister; Early Death in her father; Heart Disease in her mother; High Blood Pressure in her mother; Substance Abuse in her father.   Soc HX:   Social History Socioeconomic History    Marital status:      Spouse name: Shane Roman Number of children: 3    Years of education: None    Highest education level: None   Occupational History    None   Tobacco Use    Smoking status: Former Smoker     Packs/day: 1.00     Types: Cigarettes     Quit date: 2010     Years since quittin.0    Smokeless tobacco: Never Used   Substance and Sexual Activity    Alcohol use: Yes     Alcohol/week: 0.0 standard drinks     Comment: wine twice a year    Drug use: No    Sexual activity: Yes     Partners: Male   Other Topics Concern    None   Social History Narrative    None     Social Determinants of Health     Financial Resource Strain:     Difficulty of Paying Living Expenses:    Food Insecurity:     Worried About Running Out of Food in the Last Year:     Ran Out of Food in the Last Year:    Transportation Needs:     Lack of Transportation (Medical):      Lack of Transportation (Non-Medical):    Physical Activity:     Days of Exercise per Week:     Minutes of Exercise per Session:    Stress:     Feeling of Stress :    Social Connections:     Frequency of Communication with Friends and Family:     Frequency of Social Gatherings with Friends and Family:     Attends Jainism Services:     Active Member of Clubs or Organizations:     Attends Club or Organization Meetings:     Marital Status:    Intimate Partner Violence:     Fear of Current or Ex-Partner:     Emotionally Abused:     Physically Abused:     Sexually Abused:        Medications:   Medications:    vancomycin  20 mg/kg IntraVENous Once    atorvastatin  40 mg Oral Daily    calcium citrate-vitamin D  1 tablet Oral BID WC    docusate sodium  100 mg Oral BID    ferrous sulfate  325 mg Oral Q12H    [START ON 2021] lactobacillus  1 capsule Oral Daily with breakfast    levothyroxine  75 mcg Oral Daily    [START ON 2021] linaclotide  145 mcg Oral QAM AC    mirtazapine  15 mg Oral Nightly    pantoprazole  40 mg Oral BID AC    potassium chloride  10 mEq Oral Daily    sodium chloride flush  5-40 mL IntraVENous 2 times per day    [START ON 9/9/2021] cefepime  2,000 mg IntraVENous Q12H      Infusions:    sodium chloride 100 mL/hr at 09/08/21 1521    sodium chloride       PRN Meds: albuterol sulfate HFA, 2 puff, Q4H PRN  sodium chloride flush, 5-40 mL, PRN  sodium chloride, 25 mL, PRN  ondansetron, 4 mg, Q8H PRN   Or  ondansetron, 4 mg, Q6H PRN  polyethylene glycol, 17 g, Daily PRN  acetaminophen, 650 mg, Q6H PRN   Or  acetaminophen, 650 mg, Q6H PRN  albuterol, 2.5 mg, Q4H PRN        X-ray chest portable 9/8/2021  Elevated left hemidiaphragm with small left pleural effusion and left base opacity that may reflect atelectasis or pneumonia if the patient has fever or leukocytosis. ECHO 12/24/2017    Summary   Left ventricular function is low normal.   Ejection fraction is visually estimated 50%. No evidence of diastolic dysfunction. 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 Trace mitral and tricuspid regurgitation visualized. RVSP is 25 mmHg. No evidence of pericardial effusion.     Signature      ------------------------------------------------------------------   Electronically signed by Gallo Rivera MD   (Interpreting physician) on 12/24/2017 at 12:15 PM   ------------------------------------------------------------------        Electronically signed by AMPARO Bailey on 9/8/2021 at 4:01 PM

## 2021-09-09 LAB
ANION GAP SERPL CALCULATED.3IONS-SCNC: 8 MMOL/L (ref 4–16)
BUN BLDV-MCNC: 25 MG/DL (ref 6–23)
CALCIUM SERPL-MCNC: 8.5 MG/DL (ref 8.3–10.6)
CHLORIDE BLD-SCNC: 102 MMOL/L (ref 99–110)
CO2: 31 MMOL/L (ref 21–32)
CREAT SERPL-MCNC: 1.1 MG/DL (ref 0.6–1.1)
EKG ATRIAL RATE: 65 BPM
EKG DIAGNOSIS: NORMAL
EKG P AXIS: 57 DEGREES
EKG P-R INTERVAL: 178 MS
EKG Q-T INTERVAL: 420 MS
EKG QRS DURATION: 86 MS
EKG QTC CALCULATION (BAZETT): 436 MS
EKG R AXIS: -12 DEGREES
EKG T AXIS: 13 DEGREES
EKG VENTRICULAR RATE: 65 BPM
GFR AFRICAN AMERICAN: 58 ML/MIN/1.73M2
GFR NON-AFRICAN AMERICAN: 48 ML/MIN/1.73M2
GLUCOSE BLD-MCNC: 93 MG/DL (ref 70–99)
HCT VFR BLD CALC: 34.5 % (ref 37–47)
HEMOGLOBIN: 9.4 GM/DL (ref 12.5–16)
LEGIONELLA URINARY AG: NEGATIVE
MCH RBC QN AUTO: 26.6 PG (ref 27–31)
MCHC RBC AUTO-ENTMCNC: 27.2 % (ref 32–36)
MCV RBC AUTO: 97.5 FL (ref 78–100)
PDW BLD-RTO: 13.7 % (ref 11.7–14.9)
PLATELET # BLD: 282 K/CU MM (ref 140–440)
PMV BLD AUTO: 10.4 FL (ref 7.5–11.1)
POTASSIUM SERPL-SCNC: 4.7 MMOL/L (ref 3.5–5.1)
PROCALCITONIN: 0.05
RBC # BLD: 3.54 M/CU MM (ref 4.2–5.4)
SODIUM BLD-SCNC: 141 MMOL/L (ref 135–145)
STREP PNEUMONIAE ANTIGEN: NORMAL
WBC # BLD: 9.2 K/CU MM (ref 4–10.5)

## 2021-09-09 PROCEDURE — 87186 SC STD MICRODIL/AGAR DIL: CPT

## 2021-09-09 PROCEDURE — 2580000003 HC RX 258: Performed by: CLINICAL NURSE SPECIALIST

## 2021-09-09 PROCEDURE — 36415 COLL VENOUS BLD VENIPUNCTURE: CPT

## 2021-09-09 PROCEDURE — 87147 CULTURE TYPE IMMUNOLOGIC: CPT

## 2021-09-09 PROCEDURE — 87899 AGENT NOS ASSAY W/OPTIC: CPT

## 2021-09-09 PROCEDURE — 6370000000 HC RX 637 (ALT 250 FOR IP)

## 2021-09-09 PROCEDURE — 80048 BASIC METABOLIC PNL TOTAL CA: CPT

## 2021-09-09 PROCEDURE — 6370000000 HC RX 637 (ALT 250 FOR IP): Performed by: NURSE PRACTITIONER

## 2021-09-09 PROCEDURE — 6370000000 HC RX 637 (ALT 250 FOR IP): Performed by: CLINICAL NURSE SPECIALIST

## 2021-09-09 PROCEDURE — 87449 NOS EACH ORGANISM AG IA: CPT

## 2021-09-09 PROCEDURE — 6360000002 HC RX W HCPCS: Performed by: INTERNAL MEDICINE

## 2021-09-09 PROCEDURE — 87070 CULTURE OTHR SPECIMN AEROBIC: CPT

## 2021-09-09 PROCEDURE — 87077 CULTURE AEROBIC IDENTIFY: CPT

## 2021-09-09 PROCEDURE — 1200000000 HC SEMI PRIVATE

## 2021-09-09 PROCEDURE — 94761 N-INVAS EAR/PLS OXIMETRY MLT: CPT

## 2021-09-09 PROCEDURE — 2700000000 HC OXYGEN THERAPY PER DAY

## 2021-09-09 PROCEDURE — 6360000002 HC RX W HCPCS: Performed by: CLINICAL NURSE SPECIALIST

## 2021-09-09 PROCEDURE — 6370000000 HC RX 637 (ALT 250 FOR IP): Performed by: INTERNAL MEDICINE

## 2021-09-09 PROCEDURE — 85027 COMPLETE CBC AUTOMATED: CPT

## 2021-09-09 PROCEDURE — 87205 SMEAR GRAM STAIN: CPT

## 2021-09-09 PROCEDURE — 93010 ELECTROCARDIOGRAM REPORT: CPT | Performed by: INTERNAL MEDICINE

## 2021-09-09 RX ORDER — LANOLIN ALCOHOL/MO/W.PET/CERES
3 CREAM (GRAM) TOPICAL NIGHTLY PRN
Status: DISCONTINUED | OUTPATIENT
Start: 2021-09-09 | End: 2021-09-16 | Stop reason: HOSPADM

## 2021-09-09 RX ORDER — ALPRAZOLAM 0.25 MG/1
0.25 TABLET ORAL DAILY PRN
Status: DISCONTINUED | OUTPATIENT
Start: 2021-09-09 | End: 2021-09-16 | Stop reason: HOSPADM

## 2021-09-09 RX ORDER — LANOLIN ALCOHOL/MO/W.PET/CERES
CREAM (GRAM) TOPICAL
Status: COMPLETED
Start: 2021-09-09 | End: 2021-09-09

## 2021-09-09 RX ORDER — LANOLIN ALCOHOL/MO/W.PET/CERES
3 CREAM (GRAM) TOPICAL ONCE
Status: COMPLETED | OUTPATIENT
Start: 2021-09-09 | End: 2021-09-09

## 2021-09-09 RX ADMIN — ENOXAPARIN SODIUM 40 MG: 40 INJECTION SUBCUTANEOUS at 21:56

## 2021-09-09 RX ADMIN — SODIUM CHLORIDE: 9 INJECTION, SOLUTION INTRAVENOUS at 05:33

## 2021-09-09 RX ADMIN — Medication 250 MG: at 21:56

## 2021-09-09 RX ADMIN — DOCUSATE SODIUM 100 MG: 100 CAPSULE, LIQUID FILLED ORAL at 08:56

## 2021-09-09 RX ADMIN — POTASSIUM CHLORIDE 10 MEQ: 1500 TABLET, EXTENDED RELEASE ORAL at 08:56

## 2021-09-09 RX ADMIN — SODIUM CHLORIDE, PRESERVATIVE FREE 10 ML: 5 INJECTION INTRAVENOUS at 21:55

## 2021-09-09 RX ADMIN — FERROUS SULFATE TAB 325 MG (65 MG ELEMENTAL FE) 325 MG: 325 (65 FE) TAB at 15:10

## 2021-09-09 RX ADMIN — Medication 3 MG: at 01:06

## 2021-09-09 RX ADMIN — FERROUS SULFATE TAB 325 MG (65 MG ELEMENTAL FE) 325 MG: 325 (65 FE) TAB at 05:24

## 2021-09-09 RX ADMIN — Medication 250 MG: at 09:01

## 2021-09-09 RX ADMIN — PANTOPRAZOLE SODIUM 40 MG: 40 TABLET, DELAYED RELEASE ORAL at 15:10

## 2021-09-09 RX ADMIN — LEVOTHYROXINE SODIUM 75 MCG: 0.07 TABLET ORAL at 05:25

## 2021-09-09 RX ADMIN — DOCUSATE SODIUM 100 MG: 100 CAPSULE, LIQUID FILLED ORAL at 00:59

## 2021-09-09 RX ADMIN — Medication 3 MG: at 23:54

## 2021-09-09 RX ADMIN — MIRTAZAPINE 15 MG: 15 TABLET, FILM COATED ORAL at 01:00

## 2021-09-09 RX ADMIN — ATORVASTATIN CALCIUM 40 MG: 40 TABLET, FILM COATED ORAL at 08:56

## 2021-09-09 RX ADMIN — MIRTAZAPINE 15 MG: 15 TABLET, FILM COATED ORAL at 21:55

## 2021-09-09 RX ADMIN — ALPRAZOLAM 0.25 MG: 0.25 TABLET ORAL at 15:10

## 2021-09-09 RX ADMIN — ACETAMINOPHEN 650 MG: 325 TABLET ORAL at 01:00

## 2021-09-09 RX ADMIN — CEFEPIME HYDROCHLORIDE 2000 MG: 2 INJECTION, POWDER, FOR SOLUTION INTRAVENOUS at 02:48

## 2021-09-09 RX ADMIN — Medication 1 CAPSULE: at 08:56

## 2021-09-09 RX ADMIN — DOCUSATE SODIUM 100 MG: 100 CAPSULE, LIQUID FILLED ORAL at 21:55

## 2021-09-09 RX ADMIN — ACETAMINOPHEN 650 MG: 325 TABLET ORAL at 22:02

## 2021-09-09 RX ADMIN — PANTOPRAZOLE SODIUM 40 MG: 40 TABLET, DELAYED RELEASE ORAL at 05:25

## 2021-09-09 ASSESSMENT — PAIN DESCRIPTION - PAIN TYPE
TYPE: ACUTE PAIN
TYPE: ACUTE PAIN;CHRONIC PAIN

## 2021-09-09 ASSESSMENT — PAIN SCALES - GENERAL
PAINLEVEL_OUTOF10: 0
PAINLEVEL_OUTOF10: 9
PAINLEVEL_OUTOF10: 9
PAINLEVEL_OUTOF10: 0
PAINLEVEL_OUTOF10: 9

## 2021-09-09 ASSESSMENT — PAIN DESCRIPTION - ONSET
ONSET: ON-GOING
ONSET: ON-GOING

## 2021-09-09 ASSESSMENT — PAIN DESCRIPTION - LOCATION
LOCATION: CHEST;KNEE
LOCATION: CHEST

## 2021-09-09 ASSESSMENT — PAIN DESCRIPTION - PROGRESSION
CLINICAL_PROGRESSION: NOT CHANGED
CLINICAL_PROGRESSION: NOT CHANGED

## 2021-09-09 ASSESSMENT — PAIN - FUNCTIONAL ASSESSMENT
PAIN_FUNCTIONAL_ASSESSMENT: PREVENTS OR INTERFERES SOME ACTIVE ACTIVITIES AND ADLS
PAIN_FUNCTIONAL_ASSESSMENT: PREVENTS OR INTERFERES SOME ACTIVE ACTIVITIES AND ADLS

## 2021-09-09 ASSESSMENT — PAIN DESCRIPTION - DESCRIPTORS
DESCRIPTORS: ACHING;SHARP
DESCRIPTORS: SHARP

## 2021-09-09 ASSESSMENT — PAIN DESCRIPTION - FREQUENCY
FREQUENCY: INTERMITTENT
FREQUENCY: INTERMITTENT

## 2021-09-09 NOTE — PROGRESS NOTES
Hospitalist Progress Note      Name:  Jenni Augustine /Age/Sex: 1940  ([de-identified] y.o. female)   MRN & CSN:  [id number] & [id number] Admission Date/Time: 2021 12:44 PM   Location:  -A PCP: Concetta Nash MD         Hospital Day: 2    Assessment and Plan:   Jenni Augustine is a [de-identified] y.o.  female with PMH of HTN, CHF, CAD, COPD and depression who was admitted with shortness of breath thought to be of multifactorial etiology including atelectasis and COPD exacerbation on top of baseline CHF and elevated diaphragm. Patient has sure of COVID-19 and 2020. This is unlikely pneumonia in the absence of fever and leukocytosis    #Shortness of breath due to COPD exacerbation on top of atelectasis, diaphragmatic elevation and baseline chronic diastolic CHF. -Bronchodilator inhalers. -Give nebs as needed.  -No indication for antibiotics at this point.  -Give incentive spirometry.  -Continue diuretics and monitor fluid status. #Reproducible chest/epigastric pain concerning for GERD. -Serial troponin is negative. -EKG is unremarkable for evidence of acute ischemic changes.  -Analgesics and antacids as needed.  -Continue PPI. #Chronic hypoxemic respiratory failure on supplemental oxygen. #Essential hypertension not on antihypertensive  #Hyperlipidemia on atorvastatin  #CAD status post heart cath in . Continue atorvastatin. Not on antiplatelets/BB  #Anxiety and depression on mirtazapine. #Hypothyroidism on levothyroxine. Diet ADULT DIET;  Dysphagia - Minced and Moist   DVT Prophylaxis [] Lovenox, []  Heparin, [x] SCDs, [] Ambulation   GI Prophylaxis [x] PPI,  [] H2 Blocker,  [] Carafate,  [] Diet/Tube Feeds   Code Status Full Code   Disposition Patient requires continued admission due to persistent shortness of breath due to COPD exacerbation   MDM [] Low, [] Moderate,[x]  High  Patient's risk as above due to shortness of breath due to COPD exacerbation, atelectasis on top of baseline diaphragmatic elevation and chronic diastolic CHF at risk of decompensation. History of Present Illness:     Chief Complaint:   Patient was seen and examined in the morning. Chart reviewed and case discussed with RN. No acute event overnight. Patient complained of shortness of breath and cough productive of whitish phlegm. She admits to dyspnea but no leg swelling. Ten point ROS reviewed negative, unless as noted above    Objective: Intake/Output Summary (Last 24 hours) at 9/9/2021 0957  Last data filed at 9/9/2021 0532  Gross per 24 hour   Intake 795.83 ml   Output 400 ml   Net 395.83 ml      Vitals:   Vitals:    09/09/21 0845   BP: 133/62   Pulse: 67   Resp: 15   Temp: 98.2 °F (36.8 °C)   SpO2: 99%     Physical Exam:   GEN Awake female, sitting upright in bed in no apparent distress. Appears given age. EYES No scleral erythema, discharge, or conjunctivitis. HENT Mucous membranes are moist. No evidence of thrush. NECK Supple, no apparent thyromegaly or masses. RESP Wheezes noted on the left hemithorax but no rales or rhonchi. Symmetric chest movement while on oxygen by nasal cannula. Tenderness on the epigastric and sternum. CARDIO/VASC S1/S2 auscultated. Regular rate without appreciable murmurs, rubs, or gallops. No JVD or carotid bruits. Peripheral pulses equal bilaterally and palpable. Trace bilateral peripheral pedal edema. GI Abdomen is soft with mild epigastric tenderness, masses, or guarding. Bowel sounds are normoactive.  No costovertebral angle tenderness. Handley catheter is not present. HEME/LYMPH No palpable cervical lymphadenopathy and no hepatosplenomegaly. No petechiae or ecchymoses. MSK No gross joint deformities. SKIN Normal coloration, warm, dry. NEURO Cranial nerves appear grossly intact, normal speech, no lateralizing weakness. PSYCH Awake, alert, oriented x 4. Affect appropriate.     Medications:   Medications:    atorvastatin  40 mg Oral Daily    Calcium

## 2021-09-09 NOTE — PLAN OF CARE
Problem: Falls - Risk of:  Goal: Will remain free from falls  Description: Will remain free from falls  Outcome: Ongoing  Goal: Absence of physical injury  Description: Absence of physical injury  Outcome: Ongoing     Problem: Pain:  Goal: Pain level will decrease  Description: Pain level will decrease  Outcome: Ongoing  Goal: Control of acute pain  Description: Control of acute pain  Outcome: Ongoing  Goal: Control of chronic pain  Description: Control of chronic pain  Outcome: Ongoing     Problem: Skin Integrity:  Goal: Will show no infection signs and symptoms  Description: Will show no infection signs and symptoms  Outcome: Completed  Goal: Absence of new skin breakdown  Description: Absence of new skin breakdown  Outcome: Completed     Problem: Discharge Planning:  Goal: Discharged to appropriate level of care  Description: Discharged to appropriate level of care  Outcome: Ongoing     Problem:  Activity Intolerance:  Goal: Ability to tolerate increased activity will improve  Description: Ability to tolerate increased activity will improve  Outcome: Ongoing     Problem: Airway Clearance - Ineffective:  Goal: Ability to maintain a clear airway will improve  Description: Ability to maintain a clear airway will improve  Outcome: Ongoing     Problem: Breathing Pattern - Ineffective:  Goal: Ability to achieve and maintain a regular respiratory rate will improve  Description: Ability to achieve and maintain a regular respiratory rate will improve  Outcome: Ongoing     Problem: Gas Exchange - Impaired:  Goal: Levels of oxygenation will improve  Description: Levels of oxygenation will improve  Outcome: Ongoing

## 2021-09-09 NOTE — ADT AUTH CERT
Pneumonia - Care Day 2 (9/9/2021) by Chad Garcia RN       Review Status Review Entered   Completed 9/9/2021 13:33      Criteria Review      Care Day: 2 Care Date: 9/9/2021 Level of Care: Inpatient Floor    Guideline Day 2    Clinical Status    (X) * No CO2 retention or acidosis    9/9/2021 1:33 PM EDT by Yahaira Farrell      none    (X) * No requirement for mechanical ventilation    9/9/2021 1:33 PM EDT by Yahaira Farrell      none    (X) * Hypotension absent    9/9/2021 1:33 PM EDT by Yahaira Farrell      none    (X) * Afebrile or fever improved    9/9/2021 1:33 PM EDT by Yahaira Farrell      no fever    (X) * No hypoxia on room air or oxygenation improved    9/9/2021 1:33 PM EDT by Yahaira Farrell      no hypoxia; baseline 3L NC --- now on 4L NC    (X) * Mental status improved or at baseline    9/9/2021 1:33 PM EDT by Yahaira Farrell      ao x3    Activity    ( ) * Increased activity    Routes    (X) Oral medications    9/9/2021 1:33 PM EDT by Yahaira Farrell      tolerates po home meds    (X) Usual diet    9/9/2021 1:33 PM EDT by Yahaira Farrell      minced dysphagia    Interventions    (X) Pulse oximetry    9/9/2021 1:33 PM EDT by Yahaira Farrell      95% 4L NC    (X) Possible oxygen    9/9/2021 1:33 PM EDT by Yahaira Farrell      4L NC    Medications    (X) IV or oral antibiotics    9/9/2021 1:33 PM EDT by Yahaira Farrell      cefepime 2g IV x1    * Milestone   Additional Notes   9/9   97.9, RR 15, 22, 18, 15; HR 63, 72, 131/65, 95% 4L NC       bun 25, gfr tejal 48   H/H 9.4/34.5, mchc 27.2      cefepime 2g IV x1   0.9NS @ 50mL/hr IV cont   --------------------------------    IM   No acute event overnight. SP  Wheezes noted on the left hemithorax but no rales or rhonchi.  Symmetric chest movement while on oxygen by nasal cannula. Tenderness on the epigastric and sternum.       A/P   admitted with shortness of breath thought to be of multifactorial etiology including atelectasis and COPD exacerbation on top of baseline CHF and elevated diaphragm. This is unlikely pneumonia in the absence of fever and leukocytosis       #Shortness of breath due to COPD exacerbation on top of atelectasis, diaphragmatic elevation and baseline chronic diastolic CHF. -Bronchodilator inhalers. -Give nebs as needed.   -No indication for antibiotics at this point.   -Give incentive spirometry.   -Continue diuretics and monitor fluid status.       #Reproducible chest/epigastric pain concerning for GERD. -Serial troponin is negative. -EKG is unremarkable for evidence of acute ischemic changes.   -Analgesics and antacids as needed.   -Continue PPI.       Patient requires continued admission due to persistent shortness of breath due to COPD exacerbation         Pneumonia - Care Day 1 (9/8/2021) by Matthew Julio RN       Review Status Review Entered   Completed 9/9/2021 13:33      Criteria Review      Care Day: 1 Care Date: 9/8/2021 Level of Care: Inpatient Floor    Guideline Day 1    Clinical Status    (X) * Clinical Indications met    9/9/2021 1:33 PM EDT by Charlene Peralta      yes PSI 4    (X) Possible Fever, dyspnea, purulent sputum, pleuritic pain, Altered mental status    9/9/2021 1:33 PM EDT by Charlene Peralta      white productive sputum, SOB, cp    Routes    (X) Possible IV fluids    9/9/2021 1:33 PM EDT by Charlene Peralta      0.9NS @ 50mL/hr    (X) Parenteral or oral medications    9/9/2021 1:33 PM EDT by Charlene Peralta      cefepime 2g IV x1  iron 325mg po   vancomycin 1500mg IV x1  0.9NS @ 50mL/hr IV cont    Interventions    (X) WBC    9/9/2021 1:33 PM EDT by Charlene Peralta      WNL    (X) Probable oxygen    9/9/2021 1:33 PM EDT by Charlene Peralta      3L NC baseline    * Milestone   Additional Notes   9/8- ER to Med Surg   98.2, RR 19, 15; HR 65-61; 110/60, 92% ra      segs rel 80.1%, mchc 28.4%, H/H 10.2/35.9   cl 97, co2 33, bun 30, creat 1.3, gfr tejal 39      cefepime 2g IV x1   iron 325mg po Clinical Indications for Admission to Inpatient Care by Mari Menendez RN       Review Status Review Entered   Completed 9/9/2021 13:29      Criteria Review      Clinical Indications for Admission to Inpatient Care    Most Recent :  Matthew Pettit Most Recent Date: 9/9/2021 1:29 PM EDT    (X) Admission is indicated for  1 or more  of the following  [A] [B] (1) (2) (9) (10) (11):       (X) Moderate-risk-category or high-risk-category patient [C] (Pneumonia Severity Index class IV       or V, or CURB-65 score of 3 or greater)       9/9/2021 1:29 PM EDT by Matthew Pettit         Class IV PSI: pleural effusion, age [de-identified], nursing home, CHF, BUN 30

## 2021-09-10 PROBLEM — R07.89 OTHER CHEST PAIN: Status: ACTIVE | Noted: 2021-09-10

## 2021-09-10 PROBLEM — I50.32 CHRONIC DIASTOLIC CHF (CONGESTIVE HEART FAILURE) (HCC): Status: ACTIVE | Noted: 2017-12-22

## 2021-09-10 PROCEDURE — 6360000002 HC RX W HCPCS: Performed by: INTERNAL MEDICINE

## 2021-09-10 PROCEDURE — 6370000000 HC RX 637 (ALT 250 FOR IP): Performed by: INTERNAL MEDICINE

## 2021-09-10 PROCEDURE — 2580000003 HC RX 258: Performed by: CLINICAL NURSE SPECIALIST

## 2021-09-10 PROCEDURE — 6370000000 HC RX 637 (ALT 250 FOR IP): Performed by: NURSE PRACTITIONER

## 2021-09-10 PROCEDURE — 6370000000 HC RX 637 (ALT 250 FOR IP): Performed by: CLINICAL NURSE SPECIALIST

## 2021-09-10 PROCEDURE — 87081 CULTURE SCREEN ONLY: CPT

## 2021-09-10 PROCEDURE — 1200000000 HC SEMI PRIVATE

## 2021-09-10 PROCEDURE — 94761 N-INVAS EAR/PLS OXIMETRY MLT: CPT

## 2021-09-10 RX ORDER — TRAMADOL HYDROCHLORIDE 50 MG/1
50 TABLET ORAL EVERY 4 HOURS PRN
Status: DISCONTINUED | OUTPATIENT
Start: 2021-09-10 | End: 2021-09-16 | Stop reason: HOSPADM

## 2021-09-10 RX ORDER — TRAMADOL HYDROCHLORIDE 50 MG/1
50 TABLET ORAL ONCE
Status: COMPLETED | OUTPATIENT
Start: 2021-09-10 | End: 2021-09-10

## 2021-09-10 RX ADMIN — ATORVASTATIN CALCIUM 40 MG: 40 TABLET, FILM COATED ORAL at 09:47

## 2021-09-10 RX ADMIN — Medication 1 CAPSULE: at 09:47

## 2021-09-10 RX ADMIN — ENOXAPARIN SODIUM 40 MG: 40 INJECTION SUBCUTANEOUS at 09:47

## 2021-09-10 RX ADMIN — DOCUSATE SODIUM 100 MG: 100 CAPSULE, LIQUID FILLED ORAL at 09:47

## 2021-09-10 RX ADMIN — SODIUM CHLORIDE: 9 INJECTION, SOLUTION INTRAVENOUS at 03:14

## 2021-09-10 RX ADMIN — Medication 250 MG: at 09:47

## 2021-09-10 RX ADMIN — ACETAMINOPHEN 650 MG: 325 TABLET ORAL at 20:45

## 2021-09-10 RX ADMIN — TRAMADOL HYDROCHLORIDE 50 MG: 50 TABLET, FILM COATED ORAL at 07:15

## 2021-09-10 RX ADMIN — FERROUS SULFATE TAB 325 MG (65 MG ELEMENTAL FE) 325 MG: 325 (65 FE) TAB at 05:49

## 2021-09-10 RX ADMIN — ALPRAZOLAM 0.25 MG: 0.25 TABLET ORAL at 17:46

## 2021-09-10 RX ADMIN — Medication 3 MG: at 20:41

## 2021-09-10 RX ADMIN — TRAMADOL HYDROCHLORIDE 50 MG: 50 TABLET, FILM COATED ORAL at 23:42

## 2021-09-10 RX ADMIN — POTASSIUM CHLORIDE 10 MEQ: 1500 TABLET, EXTENDED RELEASE ORAL at 09:47

## 2021-09-10 RX ADMIN — PANTOPRAZOLE SODIUM 40 MG: 40 TABLET, DELAYED RELEASE ORAL at 05:49

## 2021-09-10 RX ADMIN — FERROUS SULFATE TAB 325 MG (65 MG ELEMENTAL FE) 325 MG: 325 (65 FE) TAB at 17:45

## 2021-09-10 RX ADMIN — DOCUSATE SODIUM 100 MG: 100 CAPSULE, LIQUID FILLED ORAL at 20:41

## 2021-09-10 RX ADMIN — SODIUM CHLORIDE: 9 INJECTION, SOLUTION INTRAVENOUS at 20:40

## 2021-09-10 RX ADMIN — LEVOTHYROXINE SODIUM 75 MCG: 0.07 TABLET ORAL at 05:49

## 2021-09-10 RX ADMIN — Medication 250 MG: at 17:46

## 2021-09-10 RX ADMIN — PANTOPRAZOLE SODIUM 40 MG: 40 TABLET, DELAYED RELEASE ORAL at 17:46

## 2021-09-10 RX ADMIN — MIRTAZAPINE 15 MG: 15 TABLET, FILM COATED ORAL at 20:41

## 2021-09-10 ASSESSMENT — PAIN DESCRIPTION - PROGRESSION
CLINICAL_PROGRESSION: NOT CHANGED
CLINICAL_PROGRESSION: NOT CHANGED

## 2021-09-10 ASSESSMENT — PAIN DESCRIPTION - FREQUENCY: FREQUENCY: INTERMITTENT

## 2021-09-10 ASSESSMENT — PAIN SCALES - GENERAL
PAINLEVEL_OUTOF10: 9
PAINLEVEL_OUTOF10: 6
PAINLEVEL_OUTOF10: 0
PAINLEVEL_OUTOF10: 0
PAINLEVEL_OUTOF10: 9
PAINLEVEL_OUTOF10: 8

## 2021-09-10 ASSESSMENT — PAIN DESCRIPTION - LOCATION
LOCATION: CHEST
LOCATION: ABDOMEN;BACK

## 2021-09-10 ASSESSMENT — PAIN DESCRIPTION - ONSET
ONSET: ON-GOING
ONSET: ON-GOING

## 2021-09-10 ASSESSMENT — PAIN DESCRIPTION - DESCRIPTORS
DESCRIPTORS: ACHING;SHARP
DESCRIPTORS: ACHING

## 2021-09-10 ASSESSMENT — PAIN DESCRIPTION - PAIN TYPE: TYPE: ACUTE PAIN

## 2021-09-10 NOTE — CARE COORDINATION
LESVIAW reviewed chart. Pt is from [address] [place] [address] at [facility]. Call to [facility] Pt does not need anything to return. Pt can return whenever medically stable.

## 2021-09-10 NOTE — DISCHARGE INSTR - COC
Continuity of Care Form    Patient Name: Stefan Wagoner   :  1940  MRN:  [id number]    [facility] date:  2021  Discharge date:  2021      Code Status Order: Full Code   Advance Directives:     Admitting Physician:  Elyn Seip, MD  PCP: Karolyn Max MD    Discharging Nurse: [address] Unit/Room#: 1102/1102-A  Discharging Unit Phone Number: [phone]    Emergency Contact:   Extended Emergency Contact Information  Primary Emergency Contact: Trung García  Address: [address] [place] [address] Phone: [phone]  Relation: Spouse    Past Surgical History:  Past Surgical History:   Procedure Laterality Date    ANUS SURGERY      fistula repair    APPENDECTOMY      HYSTERECTOMY      TONSILLECTOMY      UPPER GASTROINTESTINAL ENDOSCOPY N/A 2020    EGD BIOPSY performed by Shahzad Staples MD at [facility] ENDOSCOPY       Immunization History:   Immunization History   Administered Date(s) Administered    COVID-19, Casiano Peter, PF, 30mcg/0.3mL 2020, 2021    Influenza Virus Vaccine 2017    Pneumococcal Conjugate 13-valent (Santo Pounds) 2016    Pneumococcal Polysaccharide ([id number]) 2015       Active Problems:  Patient Active Problem List   Diagnosis Code    Coronary atherosclerosis of native coronary artery I25.10    Anemia D64.9    Bilateral edema of lower extremity R60.0    Moderate COPD (chronic obstructive pulmonary disease) ([address] J44.9    COPD exacerbation ([address] J44.1    Morbid obesity due to excess calories ([address] E66.01    Acute on chronic diastolic congestive heart failure ([address] I50.33    Depression F32.9    Hypertension I10    Pneumonia J18.9    Acute on chronic diastolic ACC/AHA stage C congestive heart failure (HCC) I50.33    GIB (gastrointestinal bleeding) K92.2       Isolation/Infection:   Isolation          No Isolation        Patient Infection Status     Infection Onset Added Last Indicated Last Indicated By Review Planned Expiration Resolved Resolved By    None active    Resolved    COVID-19 Rule Out 09/08/21 09/08/21 09/08/21 COVID-19, Rapid (Ordered)   09/08/21 Rule-Out Test Resulted          Nurse Assessment:  Last Vital Signs: /62   Pulse 72   Temp 99.4 °F (37.4 °C) (Oral)   Resp 19   Ht 5' 1\" (1.549 m)   Wt 218 lb 4.1 oz (99 kg)   SpO2 93%   BMI 41.24 kg/m²     Last documented pain score (0-10 scale): Pain Level: 9  Last Weight:   Wt Readings from Last 1 Encounters:   09/09/21 218 lb 4.1 oz (99 kg)     Mental Status:  oriented    IV Access:  - None    Nursing Mobility/ADLs:  Walking   Assisted  Transfer  Assisted  Bathing  Dependent  Dressing  Dependent  Toileting  Dependent  Feeding  Assisted  Med Admin  Dependent  Med Delivery   whole    Wound Care Documentation and Therapy:  Pressure Ulcer 09/02/16 Buttocks Right Two small, circular (Active)   Number of days: 0302        Elimination:  Continence:   · Bowel: No  · Bladder: No  Urinary Catheter: None   Colostomy/Ileostomy/Ileal Conduit: No       Date of Last BM: 09/16/2021    Intake/Output Summary (Last 24 hours) at 9/10/2021 0921  Last data filed at 9/9/2021 2155  Gross per 24 hour   Intake 10 ml   Output 600 ml   Net -590 ml     I/O last 3 completed shifts: In: 10 [I.V.:10]  Out: 600 [Urine:600]    Safety Concerns:      At Risk for Falls    Impairments/Disabilities:      None    Patient's personal belongings (please select all that are sent with patient):  Glasses, cell phone    RN SIGNATURE:  Electronically signed by Agustin Solo RN on 9/16/21 at 10:14 AM EDT    CASE MANAGEMENT/SOCIAL WORK SECTION    Inpatient Status Date: ***    Readmission Risk Assessment Score:  Readmission Risk              Risk of Unplanned Readmission:  18           Discharging to Facility/ Agency   · Name:   · Address:  · Phone:  · Fax:    Dialysis Facility (if applicable)   · Name:  · Address:  · Dialysis Schedule:  · Phone:  · Fax:    / signature: {Esignature:[id number]}    PHYSICIAN SECTION    Nutrition Therapy:  Current Nutrition Therapy:   50Con[address] Luz CLAUDIA Diet List:661505764}    Routes of Feeding: {CHP DME Other Feedings:730218308}  Liquids: {Slp liquid thickness:46485}  Daily Fluid Restriction: {CHP DME Yes amt example:453911541}  Last Modified Barium Swallow with Video (Video Swallowing Test): {Done Not Done IDCV:260665052}    Treatments at the Time of Hospital Discharge:   Respiratory Treatments: ***  Oxygen Therapy:  {Therapy; copd oxygen:55939}  Ventilator:    {[facility] Vent OXXY:952928766}    Rehab Therapies: {THERAPEUTIC INTERVENTION:3491249626}  Weight Bearing Status/Restrictions: 50Cony PAREKH Weight Bearin}  Other Medical Equipment (for information only, NOT a DME order):  {EQUIPMENT:985365724}  Other Treatments: ***    Prognosis: {Prognosis:8350620327}    Condition at Discharge: 50Cony Luz Patient Condition:706283299}    Rehab Potential (if transferring to Rehab): {Prognosis:3398574815}    Recommended Labs or Other Treatments After Discharge: ***    Physician Certification: I certify the above information and transfer of Samara Peña  is necessary for the continuing treatment of the diagnosis listed and that she requires {Admit to Appropriate Level of Care:89597} for {GREATER/LESS:930652510} 30 days.      Update Admission H&P: {CHP DME Changes in QGYTK:642735441}    PHYSICIAN SIGNATURE:  {Esignature:718932035}

## 2021-09-10 NOTE — PROGRESS NOTES
Hospitalist Progress Note      Name:  Maria Alejandra Albright /Age/Sex: 1940  ([de-identified] y.o. female)   MRN & CSN:  [id number] & [id number] Admission Date/Time: 2021 12:44 PM   Location:  [facility] PCP: Brian Sewell MD         Hospital Day: 3    Assessment and Plan:   Maria Alejandra Albright is a [de-identified] y.o.  female with PMH of HTN, CHF, CAD, COPD and depression who was admitted with shortness of breath thought to be of multifactorial etiology including atelectasis and COPD exacerbation on top of baseline CHF and elevated diaphragm. Patient has history of COVID-19 on 2020. This is unlikely pneumonia in the absence of fever and leukocytosis     #Shortness of breath due to COPD exacerbation on top of atelectasis, diaphragmatic elevation and baseline chronic diastolic CHF. -Bronchodilator inhalers. -Give nebs as needed.  -No indication for antibiotics at this point.  -Give incentive spirometry.  -Continue diuretics and monitor fluid status.     #Reproducible chest/epigastric pain concerning for GERD. -Serial troponin is negative. -EKG is unremarkable for evidence of acute ischemic changes.  -Analgesics and antacids as needed.  -Continue PPI.     #Chronic hypoxemic respiratory failure on supplemental oxygen. #Essential hypertension not on antihypertensive  #Hyperlipidemia on atorvastatin  #CAD status post heart cath in . Continue atorvastatin. Not on antiplatelets/BB  #Anxiety and depression on mirtazapine. #Hypothyroidism on levothyroxine.       Diet ADULT DIET;  Dysphagia - Minced and Moist   DVT Prophylaxis [] Lovenox, []  Heparin, [x] SCDs, [] Ambulation   GI Prophylaxis [x] PPI,  [] H2 Blocker,  [] Carafate,  [] Diet/Tube Feeds   Code Status Full Code   Disposition Patient requires continued admission due to persistent shortness of breath due to COPD exacerbation and awaiting PT and OT evaluation   MDM [] Low, [] Moderate,[x]  High  Patient's risk as above due to COPD exacerbation, chronic hypoxemic respiratory failure, chronic diastolic CHF and chest pain     History of Present Illness:     Chief Complaint:   Patient was seen and examined in the morning. Chart reviewed and case discussed with RN. No acute event overnight.     Patient continues to complain of shortness of breath and cough. Ten point ROS reviewed negative, unless as noted above    Objective: Intake/Output Summary (Last 24 hours) at 9/10/2021 1130  Last data filed at 9/9/2021 2155  Gross per 24 hour   Intake 10 ml   Output 600 ml   Net -590 ml      Vitals:   Vitals:    09/10/21 0945   BP: (!) 132/53   Pulse: 70   Resp: 17   Temp: 98.6 °F (37 °C)   SpO2: 95%     Physical Exam:   GEN    Awake female, sitting upright in bed in no apparent distress. Appears given age. EYES   No scleral erythema, discharge, or conjunctivitis. HENT  Mucous membranes are moist. No evidence of thrush. NECK  Supple, no apparent thyromegaly or masses. RESP  Wheezes noted on the left hemithorax but no rales or rhonchi. Symmetric chest movement while on oxygen by nasal cannula. Tenderness on the epigastric and sternum. CARDIO/VASC           S1/S2 auscultated. Regular rate without appreciable murmurs, rubs, or gallops. No JVD or carotid bruits. Peripheral pulses equal bilaterally and palpable. Trace bilateral peripheral pedal edema. GI        Abdomen is soft with mild epigastric tenderness, masses, or guarding. Bowel sounds are normoactive.        No costovertebral angle tenderness. Handley catheter is not present. HEME/LYMPH            No palpable cervical lymphadenopathy and no hepatosplenomegaly. No petechiae or ecchymoses. MSK    No gross joint deformities. SKIN    Normal coloration, warm, dry. NEURO           Cranial nerves appear grossly intact, normal speech, no lateralizing weakness. PSYCH            Awake, alert, oriented x 4. Affect appropriate.     Medications:   Medications:    enoxaparin  40 mg SubCUTAneous Daily    atorvastatin  40 mg Oral Daily    Calcium Carb-Cholecalciferol  1 tablet Oral BID WC    docusate sodium  100 mg Oral BID    ferrous sulfate  325 mg Oral Q12H    lactobacillus  1 capsule Oral Daily with breakfast    levothyroxine  75 mcg Oral Daily    linaclotide  145 mcg Oral QAM AC    mirtazapine  15 mg Oral Nightly    pantoprazole  40 mg Oral BID AC    potassium chloride  10 mEq Oral Daily    sodium chloride flush  5-40 mL IntraVENous 2 times per day      Infusions:    sodium chloride 50 mL/hr at 09/10/21 0314    sodium chloride       PRN Meds: ALPRAZolam, 0.25 mg, Daily PRN  melatonin, 3 mg, Nightly PRN  albuterol sulfate HFA, 2 puff, Q4H PRN  sodium chloride flush, 5-40 mL, PRN  sodium chloride, 25 mL, PRN  ondansetron, 4 mg, Q8H PRN   Or  ondansetron, 4 mg, Q6H PRN  polyethylene glycol, 17 g, Daily PRN  acetaminophen, 650 mg, Q6H PRN   Or  acetaminophen, 650 mg, Q6H PRN  albuterol, 2.5 mg, Q4H PRN          Electronically signed by Valma Kanner, MD on 9/10/2021 at 11:30 AM

## 2021-09-11 PROCEDURE — 6370000000 HC RX 637 (ALT 250 FOR IP): Performed by: INTERNAL MEDICINE

## 2021-09-11 PROCEDURE — 6360000002 HC RX W HCPCS: Performed by: INTERNAL MEDICINE

## 2021-09-11 PROCEDURE — 2580000003 HC RX 258: Performed by: CLINICAL NURSE SPECIALIST

## 2021-09-11 PROCEDURE — 6370000000 HC RX 637 (ALT 250 FOR IP): Performed by: CLINICAL NURSE SPECIALIST

## 2021-09-11 PROCEDURE — 6370000000 HC RX 637 (ALT 250 FOR IP): Performed by: NURSE PRACTITIONER

## 2021-09-11 PROCEDURE — 1200000000 HC SEMI PRIVATE

## 2021-09-11 PROCEDURE — 2700000000 HC OXYGEN THERAPY PER DAY

## 2021-09-11 PROCEDURE — 94761 N-INVAS EAR/PLS OXIMETRY MLT: CPT

## 2021-09-11 RX ORDER — PREDNISONE 20 MG/1
40 TABLET ORAL DAILY
Status: DISPENSED | OUTPATIENT
Start: 2021-09-11 | End: 2021-09-16

## 2021-09-11 RX ADMIN — PANTOPRAZOLE SODIUM 40 MG: 40 TABLET, DELAYED RELEASE ORAL at 10:48

## 2021-09-11 RX ADMIN — FERROUS SULFATE TAB 325 MG (65 MG ELEMENTAL FE) 325 MG: 325 (65 FE) TAB at 18:04

## 2021-09-11 RX ADMIN — SODIUM CHLORIDE, PRESERVATIVE FREE 10 ML: 5 INJECTION INTRAVENOUS at 21:14

## 2021-09-11 RX ADMIN — TRAMADOL HYDROCHLORIDE 50 MG: 50 TABLET, FILM COATED ORAL at 18:12

## 2021-09-11 RX ADMIN — DOCUSATE SODIUM 100 MG: 100 CAPSULE, LIQUID FILLED ORAL at 21:14

## 2021-09-11 RX ADMIN — LEVOTHYROXINE SODIUM 75 MCG: 0.07 TABLET ORAL at 06:02

## 2021-09-11 RX ADMIN — Medication 250 MG: at 18:04

## 2021-09-11 RX ADMIN — ATORVASTATIN CALCIUM 40 MG: 40 TABLET, FILM COATED ORAL at 10:48

## 2021-09-11 RX ADMIN — SODIUM CHLORIDE: 9 INJECTION, SOLUTION INTRAVENOUS at 18:05

## 2021-09-11 RX ADMIN — ENOXAPARIN SODIUM 40 MG: 40 INJECTION SUBCUTANEOUS at 10:49

## 2021-09-11 RX ADMIN — PANTOPRAZOLE SODIUM 40 MG: 40 TABLET, DELAYED RELEASE ORAL at 18:04

## 2021-09-11 RX ADMIN — DOCUSATE SODIUM 100 MG: 100 CAPSULE, LIQUID FILLED ORAL at 10:48

## 2021-09-11 RX ADMIN — FERROUS SULFATE TAB 325 MG (65 MG ELEMENTAL FE) 325 MG: 325 (65 FE) TAB at 03:36

## 2021-09-11 RX ADMIN — PANTOPRAZOLE SODIUM 40 MG: 40 TABLET, DELAYED RELEASE ORAL at 06:03

## 2021-09-11 RX ADMIN — MIRTAZAPINE 15 MG: 15 TABLET, FILM COATED ORAL at 21:14

## 2021-09-11 RX ADMIN — POTASSIUM CHLORIDE 10 MEQ: 1500 TABLET, EXTENDED RELEASE ORAL at 18:05

## 2021-09-11 RX ADMIN — PREDNISONE 40 MG: 20 TABLET ORAL at 18:04

## 2021-09-11 RX ADMIN — Medication 1 CAPSULE: at 18:04

## 2021-09-11 RX ADMIN — TRAMADOL HYDROCHLORIDE 50 MG: 50 TABLET, FILM COATED ORAL at 06:02

## 2021-09-11 RX ADMIN — SODIUM CHLORIDE, PRESERVATIVE FREE 10 ML: 5 INJECTION INTRAVENOUS at 10:49

## 2021-09-11 ASSESSMENT — PAIN SCALES - GENERAL
PAINLEVEL_OUTOF10: 6
PAINLEVEL_OUTOF10: 0
PAINLEVEL_OUTOF10: 0
PAINLEVEL_OUTOF10: 4

## 2021-09-11 NOTE — PROGRESS NOTES
Hospitalist Progress Note      Name:  Jair Montiel /Age/Sex: 1940  ([de-identified] y.o. female)   MRN & CSN:  [id number] & [id number] Admission Date/Time: 2021 12:44 PM   Location:  -A PCP: Artur Parry MD         Hospital Day: 4    Assessment and Plan:   Elliot Smith is a [de-identified] y.o.  female with PMH of HTN, CHF, CAD, COPD and depression who was admitted with shortness of breath thought to be of multifactorial etiology including atelectasis and COPD exacerbation on top of baseline CHF and elevated diaphragm. Patient has history of COVID-19 on 2020. This is unlikely pneumonia in the absence of fever and leukocytosis     #Shortness of breath due to COPD exacerbation on top of atelectasis, diaphragmatic elevation and baseline chronic diastolic CHF. -Continue bronchodilator inhalers. -Give nebs as needed. -Give short course of steroid  -No indication for antibiotics at this point.  -Continue incentive spirometry.  -Not usually on diuretics and monitor fluid status.  -This patient is non-mobile at baseline.     #Reproducible chest/epigastric pain concerning for GERD. -Serial troponin is negative. -EKG is unremarkable for evidence of acute ischemic changes.  -Analgesics and antacids as needed.  -Continue PPI.     #Chronic hypoxemic respiratory failure on supplemental oxygen. #Essential hypertension not on antihypertensive  #Hyperlipidemia on atorvastatin  #CAD status post heart cath in . Continue atorvastatin. Not on antiplatelets/BB  #Anxiety and depression on mirtazapine. #Hypothyroidism on levothyroxine. Diet ADULT DIET; Dysphagia - Minced and Moist   DVT Prophylaxis [] Lovenox, []  Heparin, [x] SCDs, [] Ambulation   GI Prophylaxis [x] PPI,  [] H2 Blocker,  [] Carafate,  [] Diet/Tube Feeds   Code Status Full Code   Disposition Patient requires continued admission due to persistent shortness of breath due to COPD exacerbation.    MDM [] Low, [] Moderate,[x]  High  Patient's risk as above due to COPD exacerbation, chronic hypoxemic respiratory failure, chronic diastolic CHF and chest pain     History of Present Illness:     Chief Complaint:   Patient was seen and examined in the morning. Chart reviewed and case discussed with RN. No acute event overnight.     Patient continues to complain of shortness of breath and cough. Also reports wheezes. Ten point ROS reviewed negative, unless as noted above    Objective: Intake/Output Summary (Last 24 hours) at 9/11/2021 1347  Last data filed at 9/11/2021 0612  Gross per 24 hour   Intake 100 ml   Output 600 ml   Net -500 ml      Vitals:   Vitals:    09/11/21 1030   BP: 131/65   Pulse: 85   Resp: 14   Temp: 98.4 °F (36.9 °C)   SpO2: 94%     Physical Exam:   GEN    Awake female, sitting upright in bed in no apparent distress. Appears given age. EYES   No scleral erythema, discharge, or conjunctivitis. HENT  Mucous membranes are moist. No evidence of thrush. NECK  Supple, no apparent thyromegaly or masses. RESP  Wheezes still present on the left hemithorax but no rales or rhonchi.  Symmetric chest movement while on oxygen by nasal cannula. Tenderness on the epigastric and sternum. CARDIO/VASC           S1/S2 auscultated. Regular rate without appreciable murmurs, rubs, or gallops. No JVD or carotid bruits. Peripheral pulses equal bilaterally and palpable. Trace bilateral peripheral pedal edema. Kari Braun is soft with mild epigastric tenderness, masses, or guarding. Bowel sounds are normoactive.        No costovertebral angle tenderness. Handley catheter is not present. HEME/LYMPH            No palpable cervical lymphadenopathy and no hepatosplenomegaly. No petechiae or ecchymoses. MSK    No gross joint deformities. SKIN    Normal coloration, warm, dry. NEURO           Cranial nerves appear grossly intact, normal speech, no lateralizing weakness.   PSYCH            Awake, alert, oriented x 4.  Affect appropriate.     Medications:   Medications:    enoxaparin  40 mg SubCUTAneous Daily    atorvastatin  40 mg Oral Daily    Calcium Carb-Cholecalciferol  1 tablet Oral BID WC    docusate sodium  100 mg Oral BID    ferrous sulfate  325 mg Oral Q12H    lactobacillus  1 capsule Oral Daily with breakfast    levothyroxine  75 mcg Oral Daily    linaclotide  145 mcg Oral QAM AC    mirtazapine  15 mg Oral Nightly    pantoprazole  40 mg Oral BID AC    potassium chloride  10 mEq Oral Daily    sodium chloride flush  5-40 mL IntraVENous 2 times per day      Infusions:    sodium chloride 50 mL/hr at 09/10/21 2040    sodium chloride       PRN Meds: traMADol, 50 mg, Q4H PRN  ALPRAZolam, 0.25 mg, Daily PRN  melatonin, 3 mg, Nightly PRN  albuterol sulfate HFA, 2 puff, Q4H PRN  sodium chloride flush, 5-40 mL, PRN  sodium chloride, 25 mL, PRN  ondansetron, 4 mg, Q8H PRN   Or  ondansetron, 4 mg, Q6H PRN  polyethylene glycol, 17 g, Daily PRN  acetaminophen, 650 mg, Q6H PRN   Or  acetaminophen, 650 mg, Q6H PRN  albuterol, 2.5 mg, Q4H PRN          Electronically signed by Guera Loyloa MD on 9/11/2021 at 1:47 PM

## 2021-09-11 NOTE — PROGRESS NOTES
Physical Therapy    Attempted to see pt for PT evaluation, upon entry to room and start of PLOF questioning pt reports that she is a LTC resident at [facility], does not get out of bed without assistance. Patient reports that she uses a bedpan for toileting (does not get up to commode) and that if she ever had a need to get out of bed she would get out of bed using lift equipment, does not stand or ambulate per pt report. Will hold therapy evaluation pending clarification of PLOF, if dependent at baseline will discontinue order.

## 2021-09-11 NOTE — PROGRESS NOTES
Occupational Therapy    Presented to pt room to complete evaluation. Upon entering pt states she is baseline dependent for all ADLs/IADLs and does not ambulate within assisted living facility. OT should follow-up to consult with nursing regarding PLOF and determine need for OT evaluation.

## 2021-09-12 ENCOUNTER — APPOINTMENT (OUTPATIENT)
Dept: ULTRASOUND IMAGING | Age: 81
DRG: 178 | End: 2021-09-12
Payer: MEDICARE

## 2021-09-12 LAB
ANION GAP SERPL CALCULATED.3IONS-SCNC: 9 MMOL/L (ref 4–16)
APTT: 34.6 SECONDS (ref 25.1–37.1)
BUN BLDV-MCNC: 26 MG/DL (ref 6–23)
CALCIUM SERPL-MCNC: 8.9 MG/DL (ref 8.3–10.6)
CHLORIDE BLD-SCNC: 102 MMOL/L (ref 99–110)
CO2: 27 MMOL/L (ref 21–32)
CREAT SERPL-MCNC: 1.1 MG/DL (ref 0.6–1.1)
CULTURE: ABNORMAL
FERRITIN: 85 NG/ML (ref 15–150)
FOLATE: >20 NG/ML (ref 3.1–17.5)
GFR AFRICAN AMERICAN: 58 ML/MIN/1.73M2
GFR NON-AFRICAN AMERICAN: 48 ML/MIN/1.73M2
GLUCOSE BLD-MCNC: 121 MG/DL (ref 70–99)
GRAM SMEAR: ABNORMAL
HCT VFR BLD CALC: 29.9 % (ref 37–47)
HCT VFR BLD CALC: 30.7 % (ref 37–47)
HCT VFR BLD CALC: 32.1 % (ref 37–47)
HEMOCCULT SP1 STL QL: POSITIVE
HEMOGLOBIN: 8.3 GM/DL (ref 12.5–16)
HEMOGLOBIN: 8.6 GM/DL (ref 12.5–16)
HEMOGLOBIN: 9 GM/DL (ref 12.5–16)
INR BLD: 0.93 INDEX
IRON: 29 UG/DL (ref 37–145)
Lab: ABNORMAL
Lab: ABNORMAL
MAGNESIUM: 2.1 MG/DL (ref 1.8–2.4)
MCH RBC QN AUTO: 26.9 PG (ref 27–31)
MCHC RBC AUTO-ENTMCNC: 27.8 % (ref 32–36)
MCV RBC AUTO: 96.8 FL (ref 78–100)
PCT TRANSFERRIN: 13 % (ref 10–44)
PDW BLD-RTO: 13.7 % (ref 11.7–14.9)
PLATELET # BLD: 255 K/CU MM (ref 140–440)
PMV BLD AUTO: 10.7 FL (ref 7.5–11.1)
POTASSIUM SERPL-SCNC: 5.6 MMOL/L (ref 3.5–5.1)
PROTHROMBIN TIME: 12 SECONDS (ref 11.7–14.5)
RBC # BLD: 3.09 M/CU MM (ref 4.2–5.4)
SODIUM BLD-SCNC: 138 MMOL/L (ref 135–145)
SPECIMEN: ABNORMAL
SPECIMEN: ABNORMAL
T4 FREE: 1.18 NG/DL (ref 0.9–1.8)
TOTAL IRON BINDING CAPACITY: 215 UG/DL (ref 250–450)
TROPONIN T: 0.01 NG/ML
TSH HIGH SENSITIVITY: 0.23 UIU/ML (ref 0.27–4.2)
UNSATURATED IRON BINDING CAPACITY: 186 UG/DL (ref 110–370)
VITAMIN B-12: 1850 PG/ML (ref 211–911)
WBC # BLD: 9 K/CU MM (ref 4–10.5)

## 2021-09-12 PROCEDURE — 6370000000 HC RX 637 (ALT 250 FOR IP): Performed by: NURSE PRACTITIONER

## 2021-09-12 PROCEDURE — 80048 BASIC METABOLIC PNL TOTAL CA: CPT

## 2021-09-12 PROCEDURE — 83550 IRON BINDING TEST: CPT

## 2021-09-12 PROCEDURE — 83540 ASSAY OF IRON: CPT

## 2021-09-12 PROCEDURE — 82746 ASSAY OF FOLIC ACID SERUM: CPT

## 2021-09-12 PROCEDURE — 93971 EXTREMITY STUDY: CPT

## 2021-09-12 PROCEDURE — 94761 N-INVAS EAR/PLS OXIMETRY MLT: CPT

## 2021-09-12 PROCEDURE — 84439 ASSAY OF FREE THYROXINE: CPT

## 2021-09-12 PROCEDURE — 84484 ASSAY OF TROPONIN QUANT: CPT

## 2021-09-12 PROCEDURE — 1200000000 HC SEMI PRIVATE

## 2021-09-12 PROCEDURE — 82270 OCCULT BLOOD FECES: CPT

## 2021-09-12 PROCEDURE — 85027 COMPLETE CBC AUTOMATED: CPT

## 2021-09-12 PROCEDURE — 82607 VITAMIN B-12: CPT

## 2021-09-12 PROCEDURE — 84443 ASSAY THYROID STIM HORMONE: CPT

## 2021-09-12 PROCEDURE — 6370000000 HC RX 637 (ALT 250 FOR IP): Performed by: INTERNAL MEDICINE

## 2021-09-12 PROCEDURE — 85730 THROMBOPLASTIN TIME PARTIAL: CPT

## 2021-09-12 PROCEDURE — 82728 ASSAY OF FERRITIN: CPT

## 2021-09-12 PROCEDURE — 6370000000 HC RX 637 (ALT 250 FOR IP): Performed by: CLINICAL NURSE SPECIALIST

## 2021-09-12 PROCEDURE — 2580000003 HC RX 258: Performed by: CLINICAL NURSE SPECIALIST

## 2021-09-12 PROCEDURE — 6360000002 HC RX W HCPCS: Performed by: INTERNAL MEDICINE

## 2021-09-12 PROCEDURE — 36415 COLL VENOUS BLD VENIPUNCTURE: CPT

## 2021-09-12 PROCEDURE — 83735 ASSAY OF MAGNESIUM: CPT

## 2021-09-12 PROCEDURE — 2700000000 HC OXYGEN THERAPY PER DAY

## 2021-09-12 PROCEDURE — 85610 PROTHROMBIN TIME: CPT

## 2021-09-12 PROCEDURE — 85014 HEMATOCRIT: CPT

## 2021-09-12 PROCEDURE — 85018 HEMOGLOBIN: CPT

## 2021-09-12 RX ORDER — PANTOPRAZOLE SODIUM 40 MG/10ML
40 INJECTION, POWDER, LYOPHILIZED, FOR SOLUTION INTRAVENOUS EVERY 12 HOURS
Status: DISCONTINUED | OUTPATIENT
Start: 2021-09-12 | End: 2021-09-16 | Stop reason: HOSPADM

## 2021-09-12 RX ORDER — FUROSEMIDE 10 MG/ML
40 INJECTION INTRAMUSCULAR; INTRAVENOUS DAILY
Status: DISCONTINUED | OUTPATIENT
Start: 2021-09-12 | End: 2021-09-16 | Stop reason: HOSPADM

## 2021-09-12 RX ORDER — METOPROLOL SUCCINATE 25 MG/1
25 TABLET, EXTENDED RELEASE ORAL DAILY
Status: DISCONTINUED | OUTPATIENT
Start: 2021-09-12 | End: 2021-09-16 | Stop reason: HOSPADM

## 2021-09-12 RX ORDER — SODIUM POLYSTYRENE SULFONATE 15 G/60ML
15 SUSPENSION ORAL; RECTAL ONCE
Status: COMPLETED | OUTPATIENT
Start: 2021-09-12 | End: 2021-09-12

## 2021-09-12 RX ADMIN — SODIUM POLYSTYRENE SULFONATE 15 G: 15 SUSPENSION ORAL; RECTAL at 10:13

## 2021-09-12 RX ADMIN — Medication 250 MG: at 15:28

## 2021-09-12 RX ADMIN — SODIUM CHLORIDE, PRESERVATIVE FREE 10 ML: 5 INJECTION INTRAVENOUS at 10:05

## 2021-09-12 RX ADMIN — METOPROLOL SUCCINATE 25 MG: 25 TABLET, EXTENDED RELEASE ORAL at 22:02

## 2021-09-12 RX ADMIN — FERROUS SULFATE TAB 325 MG (65 MG ELEMENTAL FE) 325 MG: 325 (65 FE) TAB at 15:28

## 2021-09-12 RX ADMIN — ACETAMINOPHEN 650 MG: 325 TABLET ORAL at 22:02

## 2021-09-12 RX ADMIN — MIRTAZAPINE 15 MG: 15 TABLET, FILM COATED ORAL at 22:02

## 2021-09-12 RX ADMIN — TRAMADOL HYDROCHLORIDE 50 MG: 50 TABLET, FILM COATED ORAL at 10:32

## 2021-09-12 RX ADMIN — FERROUS SULFATE TAB 325 MG (65 MG ELEMENTAL FE) 325 MG: 325 (65 FE) TAB at 04:54

## 2021-09-12 RX ADMIN — LEVOTHYROXINE SODIUM 75 MCG: 0.07 TABLET ORAL at 05:19

## 2021-09-12 RX ADMIN — DOCUSATE SODIUM 100 MG: 100 CAPSULE, LIQUID FILLED ORAL at 10:05

## 2021-09-12 RX ADMIN — PANTOPRAZOLE SODIUM 40 MG: 40 TABLET, DELAYED RELEASE ORAL at 05:19

## 2021-09-12 RX ADMIN — ENOXAPARIN SODIUM 40 MG: 40 INJECTION SUBCUTANEOUS at 10:06

## 2021-09-12 RX ADMIN — ALPRAZOLAM 0.25 MG: 0.25 TABLET ORAL at 10:32

## 2021-09-12 RX ADMIN — ATORVASTATIN CALCIUM 40 MG: 40 TABLET, FILM COATED ORAL at 10:05

## 2021-09-12 RX ADMIN — PREDNISONE 40 MG: 20 TABLET ORAL at 10:05

## 2021-09-12 RX ADMIN — Medication 3 MG: at 22:02

## 2021-09-12 RX ADMIN — FUROSEMIDE 40 MG: 10 INJECTION, SOLUTION INTRAMUSCULAR; INTRAVENOUS at 10:05

## 2021-09-12 RX ADMIN — Medication 1 TABLET: at 10:23

## 2021-09-12 RX ADMIN — DOCUSATE SODIUM 100 MG: 100 CAPSULE, LIQUID FILLED ORAL at 22:02

## 2021-09-12 RX ADMIN — Medication 1 CAPSULE: at 10:23

## 2021-09-12 ASSESSMENT — PAIN SCALES - GENERAL
PAINLEVEL_OUTOF10: 0
PAINLEVEL_OUTOF10: 10
PAINLEVEL_OUTOF10: 0
PAINLEVEL_OUTOF10: 10
PAINLEVEL_OUTOF10: 0
PAINLEVEL_OUTOF10: 10

## 2021-09-12 ASSESSMENT — PAIN DESCRIPTION - PROGRESSION
CLINICAL_PROGRESSION: GRADUALLY WORSENING

## 2021-09-12 ASSESSMENT — PAIN DESCRIPTION - ONSET
ONSET: ON-GOING
ONSET: ON-GOING

## 2021-09-12 ASSESSMENT — PAIN - FUNCTIONAL ASSESSMENT
PAIN_FUNCTIONAL_ASSESSMENT: ACTIVITIES ARE NOT PREVENTED
PAIN_FUNCTIONAL_ASSESSMENT: ACTIVITIES ARE NOT PREVENTED

## 2021-09-12 ASSESSMENT — PAIN DESCRIPTION - PAIN TYPE
TYPE: ACUTE PAIN
TYPE: ACUTE PAIN

## 2021-09-12 ASSESSMENT — PAIN DESCRIPTION - ORIENTATION
ORIENTATION: ANTERIOR
ORIENTATION: ANTERIOR

## 2021-09-12 ASSESSMENT — PAIN DESCRIPTION - DESCRIPTORS
DESCRIPTORS: HEADACHE
DESCRIPTORS: HEADACHE

## 2021-09-12 ASSESSMENT — PAIN DESCRIPTION - FREQUENCY
FREQUENCY: INTERMITTENT
FREQUENCY: INTERMITTENT

## 2021-09-12 ASSESSMENT — PAIN DESCRIPTION - LOCATION
LOCATION: HEAD
LOCATION: HEAD

## 2021-09-12 NOTE — PROGRESS NOTES
I have repeatedly asked for labs to be drawn for this patient's H&H MDM I would not the abdomen has been calling to get this done. It is important to note this patient's H&H and she has GI bleed. GI has been consulted as well. Continue PPI we will continue to monitor.

## 2021-09-12 NOTE — PROGRESS NOTES
Hospitalist Progress Note      Name:  Ulysses Dumont /Age/Sex: 1940  ([de-identified] y.o. female)   MRN & CSN:  [id number] & [id number] Admission Date/Time: 2021 12:44 PM   Location:  -A PCP: Farzana Hayes MD         Hospital Day: 5    Assessment and Plan:   Hayley Smith is a [de-identified] y.o.  female with PMH of HTN, CHF, CAD, COPD and depression who was admitted with shortness of breath thought to be of multifactorial etiology including atelectasis and COPD exacerbation on top of baseline CHF and elevated diaphragm. Patient has history of COVID-19 on 2020. This is unlikely pneumonia in the absence of fever and leukocytosis     #Shortness of breath due to COPD exacerbation on top of atelectasis, diaphragmatic elevation and baseline chronic diastolic CHF. She does have evidence of fluid overload at this time.  -Stop IV fluid infusion.  -Give IV Lasix 40 mg stat and monitor fluid status with I's/O's, daily weight and renal function.  -Continue bronchodilator inhalers. -Give nebs as needed.  -No indication for antibiotics at this point.  -Give incentive spirometry. -PT/OT input appreciated    #Tachycardia suspected to be A. fib with RVR and chest pain.  -Stat EKG. -Serial troponin.  -Keep on telemetry.  -Obtain echo and cardiology consult. #Worsening anemia.  -Check FOBT, iron studies, B12 and folate.  -Serial H&H and goal hemoglobin of 7.  -Continue pantoprazole twice daily.  -If FOBT is positive, consult GI. #Left upper extremity swelling of unclear etiology.  -Possibly infiltration from IV fluid. Relocated IV access.  -Left upper extremity Doppler ultrasound to rule out DVT.     #Reproducible chest/epigastric pain concerning for GERD. -Serial troponin is negative. -EKG is unremarkable for evidence of acute ischemic changes.  -Analgesics and antacids as needed.  -Continue PPI.     #Chronic hypoxemic respiratory failure on supplemental oxygen.   #Essential hypertension not on antihypertensive  #Hyperlipidemia on atorvastatin  #CAD status post heart cath in 2015. Continue atorvastatin. Not on antiplatelets/BB  #Anxiety and depression on mirtazapine. #Hypothyroidism on levothyroxine. Diet ADULT DIET; Dysphagia - Minced and Moist   DVT Prophylaxis [] Lovenox, []  Heparin, [x] SCDs, [] Ambulation   GI Prophylaxis [x] PPI,  [] H2 Blocker,  [] Carafate,  [] Diet/Tube Feeds   Code Status Full Code   Disposition Patient requires continued admission due to persistent shortness of breath due to COPD exacerbation  and fluid overload. Now with worsening anemia. MDM [] Low, [] Moderate,[x]  High  Patient's risk as above due to COPD exacerbation, worsening anemia, chronic hypoxemic respiratory failure, chronic diastolic CHF with fluid overload. History of Present Illness:     Chief Complaint:   Patient was seen and examined in the morning. Chart reviewed and case discussed with RN. No acute event overnight.     Patient  complaints of shortness of breath and chest pain. Ten point ROS reviewed negative, unless as noted above    Objective: Intake/Output Summary (Last 24 hours) at 9/12/2021 1306  Last data filed at 9/12/2021 0455  Gross per 24 hour   Intake --   Output 1005 ml   Net -1005 ml      Vitals:   Vitals:    09/12/21 0945   BP: 115/61   Pulse: 78   Resp: 16   Temp: 98.4 °F (36.9 °C)   SpO2: 93%     Physical Exam:   GEN    Awake female, lying in bed in no apparent distress. Appears given age. EYES   No scleral erythema, discharge, or conjunctivitis. HENT  Mucous membranes are moist. No evidence of thrush. NECK  Supple, JVD noted, no apparent thyromegaly or masses. RESP Decreased air entry bilaterally, R>L but no rales or rhonchi.  Symmetric chest movement while on oxygen by nasal cannula. CARDIO/VASC           S1/S2 auscultated. Regular rate with appreciable LV systolic whistling murmurs, no rubs, or gallops. No JVD or carotid bruits.  Trace bilateral peripheral pedal edema. Rolando Erkortneyon is soft with mild epigastric tenderness, masses, or guarding. Bowel sounds are normoactive.        No costovertebral angle tenderness. Handley catheter is not present. HEME/LYMPH            No palpable cervical lymphadenopathy and no hepatosplenomegaly. No petechiae or ecchymoses. MSK    Left upper extremity swelling. SKIN    Normal coloration, warm, dry. NEURO           Cranial nerves appear grossly intact, normal speech, no lateralizing weakness. PSYCH            Awake and alert.  Affect appropriate.     Medications:   Medications:    furosemide  40 mg IntraVENous Daily    predniSONE  40 mg Oral Daily    enoxaparin  40 mg SubCUTAneous Daily    atorvastatin  40 mg Oral Daily    Calcium Carb-Cholecalciferol  1 tablet Oral BID WC    docusate sodium  100 mg Oral BID    ferrous sulfate  325 mg Oral Q12H    lactobacillus  1 capsule Oral Daily with breakfast    levothyroxine  75 mcg Oral Daily    linaclotide  145 mcg Oral QAM AC    mirtazapine  15 mg Oral Nightly    pantoprazole  40 mg Oral BID AC    sodium chloride flush  5-40 mL IntraVENous 2 times per day      Infusions:    sodium chloride       PRN Meds: traMADol, 50 mg, Q4H PRN  ALPRAZolam, 0.25 mg, Daily PRN  melatonin, 3 mg, Nightly PRN  albuterol sulfate HFA, 2 puff, Q4H PRN  sodium chloride flush, 5-40 mL, PRN  sodium chloride, 25 mL, PRN  ondansetron, 4 mg, Q8H PRN   Or  ondansetron, 4 mg, Q6H PRN  polyethylene glycol, 17 g, Daily PRN  acetaminophen, 650 mg, Q6H PRN   Or  acetaminophen, 650 mg, Q6H PRN  albuterol, 2.5 mg, Q4H PRN          Electronically signed by Jose Reyes MD on 9/12/2021 at 1:06 PM

## 2021-09-12 NOTE — CONSULTS
Will dictate full note  tachycardia noted    Dictated-[id number]  Echo --Conclusions-2017   Summary   Left ventricular function is low normal.   Ejection fraction is visually estimated 50%. No evidence of diastolic dysfunction. 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 Trace mitral and tricuspid regurgitation visualized. RVSP is 25 mmHg. No evidence of pericardial effusion. Cath 2015--Dictated [id number]  LEFT MAIN  PATENT  LAD OSTIAL 60-70% STENOSIS --DISTAL 70% STENOSIS  LCX-MILD DX  RAMUS MILD DX  RCA-MILD DX  LVEDP 22     REFER CABG FOR SEVERE LAD DX--WILL WAIT DR. Alfredo Muller INPUT  NO COMPLICATIONS  UNABLE TO FFR OF LAD DUE TO TORTUOUS VESSEL         PAST MEDICAL HISTORY:  Coronary artery disease that has been treated  medically, history of diastolic dysfunction, pulmonary hypertension, COPD on  home oxygen. She uses a CPAP at night. Hypertension, gastritis in the past,  history of PE in the past, and DVT in the past.     PAST SURGICAL HISTORY:  Left knee surgery, appendectomy, hysterectomy, and  tonsillectomy.     Electronically signed by Isis Flores MD on 9/12/21 at 5:33 PM EDT

## 2021-09-13 ENCOUNTER — APPOINTMENT (OUTPATIENT)
Dept: GENERAL RADIOLOGY | Age: 81
DRG: 178 | End: 2021-09-13
Payer: MEDICARE

## 2021-09-13 LAB
ALBUMIN SERPL-MCNC: 3.3 GM/DL (ref 3.4–5)
ALP BLD-CCNC: 79 IU/L (ref 40–129)
ALT SERPL-CCNC: 8 U/L (ref 10–40)
ANION GAP SERPL CALCULATED.3IONS-SCNC: 6 MMOL/L (ref 4–16)
AST SERPL-CCNC: 16 IU/L (ref 15–37)
BILIRUB SERPL-MCNC: 0.2 MG/DL (ref 0–1)
BUN BLDV-MCNC: 27 MG/DL (ref 6–23)
CALCIUM SERPL-MCNC: 9 MG/DL (ref 8.3–10.6)
CHLORIDE BLD-SCNC: 103 MMOL/L (ref 99–110)
CO2: 34 MMOL/L (ref 21–32)
CREAT SERPL-MCNC: 1 MG/DL (ref 0.6–1.1)
CULTURE: NORMAL
GFR AFRICAN AMERICAN: >60 ML/MIN/1.73M2
GFR NON-AFRICAN AMERICAN: 53 ML/MIN/1.73M2
GLUCOSE BLD-MCNC: 105 MG/DL (ref 70–99)
HCT VFR BLD CALC: 31.3 % (ref 37–47)
HCT VFR BLD CALC: 36.5 % (ref 37–47)
HEMOGLOBIN: 8.7 GM/DL (ref 12.5–16)
HEMOGLOBIN: 9.9 GM/DL (ref 12.5–16)
LV EF: 55 %
LVEF MODALITY: NORMAL
Lab: NORMAL
MAGNESIUM: 2.1 MG/DL (ref 1.8–2.4)
POTASSIUM SERPL-SCNC: 4.3 MMOL/L (ref 3.5–5.1)
SARS-COV-2, NAAT: NOT DETECTED
SODIUM BLD-SCNC: 143 MMOL/L (ref 135–145)
SPECIMEN: NORMAL
TOTAL PROTEIN: 5.9 GM/DL (ref 6.4–8.2)

## 2021-09-13 PROCEDURE — 84484 ASSAY OF TROPONIN QUANT: CPT

## 2021-09-13 PROCEDURE — 93306 TTE W/DOPPLER COMPLETE: CPT

## 2021-09-13 PROCEDURE — 6370000000 HC RX 637 (ALT 250 FOR IP): Performed by: INTERNAL MEDICINE

## 2021-09-13 PROCEDURE — 6360000002 HC RX W HCPCS: Performed by: INTERNAL MEDICINE

## 2021-09-13 PROCEDURE — 87635 SARS-COV-2 COVID-19 AMP PRB: CPT

## 2021-09-13 PROCEDURE — 2700000000 HC OXYGEN THERAPY PER DAY

## 2021-09-13 PROCEDURE — 76937 US GUIDE VASCULAR ACCESS: CPT

## 2021-09-13 PROCEDURE — 2580000003 HC RX 258: Performed by: CLINICAL NURSE SPECIALIST

## 2021-09-13 PROCEDURE — 80053 COMPREHEN METABOLIC PANEL: CPT

## 2021-09-13 PROCEDURE — 83735 ASSAY OF MAGNESIUM: CPT

## 2021-09-13 PROCEDURE — 6370000000 HC RX 637 (ALT 250 FOR IP): Performed by: NURSE PRACTITIONER

## 2021-09-13 PROCEDURE — 36415 COLL VENOUS BLD VENIPUNCTURE: CPT

## 2021-09-13 PROCEDURE — 71045 X-RAY EXAM CHEST 1 VIEW: CPT

## 2021-09-13 PROCEDURE — 85018 HEMOGLOBIN: CPT

## 2021-09-13 PROCEDURE — 6370000000 HC RX 637 (ALT 250 FOR IP): Performed by: CLINICAL NURSE SPECIALIST

## 2021-09-13 PROCEDURE — 85014 HEMATOCRIT: CPT

## 2021-09-13 PROCEDURE — 6360000002 HC RX W HCPCS: Performed by: NURSE PRACTITIONER

## 2021-09-13 PROCEDURE — C9113 INJ PANTOPRAZOLE SODIUM, VIA: HCPCS | Performed by: INTERNAL MEDICINE

## 2021-09-13 PROCEDURE — 1200000000 HC SEMI PRIVATE

## 2021-09-13 PROCEDURE — 94761 N-INVAS EAR/PLS OXIMETRY MLT: CPT

## 2021-09-13 RX ORDER — VANCOMYCIN 1 G/200ML
1000 INJECTION, SOLUTION INTRAVENOUS EVERY 24 HOURS
Status: DISCONTINUED | OUTPATIENT
Start: 2021-09-14 | End: 2021-09-16 | Stop reason: HOSPADM

## 2021-09-13 RX ORDER — VANCOMYCIN 1.5 G/300ML
1500 INJECTION, SOLUTION INTRAVENOUS EVERY 24 HOURS
Status: DISCONTINUED | OUTPATIENT
Start: 2021-09-13 | End: 2021-09-13

## 2021-09-13 RX ORDER — VANCOMYCIN 1.5 G/300ML
1500 INJECTION, SOLUTION INTRAVENOUS EVERY 12 HOURS
Status: DISCONTINUED | OUTPATIENT
Start: 2021-09-13 | End: 2021-09-13

## 2021-09-13 RX ORDER — VANCOMYCIN 1.5 G/300ML
1500 INJECTION, SOLUTION INTRAVENOUS ONCE
Status: COMPLETED | OUTPATIENT
Start: 2021-09-13 | End: 2021-09-14

## 2021-09-13 RX ADMIN — FERROUS SULFATE TAB 325 MG (65 MG ELEMENTAL FE) 325 MG: 325 (65 FE) TAB at 04:15

## 2021-09-13 RX ADMIN — Medication 1 CAPSULE: at 09:17

## 2021-09-13 RX ADMIN — MIRTAZAPINE 15 MG: 15 TABLET, FILM COATED ORAL at 22:42

## 2021-09-13 RX ADMIN — DOCUSATE SODIUM 100 MG: 100 CAPSULE, LIQUID FILLED ORAL at 09:16

## 2021-09-13 RX ADMIN — MAGNESIUM CITRATE 296 ML: 1.75 LIQUID ORAL at 12:54

## 2021-09-13 RX ADMIN — PANTOPRAZOLE SODIUM 40 MG: 40 INJECTION, POWDER, FOR SOLUTION INTRAVENOUS at 04:15

## 2021-09-13 RX ADMIN — Medication 3 MG: at 22:41

## 2021-09-13 RX ADMIN — VANCOMYCIN 1500 MG: 1.5 INJECTION, SOLUTION INTRAVENOUS at 23:29

## 2021-09-13 RX ADMIN — SODIUM CHLORIDE, PRESERVATIVE FREE 10 ML: 5 INJECTION INTRAVENOUS at 09:37

## 2021-09-13 RX ADMIN — SODIUM CHLORIDE 25 ML: 9 INJECTION, SOLUTION INTRAVENOUS at 23:24

## 2021-09-13 RX ADMIN — DOCUSATE SODIUM 100 MG: 100 CAPSULE, LIQUID FILLED ORAL at 22:41

## 2021-09-13 RX ADMIN — LEVOTHYROXINE SODIUM 75 MCG: 0.07 TABLET ORAL at 05:49

## 2021-09-13 RX ADMIN — BISACODYL 10 MG: 5 TABLET, COATED ORAL at 10:16

## 2021-09-13 RX ADMIN — Medication 250 MG: at 09:17

## 2021-09-13 RX ADMIN — SODIUM CHLORIDE, PRESERVATIVE FREE 10 ML: 5 INJECTION INTRAVENOUS at 22:44

## 2021-09-13 RX ADMIN — FUROSEMIDE 40 MG: 10 INJECTION, SOLUTION INTRAMUSCULAR; INTRAVENOUS at 09:17

## 2021-09-13 RX ADMIN — TRAMADOL HYDROCHLORIDE 50 MG: 50 TABLET, FILM COATED ORAL at 12:44

## 2021-09-13 RX ADMIN — Medication 250 MG: at 16:47

## 2021-09-13 RX ADMIN — FERROUS SULFATE TAB 325 MG (65 MG ELEMENTAL FE) 325 MG: 325 (65 FE) TAB at 16:47

## 2021-09-13 RX ADMIN — PREDNISONE 40 MG: 20 TABLET ORAL at 09:16

## 2021-09-13 RX ADMIN — TRAMADOL HYDROCHLORIDE 50 MG: 50 TABLET, FILM COATED ORAL at 05:51

## 2021-09-13 RX ADMIN — Medication: at 22:42

## 2021-09-13 RX ADMIN — PANTOPRAZOLE SODIUM 40 MG: 40 INJECTION, POWDER, FOR SOLUTION INTRAVENOUS at 16:47

## 2021-09-13 RX ADMIN — SODIUM CHLORIDE, PRESERVATIVE FREE 10 ML: 5 INJECTION INTRAVENOUS at 16:48

## 2021-09-13 RX ADMIN — ATORVASTATIN CALCIUM 40 MG: 40 TABLET, FILM COATED ORAL at 09:16

## 2021-09-13 RX ADMIN — METOPROLOL SUCCINATE 25 MG: 25 TABLET, EXTENDED RELEASE ORAL at 09:15

## 2021-09-13 ASSESSMENT — PAIN SCALES - GENERAL
PAINLEVEL_OUTOF10: 10
PAINLEVEL_OUTOF10: 10
PAINLEVEL_OUTOF10: 4
PAINLEVEL_OUTOF10: 7
PAINLEVEL_OUTOF10: 0
PAINLEVEL_OUTOF10: 0
PAINLEVEL_OUTOF10: 4
PAINLEVEL_OUTOF10: 10

## 2021-09-13 ASSESSMENT — PAIN DESCRIPTION - LOCATION
LOCATION: BACK

## 2021-09-13 ASSESSMENT — PAIN - FUNCTIONAL ASSESSMENT
PAIN_FUNCTIONAL_ASSESSMENT: ACTIVITIES ARE NOT PREVENTED

## 2021-09-13 ASSESSMENT — PAIN DESCRIPTION - FREQUENCY
FREQUENCY: INTERMITTENT

## 2021-09-13 ASSESSMENT — PAIN DESCRIPTION - PAIN TYPE
TYPE: CHRONIC PAIN

## 2021-09-13 ASSESSMENT — PAIN DESCRIPTION - DESCRIPTORS
DESCRIPTORS: ACHING;CONSTANT

## 2021-09-13 ASSESSMENT — PAIN DESCRIPTION - PROGRESSION
CLINICAL_PROGRESSION: GRADUALLY WORSENING
CLINICAL_PROGRESSION: GRADUALLY WORSENING
CLINICAL_PROGRESSION: GRADUALLY IMPROVING
CLINICAL_PROGRESSION: GRADUALLY WORSENING

## 2021-09-13 ASSESSMENT — PAIN DESCRIPTION - ORIENTATION
ORIENTATION: LOWER;MID

## 2021-09-13 ASSESSMENT — PAIN DESCRIPTION - ONSET
ONSET: ON-GOING

## 2021-09-13 NOTE — PROGRESS NOTES
Cardiology Progress Note       Patricia Guillermo is a [de-identified] y.o. female   1940     SUBJECTIVE:   Patient seen and examined main complaint is back pain no chest pain rhythm is normal sinus rhythm  OBJECTIVE:    Review of Systems:  General appearance: alert, appears stated age and cooperative  Skin: Skin color, texture, normal. No rashes or lesions  HEENT: No nose bleed, headache, vision problems  CV: C/O chest pain, tightness, pressure,   Respiratory: C/o no SOB, MORA, Orthopnea, PND  GI: No abdominal pain, black stool, bloating  Limbs: No c/o edema, pain, swelling, intermittent claudication, joint pains  Neuro: No dizziness, lightheadedness, syncope, gait problems, memory problems  Psych: grossly normal. No SI/depression. Vitals:   Blood pressure 129/62, pulse 65, temperature 97.9 °F (36.6 °C), temperature source Oral, resp. rate 18, height 5' 1\" (1.549 m), weight 219 lb 5.7 oz (99.5 kg), SpO2 96 %, not currently breastfeeding.     HEENT: AT, NC, PERRLA  Neck: No JVD  Heart: S1 S2 audible, no murmur   Lungs: CTA   Abdomen: Nontender   Limbs: No edema   CNS: no focal deficit      Past Medical History:   Diagnosis Date    ASHD (arteriosclerotic heart disease)     Bilateral edema of lower extremity 12/16/2015    CHF (congestive heart failure) (HCC)     COPD (chronic obstructive pulmonary disease) ([address]     Depression     Hypertension         Patient Active Problem List   Diagnosis    Coronary atherosclerosis of native coronary artery    Anemia    Chronic hypoxemic respiratory failure (HCC)    Bilateral edema of lower extremity    Moderate COPD (chronic obstructive pulmonary disease) ([address]    COPD exacerbation ([address]    COPD with acute exacerbation ([address]    Morbid obesity due to excess calories ([address]    Acute on chronic diastolic congestive heart failure ([address]    Depression    Hypertension    Pneumonia    Chronic diastolic CHF (congestive heart failure) (HCC)    GIB (gastrointestinal bleeding)    Other chest pain        Allergies   Allergen Reactions    Codeine Itching    Moxifloxacin Other (See Comments)     Listed as allergy from [facility]    Oxycodone Other (See Comments)     Listed as allergy from [facility]     Pcn [Penicillins] Hives and Rash    Warfarin And Related Other (See Comments)     listed as allergy from [facility]        Current Inpatient Medications:    Current Facility-Administered Medications   Medication Dose Route Frequency Provider Last Rate Last Admin    furosemide (LASIX) injection 40 mg  40 mg IntraVENous Daily Sindi Phan MD   40 mg at 09/12/21 1005    pantoprazole (PROTONIX) injection 40 mg  40 mg IntraVENous Q12H Sindi Phan MD   40 mg at 09/13/21 0415    metoprolol succinate (TOPROL XL) extended release tablet 25 mg  25 mg Oral Daily Jessica Ross MD   25 mg at 09/12/21 2202    predniSONE (DELTASONE) tablet 40 mg  40 mg Oral Daily Sindi Phan MD   40 mg at 09/12/21 1005    traMADol (ULTRAM) tablet 50 mg  50 mg Oral Q4H PRN AMPARO Rhodes - CNP   50 mg at 09/13/21 0551    ALPRAZolam (XANAX) tablet 0.25 mg  0.25 mg Oral Daily PRN Sindi Phan MD   0.25 mg at 09/12/21 1032    melatonin tablet 3 mg  3 mg Oral Nightly PRN Anyi Whitfield APRN - CNP   3 mg at 09/12/21 2202    albuterol sulfate  (90 Base) MCG/ACT inhaler 2 puff  2 puff Inhalation Q4H PRN Bindsushila Sajay, APRN        atorvastatin (LIPITOR) tablet 40 mg  40 mg Oral Daily Bindhu Sajay, APRN   40 mg at 09/12/21 1005    Calcium Carb-Cholecalciferol 250-125 MG-UNIT TABS 250 mg  1 tablet Oral BID WC Bindhu Sajay, APRN   250 mg at 09/12/21 1528    docusate sodium (COLACE) capsule 100 mg  100 mg Oral BID Bindhu Sajay, APRN   100 mg at 09/12/21 2202    ferrous sulfate (IRON 325) tablet 325 mg  325 mg Oral Q12H Bindhu Sajay, APRN   325 mg at 09/13/21 0415    lactobacillus (CULTURELLE) capsule 1 capsule  1 capsule Oral Daily with breakfast AMPARO Phillips   1 capsule at 09/12/21 1020    levothyroxine (SYNTHROID) tablet 75 mcg  75 mcg Oral Daily Bindhu Sajay, APRN   75 mcg at 09/13/21 0549    linaclotide (LINZESS) capsule 145 mcg  145 mcg Oral QAM AC Bindhu Sajay, APRN        mirtazapine (REMERON) tablet 15 mg  15 mg Oral Nightly Bindhu Sajay, APRN   15 mg at 09/12/21 2202    sodium chloride flush 0.9 % injection 5-40 mL  5-40 mL IntraVENous 2 times per day Bindhu Sajay, APRN   10 mL at 09/12/21 1005    sodium chloride flush 0.9 % injection 5-40 mL  5-40 mL IntraVENous PRN Bindhu Sajay, APRN        0.9 % sodium chloride infusion  25 mL IntraVENous PRN Bindhu Sajay, APRN        ondansetron (ZOFRAN-ODT) disintegrating tablet 4 mg  4 mg Oral Q8H PRN Bindhu Sajay, APRN        Or    ondansetron (ZOFRAN) injection 4 mg  4 mg IntraVENous Q6H PRN Bindhu Sajay, APRN        polyethylene glycol (GLYCOLAX) packet 17 g  17 g Oral Daily PRN Bindhu Sajay, APRN        acetaminophen (TYLENOL) tablet 650 mg  650 mg Oral Q6H PRN Bindhu Sajay, APRN   650 mg at 09/12/21 2202    Or    acetaminophen (TYLENOL) suppository 650 mg  650 mg Rectal Q6H PRN Bindhu Sajay, APRN        albuterol (PROVENTIL) nebulizer solution 2.5 mg  2.5 mg Nebulization Q4H PRN Bindhu Sajay, APRN               Labs:  CBC with Differential:    Lab Results   Component Value Date    WBC 9.0 09/12/2021    RBC 3.09 09/12/2021    HGB 8.7 09/13/2021    HCT 31.3 09/13/2021     09/12/2021    MCV 96.8 09/12/2021    MCH 26.9 09/12/2021    MCHC 27.8 09/12/2021    RDW 13.7 09/12/2021    NRBC 1 06/23/2016    SEGSPCT 80.1 09/08/2021    BANDSPCT 2 06/29/2016    LYMPHOPCT 10.2 09/08/2021    PROMYELOPCT 1 06/26/2016    MONOPCT 6.7 09/08/2021    MYELOPCT 1 06/26/2016    BASOPCT 0.2 09/08/2021    MONOSABS 0.6 09/08/2021    LYMPHSABS 1.0 09/08/2021    EOSABS 0.2 09/08/2021    BASOSABS 0.0 09/08/2021    DIFFTYPE AUTOMATED DIFFERENTIAL 09/08/2021     CMP:    Lab Results   Component Value Date     09/12/2021    K 5.6 09/12/2021     09/12/2021 CO2 27 09/12/2021    BUN 26 09/12/2021    CREATININE 1.1 09/12/2021    GFRAA 58 09/12/2021    LABGLOM 48 09/12/2021    GLUCOSE 121 09/12/2021    PROT 7.0 09/08/2021    LABALBU 3.2 09/08/2021    CALCIUM 8.9 09/12/2021    BILITOT 0.2 09/08/2021    ALKPHOS 92 09/08/2021    AST 20 09/08/2021    ALT 8 09/08/2021     Hepatic Function Panel:    Lab Results   Component Value Date    ALKPHOS 92 09/08/2021    ALT 8 09/08/2021    AST 20 09/08/2021    PROT 7.0 09/08/2021    BILITOT 0.2 09/08/2021    BILIDIR 0.2 06/17/2016    IBILI 0.0 06/17/2016    LABALBU 3.2 09/08/2021     Magnesium:    Lab Results   Component Value Date    MG 2.1 09/12/2021     PT/INR:    Lab Results   Component Value Date    PROTIME 12.0 09/12/2021    INR 0.93 09/12/2021     Last 3 Troponin:  No results found for: TROPONINI  U/A:    Lab Results   Component Value Date    COLORU YELLOW 11/24/2020    WBCUA 1 11/24/2020    RBCUA 1 11/24/2020    MUCUS RARE 11/24/2020    TRICHOMONAS NONE SEEN 11/24/2020    BACTERIA FEW 11/24/2020    CLARITYU CLEAR 11/24/2020    SPECGRAV 1.014 11/24/2020    LEUKOCYTESUR TRACE 11/24/2020    UROBILINOGEN NORMAL 11/24/2020    BILIRUBINUR NEGATIVE 11/24/2020    BLOODU SMALL 11/24/2020     ABG:    Lab Results   Component Value Date    VXF9HAN 40.0 09/01/2016    PO2ART 167 09/01/2016    ZJG2BEF 29.1 09/01/2016     FLP:    Lab Results   Component Value Date    TRIG 58 01/02/2019    HDL 58 01/02/2019    LDLCALC 36 09/05/2018    LDLDIRECT 48 01/02/2019     TSH:  No results found for: TSH   DATA:   ECG: Sinus Rhythm       ASSESSMENT:   1 episode of atrial fibrillation with RVR is a very likely has paroxysmal atrial fibrillation currently in normal sinus rhythm patient not started on anticoagulation because of anemia and positive blood in the stool  2 hypertension  Well controlled  3 morbid obesity chronic due to excess caloric intake  4 CAD no chest pain  5 COPD no active wheezing  Depression  Plan  Continue Lopressor  Review 2D echo once completed          PLAN

## 2021-09-13 NOTE — CONSULTS
[address] [place] [address], [address] [facility]                                  CONSULTATION    PATIENT NAME: Estella Kelly                 :        1940  MED REC NO:   [id number]                          ROOM:       1102  ACCOUNT NO:   [de-identified]                           ADMIT DATE: 2021  PROVIDER:     DERECK Desouza    CONSULT DATE:  2021    CHIEF COMPLAINT:  Tachycardia, shortness of breath. HISTORY OF PRESENT ILLNESS:  This is an 67-year-old female with past  medical history of COPD that came inside the hospital due to worsening  shortness of breath, thought to be multifactorial including atelectasis  and a COPD exacerbation on top of baseline diastolic heart failure. Earlier today, the patient went into tachycardia that was like AFib with  RVR, heart rate up till 150-160 that lasted for several minutes. She  also stated that she had chest pain at that time. Chest pain was  slightly atypical as it started with the arrhythmia. She states that  she has not ever had tachycardia like this before that she could feel  and I do not see anything in the notes suggesting that she has a history  of AFib, therefore I believe that is new onset at this time. On exam,  she is lying in bed, resting comfortably and doing okay overall. PAST MEDICAL HISTORY:  Atherosclerotic sclerotic heart disease,  moderate; CAD and heart cath back in , bilateral edema of the lower  extremity, diastolic heart failure, COPD, depression, hypertension. PAST SURGICAL HISTORY:  Anal surgery, hysterectomy, appendectomy,  tonsillectomy, upper GI. ALLERGIES:  CODEINE, MOXIFLOXACIN, OXYCODONE, PENICILLIN, WARFARIN. FAMILY HISTORY:  Reviewed, noncontributory. SOCIAL HISTORY:  Former smoker, quit in , does drink alcohol  socially.     HOME MEDICATIONS:  Remeron, Protonix, multivitamin, Breo, Linzess,  _____, albuterol, Zofran, Klor-Con 10 mEq daily, cranberry, Synthroid,  Tylenol, Lipitor 40 daily at bedtime, lactobacillus, Colace, oxygen at  home, Phenergan, ferrous sulfate. REVIEW OF SYSTEMS:  10-point review of systems is reviewed and negative  unless noted in the HPI. PHYSICAL EXAMINATION:  GENERAL:  Awake, alert female, lying in bed in no acute distress. HEENT:  Head:  Atraumatic, normocephalic. Eyes:  No scleral edema,  discharge or conjunctivitis. NECK:  Trachea is midline. No apparent masses. CARDIAC:  Regular rate and rhythm with no murmurs, rubs or gallops  auscultated. LUNGS:  Clear to auscultation bilaterally with good rise and fall of  chest.  ABDOMEN:  Soft, nontender. MUSCULOSKELETAL:  No obvious joint deformities. SKIN:  No erythema or ecchymosis noted. NEUROLOGIC:  Alert and oriented x4. PSYCH:  Normal mood and affect. LABORATORY DATA:  BMP checked earlier today shows electrolytes within  normal limits except potassium is elevated at 5.6, creatinine stable at  1.1. CBC shows that her white count is stable at 9.0. She is anemic at  8.3 and then 8.6 on a recheck. Troponin is mildly elevated at 0.015. Thyroid within normal limits. She is positive for occult blood in her  stool. Echo has been ordered and we will review that. EKG was done  earlier that showed normal sinus rhythm, heart rate was 65 at that time,  nonspecific ST abnormalities. Last echo was done in 2017 and showed  that EF was preserved at 50% with trace MR and TR, RVSP was 25. Last  left heart catheterization was done in 2015 that showed diffuse 70%  stenosis in the LAD proximal and distal.  No stent was placed. She was  treated medically at that time. IMPRESSION:  An 81-year-old female who came in with shortness of breath,  COPD exacerbation. She is being treated for this. She had a run of  AFib with RVR that appears to be new onset earlier today. She currently  is anemic and has occult blood in her stool.   She is on Lovenox that  will be _____ at this time. We will start beta blocker and an echo to  be done tomorrow. Further workup will be dictated by hospital course.       Agree with above       DERECK Mustafa    D: 09/12/2021 21:58:05       T: 09/12/2021 22:01:21     [email or web address]  Job#: [id number]     Doc#: [id number]    CC:  Rhianna Gonzalez MD

## 2021-09-13 NOTE — CONSULTS
[facility] Gastroenterology  Gastroenterology Consultation    2021  8:35 AM    Patient:    Jenni Augustine  : 1940   [de-identified] y.o. MRN: [id number]  Admitted: 2021 12:44 PM ATT: Matilde Green MD   1102/1102-A  AdmitDx: Pneumonia [J18.9]  Pleural effusion [J90]  Hypoxia [R09.02]  SALTY (acute kidney injury) ([facility] [N17.9]  Pneumonia of left lung due to infectious organism, unspecified part of lung [J18.9]  PCP: Concetta Nash MD    Reason for Consult:  Gi bleed    Requesting Physician:  Matilde Green MD      History Obtained From:  Patient and review of all records    HISTORY OF PRESENT ILLNESS:                The patient is a [de-identified] y.o. female with significant past medical history as below who presents with above mentioned causes in reason for consult. Consulted for GI bleed. Per pt no gross bleeding. Takes iron daily. Dark BM but not black.      Denies Abdominal pain  Denies N/V, GERD, dyspepsia, dysphagia   Last BM daily dark brown    History of EGD 2020  1) SEVERE GASTRITIS in antrum with few erosions, Biopsy negative   2) small nodule in duodenum biopsy negative  3) Rest of study normal  History of colonoscopy 2015 diverticulosis, hemorrhoids, polyp- tubular adenoma     Denies ASA   NSAIDs IBU rare    Denies ti-platelet therapy    NonSmoker  Denies Alcohol intake    Past Medical History:        Diagnosis Date    ASHD (arteriosclerotic heart disease)     Bilateral edema of lower extremity 2015    CHF (congestive heart failure) (HCC)     COPD (chronic obstructive pulmonary disease) ([facility]     Depression     Hypertension        Past Surgical History:        Procedure Laterality Date    ANUS SURGERY      fistula repair    APPENDECTOMY      HYSTERECTOMY      TONSILLECTOMY      UPPER GASTROINTESTINAL ENDOSCOPY N/A 2020    EGD BIOPSY performed by Kimberly Forrest MD at [address] [facility] ENDOSCOPY         Current Medications:    Medications Scheduled Medications:    furosemide  40 mg IntraVENous Daily    pantoprazole  40 mg IntraVENous Q12H    metoprolol succinate  25 mg Oral Daily    predniSONE  40 mg Oral Daily    atorvastatin  40 mg Oral Daily    Calcium Carb-Cholecalciferol  1 tablet Oral BID WC    docusate sodium  100 mg Oral BID    ferrous sulfate  325 mg Oral Q12H    lactobacillus  1 capsule Oral Daily with breakfast    levothyroxine  75 mcg Oral Daily    linaclotide  145 mcg Oral QAM AC    mirtazapine  15 mg Oral Nightly    sodium chloride flush  5-40 mL IntraVENous 2 times per day     PRN Medications: traMADol, ALPRAZolam, melatonin, albuterol sulfate HFA, sodium chloride flush, sodium chloride, ondansetron **OR** ondansetron, polyethylene glycol, acetaminophen **OR** acetaminophen, albuterol      Allergies:  Codeine, Moxifloxacin, Oxycodone, Pcn [penicillins], and Warfarin and related    Social History:   TOBACCO:   reports that she quit smoking about 11 years ago. Her smoking use included cigarettes. She smoked 1.00 pack per day. She has never used smokeless tobacco.  ETOH:   reports current alcohol use. Family History:       Problem Relation Age of Onset    High Blood Pressure Mother     Heart Disease Mother     Early Death Father     Substance Abuse Father     Diabetes Sister        No family history of colon cancer, Crohn's disease, or ulcerative colitis. REVIEW OF SYSTEMS:    The positive ROS will be identified in bold, otherwise ROS are negative     CONSTITUTIONAL:  Neg   Recent weight changes, fatigue, fever, chills or night sweats  MOUTH/THROAT:  Neg  bleeding gums, hoarseness or sore throat.   RESPIRATORY:   Neg SOB, wheeze, cough, sputum, hemoptysis or bronchitis  CARDIOVASCULAR:  Neg chest pain, palpitations, dyspnea on exertion, orthopnea, paroxysmal nocturnal dyspnea or edema  GASTROINTESTINAL:  SEE HPI  HEMATOLOGIC/LYMPHATIC:  Neg  Anemia, bleeding tendency  MUSCULOSKELETAL:    New myalgias, bone pain, joint pain, swelling or stiffness and has had change in gait. NEUROLOGICAL:  Neg  Loss of Consciousness, memory loss, forgetfulness, periods of confusion, difficulty concentrating, seizures, decline in intellect, nervousness, insomina, aphasia or dysarthria. SKIN:  Neg  skin or hair changes, and has no itching, rashes, or sores. PSYCHIATRIC:  Neg depression, personality changes, anxiety. ENDOCRINE:  Neg polydipsia, polyuria, abnormal weight changes, heat /cold intolerance. ALL/IMM:  Neg reactions to drugs other than listed    PHYSICAL EXAM:      Vitals:    BP (!) 107/53   Pulse 66   Temp 98.7 °F (37.1 °C) (Oral)   Resp 16   Ht 5' 1\" (1.549 m)   Wt 219 lb 5.7 oz (99.5 kg)   SpO2 96%   BMI 41.45 kg/m²     General Appearance:    Alert, cooperative, no distress, appears stated age   HEENT:    Normocephalic, atraumatic, Conjunctiva palel   Neck:   Supple, symmetrical, trachea midline   Lungs:     Wheezing bilaterally, respirations unlabored on 4 L O2 per NC    Chest Wall:    No tenderness or deformity    Heart:    Regular rate and rhythm, S1 and S2 normal   Abdomen:     Soft, non-tender, bowel sounds active all four quadrants,     no masses, no organomegaly, no ascites    Rectal:    Deferred   Extremities:   Extremities normal, atraumatic, no cyanosis or edema   Pulses:   2+ and symmetric all extremities   Skin:   Skin color, texture, turgor normal, no rashes or lesions       Neurologic:   No focal deficits, moving all four extremities            DATA:    ABGs:   Lab Results   Component Value Date    PO2ART 167 09/01/2016    FMI1VOF 40.0 09/01/2016     CBC:   Recent Labs     09/12/21  0437 09/12/21  0437 09/12/21  1714 09/12/21  2054 09/13/21  0250   WBC 9.0  --   --   --   --    HGB 8.3*   < > 9.0* 8.6* 8.7*     --   --   --   --     < > = values in this interval not displayed.      BMP:    Recent Labs     09/12/21 0437      K 5.6*      CO2 27   BUN 26*   CREATININE 1.1   GLUCOSE 121* Magnesium:   Lab Results   Component Value Date    MG 2.1 09/12/2021     Hepatic: No results for input(s): AST, ALT, ALB, BILITOT, ALKPHOS in the last 72 hours. No results for input(s): LIPASE, AMYLASE in the last 72 hours. Recent Labs     09/12/21 2054   PROTIME 12.0   INR 0.93     No results for input(s): PTT in the last 72 hours. Lipids: No results for input(s): CHOL, HDL in the last 72 hours.     Invalid input(s): LDLCALCU  INR:   Recent Labs     09/12/21 2054   INR 0.93     TSH: No results found for: TSH    Intake/Output Summary (Last 24 hours) at 9/13/2021 0835  Last data filed at 9/13/2021 0815  Gross per 24 hour   Intake 0 ml   Output 1400 ml   Net -1400 ml      sodium chloride         Imaging Studies:reviewed      IMPRESSION:      Patient Active Problem List   Diagnosis Code    Coronary atherosclerosis of native coronary artery I25.10    Anemia D64.9    Chronic hypoxemic respiratory failure ([facility]) J96.11    Bilateral edema of lower extremity R60.0    Moderate COPD (chronic obstructive pulmonary disease) ([facility]) J44.9    COPD exacerbation ([address] J44.1    COPD with acute exacerbation ([facility] J44.1    Morbid obesity due to excess calories ([facility] E66.01    Acute on chronic diastolic congestive heart failure ([facility]) I50.33    Depression F32.9    Hypertension I10    Pneumonia J18.9    Chronic diastolic CHF (congestive heart failure) ([facility]) I50.32    GIB (gastrointestinal bleeding) K92.2    Other chest pain R07.89       ASSESSMENT:  GI Bleed  hgb 8.7, iron 29, tibc 215  +occult   Chronic anemia   multifactorial   May be gastritis or chronic diseases  Due for c scope now for hx of polyp    RECOMMENDATIONS:  Repeat chest x ray for wheezing   Incentive spirometry   Covid rapid   Clear liquid  Mag citrate today and Dulcolax  Will start bowel prep tomorrow   EGD & Colonoscopy Wednesday   H &H Q 6   Transfuse to keep hgb > 7.0  Continue PPI BID     Discussed plan of care with patient and RN     Patient clinical, biochemical, and radiological information discussed with Dr. Jose Barajas. He agrees with the assessment and plan. AMPARO Sands CNP, CNP  9/13/2021  8:35 AM     Chronic anemia  New onset pneumonia atypical  Recent history of cold  Has anemia no overt bleed  Optimize pulmonary function  GI work-up in terms of EGD and colonoscopy if needed during this admission    I have seen and examined this patient personally, and independently of the nurse practitioner. The plan was developed mutually at the time of the visit with the patient. Preston Yeboah and myself have spoken with patient, nursing staff and provided written and verbal instructions .     The above note has been reviewed and I agree with the Assessment,  Diagnosis, and Treatment plan as suggested by Preston Yeboah CNP      [facility] gastroenterology

## 2021-09-13 NOTE — PROGRESS NOTES
Occupational Therapy  Re-interviewed pt this morning and confirmed that she does live in [address] [place] [address] and requires dependent care for ADL and mobility, mostly at bed level. Staff at [address] [place] [address] for all OOB mobility. Pt politely declines therapy this admission which is appropriate. Will discontinue order.    [address], OTR/L, [place]   [id number]   10:40 AM, 9/13/2021

## 2021-09-13 NOTE — PROGRESS NOTES
Hospitalist Progress Note      Name:  Kassie Grijalva /Age/Sex: 1940  ([de-identified] y.o. female)   MRN & CSN:  [id number] & [id number] Admission Date/Time: 2021 12:44 PM   Location:  -A PCP: Dong Salter MD         Hospital Day: 6    Assessment and Plan:   Christianne Smith is a [de-identified] y.o.  female with PMH of HTN, CHF, CAD, COPD and depression who was admitted with shortness of breath thought to be of multifactorial etiology including atelectasis and COPD exacerbation on top of baseline CHF and elevated diaphragm. Patient has history of COVID-19 on 2020. This is unlikely pneumonia in the absence of fever and leukocytosis. Hospital course was complicated by an episode of A. fib with RVR and GI bleed.     #Shortness of breath due to COPD exacerbation on top of atelectasis, diaphragmatic elevation and baseline chronic diastolic CHF. She does have evidence of fluid overload at this time.  -Stopped IV fluid infusion. -IV Lasix 40 mg and monitor fluid status with I's/O's, daily weight and renal function.  -Continue bronchodilator inhalers.  -Continue prednisone.  -Pulmonary consult  -Give nebs as needed.  -No indication for antibiotics at this point.  -Give incentive spirometry. -PT/OT input appreciated     #Paroxysmal A. fib with RVR and chest pain. -Reverted back to sinus rhythm.  -No anticoagulation due to GI bleed  -Echo show LVEF of 55%, mild LVH with mild to moderate TR and moderate PHTN  -Cardiology consulted and input appreciated.     #Worsening anemia.  -Iron studies, B12 and folate ordered.  -H&H has stabilized  -Continue pantoprazole twice daily. -Appreciate GI input and being planned for EGD/colonoscopy on Wednesday.     #Left upper extremity swelling due to IV fluid infiltration  -Left upper extremity Doppler ultrasound is negative for DVT.    #Chronic hypoxemic respiratory failure on supplemental oxygen.   #Essential hypertension not on antihypertensive  #Hyperlipidemia on bilateral peripheral pedal edema. Rolando Condon is soft with mild epigastric tenderness, masses, or guarding. Bowel sounds are normoactive.        No costovertebral angle tenderness. Handley catheter is not present. HEME/LYMPH            No palpable cervical lymphadenopathy and no hepatosplenomegaly. No petechiae or ecchymoses. MSK    Left upper extremity swelling. SKIN    Normal coloration, warm, dry. NEURO           Cranial nerves appear grossly intact, normal speech, no lateralizing weakness. PSYCH            Awake and alert.  Affect appropriate.     Medications:   Medications:    furosemide  40 mg IntraVENous Daily    pantoprazole  40 mg IntraVENous Q12H    metoprolol succinate  25 mg Oral Daily    predniSONE  40 mg Oral Daily    enoxaparin  40 mg SubCUTAneous Daily    atorvastatin  40 mg Oral Daily    Calcium Carb-Cholecalciferol  1 tablet Oral BID WC    docusate sodium  100 mg Oral BID    ferrous sulfate  325 mg Oral Q12H    lactobacillus  1 capsule Oral Daily with breakfast    levothyroxine  75 mcg Oral Daily    linaclotide  145 mcg Oral QAM AC    mirtazapine  15 mg Oral Nightly    sodium chloride flush  5-40 mL IntraVENous 2 times per day      Infusions:    sodium chloride       PRN Meds: traMADol, 50 mg, Q4H PRN  ALPRAZolam, 0.25 mg, Daily PRN  melatonin, 3 mg, Nightly PRN  albuterol sulfate HFA, 2 puff, Q4H PRN  sodium chloride flush, 5-40 mL, PRN  sodium chloride, 25 mL, PRN  ondansetron, 4 mg, Q8H PRN   Or  ondansetron, 4 mg, Q6H PRN  polyethylene glycol, 17 g, Daily PRN  acetaminophen, 650 mg, Q6H PRN   Or  acetaminophen, 650 mg, Q6H PRN  albuterol, 2.5 mg, Q4H PRN          Electronically signed by Jose Reyes MD on 9/13/2021 at 7:44 AM

## 2021-09-14 LAB
HCT VFR BLD CALC: 30.9 % (ref 37–47)
HCT VFR BLD CALC: 33.2 % (ref 37–47)
HCT VFR BLD CALC: 33.9 % (ref 37–47)
HEMOGLOBIN: 8.6 GM/DL (ref 12.5–16)
HEMOGLOBIN: 8.9 GM/DL (ref 12.5–16)
HEMOGLOBIN: 9.2 GM/DL (ref 12.5–16)

## 2021-09-14 PROCEDURE — 99223 1ST HOSP IP/OBS HIGH 75: CPT | Performed by: INTERNAL MEDICINE

## 2021-09-14 PROCEDURE — 94761 N-INVAS EAR/PLS OXIMETRY MLT: CPT

## 2021-09-14 PROCEDURE — 85018 HEMOGLOBIN: CPT

## 2021-09-14 PROCEDURE — 6370000000 HC RX 637 (ALT 250 FOR IP): Performed by: NURSE PRACTITIONER

## 2021-09-14 PROCEDURE — 36415 COLL VENOUS BLD VENIPUNCTURE: CPT

## 2021-09-14 PROCEDURE — 6360000002 HC RX W HCPCS: Performed by: INTERNAL MEDICINE

## 2021-09-14 PROCEDURE — 1200000000 HC SEMI PRIVATE

## 2021-09-14 PROCEDURE — 6370000000 HC RX 637 (ALT 250 FOR IP): Performed by: INTERNAL MEDICINE

## 2021-09-14 PROCEDURE — 6370000000 HC RX 637 (ALT 250 FOR IP): Performed by: CLINICAL NURSE SPECIALIST

## 2021-09-14 PROCEDURE — 6360000002 HC RX W HCPCS: Performed by: NURSE PRACTITIONER

## 2021-09-14 PROCEDURE — 85014 HEMATOCRIT: CPT

## 2021-09-14 PROCEDURE — C9113 INJ PANTOPRAZOLE SODIUM, VIA: HCPCS | Performed by: INTERNAL MEDICINE

## 2021-09-14 PROCEDURE — 2580000003 HC RX 258: Performed by: CLINICAL NURSE SPECIALIST

## 2021-09-14 PROCEDURE — 2700000000 HC OXYGEN THERAPY PER DAY

## 2021-09-14 RX ORDER — POLYETHYLENE GLYCOL 3350 17 G/17G
238 POWDER, FOR SOLUTION ORAL ONCE
Status: COMPLETED | OUTPATIENT
Start: 2021-09-14 | End: 2021-09-14

## 2021-09-14 RX ADMIN — ATORVASTATIN CALCIUM 40 MG: 40 TABLET, FILM COATED ORAL at 08:57

## 2021-09-14 RX ADMIN — Medication 250 MG: at 16:41

## 2021-09-14 RX ADMIN — Medication 250 MG: at 08:57

## 2021-09-14 RX ADMIN — LEVOTHYROXINE SODIUM 75 MCG: 0.07 TABLET ORAL at 06:13

## 2021-09-14 RX ADMIN — PANTOPRAZOLE SODIUM 40 MG: 40 INJECTION, POWDER, FOR SOLUTION INTRAVENOUS at 16:41

## 2021-09-14 RX ADMIN — Medication: at 08:57

## 2021-09-14 RX ADMIN — Medication: at 22:28

## 2021-09-14 RX ADMIN — PREDNISONE 40 MG: 20 TABLET ORAL at 08:57

## 2021-09-14 RX ADMIN — FERROUS SULFATE TAB 325 MG (65 MG ELEMENTAL FE) 325 MG: 325 (65 FE) TAB at 04:24

## 2021-09-14 RX ADMIN — PANTOPRAZOLE SODIUM 40 MG: 40 INJECTION, POWDER, FOR SOLUTION INTRAVENOUS at 04:24

## 2021-09-14 RX ADMIN — SODIUM CHLORIDE 25 ML: 9 INJECTION, SOLUTION INTRAVENOUS at 22:26

## 2021-09-14 RX ADMIN — FUROSEMIDE 40 MG: 10 INJECTION, SOLUTION INTRAMUSCULAR; INTRAVENOUS at 08:57

## 2021-09-14 RX ADMIN — MIRTAZAPINE 15 MG: 15 TABLET, FILM COATED ORAL at 22:28

## 2021-09-14 RX ADMIN — Medication 1 CAPSULE: at 08:57

## 2021-09-14 RX ADMIN — TRAMADOL HYDROCHLORIDE 50 MG: 50 TABLET, FILM COATED ORAL at 22:28

## 2021-09-14 RX ADMIN — SODIUM CHLORIDE, PRESERVATIVE FREE 10 ML: 5 INJECTION INTRAVENOUS at 22:28

## 2021-09-14 RX ADMIN — Medication 238 G: at 14:45

## 2021-09-14 RX ADMIN — FERROUS SULFATE TAB 325 MG (65 MG ELEMENTAL FE) 325 MG: 325 (65 FE) TAB at 16:41

## 2021-09-14 RX ADMIN — Medication 3 MG: at 22:28

## 2021-09-14 RX ADMIN — DOCUSATE SODIUM 100 MG: 100 CAPSULE, LIQUID FILLED ORAL at 22:28

## 2021-09-14 RX ADMIN — METOPROLOL SUCCINATE 25 MG: 25 TABLET, EXTENDED RELEASE ORAL at 08:56

## 2021-09-14 RX ADMIN — VANCOMYCIN 1000 MG: 1 INJECTION, SOLUTION INTRAVENOUS at 22:28

## 2021-09-14 RX ADMIN — DOCUSATE SODIUM 100 MG: 100 CAPSULE, LIQUID FILLED ORAL at 08:57

## 2021-09-14 RX ADMIN — SODIUM CHLORIDE, PRESERVATIVE FREE 10 ML: 5 INJECTION INTRAVENOUS at 08:57

## 2021-09-14 ASSESSMENT — PAIN DESCRIPTION - FREQUENCY: FREQUENCY: CONTINUOUS

## 2021-09-14 ASSESSMENT — PAIN DESCRIPTION - PAIN TYPE: TYPE: CHRONIC PAIN

## 2021-09-14 ASSESSMENT — PAIN SCALES - GENERAL
PAINLEVEL_OUTOF10: 8
PAINLEVEL_OUTOF10: 0

## 2021-09-14 ASSESSMENT — PAIN - FUNCTIONAL ASSESSMENT: PAIN_FUNCTIONAL_ASSESSMENT: PREVENTS OR INTERFERES SOME ACTIVE ACTIVITIES AND ADLS

## 2021-09-14 ASSESSMENT — PAIN DESCRIPTION - ORIENTATION: ORIENTATION: LOWER

## 2021-09-14 ASSESSMENT — PAIN DESCRIPTION - PROGRESSION: CLINICAL_PROGRESSION: GRADUALLY IMPROVING

## 2021-09-14 ASSESSMENT — PAIN DESCRIPTION - ONSET: ONSET: ON-GOING

## 2021-09-14 ASSESSMENT — PAIN DESCRIPTION - DESCRIPTORS: DESCRIPTORS: ACHING;CONSTANT

## 2021-09-14 ASSESSMENT — PAIN DESCRIPTION - LOCATION: LOCATION: BACK

## 2021-09-14 NOTE — PROGRESS NOTES
Cardiology Progress Note       Deborah Jovel is a [de-identified] y.o. female   1940     SUBJECTIVE:   Patient seen and examined main complaint is back pain no chest pain rhythm is normal sinus rhythm  9/14  Patient seen and examined rhythm is sinus discussed with had a 2D echo was normal  OBJECTIVE:    Review of Systems:  General appearance: alert, appears stated age and cooperative  Skin: Skin color, texture, normal. No rashes or lesions  HEENT: No nose bleed, headache, vision problems  CV: C/O chest pain, tightness, pressure,   Respiratory: C/o no SOB, MORA, Orthopnea, PND  GI: No abdominal pain, black stool, bloating  Limbs: No c/o edema, pain, swelling, intermittent claudication, joint pains  Neuro: No dizziness, lightheadedness, syncope, gait problems, memory problems  Psych: grossly normal. No SI/depression. Vitals:   Blood pressure (!) 113/58, pulse 66, temperature 97.5 °F (36.4 °C), temperature source Oral, resp. rate 17, height 5' 1\" (1.549 m), weight 218 lb 14.7 oz (99.3 kg), SpO2 92 %, not currently breastfeeding.     HEENT: AT, NC, PERRLA  Neck: No JVD  Heart: S1 S2 audible, no murmur   Lungs: CTA   Abdomen: Nontender   Limbs: No edema   CNS: no focal deficit      Past Medical History:   Diagnosis Date    ASHD (arteriosclerotic heart disease)     Bilateral edema of lower extremity 12/16/2015    CHF (congestive heart failure) (HCC)     COPD (chronic obstructive pulmonary disease) (HCC)     Depression     Hypertension     MRSA (methicillin resistant staph aureus) culture positive 09/09/2021    Nasal    MRSA (methicillin resistant staph aureus) culture positive 09/09/2021    NASAL        Patient Active Problem List   Diagnosis    Coronary atherosclerosis of native coronary artery    Anemia    Chronic hypoxemic respiratory failure (HCC)    Bilateral edema of lower extremity    Moderate COPD (chronic obstructive pulmonary disease) ([address]    COPD exacerb[facility]    [facility]ation [facility]    Morbid obesity due to excess calories ([facility] [facility]    Acute on chronic diastolic congestive heart failure (HCC)    Depression    Hypertension    Pneumonia    Chronic diastolic CHF (congestive heart failure) (HCC)    GIB (gastrointestinal bleeding)    Other chest pain        Allergies   Allergen Reactions    Codeine Itching    Moxifloxacin Other (See Comments)     Listed as allergy from ecf    Oxycodone Other (See Comments)     Listed as allergy from ecf     Pcn [Penicillins] Hives and Rash    Warfarin And Related Other (See Comments)     listed as allergy from f        Current Inpatient Medications:    Current Facility-Administered Medications   Medication Dose Route Frequency Provider Last Rate Last Admin    polyethylene glycol (GLYCOLAX) powder 238 g  238 g Oral Once AMPARO Pacheco CNP        mupirocin (BACTROBAN) 2 % ointment   Nasal BID AMPARO Rhodes CNP   Given at 09/14/21 0857    vancomycin (VANCOCIN) 1000 mg in 200 mL IVPB  1,000 mg IntraVENous Q24H AMPARO Rhodes CNP        furosemide (LASIX) injection 40 mg  40 mg IntraVENous Daily Sindi Phan MD   40 mg at 09/14/21 0857    pantoprazole (PROTONIX) injection 40 mg  40 mg IntraVENous Q12H Sindi Phan MD   40 mg at 09/14/21 0424    metoprolol succinate (TOPROL XL) extended release tablet 25 mg  25 mg Oral Daily Sd Foster MD   25 mg at 09/14/21 0856    predniSONE (DELTASONE) tablet 40 mg  40 mg Oral Daily Sindi Phan MD   40 mg at 09/14/21 0857    traMADol (ULTRAM) tablet 50 mg  50 mg Oral Q4H PRN AMPARO Rhodes CNP   50 mg at 09/13/21 1244    ALPRAZolam (XANAX) tablet 0.25 mg  0.25 mg Oral Daily PRN Sindi Phan MD   0.25 mg at 09/12/21 1032    melatonin tablet 3 mg  3 mg Oral Nightly PRN AMPARO Rhodes - CNP   3 mg at 09/13/21 2241    albuterol sulfate  (90 Base) MCG/ACT inhaler 2 puff  2 puff Inhalation Q4H PRN AMPARO Phillips        atorvastatin (LIPITOR) tablet 40 mg  40 mg Oral Daily Bindhu Sajay, APRN   40 mg at 09/14/21 0857    Calcium Carb-Cholecalciferol 250-125 MG-UNIT TABS 250 mg  1 tablet Oral BID WC Bindhu Sajay, APRN   250 mg at 09/14/21 0857    docusate sodium (COLACE) capsule 100 mg  100 mg Oral BID Bindhu Sajay, APRN   100 mg at 09/14/21 0857    ferrous sulfate (IRON 325) tablet 325 mg  325 mg Oral Q12H Bindhu Sajay, APRN   325 mg at 09/14/21 0424    lactobacillus (CULTURELLE) capsule 1 capsule  1 capsule Oral Daily with breakfast Bindhu Sajay, APRN   1 capsule at 09/14/21 0857    levothyroxine (SYNTHROID) tablet 75 mcg  75 mcg Oral Daily Bindhu Sajay, APRN   75 mcg at 09/14/21 3837    linaclotide (LINZESS) capsule 145 mcg  145 mcg Oral QAM AC Bindhu Sajay, APRN        mirtazapine (REMERON) tablet 15 mg  15 mg Oral Nightly Bindhu Sajay, APRN   15 mg at 09/13/21 2242    sodium chloride flush 0.9 % injection 5-40 mL  5-40 mL IntraVENous 2 times per day Bindhu Sajay, APRN   10 mL at 09/14/21 0857    sodium chloride flush 0.9 % injection 5-40 mL  5-40 mL IntraVENous PRN Bindhu Sajay, APRN        0.9 % sodium chloride infusion  25 mL IntraVENous PRN Bindhu Sajay, APRN 100 mL/hr at 09/13/21 2324 25 mL at 09/13/21 2324    ondansetron (ZOFRAN-ODT) disintegrating tablet 4 mg  4 mg Oral Q8H PRN Bindhu Sajay, APRN        Or    ondansetron (ZOFRAN) injection 4 mg  4 mg IntraVENous Q6H PRN Bindhu Sajay, APRN        polyethylene glycol (GLYCOLAX) packet 17 g  17 g Oral Daily PRN Bindhu Sajay, APRN        acetaminophen (TYLENOL) tablet 650 mg  650 mg Oral Q6H PRN Bindhu Sajay, APRN   650 mg at 09/12/21 2202    Or    acetaminophen (TYLENOL) suppository 650 mg  650 mg Rectal Q6H PRN Bindhu Sajay, APRN        albuterol (PROVENTIL) nebulizer solution 2.5 mg  2.5 mg Nebulization Q4H PRN AMPARO Phillips               Labs:  CBC with Differential:    Lab Results   Component Value Date    WBC 9.0 09/12/2021    RBC 3.09 09/12/2021    HGB 8.9 09/14/2021    HCT 33.2 09/14/2021     09/12/2021    MCV 96.8 09/12/2021    MCH 26.9 09/12/2021    MCHC 27.8 09/12/2021    RDW 13.7 09/12/2021    NRBC 1 06/23/2016    SEGSPCT 80.1 09/08/2021    BANDSPCT 2 06/29/2016    LYMPHOPCT 10.2 09/08/2021    PROMYELOPCT 1 06/26/2016    MONOPCT 6.7 09/08/2021    MYELOPCT 1 06/26/2016    BASOPCT 0.2 09/08/2021    MONOSABS 0.6 09/08/2021    LYMPHSABS 1.0 09/08/2021    EOSABS 0.2 09/08/2021    BASOSABS 0.0 09/08/2021    DIFFTYPE AUTOMATED DIFFERENTIAL 09/08/2021     CMP:    Lab Results   Component Value Date     09/13/2021    K 4.3 09/13/2021     09/13/2021    CO2 34 09/13/2021    BUN 27 09/13/2021    CREATININE 1.0 09/13/2021    GFRAA >60 09/13/2021    LABGLOM 53 09/13/2021    GLUCOSE 105 09/13/2021    PROT 5.9 09/13/2021    LABALBU 3.3 09/13/2021    CALCIUM 9.0 09/13/2021    BILITOT 0.2 09/13/2021    ALKPHOS 79 09/13/2021    AST 16 09/13/2021    ALT 8 09/13/2021     Hepatic Function Panel:    Lab Results   Component Value Date    ALKPHOS 79 09/13/2021    ALT 8 09/13/2021    AST 16 09/13/2021    PROT 5.9 09/13/2021    BILITOT 0.2 09/13/2021    BILIDIR 0.2 06/17/2016    IBILI 0.0 06/17/2016    LABALBU 3.3 09/13/2021     Magnesium:    Lab Results   Component Value Date    MG 2.1 09/13/2021     PT/INR:    Lab Results   Component Value Date    PROTIME 12.0 09/12/2021    INR 0.93 09/12/2021     Last 3 Troponin:  No results found for: TROPONINI  U/A:    Lab Results   Component Value Date    COLORU YELLOW 11/24/2020    WBCUA 1 11/24/2020    RBCUA 1 11/24/2020    MUCUS RARE 11/24/2020    TRICHOMONAS NONE SEEN 11/24/2020    BACTERIA FEW 11/24/2020    CLARITYU CLEAR 11/24/2020    SPECGRAV 1.014 11/24/2020    LEUKOCYTESUR TRACE 11/24/2020    UROBILINOGEN NORMAL 11/24/2020    BILIRUBINUR NEGATIVE 11/24/2020    BLOODU SMALL 11/24/2020     ABG:    Lab Results   Component Value Date    COO4JME 40.0 09/01/2016    PO2ART 167 09/01/2016    WCL8ORW 29.1 09/01/2016     FLP:    Lab Results   Component Value Date    TRIG 58 01/02/2019    HDL 58 01/02/2019    LDLCALC 36 09/05/2018    LDLDIRECT 48 01/02/2019     TSH:  No results found for: TSH   DATA:   ECG: Sinus Rhythm       ASSESSMENT:   1 episode of atrial fibrillation with RVR is a very likely has paroxysmal atrial fibrillation currently in normal sinus rhythm patient not started on anticoagulation because of anemia and positive blood in the stool  Patient currently in sinus rhythm  2D echo showed normal LV function    2 hypertension  Well controlled    3 morbid obesity chronic due to excess caloric intake\    4 CAD no chest pain    5 COPD no active wheezing  Depression  Plan  Continue Lopressor            PLAN

## 2021-09-14 NOTE — PROGRESS NOTES
[facility]        Progress Note       2021  8:07 AM    Patient:    Ulysses Dumont  : 1940   [de-identified] y.o. MRN: [id number]  Admitted: 2021 12:44 PM ATT: Cece Otto MD   1102/1102-A  AdmitDx: Pneumonia [J18.9]  Pleural effusion [J90]  Hypoxia [R09.02]  SALTY (acute kidney injury) ([facility] [address] [N17.9]  Pneumonia of left lung due to infectious organism, unspecified part of lung [J18.9]  PCP: Farzana Hayes MD    SUBJECTIVE:  Chart reviewed, events noted  Patient feeling well. Tired. Multiple brown BM's overnight. Denies N/V or abd pain.      ROS:  The positive ROS will be identified in bold     CONSTITUTIONAL:  Neg  Weight loss, fatigue, fever  MOUTH/THROAT:  Neg  Bleeding gums, hoarseness or sore throat  RESPIRATORY:   Neg SOB, wheeze, cough, hemoptysis or bronchitis  CARDIOVASCULAR:  Neg Chest pain, palpitations, dyspnea on exertion, edema  GASTROINTESTINAL:  SEE HPI  HEMATOLOGIC/LYMPHATIC:  Neg  Anemia, bleeding tendency  MUSCULOSKELETAL: Neg  New myalgias, joint pain, swelling or stiffness  NEUROLOGICAL:  Neg  Loss of Consciousness, memory loss, forgetfulness, periods of confusion, difficulty concentrating, seizures, insomnia, aphasia    SKIN:  Neg No itching, rashes, or sores  PSYCHIATRIC:  Neg Depression, personality changes, anxiety    OBJECTIVE:      /62   Pulse 66   Temp 97.4 °F (36.3 °C) (Oral)   Resp 16   Ht 5' 1\" (1.549 m)   Wt 218 lb 14.7 oz (99.3 kg)   SpO2 99%   BMI 41.36 kg/m²     NAD, appears comfortable in bed   Lips and mucous membranes pink and moist  RRR, Nl s1s2, no murmurs, no JVD  Lungs decreased bilaterally, respirations even and unlabored   Abdomen soft, ND, NT, bowel sounds normal  Skin pink, warm and dry  1+ edema bilateral lower extremities   AAOx3     CBC: Recent Labs     21  0437 21  1714 21  1403 21  2331 21  0505   WBC 9.0  --   --   --   --    HGB 8.3* < > 9.9* 8.6* 8.9*     --   --   --   --     < > = values in this interval not displayed.      BMP:    Recent Labs     09/12/21  0437 09/13/21  0250    143   K 5.6* 4.3    103   CO2 27 34*   BUN 26* 27*   CREATININE 1.1 1.0   GLUCOSE 121* 105*     Magnesium:   Lab Results   Component Value Date    MG 2.1 09/13/2021     Hepatic:   Recent Labs     09/13/21  0250   AST 16   ALT 8*   BILITOT 0.2   ALKPHOS 79     INR:   Recent Labs     09/12/21 2054   INR 0.93         Intake/Output Summary (Last 24 hours) at 9/14/2021 9005  Last data filed at 9/13/2021 2335  Gross per 24 hour   Intake 72.85 ml   Output --   Net 72.85 ml         Medications    Scheduled Medications:    mupirocin   Nasal BID    vancomycin  1,000 mg IntraVENous Q24H    furosemide  40 mg IntraVENous Daily    pantoprazole  40 mg IntraVENous Q12H    metoprolol succinate  25 mg Oral Daily    predniSONE  40 mg Oral Daily    atorvastatin  40 mg Oral Daily    Calcium Carb-Cholecalciferol  1 tablet Oral BID WC    docusate sodium  100 mg Oral BID    ferrous sulfate  325 mg Oral Q12H    lactobacillus  1 capsule Oral Daily with breakfast    levothyroxine  75 mcg Oral Daily    linaclotide  145 mcg Oral QAM AC    mirtazapine  15 mg Oral Nightly    sodium chloride flush  5-40 mL IntraVENous 2 times per day     PRN Medications: traMADol, ALPRAZolam, melatonin, albuterol sulfate HFA, sodium chloride flush, sodium chloride, ondansetron **OR** ondansetron, polyethylene glycol, acetaminophen **OR** acetaminophen, albuterol  Infusions:    sodium chloride 25 mL (09/13/21 8362)           Patient Active Problem List   Diagnosis Code    Coronary atherosclerosis of native coronary artery I25.10    Anemia D64.9    Chronic hypoxemic respiratory failure ([facility]) J96.11    Bilateral edema of lower extremity R60.0    Moderate COPD (chronic obstructive pulmonary disease) ([facility]) J44.9    COPD exacerbation ([facility]) J44.1    COPD [facility]ation [facility] J44.1    Morbid obesity due to excess calories (HCC) E66.01    Acute on chronic diastolic congestive heart failure (HCC) I50.33    Depression F32.9    Hypertension I10    Pneumonia J18.9    Chronic diastolic CHF (congestive heart failure) (HCC) I50.32    GIB (gastrointestinal bleeding) K92.2    Other chest pain R07.89     ASSESSMENT:  GI Bleed  hgb 8.9, iron 29, tibc 215 stable   +occult   Chronic anemia   multifactorial   May be gastritis or chronic diseases  Due for c scope now for hx of polyp     RECOMMENDATIONS:   Incentive spirometry   Covid rapid negative this admission    Clear liquid  start bowel prep today   EGD & Colonoscopy Wednesday afternoon, consent in chart   H &H Q 6   Transfuse to keep hgb > 7.0  Continue PPI BID     Attempted to call , No answer     Cardiology following for RVR, may start anticoagulation once procedures done for anemia   On 4 L O2 per NC @ 96% saturations, which is home dose   Will be start on abx for + blood cultures     Discussed plan of care with patient, hospitalist, and RN      Patient clinical, biochemical, and radiological information discussed with Dr. César Cao. He agrees with the assessment and plan. AMPARO Garland CNP, CNP  9/14/2021  8:07 AM     Chronic anemia no overt bleed  Transfuse to keep hemoglobin more than 7  PPI twice daily we will plan EGD and colonoscopy a.m. Abdomen soft    I have seen and examined this patient personally, and independently of the nurse practitioner. The plan was developed mutually at the time of the visit with the patient. Calvin Orozco and myself have spoken with patient, nursing staff and provided written and verbal instructions .     The above note has been reviewed and I agree with the Assessment,  Diagnosis, and Treatment plan as suggested by Calvin Orozco CNP      [address] [facility] gastroenterology

## 2021-09-14 NOTE — PROGRESS NOTES
Call initiated by: Nursing staff:    Call addressed around: 9/13/2021 9:01 PM      Reason for call: \"Blood cx positive for gram variant\"      Orders placed: MRSA screening positive. Sputum culture positive for MRSA. Will stat vancomycin, Bactroban. Consult ID. Discussed with Dr. Blaire Cavazos.          Rodrick Cancino, APRN - CNP

## 2021-09-14 NOTE — CONSULTS
Infectious Disease Consult Note  2021   Patient Name: Ioana Park : 1940   Impression   MRSA left basilar pneumonia   COPD exacerbation   Gram variable cocci in blood culture: Sometimes probable cocci may be as a result of staining techniques. Require identification prior to making any judgments on this   Obesity, coronary artery disease, COPD   Multi-morbidity: per PMHx  Plan:   Therapeutic: Continue intravenous vancomycin   Diagnostic: Trend CRP and procalcitonin   F/u test: Identification and susceptibility testing of blood culture    Thank you for allowing me to consult in the care of this patient.  ------------------------  REASON FOR CONSULT: Infective syndrome   Requested by: GABBY Kelly Pratt is a [de-identified] y.o. -American female resident of [facility] with a medical history of COPD, coronary artery disease, hypertension, morbid obesity, chronic diastolic CHF who was admitted 2021 for further evaluation and management of chest pain and shortness of breath for 2 days prior to admission. She required oxygen supplementation on admission. Rapid Covid test was negative. Chest x-ray showed left hemidiaphragm elevation. She initially received vancomycin and cefepime in the emergency department. Later on sputum culture and MRSA nares were positive for MRSA. Cefepime was discontinued. Vancomycin was restarted on , along with prednisone 40 mg daily for COPD exacerbation. 1 out of 2 sets of blood cultures were positive for gram variable cocci. ? Infectious diseases service was consulted to evaluate the pt, and recommend further investigative and therapeutic measures. Review and summary of old records:  ROS: Other systems reviewed Including eyes, ENT, respiratory, cardiovascular, GI, , dermatologic, neurologic, psych, hem/lymphatic, musculoskeletal and endocrine were negative other than what is mentioned above.      Patient Active Problem List    Diagnosis Date Noted    Acute on chronic diastolic congestive heart failure ([address] 2016    Hypoxia 2016    Moderate COPD (chronic obstructive pulmonary disease) (HCC) 2016    Coronary atherosclerosis of native coronary artery 2015    Hypertension     Morbid obesity due to excess calories ([address] 2016    Depression     Bilateral edema of lower extremity 2015    Anemia 2015    Other chest pain 09/10/2021    GIB (gastrointestinal bleeding) 2020    Chronic diastolic CHF (congestive heart failure) ([address] 2017    Pneumonia 2017    COPD with acute exacerbation ([address] 2016    COPD exacerbation ([address] 2016    Chronic hypoxemic respiratory failure ([address] 2015     Past Medical History:   Diagnosis Date    ASHD (arteriosclerotic heart disease)     Bilateral edema of lower extremity 2015    CHF (congestive heart failure) (HCC)     [facility]     Depression     Hypertension     MRSA (methicillin resistant staph aureus) culture positive 2021    Nasal    MRSA (methicillin resistant staph aureus) culture positive 2021    NASAL      Past Surgical History:   Procedure Laterality Date    ANUS SURGERY      fistula repair    APPENDECTOMY      HYSTERECTOMY      TONSILLECTOMY      UPPER GASTROINTESTINAL ENDOSCOPY N/A 2020    EGD BIOPSY performed by Shahzad Staples MD at [facility] ENDOSCOPY      Family History   Problem Relation Age of Onset    High Blood Pressure Mother     Heart Disease Mother     Early Death Father     Substance Abuse Father     Diabetes Sister       Infectious disease related family history - not contibutory. SOCIAL HISTORY  Social History     Tobacco Use    Smoking status: Former Smoker     Packs/day: 1.00     Types: Cigarettes     Quit date: 2010     Years since quittin.0    Smokeless tobacco: Never Used   Substance Use Topics    Alcohol use:  Yes Alcohol/week: 0.0 standard drinks     Comment: wine twice a year       Born:  [address] Occupation:   No recent travel of significance.  No recent unusual exposures.  NO pets    ? ALLERGIES  Allergies   Allergen Reactions    Codeine Itching    Moxifloxacin Other (See Comments)     Listed as allergy from ecf    Oxycodone Other (See Comments)     Listed as allergy from ecf     Pcn [Penicillins] Hives and Rash    Warfarin And Related Other (See Comments)     listed as allergy from [facility] and are per the chart/EMR. IMMUNIZATION HISTORY  Immunization History   Administered Date(s) Administered    COVID-19, Pfizer, PF, 30mcg/0.3mL 12/21/2020, 01/11/2021    Influenza Virus Vaccine 09/01/2017    Pneumococcal Conjugate 13-valent ([id number]) 09/12/2016    Pneumococcal Polysaccharide ([id number]) 09/03/2015     ? Antibiotics:  Vancomycin  ?  -------------------------------------------------------------------------------------------------------------------    Vital Signs:  Vitals:    09/14/21 0856   BP: (!) 113/58   Pulse: 66   Resp:    Temp:    SpO2:          Exam:    VS: noted; wt 99.3 kg  Gen: alert and oriented X3 on nasal oxygen  Skin: no stigmata of endocarditis  Wounds: C/D/I  HEMT: AT/NC Oropharynx pink, moist, and without lesions or exudates; dentition in good state of repair  Eyes: PERRLA, EOMI, conjunctiva pink, sclera anicteric. Neck: Supple. Trachea midline. No LAD. Chest: Reduced air entry in both lung bases  Heart: RRR and no MRG. Abd: soft, non-distended, no tenderness, no hepatomegaly. Normoactive bowel sounds. Ext: no clubbing, cyanosis, or edema  Catheter Site: without erythema or tenderness  LDA:   Neuro: Mental status intact. CN 2-12 intact and no focal sensory or motor deficits    ? Diagnostic Studies: reviewed  ? ?   I have examined this patient and available medical records on this date and have made the above observations, conclusions and recommendations.   Electronically signed by: Electronically signed by Humberto Pritchard MD on 9/14/2021 at 2:02 PM

## 2021-09-14 NOTE — PLAN OF CARE
Problem: Falls - Risk of:  Goal: Will remain free from falls  Description: Will remain free from falls  Outcome: Ongoing  Goal: Absence of physical injury  Description: Absence of physical injury  Outcome: Ongoing     Problem: Pain:  Goal: Pain level will decrease  Description: Pain level will decrease  Outcome: Ongoing  Goal: Control of acute pain  Description: Control of acute pain  Outcome: Ongoing  Goal: Control of chronic pain  Description: Control of chronic pain  Outcome: Ongoing     Problem: Discharge Planning:  Goal: Discharged to appropriate level of care  Description: Discharged to appropriate level of care  Outcome: Ongoing     Problem:  Activity Intolerance:  Goal: Ability to tolerate increased activity will improve  Description: Ability to tolerate increased activity will improve  Outcome: Ongoing     Problem: Airway Clearance - Ineffective:  Goal: Ability to maintain a clear airway will improve  Description: Ability to maintain a clear airway will improve  Outcome: Ongoing     Problem: Breathing Pattern - Ineffective:  Goal: Ability to achieve and maintain a regular respiratory rate will improve  Description: Ability to achieve and maintain a regular respiratory rate will improve  Outcome: Ongoing     Problem: Gas Exchange - Impaired:  Goal: Levels of oxygenation will improve  Description: Levels of oxygenation will improve  Outcome: Ongoing

## 2021-09-14 NOTE — PROGRESS NOTES
Hospitalist Progress Note      Name:  Mishel Blount /Age/Sex: 1940  ([de-identified] y.o. female)   MRN & CSN:  [id number] & [id number] Admission Date/Time: 2021 12:44 PM   [facility]  [facility] PCP: Trav Arreola MD         Hospital Day: 7      Assessment and Plan:   Patient is a 78-year-old female past medical history of hypertension, COPD, chronic hypoxic nasreen failure requiring 4 L nasal cannula, depression, CHF, CAD who was admitted for shortness of breath thought to be multifactorial including atelectasis, COPD on top of CHF. Patient respiratory culture came back positive for MRSA. Currently on vancomycin. Patient also was found to have GI bleed therefore she has been followed by GI. Patient had a transient episode of A. fib for which cardiology was consulted. Currently not anticoagulated due to positive FOBT. 1. Shortness of breath: Likely multifactorial  2. Chronic hypoxic respiratory failure due to COPD: Uses 4 L nasal cannula at baseline  3. Suspected MRSA pneumonia  4. Proximal A. fib with rapid ventricular: Currently normal sinus rhythm  5. Positive FOBT  6. Left upper extremity splint due to IV fluid infiltration  7. Primary hypertension: Controlled  8. Hyperlipidemia: Unspecified  9. History of CAD   10. History of anxiety and depression  11. Hypothyroidism likely acquired    Plan:  Patient uses 4 L of nasal cannula at baseline due to chronic hypoxic aspiratory failure  Patient remains on 4 L today  Suspect shortness of breath recommendation of COPD, atelectasis, left hemidiaphragm elevation, CHF  Patient does not appear volume overload: Cautious use of Lasix  Iron studies reviewed and iron saturation tends to be low: Start IV Venofer  Continue vancomycin for suspected MRSA pneumonia: Can switch to p.o. Zyvox.   ID to follow  Check mag, phosphorus  Continue current cardiac medication: Cardiology following  GI following for possible EGD/colonoscopy  Bronchodilators and wean off oxygen as tolerated  PT/OT      DVT prophylaxis: SCDs due to FOBT positive  CODE STATUS: Full code  Patient remains hospitalized due to: IV to biotics, EGD/colonoscopy tentatively planned for tomorrow, ID clearance      Subjective. :     Patient was seen and examined today. She states she is feeling weak. Remains on 4 L which is her baseline. No acute events overnight. Patient afebrile still vital signs. Objective:   Vitals:   Vitals:    09/14/21 0807   BP:    Pulse:    Resp:    Temp:    SpO2: 92%         Physical Exam:   GEN: Awake, alert, in no apparent distress awake female, sitting upright in bed in no apparent distress. Appears given age. HEENT: Atraumatic, normocephalic, PERRLA, EOMI  NECK: Supple, no apparent thyromegaly or masses. RESP: Clear lungs to auscultation  CARDIO/VASC: Regular rate and rhythm, normal S1, S2, no murmurs  GI: Abdomen is soft, nontender, no significant organomegaly noted, bowel sounds present  MSK: No gross joint deformities.   Skin: No significant rashes, skin lesions noted  Neuro: AOx3, cranial nerves grossly intact, no focal motor or sensory deficits   PSYCH: Affect appropriate    Medications:   Medications:    mupirocin   Nasal BID    vancomycin  1,000 mg IntraVENous Q24H    furosemide  40 mg IntraVENous Daily    pantoprazole  40 mg IntraVENous Q12H    metoprolol succinate  25 mg Oral Daily    predniSONE  40 mg Oral Daily    atorvastatin  40 mg Oral Daily    Calcium Carb-Cholecalciferol  1 tablet Oral BID WC    docusate sodium  100 mg Oral BID    ferrous sulfate  325 mg Oral Q12H    lactobacillus  1 capsule Oral Daily with breakfast    levothyroxine  75 mcg Oral Daily    linaclotide  145 mcg Oral QAM AC    mirtazapine  15 mg Oral Nightly    sodium chloride flush  5-40 mL IntraVENous 2 times per day      Infusions:    sodium chloride 25 mL (09/13/21 1418)     PRN Meds: traMADol, 50 mg, Q4H PRN  ALPRAZolam, 0.25 mg, Daily PRN  melatonin, 3 mg, Nightly PRN  albuterol sulfate HFA, 2 puff, Q4H PRN  sodium chloride flush, 5-40 mL, PRN  sodium chloride, 25 mL, PRN  ondansetron, 4 mg, Q8H PRN   Or  ondansetron, 4 mg, Q6H PRN  polyethylene glycol, 17 g, Daily PRN  acetaminophen, 650 mg, Q6H PRN   Or  acetaminophen, 650 mg, Q6H PRN  albuterol, 2.5 mg, Q4H PRN          Electronically signed by Med Abreu MD on 9/14/2021 at 8:46 AM

## 2021-09-14 NOTE — CONSULTS
Subjective:   CHIEF COMPLAINT / HPI:  [de-identified]year old female admitted with increasing sob. This is associated with off and on wheeze. She also has cough which is weak and ineffective. She denies any fever or chest pain or hemoptysis.       Past Medical History:  Past Medical History:   Diagnosis Date    ASHD (arteriosclerotic heart disease)     Bilateral edema of lower extremity 12/16/2015    CH[facility]congestive heart failure) ([facility])     COPD (chronic obstructive pulmonary disease) (HCC)     Depression     Hypertension     MRSA (methicillin resistant staph aureus) culture positive 09/09/2021    Nasal    MRSA (methicillin resistant staph aureus) culture positive 09/09/2021    NASAL       Past Surgical History:        Procedure Laterality Date    ANUS SURGERY      fistula repair    APPENDECTOMY      HYSTERECTOMY      TONSILLECTOMY      UPPER GASTROINTESTINAL ENDOSCOPY N/A 11/25/2020    EGD BIOPSY performed by Robin Castaneda MD at [facility] ENDOSCOPY       Current Medications:    Current Facility-Administered Medications: polyethylene glycol (GLYCOLAX) powder 238 g, 238 g, Oral, Once  mupirocin (BACTROBAN) 2 % ointment, , Nasal, BID  vancomycin (VANCOCIN) 1000 mg in 200 mL IVPB, 1,000 mg, IntraVENous, Q24H  furosemide (LASIX) injection 40 mg, 40 mg, IntraVENous, Daily  pantoprazole (PROTONIX) injection 40 mg, 40 mg, IntraVENous, Q12H  metoprolol succinate (TOPROL XL) extended release tablet 25 mg, 25 mg, Oral, Daily  predniSONE (DELTASONE) tablet 40 mg, 40 mg, Oral, Daily  traMADol (ULTRAM) tablet 50 mg, 50 mg, Oral, Q4H PRN  ALPRAZolam (XANAX) tablet 0.25 mg, 0.25 mg, Oral, Daily PRN  melatonin tablet 3 mg, 3 mg, Oral, Nightly PRN  albuterol sulfate  (90 Base) MCG/ACT inhaler 2 puff, 2 puff, Inhalation, Q4H PRN  atorvastatin (LIPITOR) tablet 40 mg, 40 mg, Oral, Daily  Calcium Carb-Cholecalciferol 250-125 MG-UNIT TABS 250 mg, 1 tablet, Oral, BID WC  docusate sodium (COLACE) capsule 100 mg, 100 mg, Oral, BID  ferrous sulfate (IRON 325) tablet 325 mg, 325 mg, Oral, Q12H  lactobacillus (CULTURELLE) capsule 1 capsule, 1 capsule, Oral, Daily with breakfast  levothyroxine (SYNTHROID) tablet 75 mcg, 75 mcg, Oral, Daily  linaclotide (LINZESS) capsule 145 mcg, 145 mcg, Oral, QAM AC  mirtazapine (REMERON) tablet 15 mg, 15 mg, Oral, Nightly  sodium chloride flush 0.9 % injection 5-40 mL, 5-40 mL, IntraVENous, 2 times per day  sodium chloride flush 0.9 % injection 5-40 mL, 5-40 mL, IntraVENous, PRN  0.9 % sodium chloride infusion, 25 mL, IntraVENous, PRN  ondansetron (ZOFRAN-ODT) disintegrating tablet 4 mg, 4 mg, Oral, Q8H PRN **OR** ondansetron (ZOFRAN) injection 4 mg, 4 mg, IntraVENous, Q6H PRN  polyethylene glycol (GLYCOLAX) packet 17 g, 17 g, Oral, Daily PRN  acetaminophen (TYLENOL) tablet 650 mg, 650 mg, Oral, Q6H PRN **OR** acetaminophen (TYLENOL) suppository 650 mg, 650 mg, Rectal, Q6H PRN  albuterol (PROVENTIL) nebulizer solution 2.5 mg, 2.5 mg, Nebulization, Q4H PRN    Allergies   Allergen Reactions    Codeine Itching    Moxifloxacin Other (See Comments)     Listed as allergy from f    Oxycodone Other (See Comments)     Listed as allergy from f     Pcn [Penicillins] Hives and Rash    Warfarin And Related Other (See Comments)     listed as allergy from f       Social History:    Social History     Socioeconomic History    Marital status:      Spouse name: Alexei Samuel Number of children: 3    Years of education: None    Highest education level: None   Occupational History    None   Tobacco Use    Smoking status: Former Smoker     Packs/day: 1.00     Types: Cigarettes     Quit date: 2010     Years since quittin.0    Smokeless tobacco: Never Used   Substance and Sexual Activity    Alcohol use:  Yes     Alcohol/week: 0.0 standard drinks     Comment: wine twice a year    Drug use: No    Sexual activity: Yes     Partners: Male   Other Topics Concern    None   Social History Narrative    None Social Determinants of Health     Financial Resource Strain:     Difficulty of Paying Living Expenses:    Food Insecurity:     Worried About Running Out of Food in the Last Year:     [address] in the Last Year:    Transportation Needs:     Lack of Transportation (Medical):  Lack of Transportation (Non-Medical):    Physical Activity:     Days of Exercise per Week:     Minutes of Exercise per Session:    Stress:     Feeling of Stress :    Social Connections:     Frequency of Communication with Friends and Family:     Frequency of Social Gatherings with Friends and Family:     Attends [employer] Services:     Active Member of Clubs or Organizations:     Attends Club or Organization Meetings:     Marital Status:    Intimate Partner Violence:     Fear of Current or Ex-Partner:     Emotionally Abused:     Physically Abused:     Sexually Abused:        Family History:   Family History   Problem Relation Age of Onset    High Blood Pressure Mother     Heart Disease Mother     Early Death Father     Substance Abuse Father     Diabetes Sister        Immunization:  Immunization History   Administered Date(s) Administered    FAVIO, Casiano Peter, PF, 30mcg/0.3mL 12/21/2020, 01/11/2021    Influenza Virus Vaccine 09/01/2017    Pneumococcal Conjugate 13-valent ([id number]) 09/12/2016    Pneumococcal Polysaccharide ([id number]) 09/03/2015         REVIEW OF SYSTEMS:    CONSTITUTIONAL:  negative for fevers, chills, diaphoresis, activity change, appetite change, fatigue, night sweats and unexpected weight change.    EYES:  negative for blurred vision, eye discharge, visual disturbance and icterus  HEENT:  negative for hearing loss, tinnitus, ear drainage, sinus pressure, nasal congestion, epistaxis and snoring  RESPIRATORY:  See HPI  CARDIOVASCULAR:  negative for chest pain, palpitations, exertional chest pressure/discomfort, edema, syncope  GASTROINTESTINAL:  negative for nausea, vomiting, diarrhea, constipation, blood in stool and abdominal pain  GENITOURINARY:  negative for frequency, dysuria and hematuria  HEMATOLOGIC/LYMPHATIC:  negative for easy bruising, bleeding and lymphadenopathy  ALLERGIC/IMMUNOLOGIC:  negative for recurrent infections, angioedema, anaphylaxis and drug reactions  ENDOCRINE:  negative for weight changes and diabetic symptoms including polyuria, polydipsia and polyphagia    MUSCULOSKELETAL:  negative for  pain, joint swelling, decreased range of motion and muscle weakness  NEUROLOGICAL:  negative for headaches, slurred speech, unilateral weakness  PSYCHIATRIC/BEHAVIORAL: negative for hallucinations, behavioral problems, confusion and agitation. Objective:   PHYSICAL EXAM:      VITALS:    Vitals:    09/14/21 0417 09/14/21 0701 09/14/21 0807 09/14/21 0856   BP: 119/62 (!) 113/58  (!) 113/58   Pulse: 66 66  66   Resp: 16 17     Temp: 97.4 °F (36.3 °C) 97.5 °F (36.4 °C)     TempSrc: Oral Oral     SpO2: 99% 98% 92%    Weight: 218 lb 14.7 oz (99.3 kg)      Height:             CONSTITUTIONAL:  awake, alert, cooperative, no apparent distress, and appears stated age  NECK:  Supple, symmetrical, trachea midline, no adenopathy, thyroid symmetric, not enlarged and no tenderness  CHEST: Chest expansion equal and symmetrical, no intercostal retraction. LUNGS:  no increased work of breathing, has expiratory wheezes both lungs, no crackles. CARDIOVASCULAR: S1 and S2, no edema and no JVD  ABDOMEN:  normal bowel sounds, non-distended and no masses palpated, and no tenderness to palpation. No hepatospleenomegaly  LYMPHADENOPATHY:  no axillary or supraclavicular adenopathy. No cervical adnenopathy  PSYCHIATRIC: Oriented to person place and time. No obvious depression or anxiety. MUSCULOSKELETAL: No obvious misalignment or effusion of the joints. No clubbing, cyanosis of the digits. RIGHT AND LEFT LOWER EXTREMITIES: No edema, no inflammation, no tenderness.   SKIN:  normal skin color, texture, turgor and no redness, warmth, or swelling. No palpable nodules    DATA:       EXAMINATION:   ONE XRAY VIEW OF THE CHEST       9/13/2021 3:24 pm       COMPARISON:   None.       HISTORY:   ORDERING SYSTEM PROVIDED HISTORY: wheezing   TECHNOLOGIST PROVIDED HISTORY:   Reason for exam:->wheezing   Reason for Exam: TABLE       FINDINGS:   There is persistent elevation left hemidiaphragm.  There is associated left   basilar airspace disease appears slightly increased since the previous exam.   There is also likely a small left-sided pleural effusion.  The right lung   remains clear.           Impression   Worsening left basilar pleural and parenchymal disease.                               Assessment:     1. Ac copd  2. chf  3. ch resp failure  4. MRSA pneumonia          Plan:     1. D/w pt  2. Adequate o2 adm  3. Bd rx  4. On po steroids  5. On vanc  6. ID eval  7.  Thanks will follow

## 2021-09-14 NOTE — PROGRESS NOTES
[facility]  consulted by AMPARO Clayton CNP for monitoring and adjustment. Indication for treatment: MRSA positive blood culture, Positive sputum culture  Goal AUC: 400 - 600  Goal trough: 15 - 20 mcg/mL    Pertinent Laboratory Values:   Temp Readings from Last 3 Encounters:   09/13/21 98.1 °F (36.7 °C) (Oral)   11/30/20 98 °F (36.7 °C) (Oral)   12/24/17 97.6 °F (36.4 °C) (Oral)     Recent Labs     09/12/21  0437   WBC 9.0     Recent Labs     09/12/21  0437 09/13/21  0250   BUN 26* 27*   CREATININE 1.1 1.0     Estimated Creatinine Clearance: 49 mL/min (based on SCr of 1 mg/dL). Intake/Output Summary (Last 24 hours) at 9/13/2021 2238  Last data filed at 9/13/2021 0815  Gross per 24 hour   Intake 0 ml   Output 200 ml   Net -200 ml       Pertinent Cultures:  Date    Source    Results  09/08   COVID-19, Rapid  Not detected  09/08   Rapid flu, A/B   Negative  09/08   Blood    1/4 positive for gram variable cocci  09/09   Strep Pneumo/Legionella Negative  09/09   Sputum   MRSA (light growth)  09/10   MRSA Screen (Nasal) Positive    Assessment:  WBC WNL at 9; Afebrile  SCr = 1, BUN = 27, Limited I/O data  Day(s) of therapy: 1  Vancomycin concentration:   09/15 - To be collected    Plan:  Vancomycin 1,500 mg IV initial dose; Follow with Vancomycin 1,000 mg IV Q 24 Hours  Predicted AUC: 513; Predicted Trough: 15  Pharmacy will continue to monitor patient and adjust therapy as indicated    Jhony 3 09/15/2021 @ 21:00    Thank you for the consult.   Valerie Kirk, [facility]   9/13/2021 10:38 PM

## 2021-09-14 NOTE — PROGRESS NOTES
[facility]  consulted by AMPARO Zhang CNP for monitoring and adjustment. Indication for treatment: MRSA Pneumonia; 1/4 positive blood cultures  Goal AUC: 400 - 600  Goal trough: 15 - 20 mcg/mL    Pertinent Laboratory Values:   Temp Readings from Last 3 Encounters:   09/14/21 97.5 °F (36.4 °C) (Oral)   11/30/20 98 °F (36.7 °C) (Oral)   12/24/17 97.6 °F (36.4 °C) (Oral)     Recent Labs     09/12/21  0437   WBC 9.0     Recent Labs     09/12/21  0437 09/13/21  0250   BUN 26* 27*   CREATININE 1.1 1.0     Estimated Creatinine Clearance: 48 mL/min (based on SCr of 1 mg/dL). Intake/Output Summary (Last 24 hours) at 9/14/2021 1517  Last data filed at 9/14/2021 0930  Gross per 24 hour   Intake 632. 85 ml   Output 325 ml   Net 307.85 ml       Pertinent Cultures:  Date    Source    Results  09/08   COVID-19, Rapid  Not detected  09/08   Rapid flu, A/B   Negative  09/08   Blood    1/4 positive for gram variable cocci  09/09   Strep Pneumo/Legionella Negative  09/09   Sputum   MRSA (light growth)  09/10   MRSA Screen (Nasal) Positive    Assessment:  · WBC WNL at 9; Afebrile  · SCr = 1, BUN = 27, Limited I/O data  · Day(s) of therapy: 2  · Vancomycin concentration:   · 09/15 - To be collected    Plan:  · Vancomycin 1,500 mg IV initial dose; Follow with Vancomycin 1,000 mg IV Q 24 Hours  · Predicted AUC: 513; Predicted Trough: 15  · Pharmacy will continue to monitor patient and adjust therapy as indicated    Jhony 3 09/15/2021 @ 21:00    Thank you for the consult.   Oksana Gallagher, [facility]   9/14/2021 3:17 PM

## 2021-09-15 PROCEDURE — 6370000000 HC RX 637 (ALT 250 FOR IP): Performed by: CLINICAL NURSE SPECIALIST

## 2021-09-15 PROCEDURE — 86141 C-REACTIVE PROTEIN HS: CPT

## 2021-09-15 PROCEDURE — 85018 HEMOGLOBIN: CPT

## 2021-09-15 PROCEDURE — 94761 N-INVAS EAR/PLS OXIMETRY MLT: CPT

## 2021-09-15 PROCEDURE — C9113 INJ PANTOPRAZOLE SODIUM, VIA: HCPCS | Performed by: INTERNAL MEDICINE

## 2021-09-15 PROCEDURE — 1200000000 HC SEMI PRIVATE

## 2021-09-15 PROCEDURE — 84145 PROCALCITONIN (PCT): CPT

## 2021-09-15 PROCEDURE — 85014 HEMATOCRIT: CPT

## 2021-09-15 PROCEDURE — 2700000000 HC OXYGEN THERAPY PER DAY

## 2021-09-15 PROCEDURE — 6360000002 HC RX W HCPCS: Performed by: INTERNAL MEDICINE

## 2021-09-15 PROCEDURE — 6360000002 HC RX W HCPCS: Performed by: NURSE PRACTITIONER

## 2021-09-15 PROCEDURE — 6370000000 HC RX 637 (ALT 250 FOR IP): Performed by: NURSE PRACTITIONER

## 2021-09-15 PROCEDURE — 80202 ASSAY OF VANCOMYCIN: CPT

## 2021-09-15 PROCEDURE — 2580000003 HC RX 258: Performed by: CLINICAL NURSE SPECIALIST

## 2021-09-15 RX ADMIN — MIRTAZAPINE 15 MG: 15 TABLET, FILM COATED ORAL at 20:25

## 2021-09-15 RX ADMIN — FERROUS SULFATE TAB 325 MG (65 MG ELEMENTAL FE) 325 MG: 325 (65 FE) TAB at 04:44

## 2021-09-15 RX ADMIN — LEVOTHYROXINE SODIUM 75 MCG: 0.07 TABLET ORAL at 06:05

## 2021-09-15 RX ADMIN — PANTOPRAZOLE SODIUM 40 MG: 40 INJECTION, POWDER, FOR SOLUTION INTRAVENOUS at 04:44

## 2021-09-15 RX ADMIN — SODIUM CHLORIDE, PRESERVATIVE FREE 10 ML: 5 INJECTION INTRAVENOUS at 20:25

## 2021-09-15 RX ADMIN — FUROSEMIDE 40 MG: 10 INJECTION, SOLUTION INTRAMUSCULAR; INTRAVENOUS at 14:13

## 2021-09-15 RX ADMIN — Medication: at 20:25

## 2021-09-15 RX ADMIN — PANTOPRAZOLE SODIUM 40 MG: 40 INJECTION, POWDER, FOR SOLUTION INTRAVENOUS at 17:30

## 2021-09-15 RX ADMIN — VANCOMYCIN 1000 MG: 1 INJECTION, SOLUTION INTRAVENOUS at 20:24

## 2021-09-15 RX ADMIN — DOCUSATE SODIUM 100 MG: 100 CAPSULE, LIQUID FILLED ORAL at 20:25

## 2021-09-15 ASSESSMENT — PAIN SCALES - GENERAL: PAINLEVEL_OUTOF10: 0

## 2021-09-15 NOTE — PLAN OF CARE
Nutrition Problem #1: No nutrition diagnosis at this time  Intervention: Food and/or Nutrient Delivery: Continue Current Diet

## 2021-09-15 NOTE — PROGRESS NOTES
[id number] [facility]  consulted by AMPARO Martin CNP for monitoring and adjustment. Indication for treatment: MRSA Pneumonia  Goal AUC: 400 - 600  Goal trough: 15 mcg/mL    Pertinent Laboratory Values:   Temp Readings from Last 3 Encounters:   09/15/21 97.9 °F (36.6 °C) (Oral)   11/30/20 98 °F (36.7 °C) (Oral)   12/24/17 97.6 °F (36.4 °C) (Oral)     No results for input(s): WBC, LACTATE in the last 72 hours. Recent Labs     09/13/21  0250   BUN 27*   CREATININE 1.0     Estimated Creatinine Clearance: 48 mL/min (based on SCr of 1 mg/dL). Intake/Output Summary (Last 24 hours) at 9/15/2021 0959  Last data filed at 9/14/2021 2228  Gross per 24 hour   Intake 10 ml   Output --   Net 10 ml       Pertinent Cultures:  Date    Source    Results  09/08   COVID-19, Rapid  Not detected  09/08   Rapid flu, A/B   Negative  09/08   Blood    1/4 positive for gram variable cocci  09/09   Strep Pneumo/Legionella Negative  09/09   Sputum   MRSA (light growth)  09/10   MRSA Screen (Nasal)  Positive    VANCOMYCIN TROUGH:  No results for input(s): VANCOTROUGH in the last 72 hours. VANCOMYCIN RANDOM:  No results for input(s): VANCORANDOM in the last 72 hours. Assessment:  · WBC WNL; Afebrile  · Renal function stable   · Day(s) of therapy: 3  · Vancomycin concentration: to be collected    Plan:  · Vancomycin 1,500 mg x 1 followed by 1,000 mg q24h  · Predicted AUC: 513; Predicted Trough: 15  · Pharmacy will continue to monitor patient and adjust therapy as indicated    VANCOMYCIN CONCENTRATION SCHEDULED FOR 09/16 AM    Thank you for the consult.   Jurgen Santana, [facility]   9/15/2021 9:59 AM

## 2021-09-15 NOTE — PROGRESS NOTES
[facility]        Progress Note       9/15/2021  9:08 AM    Patient:    Patricia Guillermo  : 1940   [de-identified] y.o. MRN: [id number]  Admitted: 2021 12:44 PM ATT: Freeman Gage MD   1102/1102-A  AdmitDx: Pneumonia [J18.9]  Pleural effusion [J90]  Hypoxia [R09.02]  SALTY (acute kidney injury) ([facility] [address] [N17.9]  Pneumonia of left lung due to infectious organism, unspecified part of lung [J18.9]  PCP: Diann Bland MD    SUBJECTIVE:  Chart reviewed, events noted  Patient feeling well. No complaints. No BM overnight. Denies N/V or abd pain. Repeating NO, NO , NO. declining to drink bowel prep.     ROS:  The positive ROS will be identified in bold     CONSTITUTIONAL:  Neg  Weight loss, fatigue, fever  MOUTH/THROAT:  Neg  Bleeding gums, hoarseness or sore throat  RESPIRATORY:   Neg SOB, wheeze, cough, hemoptysis or bronchitis  CARDIOVASCULAR:  Neg Chest pain, palpitations, dyspnea on exertion, edema  GASTROINTESTINAL:  SEE HPI  HEMATOLOGIC/LYMPHATIC:  Neg  Anemia, bleeding tendency  MUSCULOSKELETAL: Neg  New myalgias, joint pain, swelling or stiffness  NEUROLOGICAL:  Neg  Loss of Consciousness, memory loss, forgetfulness, periods of confusion, difficulty concentrating, seizures, insomnia, aphasia    SKIN:  Neg No itching, rashes, or sores  PSYCHIATRIC:  Neg Depression, personality changes, anxiety    OBJECTIVE:      /67   Pulse 58   Temp 97.9 °F (36.6 °C) (Oral)   Resp 14   Ht 5' 1\" (1.549 m)   Wt 212 lb 15.4 oz (96.6 kg)   SpO2 99%   BMI 40.24 kg/m²     NAD, appears comfortable in bed  Lips and mucous membranes pink and moist  RRR, Nl s1s2  Lungs decreased bilaterally, respirations even and unlabored   Abdomen soft, ND, NT, bowel sounds normal  Skin pink, warm and dry  trace edema bilateral lower extremities   AAOx3    CBC: Recent Labs     21  2331 21  0505 21  1830   HGB 8.6* 8.9* 9.2*     BMP:    Recent Labs 09/13/21  0250      K 4.3      CO2 34*   BUN 27*   CREATININE 1.0   GLUCOSE 105*     Magnesium:   Lab Results   Component Value Date    MG 2.1 09/13/2021     Hepatic:   Recent Labs     09/13/21  0250   AST 16   ALT 8*   BILITOT 0.2   ALKPHOS 79     INR:   Recent Labs     09/12/21 2054   INR 0.93         Intake/Output Summary (Last 24 hours) at 9/15/2021 0908  Last data filed at 9/14/2021 2228  Gross per 24 hour   Intake 570 ml   Output --   Net 570 ml         Medications    Scheduled Medications:    magnesium citrate  296 mL Oral Once    bisacodyl  10 mg Oral Once    mupirocin   Nasal BID    vancomycin  1,000 mg IntraVENous Q24H    furosemide  40 mg IntraVENous Daily    pantoprazole  40 mg IntraVENous Q12H    metoprolol succinate  25 mg Oral Daily    predniSONE  40 mg Oral Daily    atorvastatin  40 mg Oral Daily    Calcium Carb-Cholecalciferol  1 tablet Oral BID WC    docusate sodium  100 mg Oral BID    ferrous sulfate  325 mg Oral Q12H    lactobacillus  1 capsule Oral Daily with breakfast    levothyroxine  75 mcg Oral Daily    linaclotide  145 mcg Oral QAM AC    mirtazapine  15 mg Oral Nightly    sodium chloride flush  5-40 mL IntraVENous 2 times per day     PRN Medications: traMADol, ALPRAZolam, melatonin, albuterol sulfate HFA, sodium chloride flush, sodium chloride, ondansetron **OR** ondansetron, polyethylene glycol, acetaminophen **OR** acetaminophen, albuterol  Infusions:    sodium chloride Stopped (09/15/21 0056)           Patient Active Problem List   Diagnosis Code    Coronary atherosclerosis of native coronary artery I25.10    Anemia D64.9    Chronic hypoxemic respiratory failure ([facility]) J96.11    Bilateral edema of lower extremity R60.0    Moderate COPD (chronic obstructive pulmonary disease) ([facility]) J44.9    COPD exacerbation ([facility]) J44.1    Hypoxia R09.02    COPD with acute exacerbation ([facility]) J44.1    Morbid obesity due to excess calories ([facility]) E66.01    Acute on chronic diastolic congestive heart failure (HCC) I50.33    Depression F32.9    Hypertension I10    Pneumonia J18.9    Chronic diastolic CHF (congestive heart failure) (HCC) I50.32    GIB (gastrointestinal bleeding) K92.2    Other chest pain R07.89       ASSESSMENT:  GI Bleed  hgb 9.2, iron 29, tibc 215 stable   +occult   Chronic anemia   multifactorial   May be gastritis or chronic diseases  Due for c scope now for hx of polyp     RECOMMENDATIONS:   Incentive spirometry   PATIENT DRANK 1/2 BOWEL PREP. STATING SHE WILL DRINK NO MORE. Declining procedures today and in the future, keep repeating NO, NO, NO   Continue PPI BID   Cautiously start anticoagulation      Attempted to call , No answer      Cardiology following for RVR   On 4 L O2 per NC @ 96% saturations, which is home dose   On abx for + blood cultures     Will sign off. FU OP     Discussed plan of care with patient, RN, and hospitalist    Patient clinical, biochemical, and radiological information discussed with Dr. Marlys Magallon. He agrees with the assessment and plan. AMPARO Jordan CNP, CNP  9/15/2021  9:08 AM     Anemia no active bleed  History of chronic anemia  Was planning for GI work-up in terms of EGD and colonoscopy  Refusing all procedures  Treat symptomatically  Iron replacement if needed    We will sign off call if needed    I have seen and examined this patient personally, and independently of the nurse practitioner. The plan was developed mutually at the time of the visit with the patient. Gomez Zelaay and myself have spoken with patient, nursing staff and provided written and verbal instructions .     The above note has been reviewed and I agree with the Assessment,  Diagnosis, and Treatment plan as suggested by Gomez Zelaya CNP      [address] [facility] gastroenterology

## 2021-09-15 NOTE — PROGRESS NOTES
Comprehensive Nutrition Assessment    Type and Reason for Visit:  RD Nutrition Re-Screen/LOS    Nutrition Recommendations/Plan:   · Continue current diet  · Monitor while inpatient      Nutrition Assessment:  Pt admitted with pneumonia, hypoxia. PMH SALTY, chronic anemia. Pt was resting upon visit by dietetic intern. Patient was NPO yesterday for procedure to be done today. She refused bowel prep for EGD, per chart review. Spoke with attending RN, who has updated diet order. Will follow pt at moderate risk. Estimated Daily Nutrient Needs:  Energy (kcal):  7448; Weight Used for Energy Requirements:        Protein (g):   (1.0-1.2 g/kg); Weight Used for Protein Requirements:  Ideal        Fluid (ml/day):  1373 (mL/kcal); Method Used for Fluid Requirements:  1 ml/kcal      Nutrition Related Findings:  Hg 9.2L,[id number]     Wounds:  None       Current Nutrition Therapies:    ADULT DIET;  Dysphagia - Minced and Moist    Anthropometric Measures:  · Height: 5' 1\" (154.9 cm)  · Current Body Weight: 212 lb 15.4 oz (96.6 kg)   · Admission Body Weight: 218 lb (98.9 kg)    · Usual Body Weight: 173 lb (78.5 kg) (11/24/20)     · Ideal Body Weight: 105 lbs; % Ideal Body Weight 202.8 %   · BMI: 40.3      · BMI Categories: Normal Weight (BMI 22.0 to 24.9) age over 72       Nutrition Diagnosis:   · No nutrition diagnosis at this time related to   as evidenced by        Nutrition Interventions:   Food and/or Nutrient Delivery:  Continue Current Diet  Nutrition Education/Counseling:  No recommendation at this time   Coordination of Nutrition Care:  No recommendation at this time         Nutrition Monitoring and Evaluation:   Behavioral-Environmental Outcomes:  None Identified   Food/Nutrient Intake Outcomes:  Food and Nutrient Intake  Physical Signs/Symptoms Outcomes:  Biochemical Data, Nutrition Focused Physical Findings, Weight     Discharge Planning:    No discharge needs at this time     Electronically signed by [facility] Mike Perry on 9/15/21 at 2:25 PM EDT

## 2021-09-15 NOTE — PROGRESS NOTES
Cardiology Progress Note       Walter Carbajal is a [de-identified] y.o. female   1940     SUBJECTIVE:   Patient seen and examined main complaint is back pain no chest pain rhythm is normal sinus rhythm  9/14  Patient seen and examined rhythm is sinus discussed with had a 2D echo was normal  9/15  Patient seen and examined no new cardiac complaints  OBJECTIVE:    Review of Systems:  General appearance: alert, appears stated age and cooperative  Skin: Skin color, texture, normal. No rashes or lesions  HEENT: No nose bleed, headache, vision problems  CV: C/O chest pain, tightness, pressure,   Respiratory: C/o no SOB, MORA, Orthopnea, PND  GI: No abdominal pain, black stool, bloating  Limbs: No c/o edema, pain, swelling, intermittent claudication, joint pains  Neuro: No dizziness, lightheadedness, syncope, gait problems, memory problems  Psych: grossly normal. No SI/depression. Vitals:   Blood pressure 130/67, pulse 58, temperature 97.9 °F (36.6 °C), temperature source Oral, resp. rate 14, height 5' 1\" (1.549 m), weight 212 lb 15.4 oz (96.6 kg), SpO2 99 %, not currently breastfeeding.     HEENT: AT, NC, PERRLA  Neck: No JVD  Heart: S1 S2 audible, no murmur   Lungs: CTA   Abdomen: Nontender   Limbs: No edema   CNS: no focal deficit      Past Medical History:   Diagnosis Date    ASHD (arteriosclerotic heart disease)     Bilateral edema of lower extremity 12/16/2015    CH[facility]congestive heart failure) ([facility])     COPD (chronic obstructive pulmonary disease) ([facility])     Depression     Hypertension     MRSA (methicillin resistant staph aureus) culture positive 09/09/2021    Nasal    MRSA (methicillin resistant staph aureus) culture positive 09/09/2021    NASAL        Patient Active Problem List   Diagnosis    Coronary atherosclerosis of native coronary artery    Anemia    Chronic hypoxemic respiratory failure (HCC)    Bilateral edema of lower extremity    Moderate [facility] (chronic obstructive pulmonary disease) ([facility] [address]    COPD exacerbation ([facility] [address]    Hypoxia    COPD with acute exacerbation ([facility] [address]    Morbid obesity due to excess calories (HCC)    Acute on chronic diastolic congestive heart failure (HCC)    Depression    Hypertension    Pneumonia    Chronic diastolic CHF (congestive heart failure) (HCC)    GIB (gastrointestinal bleeding)    Other chest pain        Allergies   Allergen Reactions    Codeine Itching    Moxifloxacin Other (See Comments)     Listed as allergy from ecf    Oxycodone Other (See Comments)     Listed as allergy from ecf     Pcn [Penicillins] Hives and Rash    Warfarin And Related Other (See Comments)     listed as allergy from ecf        Current Inpatient Medications:    Current Facility-Administered Medications   Medication Dose Route Frequency Provider Last Rate Last Admin    magnesium citrate solution 296 mL  296 mL Oral Once AMPARO Ramos CNP        bisacodyl (DULCOLAX) EC tablet 10 mg  10 mg Oral Once AMPARO Ramos CNP        mupirocin (BACTROBAN) 2 % ointment   Nasal BID AMPARO Hayes CNP   Given at 09/14/21 2228    vancomycin (VANCOCIN) 1000 mg in 200 mL IVPB  1,000 mg IntraVENous Q24H AMPARO Hayes CNP   Stopped at 09/15/21 0042    furosemide (LASIX) injection 40 mg  40 mg IntraVENous Daily Sindi Phan MD   40 mg at 09/14/21 0857    pantoprazole (PROTONIX) injection 40 mg  40 mg IntraVENous Q12H Sindi Phan MD   40 mg at 09/15/21 0444    metoprolol succinate (TOPROL XL) extended release tablet 25 mg  25 mg Oral Daily Gloria Garcia MD   25 mg at 09/14/21 0856    predniSONE (DELTASONE) tablet 40 mg  40 mg Oral Daily iSndi Phan MD   40 mg at 09/14/21 0857    traMADol (ULTRAM) tablet 50 mg  50 mg Oral Q4H PRN AMPARO Rhodes CNP   50 mg at 09/14/21 2228    ALPRAZolam (XANAX) tablet 0.25 mg  0.25 mg Oral Daily PRN Sindi Phan MD   0.25 mg at 09/12/21 1032    melatonin tablet 3 mg  3 mg Oral Nightly PRN Rosas Butts AMPARO Long               Labs:  CBC with Differential:    Lab Results   Component Value Date    WBC 9.0 09/12/2021    RBC 3.09 09/12/2021    HGB 9.2 09/14/2021    HCT 33.9 09/14/2021     09/12/2021    MCV 96.8 09/12/2021    MCH 26.9 09/12/2021    MCHC 27.8 09/12/2021    RDW 13.7 09/12/2021    NRBC 1 06/23/2016    SEGSPCT 80.1 09/08/2021    BANDSPCT 2 06/29/2016    LYMPHOPCT 10.2 09/08/2021    PROMYELOPCT 1 06/26/2016    MONOPCT 6.7 09/08/2021    MYELOPCT 1 06/26/2016    BASOPCT 0.2 09/08/2021    MONOSABS 0.6 09/08/2021    LYMPHSABS 1.0 09/08/2021    EOSABS 0.2 09/08/2021    BASOSABS 0.0 09/08/2021    DIFFTYPE AUTOMATED DIFFERENTIAL 09/08/2021     CMP:    Lab Results   Component Value Date     09/13/2021    K 4.3 09/13/2021     09/13/2021    CO2 34 09/13/2021    BUN 27 09/13/2021    CREATININE 1.0 09/13/2021    GFRAA >60 09/13/2021    LABGLOM 53 09/13/2021    GLUCOSE 105 09/13/2021    PROT 5.9 09/13/2021    LABALBU 3.3 09/13/2021    CALCIUM 9.0 09/13/2021    BILITOT 0.2 09/13/2021    ALKPHOS 79 09/13/2021    AST 16 09/13/2021    ALT 8 09/13/2021     Hepatic Function Panel:    Lab Results   Component Value Date    ALKPHOS 79 09/13/2021    ALT 8 09/13/2021    AST 16 09/13/2021    PROT 5.9 09/13/2021    BILITOT 0.2 09/13/2021    BILIDIR 0.2 06/17/2016    IBILI 0.0 06/17/2016    LABALBU 3.3 09/13/2021     Magnesium:    Lab Results   Component Value Date    MG 2.1 09/13/2021     PT/INR:    Lab Results   Component Value Date    PROTIME 12.0 09/12/2021    INR 0.93 09/12/2021     Last 3 Troponin:  No results found for: TROPONINI  U/A:    Lab Results   Component Value Date    COLORU YELLOW 11/24/2020    WBCUA 1 11/24/2020    RBCUA 1 11/24/2020    MUCUS RARE 11/24/2020    TRICHOMONAS NONE SEEN 11/24/2020    BACTERIA FEW 11/24/2020    CLARITYU CLEAR 11/24/2020    SPECGRAV 1.014 11/24/2020    LEUKOCYTESUR TRACE 11/24/2020    UROBILINOGEN NORMAL 11/24/2020    BILIRUBINUR NEGATIVE 11/24/2020    BLOODU SMALL 11/24/2020     ABG:    Lab Results   Component Value Date    ZIO5YPP 40.0 09/01/2016    PO2ART 167 09/01/2016    RCG9OMZ 29.1 09/01/2016     FLP:    Lab Results   Component Value Date    TRIG 58 01/02/2019    HDL 58 01/02/2019    LDLCALC 36 09/05/2018    LDLDIRECT 48 01/02/2019     TSH:  No results found for: TSH   DATA:   ECG: Sinus Rhythm       ASSESSMENT:   1 episode of atrial fibrillation with RVR is a very likely has paroxysmal atrial fibrillation currently in normal sinus rhythm patient not started on anticoagulation because of anemia and positive blood in the stool  Patient currently in sinus rhythm  2D echo showed normal LV function    2 hypertension  Well controlled    3 morbid obesity chronic due to excess caloric intake\    4 CAD no chest pain    5 COPD no active wheezing  Depression  Plan  Continue Lopressor  Okay to start low-dose Eliquis 2.5 twice daily patient can be discharged from cardiology standpoint

## 2021-09-15 NOTE — PROGRESS NOTES
pulmonary      SUBJECTIVE:  Very weak     OBJECTIVE    VITALS:  /67   Pulse 58   Temp 97.9 °F (36.6 °C) (Oral)   Resp 14   Ht 5' 1\" (1.549 m)   Wt 212 lb 15.4 oz (96.6 kg)   SpO2 99%   BMI 40.24 kg/m²   HEAD AND FACE EXAM:  No throat injection, no active exudate,no thrush  NECK EXAM;No JVD, no masses, symmetrical  CHEST EXAM; Expansion equal and symmetrical, no masses  LUNG EXAM; Good breath sounds bilaterally. There are expiratory wheezes both lungs, there are crackles at both lung bases  CARDIOVASCULAR EXAM: Positive S1 and S2, no S3 or S4, no clicks ,no murmurs  RIGHT AND LEFT LOWER EXTRIMITY EXAM: No edema, no swelling, no inflamation            LABS   Lab Results   Component Value Date    WBC 9.0 09/12/2021    HGB 9.2 (L) 09/14/2021    HCT 33.9 (L) 09/14/2021    MCV 96.8 09/12/2021     09/12/2021     Lab Results   Component Value Date    CREATININE 1.0 09/13/2021    BUN 27 (H) 09/13/2021     09/13/2021    K 4.3 09/13/2021     09/13/2021    CO2 34 (H) 09/13/2021     Lab Results   Component Value Date    INR 0.93 09/12/2021    PROTIME 12.0 09/12/2021          Lab Results   Component Value Date    PHOS 6.6 06/20/2016      No results for input(s): PH, PO2ART, TLU8PQX, HCO3, BEART, O2SAT in the last 72 hours. Wt Readings from Last 3 Encounters:   09/15/21 212 lb 15.4 oz (96.6 kg)   11/27/20 173 lb 12.8 oz (78.8 kg)   12/23/17 189 lb 5 oz (85.9 kg)               ASSESMENT  Ac copd  ch resp failure   mrsa pneumonia        PLAN  1. Cont o2  2.  Bd rx  3. antibx    9/15/2021  Jocelin Guzman MD, M.D.

## 2021-09-15 NOTE — FLOWSHEET NOTE
Patient drowsy, refusing to drink mag citrate. Patient keeps repeating \"no, no, no, no.\"  Patient educated on need to complete bowel prep for colonoscopy today.   Keeps repeating \"no, no, no\"

## 2021-09-15 NOTE — PROGRESS NOTES
Hospitalist Progress Note      Name:  Mark Olmstead /Age/Sex: 1940  ([de-identified] y.o. female)   MRN & CSN:  [id number] & [id number] Admission Date/Time: 2021 12:44 PM   [facility] PCP: Tara Jimenez MD         Hospital Day: 8      Assessment and Plan:     Patient is a 81-year-old female past medical history of hypertension, COPD, chronic hypoxic nasreen failure requiring 4 L nasal cannula, depression, CHF, CAD who was admitted for shortness of breath thought to be multifactorial including atelectasis, COPD on top of CHF. Patient respiratory culture came back positive for MRSA. Currently on vancomycin. Patient also was found to have GI bleed therefore she has been followed by GI. Patient had a transient episode of A. fib for which cardiology was consulted. Patient has refused EGD and colonoscopy. Discussed with GI and they recommend resuming anticoagulation. 1. Shortness of breath: Likely multifactorial  2. Chronic hypoxic respiratory failure due to COPD: Uses 4 L nasal cannula at baseline  3. Suspected MRSA pneumonia  4. Proximal A. fib with rapid ventricular: Currently normal sinus rhythm  5. Positive FOBT  6. Left upper extremity swelling due to IV fluid infiltration  7. Primary hypertension: Controlled  8. Hyperlipidemia: Unspecified  9. History of CAD   10. History of anxiety and depression  11. Hypothyroidism likely acquired     Plan:  Patient uses 4 L of nasal cannula at baseline due to chronic hypoxic aspiratory failure  Patient remains continues to require her baseline oxygen. Suspect shortness of breath  due to combination  of COPD, atelectasis, left hemidiaphragm elevation, and MRSA pneumonia. Patient does not appear volume overload: Cautious use of Lasix  Continue p.o. iron. Continue vancomycin for suspected MRSA pneumonia: Can switch to p.o. Zyvox.   ID to follow  Check mag, phosphorus  Continue current cardiac medication: Cardiology following  Patient has refused EGD and colonoscopy today. Discussed with GI and they recommended some anticoagulation. Bronchodilators and wean off oxygen as tolerated  PT/OT        DVT prophylaxis: Start heparin for DVT prophylaxis: Anticoagulation for paroxysmal A. fib per cardiology. CODE STATUS: Full code  Patient remains hospitalized due to:  IV antibiotics. Subjective. :     Patient was seen and examined today. She has refused EGD/colonoscopy. Also poorly prepped. Discussed with GI and they recommend resuming the coagulation. Objective:   Vitals:   Vitals:    09/15/21 0442   BP: (!) 115/58   Pulse: 54   Resp: 12   Temp: 98.1 °F (36.7 °C)   SpO2: 100%         Physical Exam:   GEN: Awake, alert, in no apparent distress awake female, sitting upright in bed in no apparent distress. Appears given age. HEENT: Atraumatic, normocephalic, PERRLA, EOMI  NECK: Supple, no apparent thyromegaly or masses. RESP: Clear lungs to auscultation  CARDIO/VASC: Regular rate and rhythm, normal S1, S2, no murmurs  GI: Abdomen is soft, nontender, no significant organomegaly noted, bowel sounds present  MSK: No gross joint deformities.   Skin: No significant rashes, skin lesions noted  Neuro: AOx3, cranial nerves grossly intact, no new focal motor or sensory deficits   PSYCH: Affect appropriate    Medications:   Medications:    magnesium citrate  296 mL Oral Once    bisacodyl  10 mg Oral Once    mupirocin   Nasal BID    vancomycin  1,000 mg IntraVENous Q24H    furosemide  40 mg IntraVENous Daily    pantoprazole  40 mg IntraVENous Q12H    metoprolol succinate  25 mg Oral Daily    predniSONE  40 mg Oral Daily    atorvastatin  40 mg Oral Daily    Calcium Carb-Cholecalciferol  1 tablet Oral BID WC    docusate sodium  100 mg Oral BID    ferrous sulfate  325 mg Oral Q12H    lactobacillus  1 capsule Oral Daily with breakfast    levothyroxine  75 mcg Oral Daily    linaclotide  145 mcg Oral QAM AC    mirtazapine  15 mg Oral Nightly    sodium chloride flush  5-40 mL IntraVENous 2 times per day      Infusions:    sodium chloride Stopped (09/15/21 0056)     PRN Meds: traMADol, 50 mg, Q4H PRN  ALPRAZolam, 0.25 mg, Daily PRN  melatonin, 3 mg, Nightly PRN  albuterol sulfate HFA, 2 puff, Q4H PRN  sodium chloride flush, 5-40 mL, PRN  sodium chloride, 25 mL, PRN  ondansetron, 4 mg, Q8H PRN   Or  ondansetron, 4 mg, Q6H PRN  polyethylene glycol, 17 g, Daily PRN  acetaminophen, 650 mg, Q6H PRN   Or  acetaminophen, 650 mg, Q6H PRN  albuterol, 2.5 mg, Q4H PRN          Electronically signed by Fady Duarte MD on 9/15/2021 at 8:14 AM

## 2021-09-15 NOTE — PLAN OF CARE
Problem: Falls - Risk of:  Goal: Will remain free from falls  Description: Will remain free from falls  9/15/2021 0202 by Jodee Merida RN  Outcome: Ongoing  9/14/2021 1235 by Barak Olivares RN  Outcome: Ongoing  Goal: Absence of physical injury  Description: Absence of physical injury  9/15/2021 0202 by Jodee Merida RN  Outcome: Ongoing  9/14/2021 1235 by Barak Olivares RN  Outcome: Ongoing     Problem: Pain:  Goal: Pain level will decrease  Description: Pain level will decrease  9/15/2021 0202 by Jodee Merida RN  Outcome: Ongoing  9/14/2021 1235 by Barak Olivares RN  Outcome: Ongoing  Goal: Control of acute pain  Description: Control of acute pain  9/15/2021 0202 by Jodee Merida RN  Outcome: Ongoing  9/14/2021 1235 by Barak Olivares RN  Outcome: Ongoing  Goal: Control of chronic pain  Description: Control of chronic pain  9/15/2021 0202 by Jodee Merida RN  Outcome: Ongoing  9/14/2021 1235 by Barak Olivares RN  Outcome: Ongoing     Problem: Discharge Planning:  Goal: Discharged to appropriate level of care  Description: Discharged to appropriate level of care  9/15/2021 0202 by Jodee Merida RN  Outcome: Ongoing  9/14/2021 1235 by Barak Olivares RN  Outcome: Ongoing     Problem:  Activity Intolerance:  Goal: Ability to tolerate increased activity will improve  Description: Ability to tolerate increased activity will improve  9/15/2021 0202 by Jodee Merida RN  Outcome: Ongoing  9/14/2021 1235 by Barak Olivares RN  Outcome: Ongoing     Problem: Airway Clearance - Ineffective:  Goal: Ability to maintain a clear airway will improve  Description: Ability to maintain a clear airway will improve  9/15/2021 0202 by Jodee Merida RN  Outcome: Ongoing  9/14/2021 1235 by Barak Olivares RN  Outcome: Ongoing     Problem: Breathing Pattern - Ineffective:  Goal: Ability to achieve and maintain a regular respiratory rate will improve  Description: Ability to achieve and maintain a regular respiratory rate will improve  9/15/2021 0202 by Eric Albright RN  Outcome: Ongoing  9/14/2021 1235 by Mar Robertson RN  Outcome: Ongoing     Problem: Gas Exchange - Impaired:  Goal: Levels of oxygenation will improve  Description: Levels of oxygenation will improve  9/15/2021 0202 by Eric Albright RN  Outcome: Ongoing  9/14/2021 1235 by Mar Robertson RN  Outcome: Ongoing

## 2021-09-16 VITALS
RESPIRATION RATE: 16 BRPM | DIASTOLIC BLOOD PRESSURE: 77 MMHG | HEART RATE: 65 BPM | TEMPERATURE: 98.4 F | SYSTOLIC BLOOD PRESSURE: 126 MMHG | OXYGEN SATURATION: 99 % | WEIGHT: 209 LBS | BODY MASS INDEX: 39.46 KG/M2 | HEIGHT: 61 IN

## 2021-09-16 LAB
ANION GAP SERPL CALCULATED.3IONS-SCNC: 9 MMOL/L (ref 4–16)
BUN BLDV-MCNC: 16 MG/DL (ref 6–23)
CALCIUM SERPL-MCNC: 8.8 MG/DL (ref 8.3–10.6)
CHLORIDE BLD-SCNC: 104 MMOL/L (ref 99–110)
CO2: 36 MMOL/L (ref 21–32)
CREAT SERPL-MCNC: 0.9 MG/DL (ref 0.6–1.1)
CULTURE: ABNORMAL
CULTURE: ABNORMAL
DOSE AMOUNT: NORMAL
DOSE TIME: NORMAL
GFR AFRICAN AMERICAN: >60 ML/MIN/1.73M2
GFR NON-AFRICAN AMERICAN: >60 ML/MIN/1.73M2
GLUCOSE BLD-MCNC: 88 MG/DL (ref 70–99)
HCT VFR BLD CALC: 35.1 % (ref 37–47)
HCT VFR BLD CALC: 36.1 % (ref 37–47)
HEMOGLOBIN: 9.5 GM/DL (ref 12.5–16)
HEMOGLOBIN: 9.7 GM/DL (ref 12.5–16)
HIGH SENSITIVE C-REACTIVE PROTEIN: 16.4 MG/L
Lab: ABNORMAL
POTASSIUM SERPL-SCNC: 4.2 MMOL/L (ref 3.5–5.1)
SODIUM BLD-SCNC: 149 MMOL/L (ref 135–145)
SPECIMEN: ABNORMAL
VANCOMYCIN RANDOM: 22 UG/ML

## 2021-09-16 PROCEDURE — 85014 HEMATOCRIT: CPT

## 2021-09-16 PROCEDURE — 6370000000 HC RX 637 (ALT 250 FOR IP): Performed by: CLINICAL NURSE SPECIALIST

## 2021-09-16 PROCEDURE — 85018 HEMOGLOBIN: CPT

## 2021-09-16 PROCEDURE — 2580000003 HC RX 258: Performed by: CLINICAL NURSE SPECIALIST

## 2021-09-16 PROCEDURE — 80202 ASSAY OF VANCOMYCIN: CPT

## 2021-09-16 PROCEDURE — 2700000000 HC OXYGEN THERAPY PER DAY

## 2021-09-16 PROCEDURE — 36415 COLL VENOUS BLD VENIPUNCTURE: CPT

## 2021-09-16 PROCEDURE — 86141 C-REACTIVE PROTEIN HS: CPT

## 2021-09-16 PROCEDURE — 6370000000 HC RX 637 (ALT 250 FOR IP): Performed by: HOSPITALIST

## 2021-09-16 PROCEDURE — 6360000002 HC RX W HCPCS: Performed by: INTERNAL MEDICINE

## 2021-09-16 PROCEDURE — 6370000000 HC RX 637 (ALT 250 FOR IP): Performed by: NURSE PRACTITIONER

## 2021-09-16 PROCEDURE — 80048 BASIC METABOLIC PNL TOTAL CA: CPT

## 2021-09-16 PROCEDURE — 6370000000 HC RX 637 (ALT 250 FOR IP): Performed by: INTERNAL MEDICINE

## 2021-09-16 PROCEDURE — 94761 N-INVAS EAR/PLS OXIMETRY MLT: CPT

## 2021-09-16 PROCEDURE — C9113 INJ PANTOPRAZOLE SODIUM, VIA: HCPCS | Performed by: INTERNAL MEDICINE

## 2021-09-16 RX ORDER — LINEZOLID 600 MG/1
600 TABLET, FILM COATED ORAL 2 TIMES DAILY
Qty: 20 TABLET | Refills: 0
Start: 2021-09-16 | End: 2021-09-26

## 2021-09-16 RX ORDER — METOPROLOL SUCCINATE 25 MG/1
25 TABLET, EXTENDED RELEASE ORAL DAILY
Qty: 30 TABLET | Refills: 3 | Status: ON HOLD | OUTPATIENT
Start: 2021-09-17 | End: 2021-10-04 | Stop reason: HOSPADM

## 2021-09-16 RX ADMIN — APIXABAN 2.5 MG: 2.5 TABLET, FILM COATED ORAL at 09:16

## 2021-09-16 RX ADMIN — ATORVASTATIN CALCIUM 40 MG: 40 TABLET, FILM COATED ORAL at 09:16

## 2021-09-16 RX ADMIN — DOCUSATE SODIUM 100 MG: 100 CAPSULE, LIQUID FILLED ORAL at 09:16

## 2021-09-16 RX ADMIN — Medication 1 CAPSULE: at 09:19

## 2021-09-16 RX ADMIN — METOPROLOL SUCCINATE 25 MG: 25 TABLET, EXTENDED RELEASE ORAL at 09:16

## 2021-09-16 RX ADMIN — SODIUM CHLORIDE, PRESERVATIVE FREE 10 ML: 5 INJECTION INTRAVENOUS at 09:17

## 2021-09-16 RX ADMIN — PANTOPRAZOLE SODIUM 40 MG: 40 INJECTION, POWDER, FOR SOLUTION INTRAVENOUS at 05:24

## 2021-09-16 RX ADMIN — FUROSEMIDE 40 MG: 10 INJECTION, SOLUTION INTRAMUSCULAR; INTRAVENOUS at 09:16

## 2021-09-16 RX ADMIN — Medication: at 09:16

## 2021-09-16 RX ADMIN — Medication 250 MG: at 09:16

## 2021-09-16 NOTE — DISCHARGE SUMMARY
INFECTIOUS DISEASES    Discharge Instruction:   Follow up appointments:   Primary care physician:  within 2 weeks    Diet:  cardiac diet   Activity: activity as tolerated  Disposition: Discharged to:   []Home, []HHC, []SNF, []Acute Rehab, []Hospice   Condition on discharge: Stable    Discharge Medications:      Maria Guadalupe Oh Potter Medication Instructions [facility]:[id number]    Printed on:09/16/21 9894   Medication Information                      Acetaminophen (TYLENOL PO)  Take 650 mg by mouth every 6 hours as needed (PAIN OR FEVER)              albuterol (PROVENTIL) (2.5 MG/3ML) 0.083% nebulizer solution  Take 3 mLs by nebulization every 6 hours as needed for Wheezing             albuterol sulfate  (90 Base) MCG/ACT inhaler  Inhale 2 puffs into the lungs every 4 hours as needed for Wheezing             apixaban (ELIQUIS) 2.5 MG TABS tablet  Take 1 tablet by mouth 2 times daily             atorvastatin (LIPITOR) 40 MG tablet  Take 40 mg by mouth daily             calcium citrate-vitamin D (CITRICAL + D) 315-250 MG-UNIT TABS per tablet  Take 1 tablet by mouth 2 times daily (with meals)             Cranberry 450 MG CAPS  Take 900 mg by mouth 2 times daily              docusate sodium (COLACE) 100 MG capsule  Take 1 capsule by mouth 2 times daily             ferrous sulfate 325 (65 FE) MG tablet  Take 1 tablet by mouth every 12 hours With a meal.             fluticasone-vilanterol (BREO ELLIPTA) 100-25 MCG/INH AEPB inhaler  Inhale 1 puff into the lungs daily             lactobacillus (CULTURELLE) capsule  Take 1 capsule by mouth daily (with breakfast)             levothyroxine (SYNTHROID) 75 MCG tablet  Take 75 mcg by mouth Daily             linaclotide (LINZESS) 145 MCG capsule  Take 145 mcg by mouth every morning (before breakfast)             linezolid (ZYVOX) 600 MG tablet  Take 1 tablet by mouth 2 times daily for 10 days             metoprolol succinate (TOPROL XL) 25 MG extended release tablet  Take 1 tablet by mouth daily             mirtazapine (REMERON) 15 MG tablet  Take 1 tablet by mouth nightly             Multiple Vitamin (MULTIVITAMIN) TABS tablet  Take 1 tablet by mouth daily             ondansetron (ZOFRAN ODT) 4 MG disintegrating tablet  Take 1 tablet by mouth every 8 hours as needed for Nausea             OXYGEN  Inhale 3 L into the lungs continuous              pantoprazole (PROTONIX) 40 MG tablet  Take 1 tablet by mouth 2 times daily (before meals)             potassium chloride (KLOR-CON M) 10 MEQ extended release tablet  Take 10 mEq by mouth daily             promethazine (PHENERGAN) 12.5 MG tablet  Take 1 tablet by mouth every 4 hours as needed for Nausea                 Objective Findings at Discharge:   BP (!) 140/68   Pulse 72   Temp 97.6 °F (36.4 °C) (Oral)   Resp 16   Ht 5' 1\" (1.549 m)   Wt 208 lb 15.9 oz (94.8 kg)   SpO2 95%   BMI 39.49 kg/m²            PHYSICAL EXAM   GEN Awake, Alert, in no apparent distress  HEENT Atraumatic, nomocephalic, PERRLA, EOMI  NECK Supple, no lymphadenopaty  RESP Clear lungs to auscultation bilaterally  CARDIO/VASC Normal S1/S2. RRRR. No mumurs. GI Abdomen is soft without significant tenderness, masses, or guarding. Bowel sounds +  MSK No gross joint deformities. SKIN Normal coloration, warm, dry. NEURO Cranial nerves appear grossly intact, normal speech, no lateralizing weakness. PSYCH Awake, alert, oriented x 3. Affect appropriate.     BMP/CBC  Recent Labs     09/13/21  2331 09/14/21  0505 09/14/21  1830   HCT 30.9* 33.2* 33.9*       IMAGING:      Discharge Time of 35 minutes    Electronically signed by Beny Lopez MD on 9/16/2021 at 9:28 AM

## 2021-09-16 NOTE — CARE COORDINATION
LSW noted Pt has a discharge for today. Pt is LTC at [facility]. Transportation arranged through [employer]  for a 3:30 pm  time. SNF and Rn notified. Packet prepared. Pt is ready for discharge from a LSW standpoint.

## 2021-09-16 NOTE — PROGRESS NOTES
pulmonary      SUBJECTIVE:  No sob and she is sleepy most of the time I see her     OBJECTIVE    VITALS:  BP (!) 140/68   Pulse 72   Temp 97.6 °F (36.4 °C) (Oral)   Resp 16   Ht 5' 1\" (1.549 m)   Wt 208 lb 15.9 oz (94.8 kg)   SpO2 95%   BMI 39.49 kg/m²   HEAD AND FACE EXAM:  No throat injection, no active exudate,no thrush  NECK EXAM;No JVD, no masses, symmetrical  CHEST EXAM; Expansion equal and symmetrical, no masses  LUNG EXAM; Good breath sounds bilaterally. There are expiratory wheezes both lungs, there are crackles at both lung bases  CARDIOVASCULAR EXAM: Positive S1 and S2, no S3 or S4, no clicks ,no murmurs  RIGHT AND LEFT LOWER EXTRIMITY EXAM: No edema, no swelling, no inflamation            LABS   Lab Results   Component Value Date    WBC 9.0 09/12/2021    HGB 9.2 (L) 09/14/2021    HCT 33.9 (L) 09/14/2021    MCV 96.8 09/12/2021     09/12/2021     Lab Results   Component Value Date    CREATININE 1.0 09/13/2021    BUN 27 (H) 09/13/2021     09/13/2021    K 4.3 09/13/2021     09/13/2021    CO2 34 (H) 09/13/2021     Lab Results   Component Value Date    INR 0.93 09/12/2021    PROTIME 12.0 09/12/2021          Lab Results   Component Value Date    PHOS 6.6 06/20/2016      No results for input(s): PH, PO2ART, SVD0AKF, HCO3, BEART, O2SAT in the last 72 hours. Wt Readings from Last 3 Encounters:   09/16/21 208 lb 15.9 oz (94.8 kg)   11/27/20 173 lb 12.8 oz (78.8 kg)   12/23/17 189 lb 5 oz (85.9 kg)               ASSESMENT  Ac copd  ch resp failure  mrsa pneumonia        PLAN  1.  Bd rx  2. antibx  3. o2 adm    9/16/2021  Froilan Cabrera MD, MANGELINA.

## 2021-09-16 NOTE — PROGRESS NOTES
Cardiology Progress Note       Mark Olmstead is a [de-identified] y.o. female   1940     SUBJECTIVE:   Patient seen and examined main complaint is back pain no chest pain rhythm is normal sinus rhythm  9/14  Patient seen and examined rhythm is sinus discussed with had a 2D echo was normal  9/16  Patient seen and examined no new cardiac complaints  Rhythm is sinus denies chest pain  OBJECTIVE:    Review of Systems:  General appearance: alert, appears stated age and cooperative  Skin: Skin color, texture, normal. No rashes or lesions  HEENT: No nose bleed, headache, vision problems  CV: C/O chest pain, tightness, pressure,   Respiratory: C/o no SOB, MORA, Orthopnea, PND  GI: No abdominal pain, black stool, bloating  Limbs: No c/o edema, pain, swelling, intermittent claudication, joint pains  Neuro: No dizziness, lightheadedness, syncope, gait problems, memory problems  Psych: grossly normal. No SI/depression. Vitals:   Blood pressure (!) 140/68, pulse 72, temperature 97.6 °F (36.4 °C), temperature source Oral, resp. rate 16, height 5' 1\" (1.549 m), weight 208 lb 15.9 oz (94.8 kg), SpO2 95 %, not currently breastfeeding.     HEENT: AT, NC, PERRLA  Neck: No JVD  Heart: S1 S2 audible, no murmur   Lungs: CTA   Abdomen: Nontender   Limbs: No edema   CNS: no focal deficit      Past Medical History:   Diagnosis Date    ASHD (arteriosclerotic heart disease)     Bilateral edema of lower extremity 12/16/2015    [facility]     COPD [facility]chronic obstructive [facility] disease) [facility]     Depression     Hypertension     MRSA (methicillin resistant staph aureus) culture positive 09/09/2021    Nasal    MRSA (methicillin resistant staph aureus) culture positive 09/09/2021    NASAL        Patient Active Problem List   Diagnosis    Coronary atherosclerosis of native coronary artery    Anemia    Chronic hypoxemic respiratory failure (HCC)    Bilateral edema of lower extremity    [facility]    COPD exacerbation [facility]    Hypoxia    COPD with acute exacerbation ([facility]    Morbid obesity due to excess calories ([facility] [facility]    Acute on chronic diastolic congestive heart failure ([facility])    Depression    Hypertension    Pneumonia    Chronic diastolic CHF (congestive heart failure) ([facility]    GIB (gastrointestinal bleeding)    Other chest pain        Allergies   Allergen Reactions    Codeine Itching    Moxifloxacin Other (See Comments)     Listed as allergy from ecf    Oxycodone Other (See Comments)     Listed as allergy from ecf     Pcn [Penicillins] Hives and Rash    Warfarin And Related Other (See Comments)     listed as allergy from ecf        Current Inpatient Medications:    Current Facility-Administered Medications   Medication Dose Route Frequency Provider Last Rate Last Admin    apixaban (ELIQUIS) tablet 2.5 mg  2.5 mg Oral BID Shashank Colon MD   2.5 mg at 09/16/21 0916    magnesium citrate solution 296 mL  296 mL Oral Once AMPARO Hawthorne CNP        bisacodyl (DULCOLAX) EC tablet 10 mg  10 mg Oral Once AMPARO Hawthorne CNP        mupirocin (BACTROBAN) 2 % ointment   Nasal BID AMPARO Cordova CNP   Given at 09/16/21 0916    vancomycin (VANCOCIN) 1000 mg in 200 mL IVPB  1,000 mg IntraVENous Q24H AMPARO Cordova CNP   Stopped at 09/15/21 2125    furosemide (LASIX) injection 40 mg  40 mg IntraVENous Daily Sindi Phan MD   40 mg at 09/16/21 0916    pantoprazole (PROTONIX) injection 40 mg  40 mg IntraVENous Q12H Sindi Phan MD   40 mg at 09/16/21 0524    metoprolol succinate (TOPROL XL) extended release tablet 25 mg  25 mg Oral Daily Shashank Madden MD   25 mg at 09/16/21 0916    traMADol (ULTRAM) tablet 50 mg  50 mg Oral Q4H PRN AMPARO Rhodes CNP   50 mg at 09/14/21 2228    ALPRAZolam (XANAX) tablet 0.25 mg  0.25 mg Oral Daily PRN Anthony Faustin MD   0.25 mg at 09/12/21 1032    melatonin tablet 3 mg  3 mg Oral Nightly PRN Stoney Deacon, APRN - CNP   3 mg at 09/14/21 2228    albuterol sulfate  (90 Base) MCG/ACT inhaler 2 puff  2 puff Inhalation Q4H PRN Bindhu Sajay, APRN        atorvastatin (LIPITOR) tablet 40 mg  40 mg Oral Daily Bindhu Sajay, APRN   40 mg at 09/16/21 0916    Calcium Carb-Cholecalciferol 250-125 MG-UNIT TABS 250 mg  1 tablet Oral BID WC Bindhu Sajay, APRN   250 mg at 09/16/21 0916    docusate sodium (COLACE) capsule 100 mg  100 mg Oral BID Bindhu Sajay, APRN   100 mg at 09/16/21 0916    ferrous sulfate (IRON 325) tablet 325 mg  325 mg Oral Q12H Bindhu Sajay, APRN   325 mg at 09/15/21 0444    lactobacillus (CULTURELLE) capsule 1 capsule  1 capsule Oral Daily with breakfast Bindhu Sajay, APRN   1 capsule at 09/16/21 0919    levothyroxine (SYNTHROID) tablet 75 mcg  75 mcg Oral Daily Bindhu Sajay, APRN   75 mcg at 09/15/21 0605    linaclotide (LINZESS) capsule 145 mcg  145 mcg Oral QAM AC Bindhu Sajay, APRN        mirtazapine (REMERON) tablet 15 mg  15 mg Oral Nightly Bindhu Sajay, APRN   15 mg at 09/15/21 2025    sodium chloride flush 0.9 % injection 5-40 mL  5-40 mL IntraVENous 2 times per day Bindhu Sajay, APRN   10 mL at 09/16/21 0917    sodium chloride flush 0.9 % injection 5-40 mL  5-40 mL IntraVENous PRN Bindhu Sajay, APRN        0.9 % sodium chloride infusion  25 mL IntraVENous PRN Bindhu Sajay, APRN   Stopped at 09/15/21 0056    ondansetron (ZOFRAN-ODT) disintegrating tablet 4 mg  4 mg Oral Q8H PRN Bindhu Sajay, APRN        Or    ondansetron (ZOFRAN) injection 4 mg  4 mg IntraVENous Q6H PRN Bindhu Sajay, APRN        polyethylene glycol (GLYCOLAX) packet 17 g  17 g Oral Daily PRN Bindhu Sajay, APRN        acetaminophen (TYLENOL) tablet 650 mg  650 mg Oral Q6H PRN Bindsushila Sadylany, APRN   650 mg at 09/12/21 2202    Or    acetaminophen (TYLENOL) suppository 650 mg  650 mg Rectal Q6H PRN Dale Long, APRN        albuterol (PROVENTIL) nebulizer solution 2.5 mg  2.5 mg Nebulization Q4H PRN AMPARO Phillips               Labs:  CBC with Differential:    Lab Results   Component Value Date    WBC 9.0 09/12/2021    RBC 3.09 09/12/2021    HGB 9.7 09/16/2021    HGB 9.5 09/16/2021    HCT 36.1 09/16/2021    HCT 35.1 09/16/2021     09/12/2021    MCV 96.8 09/12/2021    MCH 26.9 09/12/2021    MCHC 27.8 09/12/2021    RDW 13.7 09/12/2021    NRBC 1 06/23/2016    SEGSPCT 80.1 09/08/2021    BANDSPCT 2 06/29/2016    LYMPHOPCT 10.2 09/08/2021    PROMYELOPCT 1 06/26/2016    MONOPCT 6.7 09/08/2021    MYELOPCT 1 06/26/2016    BASOPCT 0.2 09/08/2021    MONOSABS 0.6 09/08/2021    LYMPHSABS 1.0 09/08/2021    EOSABS 0.2 09/08/2021    BASOSABS 0.0 09/08/2021    DIFFTYPE AUTOMATED DIFFERENTIAL 09/08/2021     CMP:    Lab Results   Component Value Date     09/13/2021    K 4.3 09/13/2021     09/13/2021    CO2 34 09/13/2021    BUN 27 09/13/2021    CREATININE 1.0 09/13/2021    GFRAA >60 09/13/2021    LABGLOM 53 09/13/2021    GLUCOSE 105 09/13/2021    PROT 5.9 09/13/2021    LABALBU 3.3 09/13/2021    CALCIUM 9.0 09/13/2021    BILITOT 0.2 09/13/2021    ALKPHOS 79 09/13/2021    AST 16 09/13/2021    ALT 8 09/13/2021     Hepatic Function Panel:    Lab Results   Component Value Date    ALKPHOS 79 09/13/2021    ALT 8 09/13/2021    AST 16 09/13/2021    PROT 5.9 09/13/2021    BILITOT 0.2 09/13/2021    BILIDIR 0.2 06/17/2016    IBILI 0.0 06/17/2016    LABALBU 3.3 09/13/2021     Magnesium:    Lab Results   Component Value Date    MG 2.1 09/13/2021     PT/INR:    Lab Results   Component Value Date    PROTIME 12.0 09/12/2021    INR 0.93 09/12/2021     Last 3 Troponin:  No results found for: TROPONINI  U/A:    Lab Results   Component Value Date    COLORU YELLOW 11/24/2020    WBCUA 1 11/24/2020    RBCUA 1 11/24/2020    MUCUS RARE 11/24/2020    TRICHOMONAS NONE SEEN 11/24/2020    BACTERIA FEW 11/24/2020    CLARITYU CLEAR 11/24/2020    SPECGRAV 1.014 11/24/2020    LEUKOCYTESUR TRACE 11/24/2020    UROBILINOGEN NORMAL 11/24/2020    BILIRUBINUR NEGATIVE 11/24/2020    BLOODU SMALL 11/24/2020     ABG:    Lab Results   Component Value Date    WDD6BBR 40.0 09/01/2016    PO2ART 167 09/01/2016    YSS2NNN 29.1 09/01/2016     FLP:    Lab Results   Component Value Date    TRIG 58 01/02/2019    HDL 58 01/02/2019    LDLCALC 36 09/05/2018    LDLDIRECT 48 01/02/2019     TSH:  No results found for: TSH   DATA:   ECG: Sinus Rhythm       ASSESSMENT:   1 episode of atrial fibrillation with RVR is a very likely has paroxysmal atrial fibrillation currently in normal sinus rhythm pa  Patient currently in sinus rhythm  2D echo showed normal LV function, tolerating low-dose Eliquis    2 hypertension  Well controlled    3 morbid obesity chronic due to excess caloric intake\    4 CAD no chest pain    5 COPD no active wheezing  Depression  Plan  Continue Lopressor  Okay to start low-dose Eliquis 2.5 twice daily patient can be discharged from cardiology standpoint

## 2021-09-17 NOTE — ADT AUTH CERT
Pneumonia - Care Day 4 (9/11/2021) by Zehra Gray RN       Review Status Review Entered   Completed 9/15/2021 15:24      Criteria Review      Care Day: 4 Care Date: 9/11/2021 Level of Care: Inpatient Floor    Guideline Day 2    Clinical Status    (X) * No CO2 retention or acidosis    (X) * No requirement for mechanical ventilation    (X) * Hypotension absent    9/15/2021 3:24 PM EDT by Nicole Hickman      09/11/21 1030  BP:131/65  Pulse:85  Resp:14  Temp:98.4 °F (36.9 °C)  SpO2:94%    (X) * Afebrile or fever improved    ( ) * No hypoxia on room air or oxygenation improved    9/15/2021 3:24 PM EDT by Nicole Hickman      nasal cannula    (X) * Mental status improved or at baseline    Activity    (X) * Increased activity    Routes    (X) Oral medications    (X) Usual diet    Interventions    (X) Pulse oximetry    (X) Possible oxygen    * Milestone   Additional Notes   9/11      Scheduled Medications   · enoxaparin 40 mg SubCUTAneous Daily   · atorvastatin 40 mg Oral Daily   · Calcium Carb-Cholecalciferol 1 tablet Oral BID WC   · docusate sodium 100 mg Oral BID   · ferrous sulfate 325 mg Oral Q12H   · lactobacillus 1 capsule Oral Daily with breakfast   · levothyroxine 75 mcg Oral Daily   · linaclotide 145 mcg Oral QAM AC   · mirtazapine 15 mg Oral Nightly   · pantoprazole 40 mg Oral BID AC   · potassium chloride 10 mEq Oral Daily   · sodium chloride flush 5-40 mL IntraVENous 2 times per day          Infusions:    Infusions Meds   · sodium chloride 50 mL/hr at 09/10/21 2040   · sodium chloride                   Internal Medicine:   #Shortness of breath due to COPD exacerbation on top of atelectasis, diaphragmatic elevation and baseline chronic diastolic CHF. -Continue bronchodilator inhalers. -Give nebs as needed.    -Give short course of steroid   -No indication for antibiotics at this point.   -Continue incentive spirometry.   -Not usually on diuretics and monitor fluid status.   -This patient is non-mobile at baseline.

## 2021-09-23 NOTE — ADT AUTH CERT
Pneumonia - Care Day 4 (9/11/2021) by Swathi Alanis RN       Review Status Review Entered   Completed 9/15/2021 15:24      Criteria Review      Care Day: 4 Care Date: 9/11/2021 Level of Care: Inpatient Floor    Guideline Day 2    Clinical Status    (X) * No CO2 retention or acidosis    (X) * No requirement for mechanical ventilation    (X) * Hypotension absent    9/15/2021 3:24 PM EDT by Kasie Silva      09/11/21 1030  BP:131/65  Pulse:85  Resp:14  Temp:98.4 °F (36.9 °C)  SpO2:94%    (X) * Afebrile or fever improved    ( ) * No hypoxia on room air or oxygenation improved    9/15/2021 3:24 PM EDT by Kasie Silva      nasal cannula    (X) * Mental status improved or at baseline    Activity    (X) * Increased activity    Routes    (X) Oral medications    (X) Usual diet    Interventions    (X) Pulse oximetry    (X) Possible oxygen    * Milestone   Additional Notes   9/11      Scheduled Medications   · enoxaparin 40 mg SubCUTAneous Daily   · atorvastatin 40 mg Oral Daily   · Calcium Carb-Cholecalciferol 1 tablet Oral BID WC   · docusate sodium 100 mg Oral BID   · ferrous sulfate 325 mg Oral Q12H   · lactobacillus 1 capsule Oral Daily with breakfast   · levothyroxine 75 mcg Oral Daily   · linaclotide 145 mcg Oral QAM AC   · mirtazapine 15 mg Oral Nightly   · pantoprazole 40 mg Oral BID AC   · potassium chloride 10 mEq Oral Daily   · sodium chloride flush 5-40 mL IntraVENous 2 times per day          Infusions:    Infusions Meds   · sodium chloride 50 mL/hr at 09/10/21 2040   · sodium chloride                   Internal Medicine:   #Shortness of breath due to COPD exacerbation on top of atelectasis, diaphragmatic elevation and baseline chronic diastolic CHF. -Continue bronchodilator inhalers. -Give nebs as needed.    -Give short course of steroid   -No indication for antibiotics at this point.   -Continue incentive spirometry.   -Not usually on diuretics and monitor fluid status.   -This patient is non-mobile at baseline.

## 2021-09-30 ENCOUNTER — APPOINTMENT (OUTPATIENT)
Dept: CT IMAGING | Age: 81
DRG: 813 | End: 2021-09-30
Payer: MEDICARE

## 2021-09-30 ENCOUNTER — HOSPITAL ENCOUNTER (INPATIENT)
Age: 81
LOS: 4 days | Discharge: NURSING, REHAB OR LONG-TERM CARE | DRG: 813 | End: 2021-10-04
Attending: STUDENT IN AN ORGANIZED HEALTH CARE EDUCATION/TRAINING PROGRAM | Admitting: INTERNAL MEDICINE
Payer: MEDICARE

## 2021-09-30 ENCOUNTER — APPOINTMENT (OUTPATIENT)
Dept: GENERAL RADIOLOGY | Age: 81
DRG: 813 | End: 2021-09-30
Payer: MEDICARE

## 2021-09-30 DIAGNOSIS — J96.21 ACUTE ON CHRONIC RESPIRATORY FAILURE WITH HYPOXEMIA (HCC): Primary | ICD-10-CM

## 2021-09-30 DIAGNOSIS — D62 ACUTE BLOOD LOSS ANEMIA: ICD-10-CM

## 2021-09-30 DIAGNOSIS — K92.1 GASTROINTESTINAL HEMORRHAGE WITH MELENA: ICD-10-CM

## 2021-09-30 DIAGNOSIS — R79.89 ELEVATED BRAIN NATRIURETIC PEPTIDE (BNP) LEVEL: ICD-10-CM

## 2021-09-30 PROBLEM — K92.2 GI BLEED: Status: ACTIVE | Noted: 2021-09-30

## 2021-09-30 LAB
ALBUMIN SERPL-MCNC: 3.2 GM/DL (ref 3.4–5)
ALP BLD-CCNC: 54 IU/L (ref 40–129)
ALT SERPL-CCNC: 8 U/L (ref 10–40)
ANION GAP SERPL CALCULATED.3IONS-SCNC: 10 MMOL/L (ref 4–16)
ANION GAP SERPL CALCULATED.3IONS-SCNC: 9 MMOL/L (ref 4–16)
APTT: 28.6 SECONDS (ref 25.1–37.1)
AST SERPL-CCNC: 13 IU/L (ref 15–37)
BASE EXCESS MIXED: 0.9 (ref 0–2.3)
BASE EXCESS MIXED: 2.9 (ref 0–2.3)
BASOPHILS ABSOLUTE: 0 K/CU MM
BASOPHILS RELATIVE PERCENT: 0.1 % (ref 0–1)
BILIRUB SERPL-MCNC: 0.2 MG/DL (ref 0–1)
BUN BLDV-MCNC: 36 MG/DL (ref 6–23)
BUN BLDV-MCNC: 37 MG/DL (ref 6–23)
CALCIUM SERPL-MCNC: 7.9 MG/DL (ref 8.3–10.6)
CALCIUM SERPL-MCNC: 8 MG/DL (ref 8.3–10.6)
CHLORIDE BLD-SCNC: 101 MMOL/L (ref 99–110)
CHLORIDE BLD-SCNC: 104 MMOL/L (ref 99–110)
CO2: 24 MMOL/L (ref 21–32)
CO2: 25 MMOL/L (ref 21–32)
COMMENT: ABNORMAL
COMMENT: ABNORMAL
CREAT SERPL-MCNC: 1 MG/DL (ref 0.6–1.1)
CREAT SERPL-MCNC: 1.1 MG/DL (ref 0.6–1.1)
DIFFERENTIAL TYPE: ABNORMAL
EOSINOPHILS ABSOLUTE: 0.2 K/CU MM
EOSINOPHILS RELATIVE PERCENT: 2.4 % (ref 0–3)
GFR AFRICAN AMERICAN: 58 ML/MIN/1.73M2
GFR AFRICAN AMERICAN: >60 ML/MIN/1.73M2
GFR NON-AFRICAN AMERICAN: 48 ML/MIN/1.73M2
GFR NON-AFRICAN AMERICAN: 53 ML/MIN/1.73M2
GLUCOSE BLD-MCNC: 113 MG/DL (ref 70–99)
GLUCOSE BLD-MCNC: 119 MG/DL (ref 70–99)
GLUCOSE BLD-MCNC: 127 MG/DL (ref 70–99)
HCO3 VENOUS: 29.4 MMOL/L (ref 19–25)
HCO3 VENOUS: 29.8 MMOL/L (ref 19–25)
HCT VFR BLD CALC: 16.8 % (ref 37–47)
HCT VFR BLD CALC: 27 % (ref 37–47)
HEMOGLOBIN: 4.5 GM/DL (ref 12.5–16)
HEMOGLOBIN: 7.9 GM/DL (ref 12.5–16)
IMMATURE NEUTROPHIL %: 0.3 % (ref 0–0.43)
INR BLD: 1.19 INDEX
LACTATE: 0.8 MMOL/L (ref 0.4–2)
LYMPHOCYTES ABSOLUTE: 0.7 K/CU MM
LYMPHOCYTES RELATIVE PERCENT: 8.2 % (ref 24–44)
MCH RBC QN AUTO: 27.1 PG (ref 27–31)
MCH RBC QN AUTO: 28.2 PG (ref 27–31)
MCHC RBC AUTO-ENTMCNC: 26.8 % (ref 32–36)
MCHC RBC AUTO-ENTMCNC: 29.3 % (ref 32–36)
MCV RBC AUTO: 101.2 FL (ref 78–100)
MCV RBC AUTO: 96.4 FL (ref 78–100)
MONOCYTES ABSOLUTE: 0.7 K/CU MM
MONOCYTES RELATIVE PERCENT: 7.5 % (ref 0–4)
NUCLEATED RBC %: 0.2 %
O2 SAT, VEN: 79.4 % (ref 50–70)
O2 SAT, VEN: 95.1 % (ref 50–70)
PCO2, VEN: 54 MMHG (ref 38–52)
PCO2, VEN: 64 MMHG (ref 38–52)
PDW BLD-RTO: 15.6 % (ref 11.7–14.9)
PDW BLD-RTO: 15.9 % (ref 11.7–14.9)
PH VENOUS: 7.27 (ref 7.32–7.42)
PH VENOUS: 7.35 (ref 7.32–7.42)
PLATELET # BLD: 149 K/CU MM (ref 140–440)
PLATELET # BLD: 175 K/CU MM (ref 140–440)
PMV BLD AUTO: 10.8 FL (ref 7.5–11.1)
PMV BLD AUTO: 11 FL (ref 7.5–11.1)
PO2, VEN: 154 MMHG (ref 28–48)
PO2, VEN: 42 MMHG (ref 28–48)
POTASSIUM SERPL-SCNC: 4.6 MMOL/L (ref 3.5–5.1)
POTASSIUM SERPL-SCNC: 4.8 MMOL/L (ref 3.5–5.1)
PRO-BNP: 3812 PG/ML
PROTHROMBIN TIME: 15.4 SECONDS (ref 11.7–14.5)
RBC # BLD: 1.66 M/CU MM (ref 4.2–5.4)
RBC # BLD: 2.8 M/CU MM (ref 4.2–5.4)
SARS-COV-2, NAAT: NOT DETECTED
SEGMENTED NEUTROPHILS ABSOLUTE COUNT: 7.1 K/CU MM
SEGMENTED NEUTROPHILS RELATIVE PERCENT: 81.5 % (ref 36–66)
SODIUM BLD-SCNC: 136 MMOL/L (ref 135–145)
SODIUM BLD-SCNC: 137 MMOL/L (ref 135–145)
SOURCE: NORMAL
TOTAL IMMATURE NEUTOROPHIL: 0.03 K/CU MM
TOTAL NUCLEATED RBC: 0 K/CU MM
TOTAL PROTEIN: 5.5 GM/DL (ref 6.4–8.2)
TROPONIN T: 0.02 NG/ML
TROPONIN T: 0.03 NG/ML
WBC # BLD: 8.6 K/CU MM (ref 4–10.5)
WBC # BLD: 8.8 K/CU MM (ref 4–10.5)

## 2021-09-30 PROCEDURE — 94640 AIRWAY INHALATION TREATMENT: CPT

## 2021-09-30 PROCEDURE — 84484 ASSAY OF TROPONIN QUANT: CPT

## 2021-09-30 PROCEDURE — 82962 GLUCOSE BLOOD TEST: CPT

## 2021-09-30 PROCEDURE — 6360000002 HC RX W HCPCS: Performed by: NURSE PRACTITIONER

## 2021-09-30 PROCEDURE — 86850 RBC ANTIBODY SCREEN: CPT

## 2021-09-30 PROCEDURE — 82805 BLOOD GASES W/O2 SATURATION: CPT

## 2021-09-30 PROCEDURE — P9016 RBC LEUKOCYTES REDUCED: HCPCS

## 2021-09-30 PROCEDURE — 85610 PROTHROMBIN TIME: CPT

## 2021-09-30 PROCEDURE — 96375 TX/PRO/DX INJ NEW DRUG ADDON: CPT

## 2021-09-30 PROCEDURE — 80053 COMPREHEN METABOLIC PANEL: CPT

## 2021-09-30 PROCEDURE — 87635 SARS-COV-2 COVID-19 AMP PRB: CPT

## 2021-09-30 PROCEDURE — 99285 EMERGENCY DEPT VISIT HI MDM: CPT

## 2021-09-30 PROCEDURE — 36430 TRANSFUSION BLD/BLD COMPNT: CPT

## 2021-09-30 PROCEDURE — 6360000002 HC RX W HCPCS: Performed by: STUDENT IN AN ORGANIZED HEALTH CARE EDUCATION/TRAINING PROGRAM

## 2021-09-30 PROCEDURE — 2580000003 HC RX 258: Performed by: STUDENT IN AN ORGANIZED HEALTH CARE EDUCATION/TRAINING PROGRAM

## 2021-09-30 PROCEDURE — 86922 COMPATIBILITY TEST ANTIGLOB: CPT

## 2021-09-30 PROCEDURE — 71045 X-RAY EXAM CHEST 1 VIEW: CPT

## 2021-09-30 PROCEDURE — 86900 BLOOD TYPING SEROLOGIC ABO: CPT

## 2021-09-30 PROCEDURE — 83605 ASSAY OF LACTIC ACID: CPT

## 2021-09-30 PROCEDURE — 6360000004 HC RX CONTRAST MEDICATION: Performed by: STUDENT IN AN ORGANIZED HEALTH CARE EDUCATION/TRAINING PROGRAM

## 2021-09-30 PROCEDURE — 87040 BLOOD CULTURE FOR BACTERIA: CPT

## 2021-09-30 PROCEDURE — 96365 THER/PROPH/DIAG IV INF INIT: CPT

## 2021-09-30 PROCEDURE — 6370000000 HC RX 637 (ALT 250 FOR IP): Performed by: NURSE PRACTITIONER

## 2021-09-30 PROCEDURE — 80048 BASIC METABOLIC PNL TOTAL CA: CPT

## 2021-09-30 PROCEDURE — 2580000003 HC RX 258: Performed by: NURSE PRACTITIONER

## 2021-09-30 PROCEDURE — 85730 THROMBOPLASTIN TIME PARTIAL: CPT

## 2021-09-30 PROCEDURE — 96368 THER/DIAG CONCURRENT INF: CPT

## 2021-09-30 PROCEDURE — 86901 BLOOD TYPING SEROLOGIC RH(D): CPT

## 2021-09-30 PROCEDURE — 93005 ELECTROCARDIOGRAM TRACING: CPT | Performed by: STUDENT IN AN ORGANIZED HEALTH CARE EDUCATION/TRAINING PROGRAM

## 2021-09-30 PROCEDURE — 71275 CT ANGIOGRAPHY CHEST: CPT

## 2021-09-30 PROCEDURE — 94761 N-INVAS EAR/PLS OXIMETRY MLT: CPT

## 2021-09-30 PROCEDURE — 2700000000 HC OXYGEN THERAPY PER DAY

## 2021-09-30 PROCEDURE — C9113 INJ PANTOPRAZOLE SODIUM, VIA: HCPCS | Performed by: NURSE PRACTITIONER

## 2021-09-30 PROCEDURE — 6370000000 HC RX 637 (ALT 250 FOR IP): Performed by: STUDENT IN AN ORGANIZED HEALTH CARE EDUCATION/TRAINING PROGRAM

## 2021-09-30 PROCEDURE — 36556 INSERT NON-TUNNEL CV CATH: CPT

## 2021-09-30 PROCEDURE — 83880 ASSAY OF NATRIURETIC PEPTIDE: CPT

## 2021-09-30 PROCEDURE — 85027 COMPLETE CBC AUTOMATED: CPT

## 2021-09-30 PROCEDURE — 2140000000 HC CCU INTERMEDIATE R&B

## 2021-09-30 PROCEDURE — C9113 INJ PANTOPRAZOLE SODIUM, VIA: HCPCS | Performed by: STUDENT IN AN ORGANIZED HEALTH CARE EDUCATION/TRAINING PROGRAM

## 2021-09-30 PROCEDURE — 02HV33Z INSERTION OF INFUSION DEVICE INTO SUPERIOR VENA CAVA, PERCUTANEOUS APPROACH: ICD-10-PCS | Performed by: STUDENT IN AN ORGANIZED HEALTH CARE EDUCATION/TRAINING PROGRAM

## 2021-09-30 PROCEDURE — 85025 COMPLETE CBC W/AUTO DIFF WBC: CPT

## 2021-09-30 PROCEDURE — 74177 CT ABD & PELVIS W/CONTRAST: CPT

## 2021-09-30 RX ORDER — ALBUTEROL SULFATE 2.5 MG/3ML
2.5 SOLUTION RESPIRATORY (INHALATION) EVERY 4 HOURS PRN
Status: DISCONTINUED | OUTPATIENT
Start: 2021-09-30 | End: 2021-10-04 | Stop reason: HOSPADM

## 2021-09-30 RX ORDER — SODIUM CHLORIDE 0.9 % (FLUSH) 0.9 %
5-40 SYRINGE (ML) INJECTION PRN
Status: DISCONTINUED | OUTPATIENT
Start: 2021-09-30 | End: 2021-10-04 | Stop reason: HOSPADM

## 2021-09-30 RX ORDER — ALBUTEROL SULFATE 90 UG/1
2 AEROSOL, METERED RESPIRATORY (INHALATION) EVERY 4 HOURS PRN
Status: DISCONTINUED | OUTPATIENT
Start: 2021-09-30 | End: 2021-10-04 | Stop reason: HOSPADM

## 2021-09-30 RX ORDER — LEVOTHYROXINE SODIUM 0.07 MG/1
75 TABLET ORAL DAILY
Status: DISCONTINUED | OUTPATIENT
Start: 2021-10-01 | End: 2021-10-04 | Stop reason: HOSPADM

## 2021-09-30 RX ORDER — ONDANSETRON 2 MG/ML
4 INJECTION INTRAMUSCULAR; INTRAVENOUS EVERY 6 HOURS PRN
Status: DISCONTINUED | OUTPATIENT
Start: 2021-09-30 | End: 2021-10-04 | Stop reason: HOSPADM

## 2021-09-30 RX ORDER — ATORVASTATIN CALCIUM 20 MG/1
40 TABLET, FILM COATED ORAL DAILY
Status: DISCONTINUED | OUTPATIENT
Start: 2021-09-30 | End: 2021-10-04 | Stop reason: HOSPADM

## 2021-09-30 RX ORDER — VANCOMYCIN 1.75 G/350ML
1250 INJECTION, SOLUTION INTRAVENOUS EVERY 24 HOURS
Status: DISCONTINUED | OUTPATIENT
Start: 2021-10-01 | End: 2021-10-01

## 2021-09-30 RX ORDER — ACETAMINOPHEN 325 MG/1
650 TABLET ORAL EVERY 6 HOURS PRN
Status: DISCONTINUED | OUTPATIENT
Start: 2021-09-30 | End: 2021-10-04 | Stop reason: HOSPADM

## 2021-09-30 RX ORDER — SODIUM CHLORIDE 9 MG/ML
INJECTION, SOLUTION INTRAVENOUS CONTINUOUS
Status: DISCONTINUED | OUTPATIENT
Start: 2021-09-30 | End: 2021-10-01

## 2021-09-30 RX ORDER — ONDANSETRON 4 MG/1
4 TABLET, ORALLY DISINTEGRATING ORAL EVERY 8 HOURS PRN
Status: DISCONTINUED | OUTPATIENT
Start: 2021-09-30 | End: 2021-10-04 | Stop reason: HOSPADM

## 2021-09-30 RX ORDER — VANCOMYCIN 2 G/400ML
20 INJECTION, SOLUTION INTRAVENOUS ONCE
Status: COMPLETED | OUTPATIENT
Start: 2021-09-30 | End: 2021-09-30

## 2021-09-30 RX ORDER — SODIUM CHLORIDE 0.9 % (FLUSH) 0.9 %
5-40 SYRINGE (ML) INJECTION EVERY 12 HOURS SCHEDULED
Status: DISCONTINUED | OUTPATIENT
Start: 2021-09-30 | End: 2021-10-04 | Stop reason: HOSPADM

## 2021-09-30 RX ORDER — ONDANSETRON 4 MG/1
4 TABLET, ORALLY DISINTEGRATING ORAL EVERY 8 HOURS PRN
Status: DISCONTINUED | OUTPATIENT
Start: 2021-09-30 | End: 2021-09-30 | Stop reason: SDUPTHER

## 2021-09-30 RX ORDER — SODIUM CHLORIDE 9 MG/ML
25 INJECTION, SOLUTION INTRAVENOUS PRN
Status: DISCONTINUED | OUTPATIENT
Start: 2021-09-30 | End: 2021-10-04 | Stop reason: HOSPADM

## 2021-09-30 RX ORDER — SODIUM CHLORIDE 9 MG/ML
INJECTION, SOLUTION INTRAVENOUS PRN
Status: COMPLETED | OUTPATIENT
Start: 2021-09-30 | End: 2021-10-01

## 2021-09-30 RX ORDER — BUDESONIDE AND FORMOTEROL FUMARATE DIHYDRATE 80; 4.5 UG/1; UG/1
2 AEROSOL RESPIRATORY (INHALATION) 2 TIMES DAILY
Status: DISCONTINUED | OUTPATIENT
Start: 2021-09-30 | End: 2021-10-04 | Stop reason: HOSPADM

## 2021-09-30 RX ORDER — LACTOBACILLUS RHAMNOSUS GG 10B CELL
1 CAPSULE ORAL
Status: DISCONTINUED | OUTPATIENT
Start: 2021-10-01 | End: 2021-10-04 | Stop reason: HOSPADM

## 2021-09-30 RX ORDER — 0.9 % SODIUM CHLORIDE 0.9 %
250 INTRAVENOUS SOLUTION INTRAVENOUS ONCE
Status: DISCONTINUED | OUTPATIENT
Start: 2021-09-30 | End: 2021-10-04 | Stop reason: HOSPADM

## 2021-09-30 RX ORDER — IPRATROPIUM BROMIDE AND ALBUTEROL SULFATE 2.5; .5 MG/3ML; MG/3ML
2 SOLUTION RESPIRATORY (INHALATION) ONCE
Status: COMPLETED | OUTPATIENT
Start: 2021-09-30 | End: 2021-09-30

## 2021-09-30 RX ORDER — PROMETHAZINE HYDROCHLORIDE 12.5 MG/1
12.5 TABLET ORAL EVERY 4 HOURS PRN
Status: DISCONTINUED | OUTPATIENT
Start: 2021-09-30 | End: 2021-10-04 | Stop reason: HOSPADM

## 2021-09-30 RX ORDER — SODIUM CHLORIDE 9 MG/ML
50 INJECTION, SOLUTION INTRAVENOUS ONCE
Status: DISCONTINUED | OUTPATIENT
Start: 2021-09-30 | End: 2021-10-04 | Stop reason: HOSPADM

## 2021-09-30 RX ORDER — ACETAMINOPHEN 650 MG/1
650 SUPPOSITORY RECTAL EVERY 6 HOURS PRN
Status: DISCONTINUED | OUTPATIENT
Start: 2021-09-30 | End: 2021-10-04 | Stop reason: HOSPADM

## 2021-09-30 RX ORDER — PANTOPRAZOLE SODIUM 40 MG/10ML
80 INJECTION, POWDER, LYOPHILIZED, FOR SOLUTION INTRAVENOUS ONCE
Status: COMPLETED | OUTPATIENT
Start: 2021-09-30 | End: 2021-09-30

## 2021-09-30 RX ORDER — VANCOMYCIN 1.5 G/300ML
15 INJECTION, SOLUTION INTRAVENOUS EVERY 12 HOURS
Status: DISCONTINUED | OUTPATIENT
Start: 2021-10-01 | End: 2021-09-30

## 2021-09-30 RX ADMIN — SODIUM CHLORIDE 150 ML/HR: 9 INJECTION, SOLUTION INTRAVENOUS at 22:04

## 2021-09-30 RX ADMIN — VANCOMYCIN 2000 MG: 2 INJECTION, SOLUTION INTRAVENOUS at 19:29

## 2021-09-30 RX ADMIN — SODIUM CHLORIDE 8 MG/HR: 9 INJECTION, SOLUTION INTRAVENOUS at 22:12

## 2021-09-30 RX ADMIN — SODIUM CHLORIDE, PRESERVATIVE FREE 10 ML: 5 INJECTION INTRAVENOUS at 22:13

## 2021-09-30 RX ADMIN — PANTOPRAZOLE SODIUM 80 MG: 40 INJECTION, POWDER, FOR SOLUTION INTRAVENOUS at 19:38

## 2021-09-30 RX ADMIN — CEFEPIME HYDROCHLORIDE 2000 MG: 2 INJECTION, POWDER, FOR SOLUTION INTRAVENOUS at 16:24

## 2021-09-30 RX ADMIN — SODIUM CHLORIDE 80 MG: 9 INJECTION, SOLUTION INTRAVENOUS at 21:31

## 2021-09-30 RX ADMIN — BUDESONIDE AND FORMOTEROL FUMARATE DIHYDRATE 2 PUFF: 80; 4.5 AEROSOL RESPIRATORY (INHALATION) at 21:55

## 2021-09-30 RX ADMIN — IOPAMIDOL 75 ML: 755 INJECTION, SOLUTION INTRAVENOUS at 17:47

## 2021-09-30 RX ADMIN — PROTHROMBIN, COAGULATION FACTOR VII HUMAN, COAGULATION FACTOR IX HUMAN, COAGULATION FACTOR X HUMAN, PROTEIN C, PROTEIN S HUMAN, AND WATER 4500 UNITS: KIT at 19:33

## 2021-09-30 RX ADMIN — IPRATROPIUM BROMIDE AND ALBUTEROL SULFATE 2 AMPULE: .5; 3 SOLUTION RESPIRATORY (INHALATION) at 17:12

## 2021-09-30 NOTE — ED NOTES
Right femoral CVC inserted x1 attempt by Dr. Mya Olivas. Pt tolerated well.       Larna Kehr, RN  09/30/21 0932

## 2021-09-30 NOTE — PROGRESS NOTES
Pt placed on vapotherm per Dr. Reina Levy due to pt desaturating into 70's. Pt placed on 20L 80% and saturating in the high 90's. I will continue to monitor.

## 2021-09-30 NOTE — ED TRIAGE NOTES
Pt presents to the ED via EMS with c/o SOB. When EMS arrived on scene pt's 02 was in the 70s. Nonrebreather applied. Pt switched to NC upon arrival at 6L 02 on 94%.

## 2021-09-30 NOTE — ED NOTES
Second unit of emergency blood transfusing at this time. Per Dr. Khanh Albarran, run the blood wide open.       Kelli Mcleod RN  09/30/21 9497

## 2021-09-30 NOTE — ED NOTES
Multiple attempts made by multiple RNs to gain IV access without success. Pt now hypotensive. 86/49. Dr Andersen Prodio at bedside to insert  CVC.       Nico Harrell RN  09/30/21 [address] [place] [address]  09/30/21 9744

## 2021-09-30 NOTE — ED PROVIDER NOTES
Emergency Department Encounter    Patient: Hilda Solano  MRN: [id number]  : 1940  Date of Evaluation: 2021  ED Provider:  Sia Duong MD    Triage Chief Complaint:   Respiratory Distress (70's upon arrival)    [place]:  Hilda Solano is a [de-identified] y.o. female with history of CHF, COPD, hypertension with recent admission and discharged on 2021 for shortness of breath which was thought to be multifactorial secondary to COPD and pneumonia was treated with Vanco and cefepime. Cultures grew MRSA at that time. Patient was also found to have a GI bleed with positive FOBT. GI was consulted and recommended EGD/colonoscopy which patient refused. Patient was treated with PPI. Patient also had transient episodes of A. fib in which cardiology was consulted. They started patient on low-dose Eliquis as well as a beta-blocker. Patient represented today stating over the past several days she has had increasing shortness of breath. States she has had a mildly worsening cough with no sputum production. Denies fevers. States she chronically has edema in bilateral lower extremities which is not significantly changed from baseline. States she does have orthopnea. States she has been taking her Eliquis as prescribed. Denies history of blood clots in the past.  Denies abdominal pain, change in urination, nausea vomiting. States she has been having black stools which she thought was secondary to iron. Denies bright red blood in stools. Denies recent falls or trauma. EMS was called patient's house and when they got there patient was oxygenating at about 70%. Patient was placed on nonrebreather at 15 L with improvement of oxygen saturations. Patient denies headache, blurred vision, focal neuro deficits, motor or sensory changes. Patient states with the shortness of breath she has some mild chest discomfort.     ROS - see HPI, below listed is current ROS at time of my eval:  At least 10 point review of system reviewed, negative other than HPI  Past Medical History:   Diagnosis Date    ASHD (arteriosclerotic heart disease)     Bilateral edema of lower extremity 2015    CHF (congestive heart failure) (HCC)     COPD (chronic obstructive pulmonary disease) (HCC)     Depression     Hypertension     MRSA (methicillin resistant staph aureus) culture positive 2021    Nasal    MRSA (methicillin resistant staph aureus) culture positive 2021    NASAL     Past Surgical History:   Procedure Laterality Date    ANUS SURGERY      fistula repair    APPENDECTOMY      HYSTERECTOMY      TONSILLECTOMY      UPPER GASTROINTESTINAL ENDOSCOPY N/A 2020    EGD BIOPSY performed by Yolanda Miranda MD at [facility] ENDOSCOPY     Family History   Problem Relation Age of Onset    High Blood Pressure Mother     Heart Disease Mother     Early Death Father     Substance Abuse Father     Diabetes Sister      Social History     Socioeconomic History    Marital status:      Spouse name: Josea Solders Number of children: 3    Years of education: Not on file    Highest education level: Not on file   Occupational History    Not on file   Tobacco Use    Smoking status: Former Smoker     Packs/day: 1.00     Types: Cigarettes     Quit date: 2010     Years since quittin.0    Smokeless tobacco: Never Used   Substance and Sexual Activity    Alcohol use: Yes     Alcohol/week: 0.0 standard drinks     Comment: wine twice a year    Drug use: No    Sexual activity: Yes     Partners: Male   Other Topics Concern    Not on file   Social History Narrative    Not on file     Social Determinants of Health     Financial Resource Strain:     Difficulty of Paying Living Expenses:    Food Insecurity:     Worried About Running Out of Food in the Last Year:     [address] in the Last Year:    Transportation Needs:     Lack of Transportation (Medical):      Lack of Transportation (Non-Medical): Physical Activity:     Days of Exercise per Week:     Minutes of Exercise per Session:    Stress:     Feeling of Stress :    Social Connections:     Frequency of Communication with Friends and Family:     Frequency of Social Gatherings with Friends and Family:     Attends Druze Services:     Active Member of Clubs or Organizations:     Attends Club or Organization Meetings:     Marital Status:    Intimate Partner Violence:     Fear of Current or Ex-Partner:     Emotionally Abused:     Physically Abused:     Sexually Abused:      No current facility-administered medications for this encounter.      Current Outpatient Medications   Medication Sig Dispense Refill    apixaban (ELIQUIS) 2.5 MG TABS tablet Take 1 tablet by mouth 2 times daily 60 tablet 0    metoprolol succinate (TOPROL XL) 25 MG extended release tablet Take 1 tablet by mouth daily 30 tablet 3    mirtazapine (REMERON) 15 MG tablet Take 1 tablet by mouth nightly 30 tablet 3    pantoprazole (PROTONIX) 40 MG tablet Take 1 tablet by mouth 2 times daily (before meals) 30 tablet 0    Multiple Vitamin (MULTIVITAMIN) TABS tablet Take 1 tablet by mouth daily  0    fluticasone-vilanterol (BREO ELLIPTA) 100-25 MCG/INH AEPB inhaler Inhale 1 puff into the lungs daily      linaclotide (LINZESS) 145 MCG capsule Take 145 mcg by mouth every morning (before breakfast)      calcium citrate-vitamin D (CITRICAL + D) 315-250 MG-UNIT TABS per tablet Take 1 tablet by mouth 2 times daily (with meals)      albuterol sulfate  (90 Base) MCG/ACT inhaler Inhale 2 puffs into the lungs every 4 hours as needed for Wheezing      ondansetron (ZOFRAN ODT) 4 MG disintegrating tablet Take 1 tablet by mouth every 8 hours as needed for Nausea 15 tablet 0    albuterol (PROVENTIL) (2.5 MG/3ML) 0.083% nebulizer solution Take 3 mLs by nebulization every 6 hours as needed for Wheezing 120 each 3    potassium chloride (KLOR-CON M) 10 MEQ extended release tablet Take 10 mEq by mouth daily      Cranberry 450 MG CAPS Take 900 mg by mouth 2 times daily       levothyroxine (SYNTHROID) 75 MCG tablet Take 75 mcg by mouth Daily      Acetaminophen (TYLENOL PO) Take 650 mg by mouth every 6 hours as needed (PAIN OR FEVER)       atorvastatin (LIPITOR) 40 MG tablet Take 40 mg by mouth daily      lactobacillus (CULTURELLE) capsule Take 1 capsule by mouth daily (with breakfast) 30 capsule     docusate sodium (COLACE) 100 MG capsule Take 1 capsule by mouth 2 times daily 30 capsule 0    OXYGEN Inhale 3 L into the lungs continuous       promethazine (PHENERGAN) 12.5 MG tablet Take 1 tablet by mouth every 4 hours as needed for Nausea 60 tablet 3    ferrous sulfate 325 (65 FE) MG tablet Take 1 tablet by mouth every 12 hours With a meal. 60 tablet 3     Allergies   Allergen Reactions    Codeine Itching    Moxifloxacin Other (See Comments)     Listed as allergy from [facility]    Oxycodone Other (See Comments)     Listed as allergy from [facility]     Pcn [Penicillins] Hives and Rash    Warfarin And Related Other (See Comments)     listed as allergy from [facility]       Nursing Notes Reviewed    Physical Exam:  Triage VS:    ED Triage Vitals [09/30/21 1433]   Enc Vitals Group      BP (!) 101/50      Pulse 81      Resp 24      Temp 98.3 °F (36.8 °C)      Temp Source Oral      SpO2 100 %      Weight       Height       Head Circumference       Peak Flow       Pain Score       Pain Loc       Pain Edu? Excl. in [address]? My pulse ox interpretation is - normal    General appearance: Mild, alert and oriented  Skin:  Warm. Dry. Eye:  Extraocular movements intact. Ears, nose, mouth and throat:  Oral mucosa moist   Neck:  Trachea midline. Extremity: Pitting edema in the bilateral lower extremity. Normal ROM     Heart:  Regular rate and rhythm, normal S1 & S2, no extra heart sounds.     Perfusion:  intact  Respiratory: Labored breathing, crackles at bilateral bases  Abdominal:  Normal bowel sounds. Soft. Nontender. Non distended. : Melena, Hemoccult positive  Back:  No CVA tenderness to palpation     Neurological:  Alert and oriented times 3. No focal neuro deficits. Psychiatric:  Appropriate    I have reviewed and interpreted all of the currently available lab results from this visit (if applicable):  No results found for this visit on 09/30/21. Radiographs (if obtained):  Radiologist's Report Reviewed:  No results found. EKG (if obtained): (All EKG's are interpreted by myself in the absence of a cardiologist)  Normal sinus rhythm, ventricular rate 75, NY interval 154, QRS duration 78, QTc 419, T wave inversions in lead III as well as V2 which is similar to prior, no significant ST depression or elevation, overall similar to prior exam    Procedure Note:  Central Line  The benefits, risks, and alternatives of central venous access were discussed with the  patient and Verbal consent was obtained for the procedure. Timeout performed prior to procedure. Gavino Baston was prepped and draped in standard bedside fashion in the right femoral area. Physical landmarks with ultrasound guidance was used to locate the central vein. Local anesthesia with 5ml of 2% lidocaine was injected. Seldinger technique was used for placement of the CVC in a non-pulsatile vasculature, US confirmed venous plasement. All ports remy and flushed. The patient tolerated the procedure well and no acute complications occurred. Comment: Please note this report has been produced using speech recognition software and may contain errors related to that system including errors in grammar, punctuation, and spelling, as well as words and phrases that may be inappropriate. If there are any questions or concerns please feel free to contact the dictating provider for clarification. MDM:    26-year-old female presenting with shortness of breath. History and be seen above.   Vitals on presentation patient requiring 15 L nonrebreather. Patient will intermittently drop into the 70s ablation transition to high flow nasal cannula with improvement of oxygen saturations. Patient initial blood pressure is 101/50. Patient's map will intermittently drop into the low 60s to high 50s. Patient was tachypneic at 24. Patient afebrile. Patient had crackles at the bilateral bases. Abdomen was soft and nontender. Neuro exam is nonfocal.  Patient appears in mild respiratory distress. Patient had bilateral lower extremity edema IV was difficult to establish. Central line was placed in the ED. EKG was nonacute and similar to patient's baseline. Chest x-ray was obtained showing persistent left lower lobe airspace opacities which may represent consolidation from left lower lobe pneumonia. There are stable interstitial opacities noted throughout the both lungs that may represent pneumonia or pulmonary edema. With patient's increasing shortness of breath with complaint of cough antibiotics were ordered. Rapid Covid came back negative. CBC revealed no leukocytosis. Hemoglobin is 4.5 down from 9.5 on 9/16/2021. Patient was borderline hypotensive and 2 units of emergency release blood were ordered as well as a type and screen. Patient received 2 units with improvement of pressure. Denilson Dylan was ordered for reversal of Eliquis. Otherwise CBC and CMP were reassuring. Johney Ely was mildly elevated at 0.02 which is likely type and 2 in nature with repeat of 0.016. BNP was elevated at 3800. The last BNP I see is from 9/8/2021 and it was within normal limits. VBG revealed a pH of 7.35 with a PCO2 of 54. Lactate within normal limits. Repeat hemoglobin after 2 units PRBCs is 7.9. Attempted to balance fluid/blood resuscitation with pulmonary status possible pneumonia versus pulmonary edema. Discussed with GI and would like patient to be on a Protonix drip overnight and n.p.o.   Discussed with hospitalist service and patient will be admitted to their service for further evaluation and treatment. Clinical Impression:  1. Acute on chronic respiratory failure with hypoxemia (HCC)    2. Gastrointestinal hemorrhage with melena    3. Acute blood loss anemia    4. Elevated brain natriuretic peptide (BNP) level      Total critical care time provided today was 45 minutes. This excludes seperately billable procedures and family discussion time. Critical care time provided for obtaining history, conducting a physical exam, performing and monitoring interventions, ordering, collecting and interpreting tests, and establishing medical decision-making. There was a potential for life/limb threatening pathology requiring close evaluation and intervention with concern for patient decompensation. Comment: Please note this report has been produced using speech recognition software and may contain errors related to that system including errors in grammar, punctuation, and spelling, as well as words and phrases that may be inappropriate. Efforts were made to edit the dictations.         April Tate MD  09/30/21 8567       April Tate MD  09/30/21 7111

## 2021-10-01 LAB
AMMONIA: 11 UMOL/L (ref 11–51)
ANION GAP SERPL CALCULATED.3IONS-SCNC: 13 MMOL/L (ref 4–16)
BACTERIA: ABNORMAL /HPF
BASE EXCESS: 2 (ref 0–2.4)
BASOPHILS ABSOLUTE: 0 K/CU MM
BASOPHILS RELATIVE PERCENT: 0.3 % (ref 0–1)
BILIRUBIN URINE: NEGATIVE MG/DL
BLOOD, URINE: ABNORMAL
BUN BLDV-MCNC: 25 MG/DL (ref 6–23)
CALCIUM SERPL-MCNC: 8.8 MG/DL (ref 8.3–10.6)
CARBON MONOXIDE, BLOOD: 2.8 % (ref 0–5)
CHLORIDE BLD-SCNC: 100 MMOL/L (ref 99–110)
CLARITY: CLEAR
CO2 CONTENT: 29.3 MMOL/L (ref 19–24)
CO2: 28 MMOL/L (ref 21–32)
COLOR: YELLOW
COMMENT: ABNORMAL
CREAT SERPL-MCNC: 0.7 MG/DL (ref 0.6–1.1)
DIFFERENTIAL TYPE: ABNORMAL
EKG ATRIAL RATE: 75 BPM
EKG DIAGNOSIS: NORMAL
EKG P AXIS: 39 DEGREES
EKG P-R INTERVAL: 154 MS
EKG Q-T INTERVAL: 376 MS
EKG QRS DURATION: 78 MS
EKG QTC CALCULATION (BAZETT): 419 MS
EKG R AXIS: -14 DEGREES
EKG T AXIS: 3 DEGREES
EKG VENTRICULAR RATE: 75 BPM
EOSINOPHILS ABSOLUTE: 0 K/CU MM
EOSINOPHILS RELATIVE PERCENT: 0 % (ref 0–3)
GFR AFRICAN AMERICAN: >60 ML/MIN/1.73M2
GFR NON-AFRICAN AMERICAN: >60 ML/MIN/1.73M2
GLUCOSE BLD-MCNC: 187 MG/DL (ref 70–99)
GLUCOSE, URINE: NEGATIVE MG/DL
HCO3 ARTERIAL: 27.2 MMOL/L (ref 18–23)
HCT VFR BLD CALC: 23.9 % (ref 37–47)
HCT VFR BLD CALC: 26.6 % (ref 37–47)
HCT VFR BLD CALC: 27.3 % (ref 37–47)
HCT VFR BLD CALC: 39.8 % (ref 37–47)
HEMOGLOBIN: 11.8 GM/DL (ref 12.5–16)
HEMOGLOBIN: 7.1 GM/DL (ref 12.5–16)
HEMOGLOBIN: 7.8 GM/DL (ref 12.5–16)
HEMOGLOBIN: 8.2 GM/DL (ref 12.5–16)
IMMATURE NEUTROPHIL %: 0.5 % (ref 0–0.43)
IRON: 168 UG/DL (ref 37–145)
KETONES, URINE: ABNORMAL MG/DL
LACTATE: 1 MMOL/L (ref 0.4–2)
LEUKOCYTE ESTERASE, URINE: ABNORMAL
LYMPHOCYTES ABSOLUTE: 1.5 K/CU MM
LYMPHOCYTES RELATIVE PERCENT: 18.8 % (ref 24–44)
MCH RBC QN AUTO: 27.8 PG (ref 27–31)
MCHC RBC AUTO-ENTMCNC: 29.6 % (ref 32–36)
MCV RBC AUTO: 93.6 FL (ref 78–100)
METHEMOGLOBIN ARTERIAL: 1.3 %
MONOCYTES ABSOLUTE: 1 K/CU MM
MONOCYTES RELATIVE PERCENT: 12.8 % (ref 0–4)
MUCUS: ABNORMAL HPF
NITRITE URINE, QUANTITATIVE: NEGATIVE
NUCLEATED RBC %: 0 %
O2 SATURATION: 95.8 % (ref 96–97)
PCO2 ARTERIAL: 68 MMHG (ref 32–45)
PCT TRANSFERRIN: 86 % (ref 10–44)
PDW BLD-RTO: 19.7 % (ref 11.7–14.9)
PH BLOOD: 7.21 (ref 7.34–7.45)
PH, URINE: 5 (ref 5–8)
PLATELET # BLD: 254 K/CU MM (ref 140–440)
PMV BLD AUTO: 10.6 FL (ref 7.5–11.1)
PO2 ARTERIAL: 100 MMHG (ref 75–100)
POTASSIUM SERPL-SCNC: 3.1 MMOL/L (ref 3.5–5.1)
PROTEIN UA: NEGATIVE MG/DL
RBC # BLD: 4.25 M/CU MM (ref 4.2–5.4)
RBC URINE: <1 /HPF (ref 0–6)
SEGMENTED NEUTROPHILS ABSOLUTE COUNT: 5.2 K/CU MM
SEGMENTED NEUTROPHILS RELATIVE PERCENT: 67.6 % (ref 36–66)
SODIUM BLD-SCNC: 141 MMOL/L (ref 135–145)
SPECIFIC GRAVITY UA: 1.02 (ref 1–1.03)
SQUAMOUS EPITHELIAL: 1 /HPF
TOTAL IMMATURE NEUTOROPHIL: 0.04 K/CU MM
TOTAL IRON BINDING CAPACITY: 196 UG/DL (ref 250–450)
TOTAL NUCLEATED RBC: 0 K/CU MM
TRICHOMONAS: ABNORMAL /HPF
TROPONIN T: 0.02 NG/ML
UNSATURATED IRON BINDING CAPACITY: 28 UG/DL (ref 110–370)
UROBILINOGEN, URINE: NEGATIVE MG/DL (ref 0.2–1)
WBC # BLD: 7.7 K/CU MM (ref 4–10.5)
WBC UA: <1 /HPF (ref 0–5)

## 2021-10-01 PROCEDURE — 83550 IRON BINDING TEST: CPT

## 2021-10-01 PROCEDURE — 82803 BLOOD GASES ANY COMBINATION: CPT

## 2021-10-01 PROCEDURE — 6370000000 HC RX 637 (ALT 250 FOR IP): Performed by: NURSE PRACTITIONER

## 2021-10-01 PROCEDURE — 84484 ASSAY OF TROPONIN QUANT: CPT

## 2021-10-01 PROCEDURE — 6360000002 HC RX W HCPCS: Performed by: NURSE PRACTITIONER

## 2021-10-01 PROCEDURE — 36600 WITHDRAWAL OF ARTERIAL BLOOD: CPT

## 2021-10-01 PROCEDURE — 93010 ELECTROCARDIOGRAM REPORT: CPT | Performed by: INTERNAL MEDICINE

## 2021-10-01 PROCEDURE — P9016 RBC LEUKOCYTES REDUCED: HCPCS

## 2021-10-01 PROCEDURE — 82140 ASSAY OF AMMONIA: CPT

## 2021-10-01 PROCEDURE — 85025 COMPLETE CBC W/AUTO DIFF WBC: CPT

## 2021-10-01 PROCEDURE — 36592 COLLECT BLOOD FROM PICC: CPT

## 2021-10-01 PROCEDURE — 2580000003 HC RX 258: Performed by: NURSE PRACTITIONER

## 2021-10-01 PROCEDURE — C9113 INJ PANTOPRAZOLE SODIUM, VIA: HCPCS | Performed by: NURSE PRACTITIONER

## 2021-10-01 PROCEDURE — 2140000000 HC CCU INTERMEDIATE R&B

## 2021-10-01 PROCEDURE — 94660 CPAP INITIATION&MGMT: CPT

## 2021-10-01 PROCEDURE — 94640 AIRWAY INHALATION TREATMENT: CPT

## 2021-10-01 PROCEDURE — 83605 ASSAY OF LACTIC ACID: CPT

## 2021-10-01 PROCEDURE — 85014 HEMATOCRIT: CPT

## 2021-10-01 PROCEDURE — 2700000000 HC OXYGEN THERAPY PER DAY

## 2021-10-01 PROCEDURE — 83540 ASSAY OF IRON: CPT

## 2021-10-01 PROCEDURE — 94761 N-INVAS EAR/PLS OXIMETRY MLT: CPT

## 2021-10-01 PROCEDURE — 87899 AGENT NOS ASSAY W/OPTIC: CPT

## 2021-10-01 PROCEDURE — 85018 HEMOGLOBIN: CPT

## 2021-10-01 PROCEDURE — 87449 NOS EACH ORGANISM AG IA: CPT

## 2021-10-01 PROCEDURE — 81001 URINALYSIS AUTO W/SCOPE: CPT

## 2021-10-01 PROCEDURE — 80048 BASIC METABOLIC PNL TOTAL CA: CPT

## 2021-10-01 RX ORDER — SODIUM CHLORIDE 9 MG/ML
INJECTION, SOLUTION INTRAVENOUS PRN
Status: DISCONTINUED | OUTPATIENT
Start: 2021-10-01 | End: 2021-10-04 | Stop reason: HOSPADM

## 2021-10-01 RX ADMIN — SODIUM CHLORIDE: 9 INJECTION, SOLUTION INTRAVENOUS at 06:27

## 2021-10-01 RX ADMIN — SODIUM CHLORIDE 8 MG/HR: 9 INJECTION, SOLUTION INTRAVENOUS at 18:05

## 2021-10-01 RX ADMIN — LEVOTHYROXINE SODIUM 75 MCG: 0.07 TABLET ORAL at 07:15

## 2021-10-01 RX ADMIN — CEFEPIME HYDROCHLORIDE 2000 MG: 2 INJECTION, POWDER, FOR SOLUTION INTRAVENOUS at 03:54

## 2021-10-01 RX ADMIN — ACETAMINOPHEN 650 MG: 325 TABLET ORAL at 17:49

## 2021-10-01 RX ADMIN — ACETAMINOPHEN 650 MG: 325 TABLET ORAL at 07:15

## 2021-10-01 RX ADMIN — SODIUM CHLORIDE 8 MG/HR: 9 INJECTION, SOLUTION INTRAVENOUS at 05:55

## 2021-10-01 RX ADMIN — SODIUM CHLORIDE, PRESERVATIVE FREE 10 ML: 5 INJECTION INTRAVENOUS at 21:12

## 2021-10-01 RX ADMIN — ATORVASTATIN CALCIUM 40 MG: 20 TABLET, FILM COATED ORAL at 21:12

## 2021-10-01 RX ADMIN — ALBUTEROL SULFATE 2 PUFF: 90 AEROSOL, METERED RESPIRATORY (INHALATION) at 20:20

## 2021-10-01 RX ADMIN — BUDESONIDE AND FORMOTEROL FUMARATE DIHYDRATE 2 PUFF: 80; 4.5 AEROSOL RESPIRATORY (INHALATION) at 20:22

## 2021-10-01 ASSESSMENT — PAIN DESCRIPTION - LOCATION: LOCATION: NECK

## 2021-10-01 ASSESSMENT — PAIN DESCRIPTION - PAIN TYPE: TYPE: ACUTE PAIN

## 2021-10-01 ASSESSMENT — PAIN DESCRIPTION - DESCRIPTORS: DESCRIPTORS: ACHING

## 2021-10-01 ASSESSMENT — PAIN SCALES - GENERAL
PAINLEVEL_OUTOF10: 4
PAINLEVEL_OUTOF10: 10

## 2021-10-01 ASSESSMENT — PAIN DESCRIPTION - ORIENTATION: ORIENTATION: RIGHT

## 2021-10-01 NOTE — PROGRESS NOTES
Hospitalist Progress Note      Name:  Pietro Feliz /Age/Sex: 1940  ([de-identified] y.o. female)   MRN & CSN:  [id number] & [id number] Admission Date/Time: 2021  2:29 PM   Location:  [address] [place], [place] [address] PCP: Thomas Bradshaw MD         Hospital Day: 2  Discharge barrier: Resp distress, GI bleed, encephalopathy    Assessment and Plan:   Pietro Feliz is a [de-identified] y.o.  female with a past medical history of COPD, chronic hypoxic respiratory failure on 4 L home O2, diastolic CHF, severe pulmonary hypertension, hypertension, morbid obesity, paroxysmal atrial fibrillation on chronic anticoagulation who presented to the ED on 2021 on account of shortness of breath and profound hypoxia. In the ED, patient was hypoxic, hypotensive, anemic with a hemoglobin of 4.5 g. She received 2 units stat. She also received Kcentra to reverse Eliquis. Per H&P, patient was recently hospitalized and was recommended EGD/colonoscopy which she deferred. During that hospitalization from 2021 through 2021, patient was started on Eliquis, patient did receive MRSA directed antibiotics for pneumonia    1. Acute on chronic hypoxic respiratory failure: Reason for presentation. SARS-CoV-2 PCR negative  Currently on HFNC. Unfortunately, patient now has acute hypercarbic and acute hypoxic respiratory failure. Switch to NIPPV. See #2 below    2. Acute hypercarbic respiratory failure: As seen on ABG  Possibly due to high flow O2 as patient likely is dependent on some degree of hypercapnia given her COPD  Stop HFNC  Start BiPAP  Repeat ABG in 2 hours    3. Acute metabolic encephalopathy: Due to acute respiratory acidosis  Expect improvement with BiPAP    4. Severe anemia: Likely due to UGI B. Suggestive BUN/creatinine ratio. GI already consulted, unfortunately respiratory status precludes GI eval  S/p 2 units PRBC  SCDs for DVT prophylaxis  Continue to hold off Eliquis. S/p Kcentra for reversal  Continue PPI gtt.     5. Hemodynamic instability: Blood pressure as low as 89/39 on 9/30/2021  Improved with 2 units PRBC  Low threshold to transfer to ICU for vasopressor support. 6.  Bilateral infiltrates: Unlikely pneumonia. Discontinue antibiotics at this time. 7.  Paroxysmal A. fib: Recently initiated on low-dose Eliquis  Continue to hold for now. May need to discontinue anticoagulation ultimately. 8.  Morbid obesity: Unable to  at this time  9. COPD: Not in acute exacerbation  Chronic hypoxic respiratory failure on 4 L supplemental O2  10. Recent MRSA pneumonia: Completed Vanco/Zyvox  11. Diastolic CHF: Unable to diurese at this time due to borderline low blood pressure    CCT: 57 minutes    Diet Diet NPO Exceptions are: Ice Chips   DVT Prophylaxis [] Lovenox, []  Heparin, [x] SCDs, [] Ambulation   GI Prophylaxis [] PPI,  [] H2 Blocker,  [] Carafate,  [] Diet/Tube Feeds   Code Status Full Code   Disposition Patient requires continued admission due to respiratory distress, GI bleed, encephalopathy   MDM [] Low, [] Moderate,[x]  High  Patient's risk as above due to critical illness     History of Present Illness:     Chief Complaint: <principal problem not specified> shortness of breath, hypoxia. O2 sat 70% on room air. Amna Avalos is a [de-identified] y.o.  female  who presents with hypoxia. It appears she has improved with high flow supplemental O2 but she is also now becoming lethargic and poorly responsive. Unable to hold a conversation with me during my interview. 10 point review of system is unable to be done accurately due to altered mental status. Objective:        Intake/Output Summary (Last 24 hours) at 10/1/2021 1247  Last data filed at 10/1/2021 1043  Gross per 24 hour   Intake 564.17 ml   Output --   Net 564.17 ml        Vitals:   Vitals:    10/01/21 0703 10/01/21 0847 10/01/21 1043 10/01/21 1208   BP: (!) 122/48 (!) 119/53  (!) 96/40   Pulse:  78 80 77   Resp:  20 20 18   Temp: 98.1 °F (36.7 °C) 98.7 °F (37.1 °C)  97.9 °F (36.6 °C)   TempSrc: Oral Oral  Oral   SpO2:  97% 99% 99%   Weight:       Height:            Physical Exam:   GEN: Awake female, lethargic, unable to stay awake. Appears given age. HEENT: Normal except edentulous  RESP: Clear lung fields bilaterally. Symmetric chest movement while on  HFNC  CVS: RRR, S1, S2  GI/: Abdomen is soft, tender ++ epigastric area, no organomegaly. Bowel sounds normal, rectal exam deferred. No CVA tenderness. MSK: No gross joint deformities. 2 + leg edema  SKIN: Normal coloration, warm, dry. NEURO: Cranial nerves appear grossly intact, normal speech, no lateralizing weakness. PSYCH:  Affect appropriate.     Medications:   Medications:    sodium chloride  250 mL IntraVENous Once    [Held by provider] lactobacillus  1 capsule Oral Daily with breakfast    atorvastatin  40 mg Oral Daily    levothyroxine  75 mcg Oral Daily    budesonide-formoterol  2 puff Inhalation BID    linaclotide  145 mcg Oral QAM AC    sodium chloride flush  5-40 mL IntraVENous 2 times per day    cefepime (MAXIPIME) 2000 mg IVPB minibag  2,000 mg IntraVENous Q12H    vancomycin  1,250 mg IntraVENous Q24H      Infusions:    sodium chloride      sodium chloride Stopped (09/30/21 2118)    pantoprozole (PROTONIX) infusion 8 mg/hr (10/01/21 0555)    sodium chloride       PRN Meds: sodium chloride, , PRN  promethazine, 12.5 mg, Q4H PRN  albuterol sulfate HFA, 2 puff, Q4H PRN  sodium chloride flush, 5-40 mL, PRN  sodium chloride, 25 mL, PRN  ondansetron, 4 mg, Q8H PRN   Or  ondansetron, 4 mg, Q6H PRN  acetaminophen, 650 mg, Q6H PRN   Or  acetaminophen, 650 mg, Q6H PRN  albuterol, 2.5 mg, Q4H PRN          Electronically signed by Oumou Lam MD on 10/1/2021 at 12:47 PM

## 2021-10-01 NOTE — PROGRESS NOTES
RENAL DOSE ADJUSTMENT MADE PER P/T PROTOCOL    PREVIOUS ORDER:  Cefepime 2 g q8h    Estimated Creatinine Clearance: 47 mL/min (based on SCr of 1 mg/dL). Recent Labs     09/30/21  1603 09/30/21  1603 09/30/21  1914   BUN 37*  --  36*   CREATININE 1.1   < > 1.0      < > 149   INR 1.19  --   --     < > = values in this interval not displayed.      NEW RENALLY ADJUSTED ORDER:  Cefepime 2 g q12h    Soila Ards, [facility]  9/30/2021 9:38 PM

## 2021-10-01 NOTE — ED NOTES
Report given to [facility]; care transferred.      Manda Bedoya RN  09/30/21 2561

## 2021-10-01 NOTE — H&P
HISTORY AND PHYSICAL             Date: 9/30/2021        Patient Name: Cyndi Hsieh     YOB: 1940      Age:  [de-identified] y.o. Chief Complaint     Chief Complaint   Patient presents with    Respiratory Distress     70's upon arrival           History Obtained From   electronic medical record, reason patient could not give history:  Respiratory distress, hypoxia     History of Present Illness   60-year-old female who was recently discharged 2 weeks ago. Presented with respiratory distress and hypoxia via EMS found to have a saturation in the 70s. During her last hospitalization she was found to have a GI bleed with positive fecal occult blood testing, GI recommended EGD/colonoscopy which patient refused she was treated with a PPI and her hemoglobin has been stable prior to discharge. She was Hemoccult positive in the ED and hemoglobin was found to be 4  and she was transfused 2 units of blood. She was given Kcentra to reverse Eliquis and GI was contacted. She denies abdominal pain for me and she also denies bloody stool or bloody emesis for this provider.   She    Past Medical History     Past Medical History:   Diagnosis Date    ASHD (arteriosclerotic heart disease)     Bilateral edema of lower extremity 12/16/2015    CHF (congestive heart failure) (HCC)     COPD (chronic obstructive pulmonary disease) (HCC)     Depression     Hypertension     MRSA (methicillin resistant staph aureus) culture positive 09/09/2021    Nasal    MRSA (methicillin resistant staph aureus) culture positive 09/09/2021    NASAL        Past Surgical History     Past Surgical History:   Procedure Laterality Date    ANUS SURGERY      fistula repair    APPENDECTOMY      HYSTERECTOMY      TONSILLECTOMY      UPPER GASTROINTESTINAL ENDOSCOPY N/A 11/25/2020    EGD BIOPSY performed by Tima Waggoner MD at [facility] ENDOSCOPY        Medications Prior to Admission     Prior to Admission medications    Medication Sig Start Date End Date Taking?  Authorizing Provider   apixaban (ELIQUIS) 2.5 MG TABS tablet Take 1 tablet by mouth 2 times daily 9/16/21   Alma Melendez MD   metoprolol succinate (TOPROL XL) 25 MG extended release tablet Take 1 tablet by mouth daily 9/17/21   Alma Melendez MD   mirtazapine (REMERON) 15 MG tablet Take 1 tablet by mouth nightly 11/30/20   Becky Granger MD   pantoprazole (PROTONIX) 40 MG tablet Take 1 tablet by mouth 2 times daily (before meals) 11/30/20 12/30/20  Becky Granger MD   Multiple Vitamin (MULTIVITAMIN) TABS tablet Take 1 tablet by mouth daily 12/1/20   Becky Granger MD   fluticasone-vilanterol (BREO ELLIPTA) 100-25 MCG/INH AEPB inhaler Inhale 1 puff into the lungs daily    Historical Provider, MD   linaclotide (LINZESS) 145 MCG capsule Take 145 mcg by mouth every morning (before breakfast)    Historical Provider, MD   calcium citrate-vitamin D (CITRICAL + D) 315-250 MG-UNIT TABS per tablet Take 1 tablet by mouth 2 times daily (with meals)    Historical Provider, MD   albuterol sulfate  (90 Base) MCG/ACT inhaler Inhale 2 puffs into the lungs every 4 hours as needed for Wheezing    Historical Provider, MD   ondansetron (ZOFRAN ODT) 4 MG disintegrating tablet Take 1 tablet by mouth every 8 hours as needed for Nausea 1/12/17   Vinod Garcia DO   albuterol (PROVENTIL) (2.5 MG/3ML) 0.083% nebulizer solution Take 3 mLs by nebulization every 6 hours as needed for Wheezing 1/12/17   Vinod Garcia DO   potassium chloride (KLOR-CON M) 10 MEQ extended release tablet Take 10 mEq by mouth daily    Historical Provider, MD   Cranberry 450 MG CAPS Take 900 mg by mouth 2 times daily     Historical Provider, MD   levothyroxine (SYNTHROID) 75 MCG tablet Take 75 mcg by mouth Daily    Historical Provider, MD   Acetaminophen (TYLENOL PO) Take 650 mg by mouth every 6 hours as needed (PAIN OR FEVER)     Historical Provider, MD   atorvastatin (LIPITOR) 40 MG tablet Take 40 mg by mouth daily    Historical Provider, MD lactobacillus (CULTURELLE) capsule Take 1 capsule by mouth daily (with breakfast) 16   Jesica , DO   docusate sodium (COLACE) 100 MG capsule Take 1 capsule by mouth 2 times daily 16   Jesica , DO   OXYGEN Inhale 3 L into the lungs continuous     Historical Provider, MD   promethazine (PHENERGAN) 12.5 MG tablet Take 1 tablet by mouth every 4 hours as needed for Nausea 9/11/15   Yadira Gandhi MD   ferrous sulfate 325 (65 FE) MG tablet Take 1 tablet by mouth every 12 hours With a meal. 9/11/15   Yadira Gandhi MD        Allergies   Codeine, Moxifloxacin, Oxycodone, Pcn [penicillins], and Warfarin and related    Social History     Social History     Tobacco History     Smoking Status  Former Smoker Quit date  2010 Smoking Frequency  1 pack/day Smoking Tobacco Type  Cigarettes    Smokeless Tobacco Use  Never Used          Alcohol History     Alcohol Use Status  Yes Drinks/Week  0 Standard drinks or equivalent per week Amount  0.0 standard drinks of alcohol/wk Comment  wine twice a year          Drug Use     Drug Use Status  No          Sexual Activity     Sexually Active  Yes Partners  Male                Family History     Family History   Problem Relation Age of Onset    High Blood Pressure Mother     Heart Disease Mother     Early Death Father     Substance Abuse Father     Diabetes Sister        Review of Systems   Review of Systems    Physical Exam   /69   Pulse 75   Temp 98.6 °F (37 °C) (Oral)   Resp 22   Ht 5' 1\" (1.549 m)   Wt 209 lb (94.8 kg)   SpO2 93%   BMI 39.49 kg/m²     Physical Exam  Vitals and nursing note reviewed. Constitutional:       Comments: pale   HENT:      Head: Normocephalic. Cardiovascular:      Rate and Rhythm: Normal rate and regular rhythm. Pulses: Normal pulses. Pulmonary:      Effort: Pulmonary effort is normal. No respiratory distress. Breath sounds: Rhonchi present. No wheezing. Abdominal:      General: Abdomen is flat.  Bowel sounds are decreased. There is distension. Musculoskeletal:         General: Normal range of motion. Skin:     Capillary Refill: Capillary refill takes 2 to 3 seconds. Coloration: Skin is pale. Comments: cool   Neurological:      General: No focal deficit present. Mental Status: She is oriented to person, place, and time.          Labs      Recent Results (from the past 24 hour(s))   POCT Glucose    Collection Time: 09/30/21  2:37 PM   Result Value Ref Range    POC Glucose 127 (H) 70 - 99 MG/DL   EKG 12 Lead    Collection Time: 09/30/21  2:41 PM   Result Value Ref Range    Ventricular Rate 75 BPM    Atrial Rate 75 BPM    P-R Interval 154 ms    QRS Duration 78 ms    Q-T Interval 376 ms    QTc Calculation (Bazett) 419 ms    P Axis 39 degrees    R Axis -14 degrees    T Axis 3 degrees    Diagnosis       Normal sinus rhythm  Low voltage QRS  Borderline ECG  When compared with ECG of 08-SEP-2021 12:55,  No significant change was found     Blood Gas, Venous    Collection Time: 09/30/21  2:45 PM   Result Value Ref Range    pH, David 7.35 7.32 - 7.42    pCO2, David 54 (H) 38 - 52 mmHG    pO2, David 42 28 - 48 mmHG    Base Exc, Mixed 2.9 (H) 0 - 2.3    HCO3, Venous 29.8 (H) 19 - 25 MMOL/L    O2 Sat, David 79.4 (H) 50 - 70 %    Comment VBG    COVID-19, Rapid    Collection Time: 09/30/21  2:48 PM    Specimen: Nasopharyngeal   Result Value Ref Range    Source THROAT     SARS-CoV-2, NAAT NOT DETECTED NOT DETECTED   CBC auto diff    Collection Time: 09/30/21  4:03 PM   Result Value Ref Range    WBC 8.8 4.0 - 10.5 K/CU MM    RBC 1.66 (L) 4.2 - 5.4 M/CU MM    Hemoglobin 4.5 (LL) 12.5 - 16.0 GM/DL    Hematocrit 16.8 (LL) 37 - 47 %    .2 (H) 78 - 100 FL    MCH 27.1 27 - 31 PG    MCHC 26.8 (L) 32.0 - 36.0 %    RDW 15.6 (H) 11.7 - 14.9 %    Platelets 196 762 - 277 K/CU MM    MPV 11.0 7.5 - 11.1 FL    Differential Type AUTOMATED DIFFERENTIAL     Segs Relative 81.5 (H) 36 - 66 %    Lymphocytes % 8.2 (L) 24 - 44 % OK TO TRANSFUSE     Crossmatch Result COMPATIBLE    Lactic acid, plasma    Collection Time: 09/30/21  7:14 PM   Result Value Ref Range    Lactate 0.8 0.4 - 2.0 mMOL/L   CBC    Collection Time: 09/30/21  7:14 PM   Result Value Ref Range    WBC 8.6 4.0 - 10.5 K/CU MM    RBC 2.80 (L) 4.2 - 5.4 M/CU MM    Hemoglobin 7.9 (L) 12.5 - 16.0 GM/DL    Hematocrit 27.0 (L) 37 - 47 %    MCV 96.4 78 - 100 FL    MCH 28.2 27 - 31 PG    MCHC 29.3 (L) 32.0 - 36.0 %    RDW 15.9 (H) 11.7 - 14.9 %    Platelets 493 602 - 776 K/CU MM    MPV 10.8 7.5 - 11.1 FL   Basic Metabolic Panel    Collection Time: 09/30/21  7:14 PM   Result Value Ref Range    Sodium 137 135 - 145 MMOL/L    Potassium 4.8 3.5 - 5.1 MMOL/L    Chloride 104 99 - 110 mMol/L    CO2 24 21 - 32 MMOL/L    Anion Gap 9 4 - 16    BUN 36 (H) 6 - 23 MG/DL    CREATININE 1.0 0.6 - 1.1 MG/DL    Glucose 119 (H) 70 - 99 MG/DL    Calcium 7.9 (L) 8.3 - 10.6 MG/DL    GFR Non- 53 (L) >60 mL/min/1.73m2    GFR African American >60 >60 mL/min/1.73m2   Blood Gas, Venous    Collection Time: 09/30/21  7:15 PM   Result Value Ref Range    pH, David 7.27 (L) 7.32 - 7.42    pCO2, David 64 (H) 38 - 52 mmHG    pO2, David 154 (H) 28 - 48 mmHG    Base Exc, Mixed . 9 0 - 2.3    HCO3, Venous 29.4 (H) 19 - 25 MMOL/L    O2 Sat, David 95.1 (H) 50 - 70 %    Comment VBG         Imaging/Diagnostics Last 24 Hours   CT ABDOMEN PELVIS W IV CONTRAST Additional Contrast? None    Result Date: 9/30/2021  EXAMINATION: CT OF THE ABDOMEN AND PELVIS WITH CONTRAST 9/30/2021 5:46 pm TECHNIQUE: CT of the abdomen and pelvis was performed with the administration of intravenous contrast. Multiplanar reformatted images are provided for review. Dose modulation, iterative reconstruction, and/or weight based adjustment of the mA/kV was utilized to reduce the radiation dose to as low as reasonably achievable.  COMPARISON: CT abdomen and pelvis November 24, 2020 HISTORY: ORDERING SYSTEM PROVIDED HISTORY: sob, hypoxia, hemoglobin 4. 5 TECHNOLOGIST PROVIDED HISTORY: Reason for exam:->sob, hypoxia, hemoglobin 4.5 Additional Contrast?->None Decision Support Exception - unselect if not a suspected or confirmed emergency medical condition->Emergency Medical Condition (MA) Reason for Exam: sob, hypoxia, hemoglobin 4.5 FINDINGS: Lower Chest: Imaged lung bases demonstrate bibasilar consolidative change. Please see separately dictated CT pulmonary arteries same day for intrathoracic findings. Organs: The liver appears unremarkable. The gallbladder demonstrates no acute findings. The spleen, pancreas, and bilateral adrenal glands demonstrate no acute abnormality. The kidneys are symmetric in size and demonstrate symmetric enhancement. Stable size of low attenuating exophytic lesion of the left kidney likely reflecting a small proteinaceous or hemorrhagic cyst.  No hydronephrosis identified. GI/Bowel: No bowel obstruction identified. Colonic diverticulosis without CT evidence of diverticulitis. The stomach is collapsed not well evaluated. No significant fluid identified within the bowel loops. Pelvis: Uterus is surgically absent. The bladder appears unremarkable. Peritoneum/Retroperitoneum: The abdominal aorta demonstrates severe calcified atherosclerotic plaque throughout. Severe atherosclerosis of the bilateral common iliac arteries. No acute aortic injury identified. A right-sided femoral central venous catheter in place with tip extending to the right common femoral vein. No free fluid or free air identified within the abdomen or pelvis. No retroperitoneal hematoma. IVC filter in place. Bones/Soft Tissues: No acute osseous or soft tissue findings. Postoperative changes along the lower midline abdominal wall redemonstrated. Few foci of subcutaneous stranding at the buttock and anterior abdominal wall are favored to reflect injection sites. No organized subcutaneous collections.   Few dystrophic calcification at the bilateral buttock consistent with injection granulomas. 1. No acute intra-abdominal process identified. 2. No free fluid or free air identified within the abdomen. No retroperitoneal hematoma. 3. No bowel obstruction. 4. Colonic diverticulosis without CT evidence of diverticulitis. 5. Severe atherosclerotic disease. 6. Dependent consolidative change at the lung bases. See separately dictated CT pulmonary same day for thoracic findings. XR CHEST PORTABLE    Result Date: 9/30/2021  EXAMINATION: ONE X-RAY VIEW OF THE CHEST 9/30/2021 2:39 pm COMPARISON: 09/13/2021 HISTORY: ORDERING SYSTEM PROVIDED HISTORY: SOB TECHNOLOGIST PROVIDED HISTORY: Reason for exam:->SOB Reason for Exam: SOB FINDINGS: Again demonstrated are interstitial opacities noted throughout both lungs. There is focal opacity in the left lung base that may represent focal consolidation from pneumonia. There is cardiomegaly again noted. Cardiomediastinal silhouette and bony thorax are unchanged. No pneumothorax. No significant change since prior exam.     Persistent left lower lobe airspace opacity that may represent consolidation from left lower lobe pneumonia. Stable interstitial opacities noted throughout both lungs that may represent pneumonia or pulmonary edema. No significant change. CTA PULMONARY W CONTRAST    Result Date: 9/30/2021  EXAMINATION: CTA OF THE CHEST 9/30/2021 5:46 pm TECHNIQUE: CTA of the chest was performed after the administration of intravenous contrast.  Multiplanar reformatted images are provided for review. MIP images are provided for review. Dose modulation, iterative reconstruction, and/or weight based adjustment of the mA/kV was utilized to reduce the radiation dose to as low as reasonably achievable. COMPARISON: None.  HISTORY: ORDERING SYSTEM PROVIDED HISTORY: sob, hypoxia, hemoglobin 4.5 TECHNOLOGIST PROVIDED HISTORY: Reason for exam:->sob, hypoxia, hemoglobin 4.5 Decision Support Exception - unselect if not a suspected or confirmed emergency medical condition->Emergency Medical Condition (MA) Reason for Exam: CHEST PAIN  R/O PE FINDINGS: Pulmonary Arteries: Pulmonary arteries are adequately opacified for evaluation. No evidence of intraluminal filling defect to suggest pulmonary embolism, within the limitations of motion. Main pulmonary artery is normal in caliber. Mediastinum: The heart is enlarged without pericardial effusion. Coronary artery atherosclerosis. There are shotty mediastinal lymph nodes. Lungs/pleura: There is no pneumothorax. There are small bilateral pleural effusions. There is consolidation in both lower lobes. There is septal thickening in both lung apices, right greater than left. There are noncalcified pulmonary nodules measuring up to 6 mm. Upper Abdomen: Limited images of the upper abdomen are unremarkable. Soft Tissues/Bones: No acute bone or soft tissue abnormality. 1. No pulmonary embolus. 2. Bibasilar pulmonary consolidation with bilateral pleural effusions, concerning for pneumonia. 3. Noncalcified pulmonary nodules measuring up to 6 mm. RECOMMENDATIONS: Multiple pulmonary nodules. Most severe: 6 mm solid pulmonary nodule. Recommend a non-contrast Chest CT at 3-6 months. If patient is high risk for malignancy, recommend an additional non-contrast Chest CT at 18-24 months; if patient is low risk for malignancy a non-contrast Chest CT at 18-24 months is optional. These guidelines do not apply to immunocompromised patients and patients with cancer. Follow up in patients with significant comorbidities as clinically warranted. For lung cancer screening, adhere to Lung-RADS guidelines. Reference: Radiology. 2017; 284(1):228-43. Assessment      Hospital Problems         Last Modified POA    GI bleed 9/30/2021 Yes          Plan   #GI bleed  #Acute blood loss anemia     -Admit to inpatient, stepdown   -ED provider contacted GI for urgent evaluation.   For upper GI evaluation colonoscopy   -Transfuse 2 units PRBCs in the ED, repeat H&H, transfuse PRBCs to keep hemoglobin greater than 8.   -Administer Kcentra to reverse Eliquis which she was on for A. fib. Hold all anticoagulants   -  Lab Results   Component Value Date    WBC 8.6 09/30/2021    HGB 7.9 (L) 09/30/2021    HCT 27.0 (L) 09/30/2021    MCV 96.4 09/30/2021     09/30/2021    -IV protonix gtt     #Acute respiratory distress with hypoxia   #Community-acquired pneumonia   -CT PE negative for pulmonary embolus bibasilar pulmonary consolidation with bilateral pleural effusions concerning for pneumonia noted blood cultures ordered check urine for strep pneumonia and strep pneumoniae antigens initiate broad-spectrum antibiotics for pneumonia. -Cefepime and vancomycin administered in ED, will continue. Consult pharmacy to dose vancomycin   -Bronchodilators and wean off oxygen as tolerated    Continue home medications once otherwise noted above for chronic medical conditions including but not limited to   A. fib, COPD acute on chronic diastolic heart failure coronary artery disease, morbid obesity, depression, chronic hypoxic respiratory failure    Diet Diet NPO Exceptions are: Ice Chips     DVT Prophylaxis [] Lovenox, []  Heparin, [x] SCDs, [] Ambulation   GI Prophylaxis [x] PPI,  [] H2 Blocker,  [] Carafate,  [] Diet/Tube Feeds   Code Status Full Code   Disposition Patient requires continued admission due to GI bleed      MDM [] Low, [] Moderate,[x]  High  Patient's risk as above      Patient discussed with and evaluated by Dr Gorge Zamora who agrees with the above diagnosis, treatment and disposition. All testing  and results reviewed with patient . All questions answered.  Patient and family voiced understanding                             Consultations Ordered:  IP CONSULT TO HOSPITALIST  IP CONSULT TO GI    Electronically signed by AMPARO Aguayo CNP on 9/30/21 at 8:11 PM EDT

## 2021-10-01 NOTE — PROGRESS NOTES
nutrition within 48 hr       Nutrition Monitoring and Evaluation:   Behavioral-Environmental Outcomes:  None Identified   Food/Nutrient Intake Outcomes:  Diet Advancement/Tolerance  Physical Signs/Symptoms Outcomes:  Biochemical Data, GI Status, Meal Time Behavior, Nutrition Focused Physical Findings, Weight     Discharge Planning:     Too soon to determine     Electronically signed by Deirdre Burton RD, LD on 10/1/21 at 2:45 PM EDT    Contact: [phone]

## 2021-10-01 NOTE — PROGRESS NOTES
10/01/21 1205   Oxygen Therapy/Pulse Ox   Resp 19   SpO2 99 %   Provider Notification   Reason for Communication Critical Value (comment)  (ABG results)   Provider Name Emerson Gross   Provider Notification Respiratory Therapist   Method of Communication Call   Response En route   Notification Time [phone] [id number]

## 2021-10-01 NOTE — PLAN OF CARE
Nutrition Problem #1: Inadequate oral intake  Intervention: Food and/or Nutrient Delivery: Continue NPO  Nutritional Goals: Pt will tolerate a source of nutrition within 48 hr

## 2021-10-01 NOTE — CARE COORDINATION
LSW spoke with pt regarding discharge plans Pt is LTC at [facility] and plans to return. LSW spoke with Dale Hatfield at [facility] and pt can return once stable. Pt will need a rapid COVID on day of discharge. CM will need a CLAUDIA completed at discharge by RN and doctor. If pt is discharged after hours please complete the following. ... Call report to [phone]    Fax completed AVS with both CLAUDIA on the AVS and any written Rx [phone]. Set up transportation (pt's choice) -[phone], Med Trans [phone] or [phone] and call family.

## 2021-10-01 NOTE — H&P
H&P    SUBJECTIVE:     Black stools per rectum, shortness of breath, oxygen saturation less than 70    Patient seen and examined in 3118/3118-A. Concern for hemorrhagic shock    OBJECTIVE:      VITALS  Vitals:    09/30/21 2255 09/30/21 2307 09/30/21 2352 10/01/21 0154   BP: (!) 112/54 (!) 114/54  (!) 106/38   Pulse: 76 76 77 77   Resp: 20 19 20 20   Temp:    99.2 °F (37.3 °C)   TempSrc:    Oral   SpO2: 96% 95% 94%    Weight:    209 lb 12.8 oz (95.2 kg)   Height:    5' 1\" (1.549 m)          PHYSICAL EXAM   GEN AxO  HENT atraumatic/patent  RESP ronchi respiratory distress  CARDIO/VASC Tachycardia RRR  GI Soft, non distended  SKIN warm, pale  PSYCH cooperative    ASSESSMENT/PLAN:    Patient states that she has had black stools, her takes iron supplements  Patient did complain of some dizziness associated shortness of breath.   Hypotension setting of profound anemia  Trend hemoglobin, follow-up GI consult  Treat pneumonia    Andrea Antunez MD  Internal Medicine Hospitalist  10/1/2021 5:09 AM    Due to the immediate potential for life-threatening deterioration due to symptomatic hypotension and anemia requiring multiple blood transfusions, I spent 35 minutes providing critical care

## 2021-10-01 NOTE — PROGRESS NOTES
Patient seen again, on BiPAP. Patient is sleeping but now arousable. Patient is able to stay awake and hold a conversation with me. Able to recognize daughter at bedside. I did let her know that she can take a break from the BiPAP but she would likely need it when sleeping. Hypercarbia is likely as a result of hyperoxia. Will target O2 sats of 88 to 90% when back on nasal cannula. Patient reports that she is chronically on 5 L O2 at home.

## 2021-10-01 NOTE — PROGRESS NOTES
[facility]  consulted by NICOLE Martino for monitoring and adjustment. Indication for treatment: Pneumonia (CAP)  Goal trough: 15 mcg/mL  AUC/KARIN: 400-600    Pertinent Laboratory Values:   Temp Readings from Last 3 Encounters:   09/30/21 98.6 °F (37 °C) (Oral)   09/16/21 98.4 °F (36.9 °C) (Oral)   11/30/20 98 °F (36.7 °C) (Oral)     Recent Labs     09/30/21  1603 09/30/21  1914   WBC 8.8 8.6   LACTATE  --  0.8     Recent Labs     09/30/21  1603 09/30/21  1914   BUN 37* 36*   CREATININE 1.1 1.0     Estimated Creatinine Clearance: 47 mL/min (based on SCr of 1 mg/dL). Intake/Output Summary (Last 24 hours) at 9/30/2021 2238  Last data filed at 9/30/2021 1747  Gross per 24 hour   Intake 60 ml   Output --   Net 60 ml       Pertinent Cultures:  Date    Source    Results  9/30   Covid-19   Negative  9/30   Blood    Ordered  9/30   Strep pneumo/Legionella Ordered    Vancomycin level:   TROUGH:  No results for input(s): VANCOTROUGH in the last 72 hours. RANDOM:  No results for input(s): VANCORANDOM in the last 72 hours. Assessment:  WBC and temperature: Normal WBC, afebrile  SCr, BUN, and urine output: Normal Scr, BUN slightly elevated  Day(s) of therapy:1  Vancomycin concentration: Pending    Plan:  Received vancomycin 2000 mg x 1 in the ED  Start vancomycin 1250 mg IVPB q24h  Predicted trough of 13.1 and AUC: 435 at steady-state  Pharmacy will continue to monitor patient and adjust therapy as indicated    Jhony 3 10/01/21 @3530    Thank you for the consult.   Roldan Myrick, [facility]  9/30/2021 10:38 PM

## 2021-10-01 NOTE — PROGRESS NOTES
[facility]  consulted by NICOLE Kuhn for monitoring and adjustment. Indication for treatment: Pneumonia (CAP)  Goal trough: 15 mcg/mL  AUC/KARIN: 400-600    Pertinent Laboratory Values:   Temp Readings from Last 3 Encounters:   10/01/21 98.7 °F (37.1 °C) (Oral)   09/16/21 98.4 °F (36.9 °C) (Oral)   11/30/20 98 °F (36.7 °C) (Oral)     Recent Labs     09/30/21  1603 09/30/21  1914 10/01/21  0404 10/01/21  0426   WBC 8.8 8.6  --  7.7   LACTATE  --  0.8 1.0  --      Recent Labs     09/30/21  1603 09/30/21  1914   BUN 37* 36*   CREATININE 1.1 1.0     Estimated Creatinine Clearance: 47 mL/min (based on SCr of 1 mg/dL). Intake/Output Summary (Last 24 hours) at 10/1/2021 1047  Last data filed at 10/1/2021 1043  Gross per 24 hour   Intake 564.17 ml   Output --   Net 564.17 ml       Pertinent Cultures:  Date    Source    Results  9/30   Covid-19   Negative  9/30   Blood    Ordered  9/30   Strep pneumo/Legionella Ordered    Vancomycin level:   TROUGH:  No results for input(s): VANCOTROUGH in the last 72 hours. RANDOM:  No results for input(s): VANCORANDOM in the last 72 hours. Assessment:  · WBC and temperature: Normal WBC, afebrile  · SCr, BUN, and urine output: stable  · Day(s) of therapy: 2  · Vancomycin concentration: Pending    Plan:  · Vancomycin 1250 mg IVPB q24h  · Predicted trough of 13.1 and AUC: 435 at steady-state  · Pharmacy will continue to monitor patient and adjust therapy as indicated    [address] 3 10/01/21 @2568    Thank you for the consult.   Martita May, [facility]  10/1/2021 10:47 AM

## 2021-10-02 LAB
ABO/RH: NORMAL
ANION GAP SERPL CALCULATED.3IONS-SCNC: 11 MMOL/L (ref 4–16)
ANTIBODY SCREEN: NEGATIVE
BASE EXCESS: 2 (ref 0–2.4)
BASE EXCESS: 2 (ref 0–2.4)
BASOPHILS ABSOLUTE: 0 K/CU MM
BASOPHILS RELATIVE PERCENT: 0.2 % (ref 0–1)
BUN BLDV-MCNC: 27 MG/DL (ref 6–23)
CALCIUM SERPL-MCNC: 7.6 MG/DL (ref 8.3–10.6)
CARBON MONOXIDE, BLOOD: 2.8 % (ref 0–5)
CARBON MONOXIDE, BLOOD: 2.8 % (ref 0–5)
CHLORIDE BLD-SCNC: 108 MMOL/L (ref 99–110)
CO2 CONTENT: 25.7 MMOL/L (ref 19–24)
CO2 CONTENT: 28.1 MMOL/L (ref 19–24)
CO2: 25 MMOL/L (ref 21–32)
COMMENT: ABNORMAL
COMMENT: ABNORMAL
COMPONENT: NORMAL
CREAT SERPL-MCNC: 1.2 MG/DL (ref 0.6–1.1)
CROSSMATCH RESULT: NORMAL
DIFFERENTIAL TYPE: ABNORMAL
EOSINOPHILS ABSOLUTE: 0.6 K/CU MM
EOSINOPHILS RELATIVE PERCENT: 5.8 % (ref 0–3)
GFR AFRICAN AMERICAN: 52 ML/MIN/1.73M2
GFR NON-AFRICAN AMERICAN: 43 ML/MIN/1.73M2
GLUCOSE BLD-MCNC: 86 MG/DL (ref 70–99)
HCO3 ARTERIAL: 24.3 MMOL/L (ref 18–23)
HCO3 ARTERIAL: 26.3 MMOL/L (ref 18–23)
HCT VFR BLD CALC: 25.9 % (ref 37–47)
HEMOGLOBIN: 7.6 GM/DL (ref 12.5–16)
IMMATURE NEUTROPHIL %: 0.5 % (ref 0–0.43)
LEGIONELLA URINARY AG: NEGATIVE
LYMPHOCYTES ABSOLUTE: 0.6 K/CU MM
LYMPHOCYTES RELATIVE PERCENT: 5.6 % (ref 24–44)
MCH RBC QN AUTO: 28.5 PG (ref 27–31)
MCHC RBC AUTO-ENTMCNC: 29.3 % (ref 32–36)
MCV RBC AUTO: 97 FL (ref 78–100)
METHEMOGLOBIN ARTERIAL: 1.6 %
METHEMOGLOBIN ARTERIAL: 1.6 %
MONOCYTES ABSOLUTE: 0.9 K/CU MM
MONOCYTES RELATIVE PERCENT: 8.4 % (ref 0–4)
NUCLEATED RBC %: 0 %
O2 SATURATION: 88.4 % (ref 96–97)
O2 SATURATION: 90 % (ref 96–97)
PCO2 ARTERIAL: 45 MMHG (ref 32–45)
PCO2 ARTERIAL: 60 MMHG (ref 32–45)
PDW BLD-RTO: 15.9 % (ref 11.7–14.9)
PH BLOOD: 7.25 (ref 7.34–7.45)
PH BLOOD: 7.34 (ref 7.34–7.45)
PLATELET # BLD: 112 K/CU MM (ref 140–440)
PMV BLD AUTO: 10.8 FL (ref 7.5–11.1)
PO2 ARTERIAL: 54 MMHG (ref 75–100)
PO2 ARTERIAL: 54 MMHG (ref 75–100)
POTASSIUM SERPL-SCNC: 4.2 MMOL/L (ref 3.5–5.1)
RBC # BLD: 2.67 M/CU MM (ref 4.2–5.4)
SEGMENTED NEUTROPHILS ABSOLUTE COUNT: 8.5 K/CU MM
SEGMENTED NEUTROPHILS RELATIVE PERCENT: 79.5 % (ref 36–66)
SODIUM BLD-SCNC: 144 MMOL/L (ref 135–145)
STATUS: NORMAL
STREP PNEUMONIAE ANTIGEN: NORMAL
TOTAL IMMATURE NEUTOROPHIL: 0.05 K/CU MM
TOTAL NUCLEATED RBC: 0 K/CU MM
TRANSFUSION STATUS: NORMAL
UNIT DIVISION: 0
UNIT NUMBER: NORMAL
WBC # BLD: 10.6 K/CU MM (ref 4–10.5)

## 2021-10-02 PROCEDURE — 2580000003 HC RX 258: Performed by: NURSE PRACTITIONER

## 2021-10-02 PROCEDURE — 6370000000 HC RX 637 (ALT 250 FOR IP): Performed by: NURSE PRACTITIONER

## 2021-10-02 PROCEDURE — 94660 CPAP INITIATION&MGMT: CPT

## 2021-10-02 PROCEDURE — 85025 COMPLETE CBC W/AUTO DIFF WBC: CPT

## 2021-10-02 PROCEDURE — C9113 INJ PANTOPRAZOLE SODIUM, VIA: HCPCS | Performed by: NURSE PRACTITIONER

## 2021-10-02 PROCEDURE — 6360000002 HC RX W HCPCS: Performed by: NURSE PRACTITIONER

## 2021-10-02 PROCEDURE — 80048 BASIC METABOLIC PNL TOTAL CA: CPT

## 2021-10-02 PROCEDURE — 94761 N-INVAS EAR/PLS OXIMETRY MLT: CPT

## 2021-10-02 PROCEDURE — 36600 WITHDRAWAL OF ARTERIAL BLOOD: CPT

## 2021-10-02 PROCEDURE — 85014 HEMATOCRIT: CPT

## 2021-10-02 PROCEDURE — 2700000000 HC OXYGEN THERAPY PER DAY

## 2021-10-02 PROCEDURE — 2140000000 HC CCU INTERMEDIATE R&B

## 2021-10-02 PROCEDURE — 94640 AIRWAY INHALATION TREATMENT: CPT

## 2021-10-02 PROCEDURE — 82803 BLOOD GASES ANY COMBINATION: CPT

## 2021-10-02 RX ORDER — HYDROCODONE BITARTRATE AND ACETAMINOPHEN 5; 325 MG/1; MG/1
1 TABLET ORAL EVERY 6 HOURS PRN
Status: DISCONTINUED | OUTPATIENT
Start: 2021-10-02 | End: 2021-10-04 | Stop reason: HOSPADM

## 2021-10-02 RX ADMIN — SODIUM CHLORIDE 8 MG/HR: 9 INJECTION, SOLUTION INTRAVENOUS at 01:03

## 2021-10-02 RX ADMIN — BUDESONIDE AND FORMOTEROL FUMARATE DIHYDRATE 2 PUFF: 80; 4.5 AEROSOL RESPIRATORY (INHALATION) at 19:33

## 2021-10-02 RX ADMIN — LEVOTHYROXINE SODIUM 75 MCG: 0.07 TABLET ORAL at 06:36

## 2021-10-02 RX ADMIN — HYDROCODONE BITARTRATE AND ACETAMINOPHEN 1 TABLET: 5; 325 TABLET ORAL at 21:48

## 2021-10-02 RX ADMIN — SODIUM CHLORIDE 8 MG/HR: 9 INJECTION, SOLUTION INTRAVENOUS at 21:22

## 2021-10-02 RX ADMIN — ACETAMINOPHEN 650 MG: 325 TABLET ORAL at 20:24

## 2021-10-02 RX ADMIN — SODIUM CHLORIDE, PRESERVATIVE FREE 10 ML: 5 INJECTION INTRAVENOUS at 20:24

## 2021-10-02 RX ADMIN — BUDESONIDE AND FORMOTEROL FUMARATE DIHYDRATE 2 PUFF: 80; 4.5 AEROSOL RESPIRATORY (INHALATION) at 08:03

## 2021-10-02 RX ADMIN — SODIUM CHLORIDE 8 MG/HR: 9 INJECTION, SOLUTION INTRAVENOUS at 10:34

## 2021-10-02 RX ADMIN — ATORVASTATIN CALCIUM 40 MG: 20 TABLET, FILM COATED ORAL at 10:33

## 2021-10-02 RX ADMIN — ACETAMINOPHEN 650 MG: 325 TABLET ORAL at 10:33

## 2021-10-02 RX ADMIN — SODIUM CHLORIDE, PRESERVATIVE FREE 10 ML: 5 INJECTION INTRAVENOUS at 10:36

## 2021-10-02 ASSESSMENT — PAIN SCALES - GENERAL
PAINLEVEL_OUTOF10: 0
PAINLEVEL_OUTOF10: 10
PAINLEVEL_OUTOF10: 9
PAINLEVEL_OUTOF10: 10

## 2021-10-02 NOTE — PROGRESS NOTES
Physician Progress Note      PATIENT:               Dora Amaral  CSN #:                  [id number]  :                       1940  ADMIT DATE:       2021 2:29 PM  DISCH DATE:  RESPONDING  PROVIDER #:        ARCADIO Zazueta TEXT:    Patient admitted with Melena and is on chronic anticoagulation. If possible,   please document in the progress notes and discharge summary if you are   evaluating and/or treating any of the following: The medical record reflects the following:  Risk Factors: Acute blood loss anemia, Melena , Eliquis  Clinical Indicators: Hemoglobin is 4.5 down from 9.5 on 2021. Patient   was borderline hypotensive and 2 units of emergency release blood were ordered   as well as a type and screen. Patient received 2 units with improvement of   pressure. Jean Marie Scot was ordered for reversal of Eliquis. Repeat hemoglobin after   2 units PRBCs is 7.9. Attempted to balance fluid/blood resuscitation with   pulmonary status possible pneumonia versus pulmonary edema. Discussed with GI   and would like patient to be on a Protonix drip overnight and n.p.o. Patient   seen and examined . Concern for hemorrh  Treatment: Transfuse 2 units PRBCs in the ED, repeat H&H, transfuse PRBCs to   keep hemoglobin greater than 8.  -Administer Kcentra to reverse Eliquis which she was on for A. fib. Hold all   anticoagulants , ED provider contacted GI for urgent evaluation. For upper GI   evaluation colonoscopy    Thank you Arianna Alanis RN, CDS ([phone])  Options provided:  -- Bleeding (Hemorrhagic disorder) associated with Eliquis  -- Bleeding unrelated to anticoagulation  -- Other - I will add my own diagnosis  -- Disagree - Not applicable / Not valid  -- Disagree - Clinically unable to determine / Unknown  -- Refer to Clinical Documentation Reviewer    PROVIDER RESPONSE TEXT:    This patient has bleeding associated with Eliquis.     Query created by: Hector Subramanian on 10/1/2021 8:02 AM      QUERY TEXT:    Pt admitted with Melena and has shock documented. If possible, please   document in progress notes and discharge summary further specificity regarding   the type of shock: The medical record reflects the following:  Risk Factors: Acute blood loss anemia, Melena , Eliquis  Clinical Indicators: Hemoglobin is 4.5 down from 9.5 on 9/16/2021. Patient   was borderline hypotensive and 2 units of emergency release blood were ordered   as well as a type and screen. Patient received 2 units with improvement of   pressure. Bettey Agent was ordered for reversal of Eliquis. Repeat hemoglobin after   2 units PRBCs is 7.9. Attempted to balance fluid/blood resuscitation with   pulmonary status possible pneumonia versus pulmonary edema. Discussed with GI   and would like patient to be on a Protonix drip overnight and n.p.o. Patient   seen and examined . Concern for hemorrh  Treatment: Transfuse 2 units PRBCs in the ED, repeat H&H, transfuse PRBCs to   keep hemoglobin greater than 8.  -Administer Kcentra to reverse Eliquis which she was on for A. fib. Hold all   anticoagulants , ED provider contacted GI for urgent evaluation. For upper GI   evaluation colonoscopy    Thank you Tiffanie Chavez RN, CDS ([phone])  Options provided:  -- Hemorrhagic Shock  -- Hypovolemic Shock  -- Other - I will add my own diagnosis  -- Disagree - Not applicable / Not valid  -- Disagree - Clinically unable to determine / Unknown  -- Refer to Clinical Documentation Reviewer    PROVIDER RESPONSE TEXT:    This patient is in hemorrhagic shock.     Query created by: Tristin Moore on 10/1/2021 8:03 AM      Electronically signed by:  Santiago Galeana 10/2/2021 12:07 PM

## 2021-10-02 NOTE — PROGRESS NOTES
Hospitalist Progress Note      Name:  Navdeep Jones /Age/Sex: 1940  ([de-identified] y.o. female)   MRN & CSN:  [id number] & [id number] Admission Date/Time: 2021  2:29 PM   Location:  [address] [place], [place] [address]-A PCP: Festus Payan, [id number] Day: 3  Discharge barrier: Resp distress, GI bleed, encephalopathy    Assessment and Plan:   Navdeep Jones is a [de-identified] y.o.  female with a past medical history of COPD, chronic hypoxic respiratory failure on 4 L home O2, diastolic CHF, severe pulmonary hypertension, hypertension, morbid obesity, paroxysmal atrial fibrillation on chronic anticoagulation who presented to the ED on 2021 on account of shortness of breath and profound hypoxia. In the ED, patient was hypoxic, hypotensive, anemic with a hemoglobin of 4.5 g. She received 2 units stat. She also received Kcentra to reverse Eliquis. Per H&P, patient was recently hospitalized and was recommended EGD/colonoscopy which she deferred. During that hospitalization from 2021 through 2021, patient was started on Eliquis, patient did receive MRSA directed antibiotics for pneumonia    1. Acute on chronic hypoxic respiratory failure: Reason for presentation. SARS-CoV-2 PCR negative  Currently on HFNC, switch to BiPAP due to current concomitant hypercarbia  Likely patient is chronic hypercapnia and therefore has a chronic hypoxic ventilatory drive so that administration of high flow oxygen causes acute respiratory acidosis with acute on chronic hypercapnia    2. Acute hypercarbic respiratory failure: As seen on ABG  See #1 above. Central ventilatory drive is likely driven by hypoxia    3. Acute metabolic encephalopathy: Due to acute respiratory acidosis  Expect improvement with BiPAP    4. Severe anemia: Due to UGI B in the context of anticoagulation  GI already consulted, unfortunately respiratory status precludes GI eval  S/p 2 units PRBC  SCDs for DVT prophylaxis  Continue to hold off Eliquis.   S/p Kcentra for reversal on 9/30/2021  Continue PPI gtt. for 48 to 72 hours. No plans for endoscopy given high oxygen requirements. 5.  Hemorrhagic shock: Resolved 8 PRBC transfusion on 9/30/2021  Continue close monitoring. 6.  Bilateral infiltrates: Unlikely pneumonia. Discontinue antibiotics at this time. 7.  Paroxysmal A. fib: Recently initiated on low-dose Eliquis  Continue to hold for now. May need to discontinue anticoagulation ultimately. 8.  Morbid obesity: Unable to  at this time  9. COPD: Not in acute exacerbation  Chronic hypoxic respiratory failure on 4 L supplemental O2  10. Recent MRSA pneumonia: Completed Vanco/Zyvox  11. Diastolic CHF: Unable to diurese at this time due to borderline low blood pressure    CCT: 37 minutes    Diet ADULT DIET; Clear Liquid   DVT Prophylaxis [] Lovenox, []  Heparin, [x] SCDs, [] Ambulation   GI Prophylaxis [] PPI,  [] H2 Blocker,  [] Carafate,  [] Diet/Tube Feeds   Code Status Full Code   Disposition Patient requires continued admission due to respiratory distress, GI bleed, encephalopathy   MDM [] Low, [] Moderate,[x]  High  Patient's risk as above due to critical illness     History of Present Illness:     Chief Complaint: <principal problem not specified> shortness of breath, hypoxia. O2 sat 70% on room air. Tomi Steele is a [de-identified] y.o.  female  who presents with hypoxia. 10/1/2021: It appears she has improved with high flow supplemental O2 but she is also now becoming lethargic and poorly responsive. Unable to hold a conversation with me during my interview. 10/2/2021: Patient is again lethargic. Apparently transition back to high flow oxygen overnight. Discussed with respiratory therapy office. 10 point review of system is unable to be done accurately due to altered mental status. Objective:        Intake/Output Summary (Last 24 hours) at 10/2/2021 1207  Last data filed at 10/2/2021 0700  Gross per 24 hour   Intake

## 2021-10-02 NOTE — PROGRESS NOTES
Placed patient on BIPAP 18/6 35%. Currently sating 96. Will monitor and continue to titrate.     ABG results Drawn on Vapo 20lpm 45%  PH  7.25  PCO2 60  PO2  54   SAT 88.4   HCO3 26.3

## 2021-10-02 NOTE — CONSULTS
[facility] Gastroenter[facility]  Gastroenterology Consultation    10/1/2021  8:35 PM    Patient:    Adelina Novoa  : 1940   [de-identified] y.o. MRN: [id number]  Admitted: 2021  2:29 PM ATT: Berto Beckwith MD   [id number]  AdmitDx: Acute blood loss anemia [D62]  GI bleed [K92.2]  Elevated brain natriuretic peptide (BNP) level [R79.89]  Gastrointestinal hemorrhage with melena [K92.1]  Acute on chronic respiratory failure with hypoxemia (HCC) [J96.21]  PCP: Maged Rivera MD    Reason for Consult:  Anemia and melena    Impression and RECOMMENDATIONS:  Anemia acute on chronic  Dark stools might be having  Melena  Denies abdominal pain  Might have peptic ulcer disease  Denied all procedures last admission  At this point patient on high flow oxygen and BiPAP   No evidence of active GI bleed at this point  Hemoglobin stable after transfusion  At this point would continue Protonix infusion for 1 more day and then Protonix 40 twice daily  High risk for GI procedures at this point of time and patient not sure if sheis okay for EGD  If evidence of acute GI bleed then patient may need to be intubated put on a vent and EGD performed  Continue present management              Patient Active Problem List   Diagnosis Code    Coronary atherosclerosis of native coronary artery I25.10    Anemia D64.9    Chronic hypoxemic respiratory failure (HCC) J96.11    Bilateral edema of lower extremity R60.0    Moderate COPD (chronic obstructive pulmonary disease) ([address] J44.9    COPD exacerbation ([address] J44.1    Hypoxia R09.02    COPD with acute exacerbation ([address] J44.1    Morbid obesity due to excess calories ([address] E66.01    Acute on chronic diastolic congestive heart failure ([address] I50.33    Depression F32. A    Hypertension I10    Pneumonia J18.9    Chronic diastolic CHF (congestive heart failure) (HCC) I50.32    GIB (gastrointestinal bleeding) K92.2    Other chest pain R07.89    GI bleed K92.2             Requesting Physician:  Mira Maldonado MD      History Obtained From:  Patient and review of all records    HISTORY OF PRESENT ILLNESS:                The patient is a [de-identified] y.o. female with significant past medical history as below who presents with above mentioned causes in reason for consult    Elderly female multiple medical problems COPD CHF acute on chronic anemia admitted with respiratory failure noted to be severely anemic. PRBC was transfused in addition Eliquis was reversed with Kcentra  At this point patient is on  high flow oxygen and BiPAP    Past Medical History:        Diagnosis Date    ASHD (arteriosclerotic heart disease)     Bilateral edema of lower extremity 12/16/2015    [facility]est[facility]     COPD ([facility]ronic [facility] disease) [facility]     Depression     Hypertension     MRSA (methicillin resistant staph aureus) culture positive 09/09/2021    Nasal    MRSA (methicillin resistant staph aureus) culture positive 09/09/2021    NASAL       Past Surgical History:        Procedure Laterality Date    ANUS SURGERY      fistula repair    APPENDECTOMY      HYSTERECTOMY      TONSILLECTOMY      UPPER GASTROINTESTINAL ENDOSCOPY N/A 11/25/2020    EGD BIOPSY performed by Obinna Pagan MD at [facility] ENDOSCOPY         Current Medications:    Medications    Prior to Admission medications    Medication Sig Start Date End Date Taking?  Authorizing Provider   apixaban (ELIQUIS) 2.5 MG TABS tablet Take 1 tablet by mouth 2 times daily 9/16/21   Vandana Zaragoza MD   metoprolol succinate (TOPROL XL) 25 MG extended release tablet Take 1 tablet by mouth daily 9/17/21   Vandana Zaragoza MD   mirtazapine (REMERON) 15 MG tablet Take 1 tablet by mouth nightly 11/30/20   Daniela Patel MD   pantoprazole (PROTONIX) 40 MG tablet Take 1 tablet by mouth 2 times daily (before meals) 11/30/20 12/30/20  Daniela Patel MD   Multiple Vitamin (MULTIVITAMIN) TABS tablet Take 1 tablet by mouth daily 12/1/20   Betty Gonzalez MD   fluticasone-vilanterol (BREO ELLIPTA) 100-25 MCG/INH AEPB inhaler Inhale 1 puff into the lungs daily    Historical Provider, MD   linaclotide (LINZESS) 145 MCG capsule Take 145 mcg by mouth every morning (before breakfast)    Historical Provider, MD   calcium citrate-vitamin D (CITRICAL + D) 315-250 MG-UNIT TABS per tablet Take 1 tablet by mouth 2 times daily (with meals)    Historical Provider, MD   albuterol sulfate  (90 Base) MCG/ACT inhaler Inhale 2 puffs into the lungs every 4 hours as needed for Wheezing    Historical Provider, MD   ondansetron (ZOFRAN ODT) 4 MG disintegrating tablet Take 1 tablet by mouth every 8 hours as needed for Nausea 1/12/17   Wilbert Freedman, DO   albuterol (PROVENTIL) (2.5 MG/3ML) 0.083% nebulizer solution Take 3 mLs by nebulization every 6 hours as needed for Wheezing 1/12/17   Wilbert Freedman, DO   potassium chloride (KLOR-CON M) 10 MEQ extended release tablet Take 10 mEq by mouth daily    Historical Provider, MD   Cranberry 450 MG CAPS Take 900 mg by mouth 2 times daily     Historical Provider, MD   levothyroxine (SYNTHROID) 75 MCG tablet Take 75 mcg by mouth Daily    Historical Provider, MD   Acetaminophen (TYLENOL PO) Take 650 mg by mouth every 6 hours as needed (PAIN OR FEVER)     Historical Provider, MD   atorvastatin (LIPITOR) 40 MG tablet Take 40 mg by mouth daily    Historical Provider, MD   lactobacillus (CULTURELLE) capsule Take 1 capsule by mouth daily (with breakfast) 6/26/16   Brandt Client, DO   docusate sodium (COLACE) 100 MG capsule Take 1 capsule by mouth 2 times daily 6/26/16   Brandt Client, DO   OXYGEN Inhale 3 L into the lungs continuous     Historical Provider, MD   promethazine (PHENERGAN) 12.5 MG tablet Take 1 tablet by mouth every 4 hours as needed for Nausea 9/11/15   Griffin Bishop MD   ferrous sulfate 325 (65 FE) MG tablet Take 1 tablet by mouth every 12 hours With a meal. 9/11/15   Griffin Bishop MD Appearance:    Alert, cooperative, on high flow oxygen and BiPAP mask somewhat in discomfort   HEENT:   Pale conjunctive conjunctiva clear, Ears Normal, Normal nares,  gums normal   Neck:   Supple, no JVD, No lymph nodes   Lungs:    Some basal rales,    Chest Wall:    No tenderness or deformity    Heart:     S1 and S2 normal,   Abdomen:     Soft, non-tender, bowel sounds active all four quadrants,     no masses, no organomegaly, no ascitis   Rectal:    Patient refused   Extremities:  , no cyanosis or edema       Skin:   Skin , no major lesions   Lymph nodes:   No abnormality   Neurologic:    moving all four extremities            DATA:    ABGs:   Lab Results   Component Value Date    PO2ART 100 10/01/2021    UTU5TEI 68.0 10/01/2021     CBC:   Recent Labs     09/30/21  1603 09/30/21  1603 09/30/21  1914 09/30/21  1914 10/01/21  0404 10/01/21  0426 10/01/21  1330   WBC 8.8  --  8.6  --   --  7.7  --    HGB 4.5*   < > 7.9*   < > 7.1* 11.8* 8.2*     --  149  --   --  254  --     < > = values in this interval not displayed. BMP:    Recent Labs     09/30/21  1603 09/30/21  1914 10/01/21  0426    137 141   K 4.6 4.8 3.1*    104 100   CO2 25 24 28   BUN 37* 36* 25*   CREATININE 1.1 1.0 0.7   GLUCOSE 113* 119* 187*     Magnesium:   Lab Results   Component Value Date    MG 2.1 09/13/2021     Hepatic:   Recent Labs     09/30/21  1603   AST 13*   ALT 8*   BILITOT 0.2   ALKPHOS 54     No results for input(s): LIPASE, AMYLASE in the last 72 hours. Recent Labs     09/30/21  1603   PROTIME 15.4*   INR 1.19     No results for input(s): PTT in the last 72 hours. Lipids: No results for input(s): CHOL, HDL in the last 72 hours.     Invalid input(s): LDLCALCU  INR:   Recent Labs     09/30/21  1603   INR 1.19     TSH: No results found for: TSH    Intake/Output Summary (Last 24 hours) at 10/1/2021 2035  Last data filed at 10/1/2021 1857  Gross per 24 hour   Intake 504.17 ml   Output 300 ml   Net 204.17 ml      sodium chloride      sodium chloride Stopped (09/30/21 2118)    pantoprozole (PROTONIX) infusion 8 mg/hr (10/01/21 1805)    sodium chloride         Imaging Studies    Reviewed        Lucy Crouch MD  10/1/2021  8:35 PM

## 2021-10-02 NOTE — PROGRESS NOTES
ABG results drawn on BIPAP 18/6 .35      Results for Tri Guerrero (MRN [id number]) as of 10/2/2021 15:38   Ref.  Range 10/2/2021 14:00   pH, Bld Latest Ref Range: 7.34 - 7.45  7.34   Base Excess Latest Ref Range: 0 - 2.4  2   O2 Sat Latest Ref Range: 96 - 97 % 90.0 (L)   Carbon Monoxide, Blood Latest Ref Range: 0 - 5 % 2.8   CO2 Content Latest Ref Range: 19 - 24 MMOL/L 25.7 (H)   pCO2, Arterial Latest Ref Range: 32 - 45 MMHG 45.0   pO2, Arterial Latest Ref Range: 75 - 100 MMHG 54 (L)   HCO3, Arterial Latest Ref Range: 18 - 23 MMOL/L 24.3 (H)   Methemoglobin, Arterial Latest Ref Range: <1.5 % 1.6 (H)

## 2021-10-03 LAB
ANION GAP SERPL CALCULATED.3IONS-SCNC: 15 MMOL/L (ref 4–16)
ANISOCYTOSIS: ABNORMAL
BANDED NEUTROPHILS ABSOLUTE COUNT: 0.62 K/CU MM
BANDED NEUTROPHILS RELATIVE PERCENT: 6 % (ref 5–11)
BUN BLDV-MCNC: 20 MG/DL (ref 6–23)
CALCIUM SERPL-MCNC: 8.1 MG/DL (ref 8.3–10.6)
CHLORIDE BLD-SCNC: 106 MMOL/L (ref 99–110)
CO2: 22 MMOL/L (ref 21–32)
COMMENT: ABNORMAL
CREAT SERPL-MCNC: 1 MG/DL (ref 0.6–1.1)
DIFFERENTIAL TYPE: ABNORMAL
EOSINOPHILS ABSOLUTE: 0.3 K/CU MM
EOSINOPHILS RELATIVE PERCENT: 3 % (ref 0–3)
GFR AFRICAN AMERICAN: >60 ML/MIN/1.73M2
GFR NON-AFRICAN AMERICAN: 53 ML/MIN/1.73M2
GLUCOSE BLD-MCNC: 67 MG/DL (ref 70–99)
HCT VFR BLD CALC: 26.6 % (ref 37–47)
HEMOGLOBIN: 7.9 GM/DL (ref 12.5–16)
LYMPHOCYTES ABSOLUTE: 0.7 K/CU MM
LYMPHOCYTES RELATIVE PERCENT: 7 % (ref 24–44)
MCH RBC QN AUTO: 28.4 PG (ref 27–31)
MCHC RBC AUTO-ENTMCNC: 29.7 % (ref 32–36)
MCV RBC AUTO: 95.7 FL (ref 78–100)
MONOCYTES ABSOLUTE: 0.8 K/CU MM
MONOCYTES RELATIVE PERCENT: 8 % (ref 0–4)
NUCLEATED RED BLOOD CELLS: 2
PDW BLD-RTO: 15.6 % (ref 11.7–14.9)
PLATELET # BLD: 142 K/CU MM (ref 140–440)
PLT MORPHOLOGY: ABNORMAL
PMV BLD AUTO: 11.4 FL (ref 7.5–11.1)
POLYCHROMASIA: ABNORMAL
POTASSIUM SERPL-SCNC: 4.2 MMOL/L (ref 3.5–5.1)
RBC # BLD: 2.78 M/CU MM (ref 4.2–5.4)
SEGMENTED NEUTROPHILS ABSOLUTE COUNT: 8 K/CU MM
SEGMENTED NEUTROPHILS RELATIVE PERCENT: 76 % (ref 36–66)
SODIUM BLD-SCNC: 143 MMOL/L (ref 135–145)
WBC # BLD: 10.4 K/CU MM (ref 4–10.5)
WBC # BLD: ABNORMAL 10*3/UL

## 2021-10-03 PROCEDURE — 6370000000 HC RX 637 (ALT 250 FOR IP): Performed by: NURSE PRACTITIONER

## 2021-10-03 PROCEDURE — C9113 INJ PANTOPRAZOLE SODIUM, VIA: HCPCS | Performed by: NURSE PRACTITIONER

## 2021-10-03 PROCEDURE — 80048 BASIC METABOLIC PNL TOTAL CA: CPT

## 2021-10-03 PROCEDURE — 6360000002 HC RX W HCPCS: Performed by: INTERNAL MEDICINE

## 2021-10-03 PROCEDURE — 85007 BL SMEAR W/DIFF WBC COUNT: CPT

## 2021-10-03 PROCEDURE — 6360000002 HC RX W HCPCS: Performed by: NURSE PRACTITIONER

## 2021-10-03 PROCEDURE — 36415 COLL VENOUS BLD VENIPUNCTURE: CPT

## 2021-10-03 PROCEDURE — 94640 AIRWAY INHALATION TREATMENT: CPT

## 2021-10-03 PROCEDURE — 2140000000 HC CCU INTERMEDIATE R&B

## 2021-10-03 PROCEDURE — 6370000000 HC RX 637 (ALT 250 FOR IP): Performed by: INTERNAL MEDICINE

## 2021-10-03 PROCEDURE — 85027 COMPLETE CBC AUTOMATED: CPT

## 2021-10-03 PROCEDURE — 2580000003 HC RX 258: Performed by: NURSE PRACTITIONER

## 2021-10-03 PROCEDURE — 94761 N-INVAS EAR/PLS OXIMETRY MLT: CPT

## 2021-10-03 PROCEDURE — 2700000000 HC OXYGEN THERAPY PER DAY

## 2021-10-03 RX ORDER — MORPHINE SULFATE 2 MG/ML
2 INJECTION, SOLUTION INTRAMUSCULAR; INTRAVENOUS EVERY 4 HOURS PRN
Status: DISCONTINUED | OUTPATIENT
Start: 2021-10-03 | End: 2021-10-04 | Stop reason: HOSPADM

## 2021-10-03 RX ORDER — FUROSEMIDE 10 MG/ML
40 INJECTION INTRAMUSCULAR; INTRAVENOUS 2 TIMES DAILY
Status: DISCONTINUED | OUTPATIENT
Start: 2021-10-03 | End: 2021-10-04 | Stop reason: HOSPADM

## 2021-10-03 RX ORDER — PANTOPRAZOLE SODIUM 40 MG/1
40 TABLET, DELAYED RELEASE ORAL
Status: DISCONTINUED | OUTPATIENT
Start: 2021-10-03 | End: 2021-10-04 | Stop reason: HOSPADM

## 2021-10-03 RX ADMIN — SODIUM CHLORIDE 8 MG/HR: 9 INJECTION, SOLUTION INTRAVENOUS at 08:47

## 2021-10-03 RX ADMIN — FUROSEMIDE 40 MG: 10 INJECTION, SOLUTION INTRAMUSCULAR; INTRAVENOUS at 17:35

## 2021-10-03 RX ADMIN — ATORVASTATIN CALCIUM 40 MG: 20 TABLET, FILM COATED ORAL at 11:04

## 2021-10-03 RX ADMIN — HYDROCODONE BITARTRATE AND ACETAMINOPHEN 1 TABLET: 5; 325 TABLET ORAL at 23:10

## 2021-10-03 RX ADMIN — PANTOPRAZOLE SODIUM 40 MG: 40 TABLET, DELAYED RELEASE ORAL at 17:35

## 2021-10-03 RX ADMIN — HYDROCODONE BITARTRATE AND ACETAMINOPHEN 1 TABLET: 5; 325 TABLET ORAL at 13:19

## 2021-10-03 RX ADMIN — LEVOTHYROXINE SODIUM 75 MCG: 0.07 TABLET ORAL at 05:56

## 2021-10-03 RX ADMIN — SODIUM CHLORIDE, PRESERVATIVE FREE 10 ML: 5 INJECTION INTRAVENOUS at 11:04

## 2021-10-03 RX ADMIN — HYDROCODONE BITARTRATE AND ACETAMINOPHEN 1 TABLET: 5; 325 TABLET ORAL at 04:51

## 2021-10-03 RX ADMIN — SODIUM CHLORIDE, PRESERVATIVE FREE 10 ML: 5 INJECTION INTRAVENOUS at 20:38

## 2021-10-03 RX ADMIN — MORPHINE SULFATE 2 MG: 2 INJECTION, SOLUTION INTRAMUSCULAR; INTRAVENOUS at 17:39

## 2021-10-03 RX ADMIN — BUDESONIDE AND FORMOTEROL FUMARATE DIHYDRATE 2 PUFF: 80; 4.5 AEROSOL RESPIRATORY (INHALATION) at 08:21

## 2021-10-03 RX ADMIN — BUDESONIDE AND FORMOTEROL FUMARATE DIHYDRATE 2 PUFF: 80; 4.5 AEROSOL RESPIRATORY (INHALATION) at 19:56

## 2021-10-03 ASSESSMENT — PAIN DESCRIPTION - PAIN TYPE: TYPE: ACUTE PAIN

## 2021-10-03 ASSESSMENT — PAIN SCALES - GENERAL
PAINLEVEL_OUTOF10: 5
PAINLEVEL_OUTOF10: 10
PAINLEVEL_OUTOF10: 0
PAINLEVEL_OUTOF10: 3
PAINLEVEL_OUTOF10: 10
PAINLEVEL_OUTOF10: 0
PAINLEVEL_OUTOF10: 4
PAINLEVEL_OUTOF10: 0

## 2021-10-03 ASSESSMENT — PAIN DESCRIPTION - DESCRIPTORS: DESCRIPTORS: ACHING

## 2021-10-03 ASSESSMENT — PAIN - FUNCTIONAL ASSESSMENT: PAIN_FUNCTIONAL_ASSESSMENT: PREVENTS OR INTERFERES SOME ACTIVE ACTIVITIES AND ADLS

## 2021-10-03 ASSESSMENT — PAIN DESCRIPTION - FREQUENCY: FREQUENCY: CONTINUOUS

## 2021-10-03 ASSESSMENT — PAIN DESCRIPTION - ORIENTATION: ORIENTATION: RIGHT

## 2021-10-03 ASSESSMENT — PAIN DESCRIPTION - LOCATION: LOCATION: NECK

## 2021-10-03 ASSESSMENT — PAIN DESCRIPTION - ONSET: ONSET: ON-GOING

## 2021-10-03 ASSESSMENT — PAIN DESCRIPTION - PROGRESSION: CLINICAL_PROGRESSION: GRADUALLY IMPROVING

## 2021-10-03 NOTE — PROGRESS NOTES
Patient unable to maintain oxygen saturation greater than 85%. Had to increase to 20L which got her up to 92% after 10 minutes. Patient's nurse informed.

## 2021-10-03 NOTE — PLAN OF CARE
Problem: Discharge Planning:  Goal: Discharged to appropriate level of care  Description: Discharged to appropriate level of care  Outcome: Ongoing     Problem:  Bowel Function - Altered:  Goal: Bowel elimination is within specified parameters  Description: Bowel elimination is within specified parameters  Outcome: Ongoing     Problem: Fluid Volume - Imbalance:  Goal: Will show no signs and symptoms of excessive bleeding  Description: Will show no signs and symptoms of excessive bleeding  Outcome: Ongoing  Goal: Absence of imbalanced fluid volume signs and symptoms  Description: Absence of imbalanced fluid volume signs and symptoms  Outcome: Ongoing     Problem: Nausea/Vomiting:  Goal: Absence of nausea/vomiting  Description: Absence of nausea/vomiting  Outcome: Ongoing  Goal: Able to drink  Description: Able to drink  Outcome: Ongoing  Goal: Able to eat  Description: Able to eat  Outcome: Ongoing  Goal: Ability to achieve adequate nutritional intake will improve  Description: Ability to achieve adequate nutritional intake will improve  Outcome: Ongoing     Problem: Gas Exchange - Impaired:  Goal: Levels of oxygenation will improve  Description: Levels of oxygenation will improve  Outcome: Ongoing     Problem: Falls - Risk of:  Goal: Will remain free from falls  Description: Will remain free from falls  Outcome: Ongoing  Goal: Absence of physical injury  Description: Absence of physical injury  Outcome: Ongoing     Problem: Skin Integrity:  Goal: Will show no infection signs and symptoms  Description: Will show no infection signs and symptoms  Outcome: Ongoing  Goal: Absence of new skin breakdown  Description: Absence of new skin breakdown  Outcome: Ongoing     Problem: Nutrition  Goal: Optimal nutrition therapy  Outcome: Ongoing     Problem: Pain:  Goal: Pain level will decrease  Description: Pain level will decrease  Outcome: Ongoing  Goal: Control of acute pain  Description: Control of acute pain  Outcome: Ongoing  Goal: Control of chronic pain  Description: Control of chronic pain  Outcome: Ongoing

## 2021-10-03 NOTE — PROGRESS NOTES
Hospitalist Progress Note      Name:  Danis Willis /Age/Sex: 1940  (12 y.o. female)   MRN & CSN:  [id number] & [id number] Admission Date/Time: 2021  2:29 PM   Location:  [address] [place], [place] [address] PCP: Flonnie Kussmaul, MD         Hospital Day: 4  Discharge barrier: Respiratory failure. Assessment and Plan:   Danis Willis is a [de-identified] y.o.  female with a past medical history of COPD, chronic hypoxic respiratory failure on 4 L home O2, diastolic CHF, severe pulmonary hypertension, hypertension, morbid obesity, paroxysmal atrial fibrillation on chronic anticoagulation who presented to the ED on 2021 on account of shortness of breath and profound hypoxia. In the ED, patient was hypoxic, hypotensive, anemic with a hemoglobin of 4.5 g. She received 2 units stat. She also received Kcentra to reverse Eliquis. Per H&P, patient was recently hospitalized and was recommended EGD/colonoscopy which she deferred. During that hospitalization from 2021 through 2021, patient was started on Eliquis, patient did receive MRSA directed antibiotics for pneumonia    1. Acute on chronic hypoxic respiratory failure: Reason for presentation. SARS-CoV-2 PCR negative  Likely due to bilateral pleural effusion, atelectasis and pulmonary edema  Incentive parameter  Diuresis    2. Acute hypercarbic respiratory failure: As seen on ABG  See #1 above. Central ventilatory drive is likely driven by hypoxia    3. Acute metabolic encephalopathy: Due to acute respiratory acidosis  Resolved with BiPAP. Discontinue bedrest  Start PT/OT    4. Severe anemia: Due to UGI B in the context of anticoagulation  GI already consulted, unfortunately respiratory status precludes GI eval  S/p 2 units PRBC  SCDs for DVT prophylaxis  Continue to hold off Eliquis. S/p Kcentra for reversal on 2021  Transition PPI gtt. to twice daily Protonix. Patient has opted to pursue noninvasive measures at this time.     5.  Hemorrhagic shock: Resolved 2 s/p PRBC transfusion on 9/30/2021  Continue close monitoring. 6.  Bilateral infiltrates/effusion: Unlikely pneumonia. Antibiotics discontinued 10/1/2021. Start diuretics as above    7. Paroxysmal A. fib: Recently initiated on low-dose Eliquis  Continue to hold for now. May need to discontinue anticoagulation ultimately. 8.  Morbid obesity: Unable to  at this time  9. COPD: Not in acute exacerbation  Chronic hypoxic respiratory failure on 4 L supplemental O2   10. Recent MRSA pneumonia: Completed Vanco/Zyvox  11. Chronic diastolic CHF: Start Lasix as above. 12.  Severe pulmonary hypertension: Due to combination of morbid obesity and COPD. 13.  Advanced care planning: Patient does not have advanced care directives. Has been full code till now. Discussed what her goals in life way and she becomes very emotional.  Telling me that she has been in a nursing home for 5 years and she has become completely unable to self-care dependent on others for her care. She is in pain half of the time. She does not have any dignity. She feels ready for living this will both have family is not ready yet. She is remaining full code because of her . She would like for me to talk to her  to keep him wishes. I called and discussed with Raman Stanford and communicated patient's wishes of wanting to be DNR. He is agreeable but he wants to, and talk to her face-to-face later today allowing another day. Total time 26 minutes. Diet ADULT DIET;  Clear Liquid   DVT Prophylaxis [] Lovenox, []  Heparin, [x] SCDs, [] Ambulation   GI Prophylaxis [] PPI,  [] H2 Blocker,  [] Carafate,  [] Diet/Tube Feeds   Code Status Full Code   Disposition Patient requires continued admission due to respiratory distress, GI bleed, encephalopathy   MDM [] Low, [] Moderate,[x]  High  Patient's risk as above due to critical illness     History of Present Illness:     Chief Complaint: <principal problem not specified> shortness of breath, hypoxia. O2 sat 70% on room air. Margarita Marmolejo is a [de-identified] y.o.  female  who presents with hypoxia. 10/1/2021: It appears she has improved with high flow supplemental O2 but she is also now becoming lethargic and poorly responsive. Unable to hold a conversation with me during my interview. 10/2/2021: Patient is again lethargic. Apparently transition back to high flow oxygen overnight. Discussed with respiratory therapy office. 10/3/2021: Patient is seen, examined, had a long discussion today on advanced care planning. Patient is very emotional, see #13 above. 10 point review of system is unable to be done accurately due to altered mental status. Objective: Intake/Output Summary (Last 24 hours) at 10/3/2021 1124  Last data filed at 10/3/2021 1104  Gross per 24 hour   Intake 10 ml   Output 800 ml   Net -790 ml        Vitals:   Vitals:    10/03/21 0313 10/03/21 0400 10/03/21 0821 10/03/21 0830   BP:  (!) 117/53  122/64   Pulse:  77  73   Resp: 20 19 16 17   Temp:  98.3 °F (36.8 °C)  98.4 °F (36.9 °C)   TempSrc:  Axillary  Oral   SpO2: 94% 93% 95% 96%   Weight:       Height:            Physical Exam:   GEN: Awake female, alert and oriented x3. Appears given age. HEENT: Normal   RESP: Rales in both bases. Symmetric chest movement while on  HFNC  CVS: RRR, S1, S2  GI/: Abdomen is soft, nontender, no organomegaly. Bowel sounds normal, rectal exam deferred. No CVA tenderness. MSK: No gross joint deformities. 2 + leg edema  SKIN: Normal coloration, warm, dry. NEURO: Cranial nerves appear grossly intact, normal speech, no lateralizing weakness. PSYCH:  Affect appropriate.     Medications:   Medications:    sodium chloride  250 mL IntraVENous Once    [Held by provider] lactobacillus  1 capsule Oral Daily with breakfast    atorvastatin  40 mg Oral Daily    levothyroxine  75 mcg Oral Daily    budesonide-formoterol  2 puff Inhalation BID    linaclotide 145 mcg Oral QAM AC    sodium chloride flush  5-40 mL IntraVENous 2 times per day      Infusions:    sodium chloride      sodium chloride Stopped (09/30/21 2118)    pantoprozole (PROTONIX) infusion 8 mg/hr (10/03/21 0847)    sodium chloride       PRN Meds: HYDROcodone-acetaminophen, 1 tablet, Q6H PRN  sodium chloride, , PRN  promethazine, 12.5 mg, Q4H PRN  albuterol sulfate HFA, 2 puff, Q4H PRN  sodium chloride flush, 5-40 mL, PRN  sodium chloride, 25 mL, PRN  ondansetron, 4 mg, Q8H PRN   Or  ondansetron, 4 mg, Q6H PRN  acetaminophen, 650 mg, Q6H PRN   Or  acetaminophen, 650 mg, Q6H PRN  albuterol, 2.5 mg, Q4H PRN          Electronically signed by Tomi Tse MD on 10/3/2021 at 11:24 AM

## 2021-10-04 VITALS
BODY MASS INDEX: 39.84 KG/M2 | DIASTOLIC BLOOD PRESSURE: 60 MMHG | HEIGHT: 61 IN | TEMPERATURE: 98.8 F | RESPIRATION RATE: 16 BRPM | OXYGEN SATURATION: 99 % | HEART RATE: 72 BPM | SYSTOLIC BLOOD PRESSURE: 118 MMHG | WEIGHT: 211 LBS

## 2021-10-04 LAB
ANION GAP SERPL CALCULATED.3IONS-SCNC: 12 MMOL/L (ref 4–16)
BASOPHILS ABSOLUTE: 0 K/CU MM
BASOPHILS RELATIVE PERCENT: 0.4 % (ref 0–1)
BUN BLDV-MCNC: 18 MG/DL (ref 6–23)
CALCIUM SERPL-MCNC: 7.8 MG/DL (ref 8.3–10.6)
CHLORIDE BLD-SCNC: 105 MMOL/L (ref 99–110)
CO2: 23 MMOL/L (ref 21–32)
CREAT SERPL-MCNC: 1.1 MG/DL (ref 0.6–1.1)
DIFFERENTIAL TYPE: ABNORMAL
EOSINOPHILS ABSOLUTE: 0.7 K/CU MM
EOSINOPHILS RELATIVE PERCENT: 6.8 % (ref 0–3)
GFR AFRICAN AMERICAN: 58 ML/MIN/1.73M2
GFR NON-AFRICAN AMERICAN: 48 ML/MIN/1.73M2
GLUCOSE BLD-MCNC: 98 MG/DL (ref 70–99)
HCT VFR BLD CALC: 25.3 % (ref 37–47)
HEMOGLOBIN: 7.3 GM/DL (ref 12.5–16)
IMMATURE NEUTROPHIL %: 2.4 % (ref 0–0.43)
LYMPHOCYTES ABSOLUTE: 1 K/CU MM
LYMPHOCYTES RELATIVE PERCENT: 9.8 % (ref 24–44)
MCH RBC QN AUTO: 28.4 PG (ref 27–31)
MCHC RBC AUTO-ENTMCNC: 28.9 % (ref 32–36)
MCV RBC AUTO: 98.4 FL (ref 78–100)
MONOCYTES ABSOLUTE: 1.3 K/CU MM
MONOCYTES RELATIVE PERCENT: 12.2 % (ref 0–4)
NUCLEATED RBC %: 1 %
PDW BLD-RTO: 15.4 % (ref 11.7–14.9)
PLATELET # BLD: 138 K/CU MM (ref 140–440)
PMV BLD AUTO: 11.5 FL (ref 7.5–11.1)
POTASSIUM SERPL-SCNC: 4 MMOL/L (ref 3.5–5.1)
RBC # BLD: 2.57 M/CU MM (ref 4.2–5.4)
SEGMENTED NEUTROPHILS ABSOLUTE COUNT: 7 K/CU MM
SEGMENTED NEUTROPHILS RELATIVE PERCENT: 68.4 % (ref 36–66)
SODIUM BLD-SCNC: 140 MMOL/L (ref 135–145)
TOTAL IMMATURE NEUTOROPHIL: 0.25 K/CU MM
TOTAL NUCLEATED RBC: 0.1 K/CU MM
WBC # BLD: 10.2 K/CU MM (ref 4–10.5)

## 2021-10-04 PROCEDURE — 80048 BASIC METABOLIC PNL TOTAL CA: CPT

## 2021-10-04 PROCEDURE — 6370000000 HC RX 637 (ALT 250 FOR IP): Performed by: INTERNAL MEDICINE

## 2021-10-04 PROCEDURE — 85025 COMPLETE CBC W/AUTO DIFF WBC: CPT

## 2021-10-04 PROCEDURE — 6360000002 HC RX W HCPCS: Performed by: INTERNAL MEDICINE

## 2021-10-04 PROCEDURE — 94640 AIRWAY INHALATION TREATMENT: CPT

## 2021-10-04 PROCEDURE — 6370000000 HC RX 637 (ALT 250 FOR IP): Performed by: NURSE PRACTITIONER

## 2021-10-04 PROCEDURE — 94761 N-INVAS EAR/PLS OXIMETRY MLT: CPT

## 2021-10-04 PROCEDURE — 2580000003 HC RX 258: Performed by: NURSE PRACTITIONER

## 2021-10-04 PROCEDURE — 2700000000 HC OXYGEN THERAPY PER DAY

## 2021-10-04 RX ORDER — FUROSEMIDE 40 MG/1
40 TABLET ORAL DAILY
Qty: 60 TABLET | Refills: 3 | Status: SHIPPED | OUTPATIENT
Start: 2021-10-04

## 2021-10-04 RX ADMIN — PANTOPRAZOLE SODIUM 40 MG: 40 TABLET, DELAYED RELEASE ORAL at 06:21

## 2021-10-04 RX ADMIN — ATORVASTATIN CALCIUM 40 MG: 20 TABLET, FILM COATED ORAL at 08:37

## 2021-10-04 RX ADMIN — HYDROCODONE BITARTRATE AND ACETAMINOPHEN 1 TABLET: 5; 325 TABLET ORAL at 08:42

## 2021-10-04 RX ADMIN — SODIUM CHLORIDE, PRESERVATIVE FREE 10 ML: 5 INJECTION INTRAVENOUS at 08:48

## 2021-10-04 RX ADMIN — FUROSEMIDE 40 MG: 10 INJECTION, SOLUTION INTRAMUSCULAR; INTRAVENOUS at 08:38

## 2021-10-04 RX ADMIN — BUDESONIDE AND FORMOTEROL FUMARATE DIHYDRATE 2 PUFF: 80; 4.5 AEROSOL RESPIRATORY (INHALATION) at 08:34

## 2021-10-04 RX ADMIN — PANTOPRAZOLE SODIUM 40 MG: 40 TABLET, DELAYED RELEASE ORAL at 16:22

## 2021-10-04 RX ADMIN — LEVOTHYROXINE SODIUM 75 MCG: 0.07 TABLET ORAL at 06:21

## 2021-10-04 ASSESSMENT — PAIN SCALES - GENERAL
PAINLEVEL_OUTOF10: 10
PAINLEVEL_OUTOF10: 10

## 2021-10-04 ASSESSMENT — PAIN DESCRIPTION - ONSET: ONSET: GRADUAL

## 2021-10-04 ASSESSMENT — PAIN DESCRIPTION - LOCATION: LOCATION: BACK

## 2021-10-04 ASSESSMENT — PAIN DESCRIPTION - ORIENTATION: ORIENTATION: LOWER

## 2021-10-04 ASSESSMENT — PAIN DESCRIPTION - FREQUENCY: FREQUENCY: CONTINUOUS

## 2021-10-04 ASSESSMENT — PAIN DESCRIPTION - PAIN TYPE: TYPE: ACUTE PAIN

## 2021-10-04 ASSESSMENT — PAIN DESCRIPTION - DESCRIPTORS: DESCRIPTORS: ACHING

## 2021-10-04 NOTE — PROGRESS NOTES
Violeta Cheek BSN RN clinical  for [facility] SURGICAL [facility] RN students 07-19:00 this date.

## 2021-10-04 NOTE — CARE COORDINATION
Transport arranged with [facility]. ETA is 1700. Notified pt, pt's spouse, pt's nurse, and Sasha/Pili. Pt had a neg Covid on 09/30 and will not require a new test per [facility] [employer]. AVS faxed.   TE

## 2021-10-04 NOTE — DISCHARGE SUMMARY
Discharge Summary    Name:  Ilana Robbins /Age/Sex: 1940  ([de-identified] y.o. female)   MRN & CSN:  [id number] & [id number] Admission Date/Time: 2021  2:29 PM   Attending:  Bijal Grey MD Discharging Physician: Bijal Grey MD     Hospital Course:   Ilana Robbins is a [de-identified] y.o.  female morbid obesity, ELIER, pulmonary hypertension, COPD, chronic hypoxic respiratory failure, on 4 to 5 L supplemental O2, recent diagnosis of paroxysmal A. fib on anticoagulation with Eliquis, recent hospitalization from 2021 through 2021 during which she was treated for MRSA pneumonia as well as newly detected atrial fibrillation who presents with acute on chronic hypoxic respiratory failure at the [facility] on 2021. In the ED, she was found to be hypoxic on her baseline oxygen, hypotensive, anemic with a hemoglobin of 4.5 g.  A diagnosis of acute blood loss anemia due to upper GI bleed due to oral anticoagulation was established as working diagnosis. She received 2 units PRBC, she received Kcentra to reverse her Eliquis and GI consult was requested. Goals of care discussion was held with patient and spouse and at this time, patient will be focusing on comfort and dignity. She will be transfused as needed for anemia. Her BUN/creatinine ratio has normalized indicating resolved UGI B. Hospital course was prolonged due to development of acute hypercarbic respiratory failure with acute respiratory acidosis presumably due to high flow oxygen locking of her hypoxic ventilatory drive. Acute metabolic encephalopathy with acute respiratory acidosis and acute hypercarbic respiratory failure resolved with BiPAP. Patient is back to her baseline and current CODE STATUS is DNR CCA at the this time. The patient/family expressed appropriate understanding of and agreement with the discharge recommendations, medications, and plan.      Consults this admission:  IP CONSULT TO HOSPITALIST  IP CONSULT TO GI    Discharge Instruction:   Follow up appointments: PCP  Primary care physician:  within 2 weeks    Diet:  cardiac diet   Activity: activity as tolerated  Disposition: Discharged to:   []Home, []HHC, [x]SNF, []Acute Rehab, []Hospice   Condition on discharge: Stable    Discharge Medications:      Oh Huang Medication Instructions [facility]:[id number]    Printed on:10/04/21 8129   Medication Information                      Acetaminophen (TYLENOL PO)  Take 650 mg by mouth every 6 hours as needed (PAIN OR FEVER)              albuterol (PROVENTIL) (2.5 MG/3ML) 0.083% nebulizer solution  Take 3 mLs by nebulization every 6 hours as needed for Wheezing             albuterol sulfate  (90 Base) MCG/ACT inhaler  Inhale 2 puffs into the lungs every 4 hours as needed for Wheezing             atorvastatin (LIPITOR) 40 MG tablet  Take 40 mg by mouth daily             calcium citrate-vitamin D (CITRICAL + D) 315-250 MG-UNIT TABS per tablet  Take 1 tablet by mouth 2 times daily (with meals)             ferrous sulfate 325 (65 FE) MG tablet  Take 1 tablet by mouth every 12 hours With a meal.             fluticasone-vilanterol (BREO ELLIPTA) 100-25 MCG/INH AEPB inhaler  Inhale 1 puff into the lungs daily             furosemide (LASIX) 40 MG tablet  Take 1 tablet by mouth daily             lactobacillus (CULTURELLE) capsule  Take 1 capsule by mouth daily (with breakfast)             levothyroxine (SYNTHROID) 75 MCG tablet  Take 75 mcg by mouth Daily             linaclotide (LINZESS) 145 MCG capsule  Take 145 mcg by mouth every morning (before breakfast)             mirtazapine (REMERON) 15 MG tablet  Take 1 tablet by mouth nightly             Multiple Vitamin (MULTIVITAMIN) TABS tablet  Take 1 tablet by mouth daily             ondansetron (ZOFRAN ODT) 4 MG disintegrating tablet  Take 1 tablet by mouth every 8 hours as needed for Nausea             OXYGEN  Inhale 5 L into the lungs continuous pantoprazole (PROTONIX) 40 MG tablet  Take 1 tablet by mouth 2 times daily (before meals)             potassium chloride (KLOR-CON M) 10 MEQ extended release tablet  Take 10 mEq by mouth daily                 Objective Findings at Discharge:   BP (!) 134/57   Pulse 76   Temp 98.3 °F (36.8 °C) (Oral)   Resp 22   Ht 5' 1\" (1.549 m)   Wt 211 lb (95.7 kg)   SpO2 95%   BMI 39.87 kg/m²            PHYSICAL EXAM  GEN:   Awake female, alert and oriented x3. Appears given age. HEENT: Normal   RESP: Rales in both bases. Symmetric chest movement while on  HFNC  CVS: RRR, S1, S2  GI/: Abdomen is soft, nontender, no organomegaly. Bowel sounds normal, rectal exam deferred. No CVA tenderness. MSK:   No gross joint deformities. 2 + leg edema  SKIN:   Normal coloration, warm, dry. NEURO: Cranial nerves appear grossly intact, normal speech, no lateralizing weakness. PSYCH:  Affect appropriate.     BMP/CBC  Recent Labs     10/02/21  0410 10/03/21  0607 10/04/21  0445    143 140   K 4.2 4.2 4.0    106 105   CO2 25 22 23   BUN 27* 20 18   CREATININE 1.2* 1.0 1.1   WBC 10.6* 10.4 10.2   HCT 25.9* 26.6* 25.3*   * 142 138*       Discharge Time of 39 minutes    Electronically signed by Yonathan Espinoza MD on 10/4/2021 at 11:32 AM

## 2021-10-04 NOTE — PROGRESS NOTES
Physical Therapy  Chart reviewed. Interviewed pt this morning. Pt lives in an LTC faclility and requires dependent care for all mobility. She typically spends each day in bed except for [employer] days\" at which time staff uses arun to lift her to a w/c. No new needs for PT at this time. Please re-order if new needs arise.

## 2021-10-04 NOTE — PROGRESS NOTES
Occupational Therapy and Physical Therapy  Triage Discharge  Chart reviewed. Interviewed pt this morning. Pt lives in an LTC faclility and requires dependent care for most ADL at bed level. She typically spends each day in bed except for [employer] salon days\" at which time staff uses arun to lift her to a w/c. No justifiable reason for skilled OT.    Lola Maloney, [place], [place]   [id number]   9:42 AM, 10/4/2021'

## 2021-10-05 LAB
CULTURE: NORMAL
CULTURE: NORMAL
Lab: NORMAL
Lab: NORMAL
SPECIMEN: NORMAL
SPECIMEN: NORMAL

## 2021-10-05 NOTE — ADT AUTH CERT
Gastrointestinal Bleeding, Upper - Care Day 2 (10/1/2021) by Nitza Franco RN       Review Status Review Entered   Completed 10/4/2021 08:26      Criteria Review      Care Day: 2 Care Date: 10/1/2021 Level of Care: Intermediate Care    Guideline Day 2    Level Of Care    (X) Floor    10/4/2021 8:26 AM EDT by Zena Park      intermed tele    Clinical Status    (X) * Hypotension absent    10/4/2021 8:26 AM EDT by [facility]      98.7 (37.1)  20  78  119/53  [id number] 25liters hhfnc    (X) * Bleeding absent or reduced    10/4/2021 8:26 AM EDT by Zena Park      no new    Activity    (X) Activity as tolerated    10/4/2021 8:26 AM EDT by Zena Park      q2turn scds    Routes    (X) * Oral hydration tolerated    10/4/2021 8:26 AM EDT by Zena Park      ns 150ml/hr dcd today    (X) * Oral diet tolerated    10/4/2021 8:26 AM EDT by Zion Beck clears    Interventions    (X) * NG tube absent    10/4/2021 8:26 AM EDT by Zena Park      yes    (X) Hgb/Hct    10/4/2021 8:26 AM EDT by Zena Park      8.2/27.3  11.8/39.8  7.1/23.9    Medications    (X) Proton pump inhibitor    10/4/2021 8:26 AM EDT by Zion Beck protonix gtt 10ml/hr    ( ) Antibiotic prophylaxis in cirrhotic patient    10/4/2021 8:26 AM EDT by Zena Park      maxipime 2000mg iv x1    * Milestone   Additional Notes   10/1 day 2--given 1 unit prbc      LABS:      Potassium: 3.1 (L)   BUN: 25 (H)   Glucose: 187 (H)   Troponin T: 0.023 (H)   Hemoglobin Quant: 11.8 (L)   MCHC: 29.6 (L)   RDW: 19.7 (H)   Lymphocyte %: 18.8 (L)   Monocytes %: 12.8 (H)         Iron: 168 (H)   UIBC: 28 (L)   TIBC: 196 (L)   Transferrin %: 86 (H)         Ketones, Urine: SMALL (A)   Blood, Urine: LARGE (A)   Leukocyte Esterase, Urine: TRACE (A)      Mucus, UA: RARE (A)         pH, Bld: 7.21 (L)         Prn meds given- tylenol 650mg po x2, albuterol 2 puff    -------------   GI:      Impression and RECOMMENDATIONS:   Anemia acute on chronic   Dark stools might be having  Melena   Denies abdominal pain   Might have peptic ulcer disease   Denied all procedures last admission   At this point patient on high flow oxygen and BiPAP    No evidence of active GI bleed at this point   Hemoglobin stable after transfusion   At this point would continue Protonix infusion for 1 more day and then Protonix 40 twice daily   High risk for GI procedures at this point of time and patient not sure if sheis okay for EGD   If evidence of acute GI bleed then patient may need to be intubated put on a vent and EGD performed   Continue present management      -------------   IM:      Patient seen again, on BiPAP.  Patient is sleeping but now arousable.  Patient is able to stay awake and hold a conversation with me. Juan Halle to recognize daughter at bedside.  I did let her know that she can take a break from the BiPAP but she would likely need it when sleeping. Hypercarbia is likely as a result of hyperoxia.     Will target O2 sats of 88 to 90% when back on nasal cannula.     Patient reports that she is chronically on 5 L O2 at home         Gastrointestinal Bleeding, Upper - Care Day 1 (9/30/2021) by Nakia Brownlee RN       Review Status Review Entered   Completed 10/1/2021 12:34      Criteria Review      Care Day: 1 Care Date: 9/30/2021 Level of Care: Intermediate Care    Guideline Day 1    Level Of Care    (X) ICU or floor    Clinical Status    (X) * Clinical Indications met    Routes    (X) IV fluids    10/1/2021 12:34 PM EDT by Desmond Ross      IVF     Interventions    (X) CBC    10/1/2021 12:34 PM EDT by Philippe Haley. 5GM/DL   Bpgliwekdz15.8%    Pro-BNP 3,812 BUN 37  pH, Ven7.27   pCO2, [id number]   pO2, [id number]   Base Exc, Mixed. 9   HCO3, Umsjka31.4MMOL/L   O2 Sat, Ven95.1%  Updated: 09/30/21 2032   Troponin T0.016    (X) Blood type and crossmatch    (X) Possible transfusion of RBC    10/1/2021 12:34 PM EDT by Lazara Cha      2U PRBCS    (X) GI consultation    10/1/2021 12:34 PM EDT by Annie Quijano      +    Medications    (X) IV proton pump inhibitor    10/1/2021 12:34 PM EDT by Siri Frey IV  PROTONIX GTT    (X) Antibiotic prophylaxis in cirrhotic patient    10/1/2021 12:34 PM EDT by Geovanna Gomes IV  VANCO IV    * Milestone   Additional Notes   9/30      Mala Contrreas is a [de-identified] y.o. female with history of CHF, COPD, hypertension with recent admission and discharged on 9/16/2021 for shortness of breath which was thought to be multifactorial secondary to COPD and pneumonia was treated with Vanco and cefepime.  Cultures grew MRSA at that time.  Patient was also found to have a GI bleed with positive FOBT.  GI was consulted and recommended EGD/colonoscopy which patient refused. Tanika Zamora was treated with PPI.  Patient also had transient episodes of A. fib in which cardiology was consulted. Trell Crowe started patient on low-dose Eliquis as well as a beta-blocker.  Patient represented today stating over the past several days she has had increasing shortness of breath.  States she has had a mildly worsening cough with no sputum production.  Denies fevers.  States she chronically has edema in bilateral lower extremities which is not significantly changed from baseline.  States she does have orthopnea.  States she has been taking her Eliquis as prescribed.  Denies history of blood clots in the past.  Denies abdominal pain, change in urination, nausea vomiting.  States she has been having black stools which she thought was secondary to iron.  Denies bright red blood in stools.  Denies recent falls or trauma.  EMS was called patient's house and when they got there patient was oxygenating at about 70%.  Patient was placed on nonrebreather at 15 L with improvement of oxygen saturations.  Patient denies headache, blurred vision, focal neuro deficits, motor or sensory changes.  Patient states with the Atrial Rate: 75   Diagnosis: Normal sinus rhyt. ..    P Axis: 39   P-R Interval: 154   Q-T Interval: 376   QRS Duration: 78   QTc Calculation (Bazett): 419   R Axis: -14   T Axis: 3   Ventricular Rate: 75      9/30/2021 14:45   pH, David: 7.35   pCO2, David: 54 (H)   pO2, David: 42   HCO3, Venous: 29.8 (H)   O2 Sat, David: 79.4 (H)   Base Exc, Mixed: 2.9 (H)      9/30/2021 14:48   SARS-CoV-2, NAAT: NOT DETECTED   Source: THROAT      9/30/2021 16:03   Sodium: 136   Potassium: 4.6   Chloride: 101   CO2: 25   BUN: 37 (H)   Creatinine: 1.1   Anion Gap: 10   GFR Non-: 48 (L)   GFR : 58 (L)   Glucose: 113 (H)   Calcium: 8.0 (L)   Total Protein: 5.5 (L)   Pro-BNP: 3,812 (H)   Troponin T: 0.025 (H)   Albumin: 3.2 (L)   Alk Phos: 54   ALT: 8 (L)   AST: 13 (L)   Bilirubin: 0.2   WBC: 8.8   RBC: 1.66 (L)   Hemoglobin Quant: 4.5 (LL)   Hematocrit: 16.8 (LL)   MCV: 101.2 (H)   MCH: 27.1   MCHC: 26.8 (L)   MPV: 11.0   RDW: 15.6 (H)   Platelet Count: 900   Lymphocyte %: 8.2 (L)   Monocytes %: 7.5 (H)   Eosinophils %: 2.4   Basophils %: 0.1   Lymphocytes Absolute: 0.7   Monocytes Absolute: 0.7   Eosinophils Absolute: 0.2   Basophils Absolute: 0.0   Differential Type: AUTOMATED DIFFERENTIAL   Segs Relative: 81.5 (H)   Segs Absolute: 7.1   Nucleated RBC %: 0.2   Immature Neutrophil %: 0.3   Total Immature Neutrophil: 0.03   Total Nucleated RBC: 0.0   Prothrombin Time: 15.4 (H)   INR: 1.19   aPTT: 28.6      9/30/2021 16:55   ABO Rh: B POSITIVE   Antibody Screen: NEGATIVE   Transfusion Status: OK TO TRANSFUSE   Crossmatch Result: COMPATIBLE   Unit Number: [id number]   Component: LEUKOREDUCED PACKED CELL   Unit Divison: 00 9/30/2021 16:55   Transfusion Status: OK TO TRANSFUSE   Crossmatch Result: COMPATIBLE   Unit Number: [id number]   Component: LEUKO-POOR RED CELLS   Unit Divison: 00 9/30/2021 16:55   Transfusion Status: OK TO TRANSFUSE   Crossmatch Result: [id number]   Unit Number: [id number] Component: LEUKO-POOR RED CELLS   Unit Divison: 00      9/30/2021 17:46   CTA PULMONARY WITH CONTRAST: Rpt   1. No pulmonary embolus. 2. Bibasilar pulmonary consolidation with bilateral pleural effusions,    concerning for pneumonia. 3. Noncalcified pulmonary nodules measuring up to 6 mm. 9/30/2021 17:49   CT ABDOMEN PELVIS W IV CONTRAST: Rpt   1. No acute intra-abdominal process identified. 2. No free fluid or free air identified within the abdomen.  No    retroperitoneal hematoma. 3. No bowel obstruction. 4. Colonic diverticulosis without CT evidence of diverticulitis. 5. Severe atherosclerotic disease. 6. Dependent consolidative change at the lung bases.  See separately dictated    CT pulmonary same day for thoracic findings.        9/30/2021 19:14   Sodium: 137   Potassium: 4.8   Chloride: 104   CO2: 24   BUN: 36 (H)   Creatinine: 1.0   Anion Gap: 9   GFR Non-: 53 (L)   GFR : >60   Lactate, ser/plas: 0.8   Glucose: 119 (H)   Calcium: 7.9 (L)   Troponin T: 0.016 (H)   WBC: 8.6   RBC: 2.80 (L)   Hemoglobin Quant: 7.9 (L)   Hematocrit: 27.0 (L)   MCV: 96.4   MCH: 28.2   MCHC: 29.3 (L)   MPV: 10.8   RDW: 15.9 (H)   Platelet Count: 891      9/30/2021 19:15   pH, David: 7.27 (L)   pCO2, David: 64 (H)   pO2, David: 154 (H)   HCO3, Venous: 29.4 (H)   O2 Sat, David: 95.1 (H)   Base Exc, Mixed: .9      MDM:       81-year-old female presenting with shortness of breath.  History and be seen above.  Vitals on presentation patient requiring 15 L nonrebreather.  Patient will intermittently drop into the 70s ablation transition to high flow nasal cannula with improvement of oxygen saturations.  Patient initial blood pressure is 101/50.  Patient's map will intermittently drop into the low 60s to high 50s.  Patient was tachypneic at 24.  Patient afebrile.  Patient had crackles at the bilateral bases.  Abdomen was soft and nontender.  Neuro exam is nonfocal.  Patient appears in mild respiratory distress.  Patient had bilateral lower extremity edema IV was difficult to establish.  Central line was placed in the ED.  EKG was nonacute and similar to patient's baseline.  Chest x-ray was obtained showing persistent left lower lobe airspace opacities which may represent consolidation from left lower lobe pneumonia.  There are stable interstitial opacities noted throughout the both lungs that may represent pneumonia or pulmonary edema.  With patient's increasing shortness of breath with complaint of cough antibiotics were ordered.  Rapid Covid came back negative. [address] revealed no leukocytosis.  Hemoglobin is 4.5 down from 9.5 on 9/16/2021.  Patient was borderline hypotensive and 2 units of emergency release blood were ordered as well as a type and screen.  Patient received 2 units with improvement of pressure. Karen Forde was ordered for reversal of Eliquis.  Otherwise CBC and CMP were reassuring.  Trope was mildly elevated at 0.02 which is likely type and 2 in nature with repeat of 0.016.  BNP was elevated at 3800.  The last BNP I see is from 9/8/2021 and it was within normal limits.  VBG revealed a pH of 7.35 with a PCO2 of 54.  Lactate within normal limits.  Repeat hemoglobin after 2 units PRBCs is 7.9.  Attempted to balance fluid/blood resuscitation with pulmonary status possible pneumonia versus pulmonary edema.  Discussed with GI and would like patient to be on a Protonix drip overnight and n.p.o.  Discussed with hospitalist service and patient will be admitted to their service for further evaluation and treatment.           Clinical Impression:   1. Acute on chronic respiratory failure with hypoxemia (HCC)    2. Gastrointestinal hemorrhage with melena    3. Acute blood loss anemia    4.  Elevated brain natriuretic peptide (BNP) level       ED MEDS:   09/30/2021 1926 0.9 % sodium chloride bolus   IntraVENous Canceled Entry Ceasar Hoffman RN    09/30/2021 2118 0.9 % sodium chloride infusion 0 mL IntraVENous Held José Antonio Blount [facility]    09/30/2021 2204 0.9 % sodium chloride infusion 150 mL/hr IntraVENous New [address] [place] [address]    09/30/2021 2204 atorvastatin (LIPITOR) tablet 40 mg 40 mg Oral Not Given Elgin Morgan RN    09/30/2021 2155 budesonide-formoterol (SYMBICORT) 80-4.5 MCG/ACT inhaler 2 puff 2 puff Inhalation Given Jaspal Wahl, [facility]    09/30/2021 1624 cefepime (MAXIPIME) 2000 mg IVPB minibag 2,000 mg IntraVENous [facility]    09/30/2021 1654 cefepime (MAXIPIME) 2000 mg IVPB minibag 0 mg IntraVENous Stopped José Antonio Blount RN    09/30/2021 1747 iopamidol (ISOVUE-370) 76 % injection 75 mL 75 mL IntraVENous Given Miguel Angel Glaser    09/30/2021 1712 ipratropium-albuterol (DUONEB) nebulizer solution 2 ampule 2 ampule Inhalation Given Hilda Griffin, [facility]    09/30/2021 2011 lactobacillus (CULTURELLE) capsule 1 capsule   Oral Held by provider AMPARO Guo - CNP    09/30/2021 2212 pantoprazole (PROTONIX) 80 mg in sodium chloride 0.9 % 100 mL infusion 8 mg/hr IntraVENous New [address] [place] [address] Elgin Morgan RN    09/30/2021 2131 pantoprazole (PROTONIX) 80 mg in sodium chloride 0.9 % 50 mL bolus 80 mg IntraVENous [facility]    09/30/2021 2205 pantoprazole (PROTONIX) 80 mg in sodium chloride 0.9 % 50 mL bolus 0 mg IntraVENous Stopped Elgin Morgan RN    09/30/2021 1938 pantoprazole (PROTONIX) injection 80 mg 80 mg IntraVENous Given José Antonio Blount RN    09/30/2021 1933 prothrombin complex concentrate (human) [facility]) infusion 4,500 Units 4,500 Units IntraVENous [facility]    09/30/2021 2151 prothrombin complex concentrate (human) [facility]) infusion 4,500 Units 0 Units IntraVENous Stopped José Antonio Blount RN    09/30/2021 2213 sodium chloride flush 0.9 % injection 5-40 mL 10 mL IntraVENous Given Elgin Morgan RN    09/30/2021 1929 vancomycin (VANCOCIN) 2000 mg in 400 mL IVPB 2,000 mg IntraVENous [facility]    09/30/2021 2209 vancomycin (VANCOCIN) 2000 mg in 400 mL IVPB 0 mg IntraVENous Stopped Ayaan Raman RN          H&P:   Assessment        Hospital Problems      Last Modified POA     GI bleed 9/30/2021 Yes               Plan   #GI bleed   #Acute blood loss anemia                   -Admit to inpatient, stepdown               -ED provider contacted GI for urgent evaluation.  For upper GI evaluation colonoscopy               -Transfuse 2 units PRBCs in the ED, repeat H&H, transfuse PRBCs to keep hemoglobin greater than 8.               -Administer Kcentra to reverse Eliquis which she was on for A. fib.  Hold all anticoagulants               -   Lab Results   Component Value Date     WBC 8.6 09/30/2021     HGB 7.9 (L) 09/30/2021     HCT 27.0 (L) 09/30/2021     MCV 96.4 09/30/2021      09/30/2021               -IV protonix gtt                  #Acute respiratory distress with hypoxia     #Community-acquired pneumonia               -CT PE negative for pulmonary embolus bibasilar pulmonary consolidation with bilateral pleural effusions concerning for pneumonia noted blood cultures ordered check urine for strep pneumonia and strep pneumoniae antigens initiate broad-spectrum antibiotics for pneumonia.               -Cefepime and vancomycin administered in ED, will continue.  Consult pharmacy to dose vancomycin               -Bronchodilators and wean off oxygen as tolerated       Continue home medications once otherwise noted above for chronic medical conditions including but not limited to   A. fib, COPD acute on chronic diastolic heart failure coronary artery disease, morbid obesity, depression, chronic hypoxic respiratory failure       Diet Diet NPO Exceptions are: Ice Chips       DVT Prophylaxis ? ? Lovenox, ??  Heparin, ?? SCDs, ?? Ambulation   GI Prophylaxis ? ? PPI,  ?? H2 Blocker,  ?? Carafate,  ?? Diet/Tube Feeds   Code Status Full Code   Disposition Patient requires continued admission due to GI bleed        MDM ? ? Low, ?? Moderate,??  High   Patient's risk as above        Patient discussed with and evaluated by Dr Doreen North who agrees with the above diagnosis, treatment and disposition.       All testing  and results reviewed with patient . All questions answered.  Patient and family voiced understanding                                      Consultations Ordered:   IP CONSULT TO HOSPITALIST   IP CONSULT TO GI      Orders Placed This Encounter       COVID-19, Rapid       Culture, Blood 1       Culture, Blood 2       Strep Pneumoniae Antigen       Legionella antigen, urine       XR CHEST PORTABLE       CTA PULMONARY W CONTRAST       CT ABDOMEN PELVIS W IV CONTRAST Additional Contrast? None       CBC auto diff       CMP       Troponin       Brain Natriuretic Peptide       Blood Gas, Venous       Lactic acid, plasma       PT - INR       PTT       Hemoglobin and Hematocrit, Blood, Post Transfusion       Troponin       CBC       Basic Metabolic Panel       Blood Gas, Venous       Urinalysis       Hemoglobin and Hematocrit, Blood       Iron and TIBC       Lactic acid, plasma       Vancomycin, trough       Troponin       CBC auto differential       Hemoglobin and Hematocrit, Blood, Post Transfusion       Basic metabolic panel       Basic Metabolic Panel w/ Reflex to MG       CBC auto differential       Blood gas, arterial       Ammonia       Diet NPO Exceptions are: Ice Chips       Height and weight       VITAL SIGNS PER TRANSFUSION PROTOCOL       Verify hospital blood consent form has been signed and witnessed       TRANSFUSION REACTION MANAGEMENT       Vital signs per unit routine       Nursing to document all stools for color and consistency       Strict Bedrest       Place intermittent pneumatic compression device       Telemetry monitoring - 24 hour duration       VITAL SIGNS PER TRANSFUSION PROTOCOL       Verify hospital blood consent form has been signed and witnessed      [address] or assist with turn approximately every 2 hours if patient is unable to turn self.  Remind patient to turn if necessary.       Assess skin per unit guidelines       Pad/offload medical devices       Maintain HOB at the lowest elevation consistent with medical plan of care       Use lift equipment for lifting patient       Maintain heels off of bed at all times       Full Code       Consult to   [address] to GI       Pharmacy to Dose: Vancomycin; Pneumonia (CAP)       Contact isolation       Heated/ Humidified High Flow Nasal Cannula       Initiate Oxygen Therapy Protocol       Initiate Oxygen Therapy Protocol       POCT Glucose       EKG 12 Lead       TYPE AND SCREEN       PREPARE RBC (CROSSMATCH), 2 Units       PREPARE RBC (CROSSMATCH), 1 Units       PATIENT STATUS (FROM ED OR OR/PROCEDURAL) Inpatient                Gastrointestinal Bleeding, Upper - Clinical Indications for Admission to Inpatient Care by Camron Peres RN       Review Status Review Entered   Completed 10/1/2021 12:25      Criteria Review      Clinical Indications for Admission to Inpatient Care    Most Recent : Antelmo Hubbard Most Recent Date: 10/1/2021 12:25 PM EDT    (X) Admission is indicated for  1 or more  of the following  (1) (2):       (X) Hemodynamic instability       10/1/2021 12:25 PM EDT by Antelmo Hubbard         09/30/21 1433  98.3 (36.8)  24  81  101/50  --  --  --  6  100  Nasal cannula    09/30/21 1531   80  20  --  Heated high flow cannula    09/30/21 1534  --  21  78  89/39  --  --  --  --  100   09/30/21 2000  --  64  --  --  --  --  90  30  97  Heated high flow ca

## 2021-11-13 ENCOUNTER — APPOINTMENT (OUTPATIENT)
Dept: CT IMAGING | Age: 81
DRG: 392 | End: 2021-11-13
Payer: MEDICARE

## 2021-11-13 ENCOUNTER — HOSPITAL ENCOUNTER (INPATIENT)
Age: 81
LOS: 9 days | Discharge: NURSING, REHAB OR LONG-TERM CARE | DRG: 392 | End: 2021-11-22
Attending: EMERGENCY MEDICINE | Admitting: INTERNAL MEDICINE
Payer: MEDICARE

## 2021-11-13 DIAGNOSIS — N17.9 AKI (ACUTE KIDNEY INJURY) (HCC): ICD-10-CM

## 2021-11-13 DIAGNOSIS — K62.5 RECTAL BLEEDING: Primary | ICD-10-CM

## 2021-11-13 DIAGNOSIS — D64.9 ANEMIA, UNSPECIFIED TYPE: ICD-10-CM

## 2021-11-13 LAB
ALBUMIN SERPL-MCNC: 3 GM/DL (ref 3.4–5)
ALP BLD-CCNC: 79 IU/L (ref 40–128)
ALT SERPL-CCNC: 11 U/L (ref 10–40)
ANION GAP SERPL CALCULATED.3IONS-SCNC: 12 MMOL/L (ref 4–16)
APTT: 26.9 SECONDS (ref 25.1–37.1)
AST SERPL-CCNC: 31 IU/L (ref 15–37)
BASOPHILS ABSOLUTE: 0 K/CU MM
BASOPHILS RELATIVE PERCENT: 0.2 % (ref 0–1)
BILIRUB SERPL-MCNC: 0.2 MG/DL (ref 0–1)
BUN BLDV-MCNC: 21 MG/DL (ref 6–23)
CALCIUM SERPL-MCNC: 8.9 MG/DL (ref 8.3–10.6)
CHLORIDE BLD-SCNC: 95 MMOL/L (ref 99–110)
CO2: 32 MMOL/L (ref 21–32)
CREAT SERPL-MCNC: 1.9 MG/DL (ref 0.6–1.1)
DIFFERENTIAL TYPE: ABNORMAL
EOSINOPHILS ABSOLUTE: 0 K/CU MM
EOSINOPHILS RELATIVE PERCENT: 0.3 % (ref 0–3)
GFR AFRICAN AMERICAN: 31 ML/MIN/1.73M2
GFR NON-AFRICAN AMERICAN: 25 ML/MIN/1.73M2
GLUCOSE BLD-MCNC: 129 MG/DL (ref 70–99)
HCT VFR BLD CALC: 27.5 % (ref 37–47)
HCT VFR BLD CALC: 32.5 % (ref 37–47)
HEMOGLOBIN: 8 GM/DL (ref 12.5–16)
HEMOGLOBIN: 9.6 GM/DL (ref 12.5–16)
IMMATURE NEUTROPHIL %: 0.7 % (ref 0–0.43)
INR BLD: 1.01 INDEX
LYMPHOCYTES ABSOLUTE: 1.1 K/CU MM
LYMPHOCYTES RELATIVE PERCENT: 8.2 % (ref 24–44)
MCH RBC QN AUTO: 26.7 PG (ref 27–31)
MCHC RBC AUTO-ENTMCNC: 29.5 % (ref 32–36)
MCV RBC AUTO: 90.5 FL (ref 78–100)
MONOCYTES ABSOLUTE: 0.7 K/CU MM
MONOCYTES RELATIVE PERCENT: 5.2 % (ref 0–4)
NUCLEATED RBC %: 0 %
PDW BLD-RTO: 15.9 % (ref 11.7–14.9)
PLATELET # BLD: 336 K/CU MM (ref 140–440)
PMV BLD AUTO: 11.4 FL (ref 7.5–11.1)
POTASSIUM SERPL-SCNC: 4.9 MMOL/L (ref 3.5–5.1)
PROTHROMBIN TIME: 13 SECONDS (ref 11.7–14.5)
RBC # BLD: 3.59 M/CU MM (ref 4.2–5.4)
SEGMENTED NEUTROPHILS ABSOLUTE COUNT: 11.7 K/CU MM
SEGMENTED NEUTROPHILS RELATIVE PERCENT: 85.4 % (ref 36–66)
SODIUM BLD-SCNC: 139 MMOL/L (ref 135–145)
TOTAL IMMATURE NEUTOROPHIL: 0.1 K/CU MM
TOTAL NUCLEATED RBC: 0 K/CU MM
TOTAL PROTEIN: 6.7 GM/DL (ref 6.4–8.2)
WBC # BLD: 13.7 K/CU MM (ref 4–10.5)

## 2021-11-13 PROCEDURE — 2700000000 HC OXYGEN THERAPY PER DAY

## 2021-11-13 PROCEDURE — 86900 BLOOD TYPING SEROLOGIC ABO: CPT

## 2021-11-13 PROCEDURE — 86922 COMPATIBILITY TEST ANTIGLOB: CPT

## 2021-11-13 PROCEDURE — 36415 COLL VENOUS BLD VENIPUNCTURE: CPT

## 2021-11-13 PROCEDURE — 85610 PROTHROMBIN TIME: CPT

## 2021-11-13 PROCEDURE — 6370000000 HC RX 637 (ALT 250 FOR IP): Performed by: FAMILY MEDICINE

## 2021-11-13 PROCEDURE — 80053 COMPREHEN METABOLIC PANEL: CPT

## 2021-11-13 PROCEDURE — 6360000002 HC RX W HCPCS: Performed by: FAMILY MEDICINE

## 2021-11-13 PROCEDURE — 99284 EMERGENCY DEPT VISIT MOD MDM: CPT

## 2021-11-13 PROCEDURE — 96375 TX/PRO/DX INJ NEW DRUG ADDON: CPT

## 2021-11-13 PROCEDURE — C9113 INJ PANTOPRAZOLE SODIUM, VIA: HCPCS | Performed by: FAMILY MEDICINE

## 2021-11-13 PROCEDURE — C9113 INJ PANTOPRAZOLE SODIUM, VIA: HCPCS | Performed by: EMERGENCY MEDICINE

## 2021-11-13 PROCEDURE — 86850 RBC ANTIBODY SCREEN: CPT

## 2021-11-13 PROCEDURE — 2140000000 HC CCU INTERMEDIATE R&B

## 2021-11-13 PROCEDURE — 85025 COMPLETE CBC W/AUTO DIFF WBC: CPT

## 2021-11-13 PROCEDURE — 85730 THROMBOPLASTIN TIME PARTIAL: CPT

## 2021-11-13 PROCEDURE — 86901 BLOOD TYPING SEROLOGIC RH(D): CPT

## 2021-11-13 PROCEDURE — 6360000002 HC RX W HCPCS: Performed by: EMERGENCY MEDICINE

## 2021-11-13 PROCEDURE — 94761 N-INVAS EAR/PLS OXIMETRY MLT: CPT

## 2021-11-13 PROCEDURE — 2580000003 HC RX 258: Performed by: EMERGENCY MEDICINE

## 2021-11-13 PROCEDURE — 2580000003 HC RX 258: Performed by: FAMILY MEDICINE

## 2021-11-13 PROCEDURE — 96365 THER/PROPH/DIAG IV INF INIT: CPT

## 2021-11-13 PROCEDURE — 85014 HEMATOCRIT: CPT

## 2021-11-13 PROCEDURE — 2580000003 HC RX 258: Performed by: INTERNAL MEDICINE

## 2021-11-13 PROCEDURE — 96366 THER/PROPH/DIAG IV INF ADDON: CPT

## 2021-11-13 PROCEDURE — 85018 HEMOGLOBIN: CPT

## 2021-11-13 PROCEDURE — 74176 CT ABD & PELVIS W/O CONTRAST: CPT

## 2021-11-13 RX ORDER — ACETAMINOPHEN 325 MG/1
650 TABLET ORAL EVERY 6 HOURS PRN
Status: DISCONTINUED | OUTPATIENT
Start: 2021-11-13 | End: 2021-11-13 | Stop reason: SDUPTHER

## 2021-11-13 RX ORDER — ONDANSETRON 2 MG/ML
4 INJECTION INTRAMUSCULAR; INTRAVENOUS EVERY 6 HOURS PRN
Status: DISCONTINUED | OUTPATIENT
Start: 2021-11-13 | End: 2021-11-22 | Stop reason: HOSPADM

## 2021-11-13 RX ORDER — SODIUM CHLORIDE 9 MG/ML
25 INJECTION, SOLUTION INTRAVENOUS PRN
Status: DISCONTINUED | OUTPATIENT
Start: 2021-11-13 | End: 2021-11-22 | Stop reason: HOSPADM

## 2021-11-13 RX ORDER — MULTIVITAMIN WITH IRON
1 TABLET ORAL DAILY
Status: DISCONTINUED | OUTPATIENT
Start: 2021-11-14 | End: 2021-11-22 | Stop reason: HOSPADM

## 2021-11-13 RX ORDER — ALBUTEROL SULFATE 90 UG/1
2 AEROSOL, METERED RESPIRATORY (INHALATION) EVERY 4 HOURS PRN
Status: DISCONTINUED | OUTPATIENT
Start: 2021-11-13 | End: 2021-11-22 | Stop reason: HOSPADM

## 2021-11-13 RX ORDER — ONDANSETRON 4 MG/1
4 TABLET, ORALLY DISINTEGRATING ORAL EVERY 8 HOURS PRN
Status: DISCONTINUED | OUTPATIENT
Start: 2021-11-13 | End: 2021-11-22 | Stop reason: HOSPADM

## 2021-11-13 RX ORDER — SODIUM CHLORIDE 0.9 % (FLUSH) 0.9 %
5-40 SYRINGE (ML) INJECTION EVERY 12 HOURS SCHEDULED
Status: DISCONTINUED | OUTPATIENT
Start: 2021-11-13 | End: 2021-11-22 | Stop reason: HOSPADM

## 2021-11-13 RX ORDER — 0.9 % SODIUM CHLORIDE 0.9 %
1000 INTRAVENOUS SOLUTION INTRAVENOUS ONCE
Status: COMPLETED | OUTPATIENT
Start: 2021-11-13 | End: 2021-11-13

## 2021-11-13 RX ORDER — ACETAMINOPHEN 650 MG/1
650 SUPPOSITORY RECTAL EVERY 6 HOURS PRN
Status: DISCONTINUED | OUTPATIENT
Start: 2021-11-13 | End: 2021-11-22 | Stop reason: HOSPADM

## 2021-11-13 RX ORDER — MORPHINE SULFATE 2 MG/ML
2 INJECTION, SOLUTION INTRAMUSCULAR; INTRAVENOUS ONCE
Status: COMPLETED | OUTPATIENT
Start: 2021-11-13 | End: 2021-11-13

## 2021-11-13 RX ORDER — BUDESONIDE AND FORMOTEROL FUMARATE DIHYDRATE 80; 4.5 UG/1; UG/1
2 AEROSOL RESPIRATORY (INHALATION) 2 TIMES DAILY
Status: DISCONTINUED | OUTPATIENT
Start: 2021-11-13 | End: 2021-11-22 | Stop reason: HOSPADM

## 2021-11-13 RX ORDER — MIRTAZAPINE 15 MG/1
15 TABLET, FILM COATED ORAL NIGHTLY
Status: DISCONTINUED | OUTPATIENT
Start: 2021-11-13 | End: 2021-11-22 | Stop reason: HOSPADM

## 2021-11-13 RX ORDER — SODIUM CHLORIDE 9 MG/ML
INJECTION, SOLUTION INTRAVENOUS CONTINUOUS
Status: DISCONTINUED | OUTPATIENT
Start: 2021-11-13 | End: 2021-11-14 | Stop reason: SDUPTHER

## 2021-11-13 RX ORDER — ONDANSETRON 2 MG/ML
4 INJECTION INTRAMUSCULAR; INTRAVENOUS ONCE
Status: COMPLETED | OUTPATIENT
Start: 2021-11-13 | End: 2021-11-13

## 2021-11-13 RX ORDER — LEVOTHYROXINE SODIUM 0.03 MG/1
75 TABLET ORAL DAILY
Status: DISCONTINUED | OUTPATIENT
Start: 2021-11-13 | End: 2021-11-22 | Stop reason: HOSPADM

## 2021-11-13 RX ORDER — ATORVASTATIN CALCIUM 40 MG/1
40 TABLET, FILM COATED ORAL DAILY
Status: DISCONTINUED | OUTPATIENT
Start: 2021-11-13 | End: 2021-11-22 | Stop reason: HOSPADM

## 2021-11-13 RX ORDER — FERROUS SULFATE 325(65) MG
325 TABLET ORAL 2 TIMES DAILY WITH MEALS
Status: DISCONTINUED | OUTPATIENT
Start: 2021-11-13 | End: 2021-11-22 | Stop reason: HOSPADM

## 2021-11-13 RX ORDER — ACETAMINOPHEN 325 MG/1
650 TABLET ORAL EVERY 6 HOURS PRN
Status: DISCONTINUED | OUTPATIENT
Start: 2021-11-13 | End: 2021-11-22 | Stop reason: HOSPADM

## 2021-11-13 RX ORDER — PANTOPRAZOLE SODIUM 40 MG/10ML
40 INJECTION, POWDER, LYOPHILIZED, FOR SOLUTION INTRAVENOUS 2 TIMES DAILY
Status: DISCONTINUED | OUTPATIENT
Start: 2021-11-13 | End: 2021-11-22 | Stop reason: HOSPADM

## 2021-11-13 RX ORDER — SODIUM CHLORIDE 0.9 % (FLUSH) 0.9 %
5-40 SYRINGE (ML) INJECTION PRN
Status: DISCONTINUED | OUTPATIENT
Start: 2021-11-13 | End: 2021-11-22 | Stop reason: HOSPADM

## 2021-11-13 RX ADMIN — MORPHINE SULFATE 2 MG: 2 INJECTION, SOLUTION INTRAMUSCULAR; INTRAVENOUS at 03:46

## 2021-11-13 RX ADMIN — SODIUM CHLORIDE: 9 INJECTION, SOLUTION INTRAVENOUS at 18:26

## 2021-11-13 RX ADMIN — ONDANSETRON 4 MG: 2 INJECTION INTRAMUSCULAR; INTRAVENOUS at 03:47

## 2021-11-13 RX ADMIN — SODIUM CHLORIDE, PRESERVATIVE FREE 10 ML: 5 INJECTION INTRAVENOUS at 20:06

## 2021-11-13 RX ADMIN — SODIUM CHLORIDE 1000 ML: 9 INJECTION, SOLUTION INTRAVENOUS at 04:52

## 2021-11-13 RX ADMIN — SODIUM CHLORIDE 8 MG/HR: 9 INJECTION, SOLUTION INTRAVENOUS at 04:52

## 2021-11-13 RX ADMIN — SODIUM CHLORIDE 80 MG: 9 INJECTION, SOLUTION INTRAVENOUS at 04:18

## 2021-11-13 RX ADMIN — PANTOPRAZOLE SODIUM 40 MG: 40 INJECTION, POWDER, FOR SOLUTION INTRAVENOUS at 20:04

## 2021-11-13 ASSESSMENT — ENCOUNTER SYMPTOMS
DIARRHEA: 0
ALLERGIC/IMMUNOLOGIC NEGATIVE: 1
BLOOD IN STOOL: 1
NAUSEA: 0
RECTAL PAIN: 0
VOMITING: 0
ABDOMINAL PAIN: 1
SHORTNESS OF BREATH: 1
ABDOMINAL DISTENTION: 0
EYES NEGATIVE: 1
CONSTIPATION: 0

## 2021-11-13 ASSESSMENT — PAIN SCALES - GENERAL: PAINLEVEL_OUTOF10: 10

## 2021-11-13 NOTE — ED PROVIDER NOTES
EMERGENCY DEPARTMENT ENCOUNTER      CHIEF COMPLAINT:   Abdominal pain  Rectal bleeding    HPI: Patric Gilmore is a [de-identified] y.o. female who presents to the emergency department, via EMS, from [facility] for evaluation of rectal bleeding. Per EMS, the nurses at [facility] reported that the patient was passing multiple large clots from her rectal area. The patient states that she has been bleeding intermittently for several days. She complains of generalized abdominal pain. She states it is achy. It is associated with nausea but no vomiting. Symptoms have been intermittent. There are no exacerbating or alleviating factors. The patient has a history of rectal bleeding with acute blood loss anemia requiring transfusion for which she was admitted  to this facility in October 2021. She was on Eliquis at that time for atrial fibrillation which appears to have been discontinued. She was seen in consult by Dr. Danny Severino of GI. She declined an EGD at that hospitalization. She was agreeable with comfort measures and blood transfusions as needed. She denies fevers, chills, chest pain, shortness of breath or any other complaints. REVIEW OF SYSTEMS:   Constitutional:  Denies fever or chills  Eyes:  Denies change in visual acuity  HENT:  Denies nasal congestion or sore throat  Respiratory:  Denies cough or shortness of breath  Cardiovascular:  Denies chest pain or edema  GI: See HPI  :  Denies dysuria  Musculoskeletal:  Denies back pain or joint pain  Integument:  Denies rash  Neurologic:  Denies headache, focal weakness or sensory changes  \"Remaining review of systems reviewed and negative. I have reviewed the nursing triage documentation and agree unless otherwise noted below. \"      PAST MEDICAL HISTORY:   Past Medical History:   Diagnosis Date    ASHD (arteriosclerotic heart disease)     Bilateral edema of lower extremity 12/16/2015    CHF (congestive heart failure) (HCC)     COPD [facility]chronic obstructive pulmonary disease) ([facility] [address]     Depression     Hypertension     MRSA (methicillin resistant staph aureus) culture positive 2021    Nasal    MRSA (methicillin resistant staph aureus) culture positive 2021    NASAL       CURRENT MEDICATIONS:   Home medications reviewed. SURGICAL HISTORY:   Past Surgical History:   Procedure Laterality Date    ANUS SURGERY      fistula repair    APPENDECTOMY      HYSTERECTOMY      TONSILLECTOMY      UPPER GASTROINTESTINAL ENDOSCOPY N/A 2020    EGD BIOPSY performed by Tom José MD at [facility] ENDOSCOPY       FAMILY HISTORY:   Family History   Problem Relation Age of Onset    High Blood Pressure Mother     Heart Disease Mother     Early Death Father     Substance Abuse Father     Diabetes Sister        SOCIAL HISTORY:   Social History     Socioeconomic History    Marital status:      Spouse name: Luis Felipe Engel Number of children: 3    Years of education: Not on file    Highest education level: Not on file   Occupational History    Not on file   Tobacco Use    Smoking status: Former Smoker     Packs/day: 1.00     Types: Cigarettes     Quit date: 2010     Years since quittin.2    Smokeless tobacco: Never Used   Substance and Sexual Activity    Alcohol use: Yes     Alcohol/week: 0.0 standard drinks     Comment: wine twice a year    Drug use: No    Sexual activity: Yes     Partners: Male   Other Topics Concern    Not on file   Social History Narrative    Not on file     Social Determinants of Health     Financial Resource Strain:     Difficulty of Paying Living Expenses: Not on file   Food Insecurity:     Worried About Running Out of Food in the Last Year: Not on file    Cuate of Food in the Last Year: Not on file   Transportation Needs:     Lack of Transportation (Medical): Not on file    Lack of Transportation (Non-Medical):  Not on file   Physical Activity:     Days of Exercise per Week: Not on file    Minutes of Exercise per Session: Not on file   Stress:     Feeling of Stress : Not on file   Social Connections:     Frequency of Communication with Friends and Family: Not on file    Frequency of Social Gatherings with Friends and Family: Not on file    Attends Latter-day Services: Not on file    Active Member of Clubs or Organizations: Not on file    Attends Club or Organization Meetings: Not on file    Marital Status: Not on file   Intimate Partner Violence:     Fear of Current or Ex-Partner: Not on file    Emotionally Abused: Not on file    Physically Abused: Not on file    Sexually Abused: Not on file   Housing Stability:     Unable to Pay for Housing in the Last Year: Not on file    Number of [place] in the Last Year: Not on file    Unstable Housing in the Last Year: Not on file       ALLERGIES: Codeine, Moxifloxacin, Oxycodone, Pcn [penicillins], and Warfarin and related    PHYSICAL EXAM:  VITAL SIGNS:   ED Triage Vitals [11/13/21 0247]   Enc Vitals Group      /68      Pulse 99      Resp 18      Temp 98.1 °F (36.7 °C)      Temp Source Oral      SpO2 95 %      Weight       Height       Head Circumference       Peak Flow       Pain Score       Pain Loc       Pain Edu? Excl. in [address]? Constitutional:  Non-toxic appearance  HENT: Normocephalic, Atraumatic  Eyes:  PERRL, Conjunctiva normal, No discharge. Neck: Normal range of motion, No tenderness, Supple, No stridor, No lymphadenopathy. Cardiovascular:  Normal heart rate, Normal rhythm  Pulmonary/Chest:  Normal breath sounds, No respiratory distress, No wheezing  Abdomen:   Bowel sounds normal, Soft, generalized mild tenderness to palpation with no guarding or rebound, No masses, No pulsatile masses  Back:  No tenderness, No CVA tenderness  Extremities:  Normal range of motion, Intact distal pulses, No edema, No tenderness  Neurologic:  Alert & oriented x 3, Normal motor function, Sensation intact to light touch throughout, No focal deficits  Skin: Warm, Dry, No erythema, No rash      EKG Interpretation  None    Cardiac Monitor Strip Interpretation  Interpreted by me  Monitor strip interpreted for greater than 10 seconds  Rhythm: normal sinus  Rate: normal  Ectopy: none  ST Segments: normal      Radiology / Procedures:  CT ABDOMEN PELVIS WO CONTRAST Additional Contrast? None (Preliminary result)  Result time 11/13/21 04:41:42  [facility] [place] [facility]  Preliminary result by Rashmi Billy MD (11/13/21 04:41:42)                Impression:    1. No acute intra-abdominal abnormality to explain patient's symptoms. Specifically, no obvious rectal mass on noncontrast imaging. 2. Stable left base opacity with improving right base opacities since   September 30, 2021.   3. Severe diverticulosis. 4. Severe atherosclerosis. 5. IVC filter in place. Labs Reviewed   CBC WITH AUTO DIFFERENTIAL - Abnormal; Notable for the following components:       Result Value    WBC 13.7 (*)     RBC 3.59 (*)     Hemoglobin 9.6 (*)     Hematocrit 32.5 (*)     MCH 26.7 (*)     MCHC 29.5 (*)     RDW 15.9 (*)     MPV 11.4 (*)     Segs Relative 85.4 (*)     Lymphocytes % 8.2 (*)     Monocytes % 5.2 (*)     Immature Neutrophil % 0.7 (*)     All other components within normal limits   COMPREHENSIVE METABOLIC PANEL - Abnormal; Notable for the following components:    Chloride 95 (*)     CREATININE 1.9 (*)     Glucose 129 (*)     Albumin 3.0 (*)     GFR Non- 25 (*)     GFR  31 (*)     All other components within normal limits   PROTIME/INR & PTT   TYPE AND SCREEN       ED COURSE & MEDICAL DECISION MAKING:  Pertinent Labs & Imaging studies reviewed. (See chart for details)  On exam, the patient is afebrile and nontoxic appearing. She is hemodynamically stable and neurologically intact.   Labs are obtained and are significant for leukocytosis, anemia with a hemoglobin of 9.6 which has improved from 7.3 on 10/4/2021, and an acute kidney injury with a BUN of 21 and a creatinine of 1.9 (baseline 1.0). There are no other clinically significant lab abnormalities. CTA of the abdomen pelvis was ordered, but the patient's renal function was not good enough to have IV contrast.  CT abdomen and pelvis without contrast was obtained and shows no acute intra-abdominal abnormality. There is no obvious rectal mass on noncontrast imaging. There is a stable left base opacity with improving right base opacities as compared to September 30 of 2021. There is severe diverticulosis. There is severe atherosclerosis. There is an IVC filter in place. The patient was treated with morphine and Zofran with improvement of pain. She was given a Protonix bolus and drip on arrival per history prior to the results. The patient had no active rectal bleeding on arrival and had no further episodes while in the emergency department. The etiology of the rectal bleeding is unclear. It seems unlikely to be secondary to an upper GI bleed given that she is not having any hematemesis and that her BUN to creatinine ratio is less than 36. The source of the bleeding is not identified at this time. The etiology of her SALTY is also unclear. It may be due to dehydration versus another process. I recommended admission to the hospital and the patient was agreeable. I discussed the case with the patient's GI doctor, Dr. Etienne Foreman, who agrees that this is unlikely to be an upper GI bleed and states that we can discontinue the Protonix drip. I discussed the case with the hospitalist who will admit the patient. Clinical Impression:  1. Rectal bleeding    2. SALTY (acute kidney injury) ([facility] [address]    3. Anemia, unspecified type        Comment: Please note this report has been produced using speech recognition software and may contain errors related to that system including errors in grammar, punctuation, and spelling, as well as words and phrases that may be inappropriate. If there are any questions or concerns please feel free to contact the dictating provider for clarification.        Carlos Ash MD  11/13/21 7076

## 2021-11-13 NOTE — H&P
History and Physical 21        NAME: Connie Angel  : 1940  MRN: [id number]      Assessment/Plan:  Connie Angel is a [de-identified] y.o. female with a history of CAD, CHF, PAF, COPD, chronic respiratory failure on home oxygen, hypertension who presented to [facility] 2021 with rectal bleeding intermittently x 2 weeks. Labs showed hgb 9.6, WBC 13.7, creatinine 1.9, glucose 129. CT A/P showed no acute process and severe diverticulosis with IVC filter in place. Admitted for GI evaluation and monitoring. 1. Rectal Bleeding: intermittent x 2 weeks with dark blood and clots. Recent admit -10/4/21 with GI bleed. GOC discussed. Eliquis was stopped and pt was transfused. Hgb improved. It does not appear that patient had endoscopy at that time although GI did follow. Hemoglobin overall appears to be stable actually improved from recent admission at 9.6. CT A/P showed severe diverticulosis is most likely contributing. Serial H&H's.  IV PPI, GI consult, and transfuse as needed. 2. Acute Blood Loss Anemia: BL Hgb ~7-8 and stable at 9.6 in the setting above. Required 2 units of PRBC's on recent. Will monitor and transfuse as needed. GI evaluation as above. 3. SALTY: Creatinine 1.1 on previous discharge on 10/2021 and up to 1.9 in ED. Suspect related to GI bleed as well as home diuretics which will be held. Gentle IV hydration while n.p.o. Will avoid nephrotoxic medication. 4. Chronic respiratory failure: Patient states she is on 2 to 3 L at home but previous documentation indicates higher and patient is on 4 L nasal cannula at emergency room. We will continue titrate as needed. 5. PAF: Per history, appears to be in sinus rhythm on admit. Stable rate. Patient not on anticoagulation or beta-blocker at this time. Will monitor on telemetry. 6. COPD: per history, no evidence of exacerbation. Bronchodilators as needed. Continue home inhalers. 7. Leukocytosis: Possibly reactive in nature. No obvious infection at this juncture but will monitor and obtain an additional work-up as needed. 8. Chronic diastolic heart failure: EF of 55% on TTE in 9/2021. No evidence of exacerbation. Holding home Lasix while patient n.p.o. and with SALTY. Provide IV diuresis as needed. 9. Obesity: BMI 39.87, counseled on lifestyle modifications. 10. DVT Prophylaxis: SCDs. 11. Code Status: DNR CCA confirmed on admission with patient as well as last admission      Current living situation: Home with   Expected Disposition: TBD  Estimated discharge date: Possibly 11/14-11/15/2021 if hemoglobin remains stable and no work-up indicated per GI after evaluation      Chief Complaint:    Rectal bleeding    History of Present Illness:    Patient is a 7year-old female with past medical history of multiple medical issues including CAD, CHF, COPD, chronic respiratory failure on home oxygen, hypertension, and recent admission for anemia suspected secondary to GI bleed who presented with intermittent rectal bleeding for the past 2 weeks. Patient states bleeding is dark blood with clots. It does not occur with every bowel movement. Some generalized abdominal discomfort. No nausea or vomiting. Denies any fever, chills, chest pain. Chronic respiratory failure with occasional dyspnea. No headache or blurred vision. Denies any dizziness. Patient was on Eliquis which was stopped on previous admission. Patient did require 2 units of packed red blood cells at that time and baseline hemoglobin appears to be around 7-8. Patient not currently on any anticoagulation. Does not appear that any endoscopy was completed on previous admission. ROS:    Review of Systems   Constitutional: Positive for fatigue. HENT: Negative. Eyes: Negative. Respiratory: Positive for shortness of breath. Cardiovascular: Negative. Gastrointestinal: Positive for abdominal pain and blood in stool.  Negative for abdominal distention, constipation, diarrhea, nausea, rectal pain and vomiting. Endocrine: Negative. Genitourinary: Negative. Musculoskeletal: Negative. Skin: Negative. Allergic/Immunologic: Negative. Neurological: Positive for weakness. Hematological: Negative. Psychiatric/Behavioral: Negative. Past Medical, Surgical, Social, Family History:   Past Medical History:   Diagnosis Date    ASHD (arteriosclerotic heart disease)     Bilateral edema of lower extremity 2015    [facility]     [facility]) ([facility] [address]     Depression     Hypertension     MRSA (methicillin resistant staph aureus) culture positive 2021    Nasal    MRSA (methicillin resistant staph aureus) culture positive 2021    NASAL     Past Surgical History:   Procedure Laterality Date    ANUS SURGERY      fistula repair    APPENDECTOMY      HYSTERECTOMY      TONSILLECTOMY      UPPER GASTROINTESTINAL ENDOSCOPY N/A 2020    EGD BIOPSY performed by [facility] Marital status:      Spouse name: Leilani Cabrera Number of children: 3    Years of education: Not on file    Highest education level: Not on file   Occupational History    Not on file   Tobacco Use    Smoking status: Former Smoker     Packs/day: 1.00     Types: Cigarettes     Quit date: 2010     Years since quittin.2    Smokeless tobacco: Never Used   Substance and Sexual Activity    Alcohol use:  Yes     Alcohol/week: 0.0 standard drinks     Comment: wine twice a year    Drug use: No    Sexual activity: Yes     Partners: Male   Other Topics Concern    Not on file   Social History Narrative    Not on file     Social Determinants of Health     Financial Resource Strain:     Difficulty of Paying Living Expenses: Not on file   Food Insecurity:     Worried About Running Out of Food in the Last Year: Not on file    Cuate of Food in the Last Year: Not on file   Transportation Needs:     Lack of Transportation (Medical): Not on file    Lack of Transportation (Non-Medical): Not on file   Physical Activity:     Days of Exercise per Week: Not on file    Minutes of Exercise per Session: Not on file   Stress:     Feeling of Stress : Not on file   Social Connections:     Frequency of Communication with Friends and Family: Not on file    Frequency of Social Gatherings with Friends and Family: Not on file    Attends Mormonism Services: Not on file    Active Member of [address] [place] [address] [employer] or Organizations: Not on file    Attends Club or Organization Meetings: Not on file    Marital Status: Not on file   Intimate Partner Violence:     Fear of Current or Ex-Partner: Not on file    Emotionally Abused: Not on file    Physically Abused: Not on file    Sexually Abused: Not on file   Housing Stability:     Unable to Pay for Housing in the Last Year: Not on file    Number of [place] in the Last Year: Not on file    Unstable Housing in the Last Year: Not on file     Family History   Problem Relation Age of Onset    High Blood Pressure Mother     Heart Disease Mother     Early Death Father     Substance Abuse Father     Diabetes Sister        Home Medications:  Prior to Admission medications    Medication Sig Start Date End Date Taking?  Authorizing Provider   OXYGEN Inhale 5 L into the lungs continuous 10/4/21   Mira Maldonado MD   furosemide (LASIX) 40 MG tablet Take 1 tablet by mouth daily 10/4/21   Mira Maldonado MD   mirtazapine (REMERON) 15 MG tablet Take 1 tablet by mouth nightly 11/30/20   Daniela Patel MD   pantoprazole (PROTONIX) 40 MG tablet Take 1 tablet by mouth 2 times daily (before meals) 11/30/20 12/30/20  Daniela Patel MD   Multiple Vitamin (MULTIVITAMIN) TABS tablet Take 1 tablet by mouth daily 12/1/20   Daniela Patel MD   fluticasone-vilanterol (BREO ELLIPTA) 100-25 MCG/INH AEPB inhaler Inhale 1 puff into the lungs daily    Historical Provider, MD   linaclotide (LINZESS) 145 MCG capsule Take 145 mcg by mouth every morning (before breakfast)    Historical Provider, MD   calcium citrate-vitamin D (CITRICAL + D) 315-250 MG-UNIT TABS per tablet Take 1 tablet by mouth 2 times daily (with meals)    Historical Provider, MD   albuterol sulfate  (90 Base) MCG/ACT inhaler Inhale 2 puffs into the lungs every 4 hours as needed for Wheezing    Historical Provider, MD   ondansetron (ZOFRAN ODT) 4 MG disintegrating tablet Take 1 tablet by mouth every 8 hours as needed for Nausea 1/12/17   [facility], DO   albuterol (PROVENTIL) (2.5 MG/3ML) 0.083% nebulizer solution Take 3 mLs by nebulization every 6 hours as needed for Wheezing 1/12/17   [facility], DO   potassium chloride (KLOR-CON M) 10 MEQ extended release tablet Take 10 mEq by mouth daily    Historical Provider, MD   levothyroxine (SYNTHROID) 75 MCG tablet Take 75 mcg by mouth Daily    Historical Provider, MD   Acetaminophen (TYLENOL PO) Take 650 mg by mouth every 6 hours as needed (PAIN OR FEVER)     Historical Provider, MD   atorvastatin (LIPITOR) 40 MG tablet Take 40 mg by mouth daily    Historical Provider, MD   lactobacillus (CULTURELLE) capsule Take 1 capsule by mouth daily (with breakfast) 6/26/16   Marita Aranda, DO   ferrous sulfate 325 (65 FE) MG tablet Take 1 tablet by mouth every 12 hours With a meal. 9/11/15   Estrella Robledo MD         Physical Exam:     /77   Pulse 86   Temp 98.1 °F (36.7 °C) (Oral)   Resp 18   SpO2 96%     General: NAD, AAOx4  Eyes: EOMI  ENT: neck supple  Cardiovascular: RRR, no murmurs  Respiratory: Clear to auscultation BL, on 4L NC  Gastrointestinal: Soft, non tender, ND, +BS  Genitourinary: no suprapubic tenderness  Musculoskeletal: No edema, moves all ext  Skin: warm, dry  Neuro: Alert. Generalized weakness  Psych: Mood appropriate.          Labs, Imaging, and Studies reviewed:    CT ABDOMEN PELVIS WO CONTRAST Additional Contrast? None    Result Date: 11/13/2021  1. No acute intra-abdominal abnormality to explain patient's symptoms. Specifically, no obvious rectal mass on noncontrast imaging. 2. Stable left base opacity with improving right base opacities since September 30, 2021. 3. Severe diverticulosis. 4. Severe atherosclerosis. 5. IVC filter in place.        CBC:   Recent Labs     11/13/21 0318   WBC 13.7*   HGB 9.6*        BMP:    Recent Labs     11/13/21 0318      K 4.9   CL 95*   CO2 32   BUN 21   CREATININE 1.9*   GLUCOSE 129*     Hepatic:   Recent Labs     11/13/21 0318   AST 31   ALT 11   BILITOT 0.2   ALKPHOS 79     Lipids:   Lab Results   Component Value Date    CHOL 114 01/02/2019    HDL 58 01/02/2019    TRIG 58 01/02/2019     Hemoglobin A1C:   Lab Results   Component Value Date    LABA1C 4.8 06/17/2016     TSH: No results found for: TSH    Personally reviewed labs, CT A/P, and ED note    Electronically signed by Edwin Lindsey MD on 11/13/2021 at 8:51 AM

## 2021-11-13 NOTE — ED TRIAGE NOTES
Ems states that the [facility] view pulled multiple large clots from patients rectal area tonight. Patient has had a slow rectal bleed since oct 4 th. Upon inspection rolling patient laterally, no bleeding or clot remnants noted. Patient reports she has been bleeding for a long while.

## 2021-11-13 NOTE — ED NOTES
Patient checked and asked if she needed to use the rest room, patient has a dry depends on.        Aruna Ramirez RN  11/13/21 2843

## 2021-11-13 NOTE — ED NOTES
Pts  [place] [facility] would like to be called on cell phone when pt gets a room.       Martie Kawasaki, RN  11/13/21 7003

## 2021-11-14 LAB
HCT VFR BLD CALC: 23.8 % (ref 37–47)
HCT VFR BLD CALC: 24.5 % (ref 37–47)
HCT VFR BLD CALC: 27 % (ref 37–47)
HEMOGLOBIN: 6.7 GM/DL (ref 12.5–16)
HEMOGLOBIN: 6.8 GM/DL (ref 12.5–16)
HEMOGLOBIN: 7.5 GM/DL (ref 12.5–16)

## 2021-11-14 PROCEDURE — 6360000002 HC RX W HCPCS: Performed by: FAMILY MEDICINE

## 2021-11-14 PROCEDURE — 85045 AUTOMATED RETICULOCYTE COUNT: CPT

## 2021-11-14 PROCEDURE — 83540 ASSAY OF IRON: CPT

## 2021-11-14 PROCEDURE — 85018 HEMOGLOBIN: CPT

## 2021-11-14 PROCEDURE — C9113 INJ PANTOPRAZOLE SODIUM, VIA: HCPCS | Performed by: FAMILY MEDICINE

## 2021-11-14 PROCEDURE — 36415 COLL VENOUS BLD VENIPUNCTURE: CPT

## 2021-11-14 PROCEDURE — 2580000003 HC RX 258: Performed by: INTERNAL MEDICINE

## 2021-11-14 PROCEDURE — 2140000000 HC CCU INTERMEDIATE R&B

## 2021-11-14 PROCEDURE — 85014 HEMATOCRIT: CPT

## 2021-11-14 PROCEDURE — 76937 US GUIDE VASCULAR ACCESS: CPT

## 2021-11-14 PROCEDURE — 6370000000 HC RX 637 (ALT 250 FOR IP): Performed by: FAMILY MEDICINE

## 2021-11-14 PROCEDURE — 2500000003 HC RX 250 WO HCPCS: Performed by: INTERNAL MEDICINE

## 2021-11-14 PROCEDURE — 82728 ASSAY OF FERRITIN: CPT

## 2021-11-14 PROCEDURE — 87040 BLOOD CULTURE FOR BACTERIA: CPT

## 2021-11-14 PROCEDURE — 82607 VITAMIN B-12: CPT

## 2021-11-14 PROCEDURE — 83550 IRON BINDING TEST: CPT

## 2021-11-14 PROCEDURE — 36430 TRANSFUSION BLD/BLD COMPNT: CPT

## 2021-11-14 PROCEDURE — C1751 CATH, INF, PER/CENT/MIDLINE: HCPCS

## 2021-11-14 PROCEDURE — 82746 ASSAY OF FOLIC ACID SERUM: CPT

## 2021-11-14 PROCEDURE — P9016 RBC LEUKOCYTES REDUCED: HCPCS

## 2021-11-14 RX ORDER — SODIUM CHLORIDE 9 MG/ML
INJECTION, SOLUTION INTRAVENOUS PRN
Status: DISCONTINUED | OUTPATIENT
Start: 2021-11-14 | End: 2021-11-22 | Stop reason: HOSPADM

## 2021-11-14 RX ORDER — SODIUM CHLORIDE 9 MG/ML
INJECTION, SOLUTION INTRAVENOUS CONTINUOUS
Status: DISCONTINUED | OUTPATIENT
Start: 2021-11-14 | End: 2021-11-15

## 2021-11-14 RX ORDER — CLINDAMYCIN PHOSPHATE 600 MG/50ML
600 INJECTION INTRAVENOUS EVERY 8 HOURS
Status: DISCONTINUED | OUTPATIENT
Start: 2021-11-14 | End: 2021-11-22 | Stop reason: HOSPADM

## 2021-11-14 RX ORDER — FUROSEMIDE 10 MG/ML
40 INJECTION INTRAMUSCULAR; INTRAVENOUS ONCE
Status: COMPLETED | OUTPATIENT
Start: 2021-11-14 | End: 2021-11-15

## 2021-11-14 RX ADMIN — MIRTAZAPINE 15 MG: 15 TABLET, FILM COATED ORAL at 21:24

## 2021-11-14 RX ADMIN — METRONIDAZOLE 500 MG: 500 INJECTION, SOLUTION INTRAVENOUS at 21:20

## 2021-11-14 RX ADMIN — METRONIDAZOLE 500 MG: 500 INJECTION, SOLUTION INTRAVENOUS at 13:58

## 2021-11-14 RX ADMIN — PANTOPRAZOLE SODIUM 40 MG: 40 INJECTION, POWDER, FOR SOLUTION INTRAVENOUS at 13:19

## 2021-11-14 RX ADMIN — SODIUM CHLORIDE, PRESERVATIVE FREE 10 ML: 5 INJECTION INTRAVENOUS at 21:24

## 2021-11-14 RX ADMIN — SODIUM CHLORIDE: 9 INJECTION, SOLUTION INTRAVENOUS at 13:56

## 2021-11-14 RX ADMIN — CLINDAMYCIN PHOSPHATE 600 MG: 600 INJECTION, SOLUTION INTRAVENOUS at 23:02

## 2021-11-14 RX ADMIN — CLINDAMYCIN PHOSPHATE 600 MG: 600 INJECTION, SOLUTION INTRAVENOUS at 15:49

## 2021-11-14 RX ADMIN — PANTOPRAZOLE SODIUM 40 MG: 40 INJECTION, POWDER, FOR SOLUTION INTRAVENOUS at 21:23

## 2021-11-14 NOTE — ED NOTES
Bed: ED-41  Expected date:   Expected time:   Means of arrival:   Comments:  t2     Fernanda Martinez RN  11/13/21 [id number]

## 2021-11-14 NOTE — ED NOTES
Lab called this RN regarding critical value of hgb being 6.7. This RN notified Dr. Michael Robledo.      Dalila Ríos, NICA  11/14/21 6024

## 2021-11-14 NOTE — CONSULTS
IV Consult complete. Arrow Endurance Extended Dwell PIV inserted in LUE Brachial VEssel x1 attempt with ultrasound guidance. Brisk blood return, catheter visualized in vessel. Labs drawn, sent.

## 2021-11-14 NOTE — PROGRESS NOTES
Internal Medicine Daily Progress Note @todate@                    Date of Admission: 11/13/2021  Allergies  Codeine, Moxifloxacin, Oxycodone, Pcn [penicillins], and Warfarin and related    Assessment/Plan    1. Acute GI bleed most likely lower as CT of the abdomen shows severe diverticulosis. White count is high. Some abdominal discomfort-we will add IV antibiotics to the for possibility of diverticulitis as well. Hemodynamically stable GI-requested. Continue to monitor hemoglobin and transfuse as needed. IV antibiotics patient allergic to penicillin    Further drop in H&H plan to transfuse 2 more units GI has been consulted I will also involve general surgery CT shows severe diverticulosis most likely is bleeding from her diverticular disease. 2.  SALTY: Creatinine is 1.9 baseline is 1.1 most likely  dehydration. IV fluids. 3.  History of COPD stable at the present time no breathing issues noted. Today for monitoring continue medication. 4.  Stroke patient is unable to move her right leg seems to be an old complaint. Patient is a nursing home resident and is bedbound except for more than a chair for patient.     Patient is DNR CCA patient states that she states the nursing home       Patient Active Problem List    Diagnosis Date Noted    Acute on chronic diastolic congestive heart failure ([address] 06/17/2016    Hypoxia 04/14/2016    Moderate COPD (chronic obstructive pulmonary disease) ([address] 02/01/2016    Coronary atherosclerosis of native coronary artery 09/01/2015    Hypertension     Morbid obesity due to excess calories ([address] 04/18/2016    Depression     Bilateral edema of lower extremity 12/16/2015    Anemia 09/07/2015    GI bleed 09/30/2021    Other chest pain 09/10/2021    GIB (gastrointestinal bleeding) 11/24/2020    Chronic diastolic CHF (congestive heart failure) ([address] 12/22/2017    Pneumonia 11/20/2017    COPD with acute exacerbation ([address] 04/14/2016  COPD exacerbation ([facility] 2016    Chronic hypoxemic respiratory failure ([facility] 2015                      Subjective:     Chief Complaint   Patient presents with    Rectal Bleeding     Ms. Joss Wing Complains of having blood from her rectum. Also complaining of some abdominal discomfort. Patient is a nursing home resident. Apparently for the last day. Denies any fever chills nausea vomiting or diarrhea no abdominal discomfort. No chest pain shortness of breath palpitation or any other complaints. Objective:   TEMPERATURE:  Current - Temp: 98.2 °F (36.8 °C); Max - Temp  Av.6 °F (37 °C)  Min: 98.2 °F (36.8 °C)  Max: 99 °F (37.2 °C)  RESPIRATIONS RANGE: Resp  Av  Min: 18  Max: 18  PULSE RANGE: Pulse  Av.1  Min: 77  Max: 85  BLOOD PRESSURE RANGE:  Systolic (37CYA), UES:287 , Min:93 , RVE:460   ; Diastolic (97TJE), XSB:14, Min:42, Max:64    PULSE OXIMETRY RANGE: SpO2  Av.3 %  Min: 94 %  Max: 100 %  24HR INTAKE/OUTPUT:  No intake or output data in the 24 hours ending 21 1204  No intake or output data in the 24 hours ending 21 1204           Physical Exam:  eneral appearance: Female in no acute distress poor oral hygiene dry mouth. HEENT dry mouth PERRLA EOMI  Neck: No neck rigidity no carotid bruits  Lungs: Clear to ascultation, bilaterally without Rales/Wheezes/Rhonchi with good respiratory effort. Heart: Regular rate and rhythm with Normal P0/U9 soft systolic murmur no radiation noted. A  Abdomen: Nondistended soft however there is tenderness across abdomen bowel sounds are positive. No rebound appreciated. Extremities: No clubbing, cyanosis, or edema bilaterally. Full range of motion without deformity and normal gait intact. Skin: Skin color, texture, turgor normal. No rashes or lesions. Neurologic: Alert and oriented X 3, seems competent to make her decisions.   Motor function seems to be equal bilaterally except for the right leg she is unable to raise her leg. Neuro exam is somewhat limited cranial nerves not done however patient has no obvious stroke or deficit noted. Mental status: Alert, oriented, thought content appropriate. Labs:  CBC:   Lab Results   Component Value Date    WBC 13.7 11/13/2021    RBC 3.59 11/13/2021    HGB 7.5 11/14/2021    HCT 27.0 11/14/2021    MCV 90.5 11/13/2021    MCH 26.7 11/13/2021    MCHC 29.5 11/13/2021    RDW 15.9 11/13/2021     11/13/2021    MPV 11.4 11/13/2021     CMP:    Lab Results   Component Value Date     11/13/2021    K 4.9 11/13/2021    CL 95 11/13/2021    CO2 32 11/13/2021    BUN 21 11/13/2021    CREATININE 1.9 11/13/2021    GFRAA 31 11/13/2021    LABGLOM 25 11/13/2021    GLUCOSE 129 11/13/2021    PROT 6.7 11/13/2021    LABALBU 3.0 11/13/2021    CALCIUM 8.9 11/13/2021    BILITOT 0.2 11/13/2021    ALKPHOS 79 11/13/2021    AST 31 11/13/2021    ALT 11 11/13/2021     No results for input(s): TROPONINT in the last 72 hours. Lab Results   Component Value Date    TSHHS 0.233 09/12/2021        Scheduled Med:   sodium chloride flush  5-40 mL IntraVENous 2 times per day    pantoprazole  40 mg IntraVENous BID    atorvastatin  40 mg Oral Daily    calcium-cholecalciferol  1 tablet Oral BID WC    ferrous sulfate  325 mg Oral BID WC    budesonide-formoterol  2 puff Inhalation BID    levothyroxine  75 mcg Oral Daily    mirtazapine  15 mg Oral Nightly    multivitamin  1 tablet Oral Daily         Infusion :   sodium chloride      sodium chloride 50 mL/hr at 11/13/21 1826               Consultants:    IP CONSULT TO GI  IP CONSULT TO HOSPITALIST  IP CONSULT TO IV TEAM    Code Status: DNR-CCA      I have explained to the patient and discussed with him/her the treatment plan.           Electronically signed by Shaji Singh MD on 11/14/2021 at 12:04 PM    Time spent roughly >30 minute in interviewing patient performing medical examination, communicating with relevant medical staff and consultants including documentation .

## 2021-11-14 NOTE — PROGRESS NOTES
Unsuccessful attempt of lab draw. Had another nurse attempt and was unable to draw labs. Will try to call lab to get labs drawn.

## 2021-11-14 NOTE — ED NOTES
Report called to [facility].       Filiberto Coon RN  11/14/21 0177

## 2021-11-14 NOTE — ED NOTES
Dr. Kendal Robledo sent [employer] at this time requesting ice chips per pt request.        Spring Pearce RN  11/14/21 4475

## 2021-11-15 LAB
ALBUMIN SERPL-MCNC: 2.7 GM/DL (ref 3.4–5)
ANION GAP SERPL CALCULATED.3IONS-SCNC: 8 MMOL/L (ref 4–16)
BUN BLDV-MCNC: 26 MG/DL (ref 6–23)
CALCIUM SERPL-MCNC: 7.8 MG/DL (ref 8.3–10.6)
CHLORIDE BLD-SCNC: 105 MMOL/L (ref 99–110)
CO2: 30 MMOL/L (ref 21–32)
CREAT SERPL-MCNC: 1 MG/DL (ref 0.6–1.1)
FERRITIN: 74 NG/ML (ref 15–150)
FOLATE: 17.2 NG/ML (ref 3.1–17.5)
GFR AFRICAN AMERICAN: >60 ML/MIN/1.73M2
GFR NON-AFRICAN AMERICAN: 53 ML/MIN/1.73M2
GLUCOSE BLD-MCNC: 85 MG/DL (ref 70–99)
HCT VFR BLD CALC: 30.5 % (ref 37–47)
HEMOGLOBIN: 9.1 GM/DL (ref 12.5–16)
IRON: 18 UG/DL (ref 37–145)
PCT TRANSFERRIN: 11 % (ref 10–44)
PHOSPHORUS: 3.9 MG/DL (ref 2.5–4.9)
POTASSIUM SERPL-SCNC: 4 MMOL/L (ref 3.5–5.1)
RETICULOCYTE COUNT PCT: 3.2 % (ref 0.2–2.2)
SODIUM BLD-SCNC: 143 MMOL/L (ref 135–145)
TOTAL IRON BINDING CAPACITY: 171 UG/DL (ref 250–450)
UNSATURATED IRON BINDING CAPACITY: 153 UG/DL (ref 110–370)
VITAMIN B-12: >2000 PG/ML (ref 211–911)

## 2021-11-15 PROCEDURE — 94640 AIRWAY INHALATION TREATMENT: CPT

## 2021-11-15 PROCEDURE — C9113 INJ PANTOPRAZOLE SODIUM, VIA: HCPCS | Performed by: FAMILY MEDICINE

## 2021-11-15 PROCEDURE — 6360000002 HC RX W HCPCS: Performed by: INTERNAL MEDICINE

## 2021-11-15 PROCEDURE — 2140000000 HC CCU INTERMEDIATE R&B

## 2021-11-15 PROCEDURE — 6370000000 HC RX 637 (ALT 250 FOR IP): Performed by: FAMILY MEDICINE

## 2021-11-15 PROCEDURE — 85018 HEMOGLOBIN: CPT

## 2021-11-15 PROCEDURE — P9016 RBC LEUKOCYTES REDUCED: HCPCS

## 2021-11-15 PROCEDURE — 6360000002 HC RX W HCPCS: Performed by: FAMILY MEDICINE

## 2021-11-15 PROCEDURE — 80069 RENAL FUNCTION PANEL: CPT

## 2021-11-15 PROCEDURE — 85014 HEMATOCRIT: CPT

## 2021-11-15 PROCEDURE — 2500000003 HC RX 250 WO HCPCS: Performed by: INTERNAL MEDICINE

## 2021-11-15 PROCEDURE — 2580000003 HC RX 258: Performed by: INTERNAL MEDICINE

## 2021-11-15 PROCEDURE — 36415 COLL VENOUS BLD VENIPUNCTURE: CPT

## 2021-11-15 PROCEDURE — 6370000000 HC RX 637 (ALT 250 FOR IP): Performed by: NURSE PRACTITIONER

## 2021-11-15 RX ORDER — SODIUM CHLORIDE 9 MG/ML
INJECTION, SOLUTION INTRAVENOUS PRN
Status: DISCONTINUED | OUTPATIENT
Start: 2021-11-15 | End: 2021-11-22 | Stop reason: HOSPADM

## 2021-11-15 RX ORDER — POLYETHYLENE GLYCOL 3350 17 G/17G
17 POWDER, FOR SOLUTION ORAL 4 TIMES DAILY
Status: DISCONTINUED | OUTPATIENT
Start: 2021-11-15 | End: 2021-11-17

## 2021-11-15 RX ADMIN — LEVOTHYROXINE SODIUM 75 MCG: 25 TABLET ORAL at 05:48

## 2021-11-15 RX ADMIN — SODIUM CHLORIDE, PRESERVATIVE FREE 10 ML: 5 INJECTION INTRAVENOUS at 21:10

## 2021-11-15 RX ADMIN — Medication 1 TABLET: at 17:14

## 2021-11-15 RX ADMIN — METRONIDAZOLE 500 MG: 500 INJECTION, SOLUTION INTRAVENOUS at 12:27

## 2021-11-15 RX ADMIN — PANTOPRAZOLE SODIUM 40 MG: 40 INJECTION, POWDER, FOR SOLUTION INTRAVENOUS at 21:09

## 2021-11-15 RX ADMIN — MAGESIUM CITRATE 296 ML: 1.75 LIQUID ORAL at 12:26

## 2021-11-15 RX ADMIN — METRONIDAZOLE 500 MG: 500 INJECTION, SOLUTION INTRAVENOUS at 21:15

## 2021-11-15 RX ADMIN — FUROSEMIDE 40 MG: 10 INJECTION, SOLUTION INTRAVENOUS at 02:54

## 2021-11-15 RX ADMIN — CLINDAMYCIN PHOSPHATE 600 MG: 600 INJECTION, SOLUTION INTRAVENOUS at 22:23

## 2021-11-15 RX ADMIN — THERA TABS 1 TABLET: TAB at 10:06

## 2021-11-15 RX ADMIN — CLINDAMYCIN PHOSPHATE 600 MG: 600 INJECTION, SOLUTION INTRAVENOUS at 13:33

## 2021-11-15 RX ADMIN — BUDESONIDE AND FORMOTEROL FUMARATE DIHYDRATE 2 PUFF: 80; 4.5 AEROSOL RESPIRATORY (INHALATION) at 21:32

## 2021-11-15 RX ADMIN — ATORVASTATIN CALCIUM 40 MG: 40 TABLET, FILM COATED ORAL at 10:06

## 2021-11-15 RX ADMIN — SODIUM CHLORIDE, PRESERVATIVE FREE 10 ML: 5 INJECTION INTRAVENOUS at 10:06

## 2021-11-15 RX ADMIN — METRONIDAZOLE 500 MG: 500 INJECTION, SOLUTION INTRAVENOUS at 07:00

## 2021-11-15 RX ADMIN — POLYETHYLENE GLYCOL (3350) 17 G: 17 POWDER, FOR SOLUTION ORAL at 12:26

## 2021-11-15 RX ADMIN — FERROUS SULFATE TAB 325 MG (65 MG ELEMENTAL FE) 325 MG: 325 (65 FE) TAB at 17:14

## 2021-11-15 RX ADMIN — POLYETHYLENE GLYCOL (3350) 17 G: 17 POWDER, FOR SOLUTION ORAL at 21:09

## 2021-11-15 RX ADMIN — POLYETHYLENE GLYCOL (3350) 17 G: 17 POWDER, FOR SOLUTION ORAL at 17:14

## 2021-11-15 RX ADMIN — PANTOPRAZOLE SODIUM 40 MG: 40 INJECTION, POWDER, FOR SOLUTION INTRAVENOUS at 10:06

## 2021-11-15 RX ADMIN — MIRTAZAPINE 15 MG: 15 TABLET, FILM COATED ORAL at 21:09

## 2021-11-15 RX ADMIN — Medication 1 TABLET: at 10:06

## 2021-11-15 RX ADMIN — FERROUS SULFATE TAB 325 MG (65 MG ELEMENTAL FE) 325 MG: 325 (65 FE) TAB at 10:06

## 2021-11-15 ASSESSMENT — PAIN SCALES - GENERAL
PAINLEVEL_OUTOF10: 0

## 2021-11-15 NOTE — CONSULTS
[facility] Gastroenterology  Gastroenterology Consultation    11/15/2021  8:15 AM    Patient:    Carey Louis  : 1940   [de-identified] y.o. MRN: [id number]  Admitted: 2021  2:44 AM ATT: Ron Valerio MD   [id number]  AdmitDx: Rectal bleeding [K62.5]  GIB (gastrointestinal bleeding) [K92.2]  SALTY (acute kidney injury) ([facility] [N17.9]  Anemia, unspecified type [D64.9]  PCP: Nancy Yin MD    Reason for Consult:  Jayant Picking      Requesting Physician:  Ron Valerio MD      History Obtained From:  Patient and review of all records    HISTORY OF PRESENT ILLNESS:                The patient is a [de-identified] y.o. female with significant past medical history as below who presents with above mentioned causes in reason for consult. Presnetd to [facility] from [facility] for rectal bleeding. Pt poor historian but believes it started 2 days ago bright red. Unsure if it was with or without bm. Pt is bed bound.      Denies Abdominal pain  Denies N/V, GERD, dyspepsia, dysphagia   Last BM daily dark brown     History of EGD 2020  1) SEVERE GASTRITIS in antrum with few erosions, Biopsy negative   2) small nodule in duodenum biopsy negative  3) Rest of study normal  History of colonoscopy 2015 diverticulosis, hemorrhoids, polyp- tubular adenoma      Denies ASA   NSAIDs IBU rare    Denies antiplatelet therapy     NonSmoker  Denies Alcohol intake    Past Medical History:        Diagnosis Date    ASHD (arteriosclerotic heart disease)     Bilateral edema of lower extremity 2015    CHF (congestive heart failure) (HCC)     COPD (chronic obstructive pulmonary disease) (HCC)     Depression     Hypertension     MRSA (methicillin resistant staph aureus) culture positive 2021    Nasal    MRSA (methicillin resistant staph aureus) culture positive 2021    NASAL       Past Surgical History:        Procedure Laterality Date    ANUS SURGERY      fistula repair   Scotty Salmeron APPENDECTOMY      HYSTERECTOMY      TONSILLECTOMY      UPPER GASTROINTESTINAL ENDOSCOPY N/A 11/25/2020    EGD BIOPSY performed by Tima Waggoner MD at [address] [facility] ENDOSCOPY         Current Medications:    Medications    Scheduled Medications:    metroNIDAZOLE  500 mg IntraVENous Q8H    clindamycin (CLEOCIN) IV  600 mg IntraVENous Q8H    sodium chloride flush  5-40 mL IntraVENous 2 times per day    pantoprazole  40 mg IntraVENous BID    atorvastatin  40 mg Oral Daily    calcium-cholecalciferol  1 tablet Oral BID WC    ferrous sulfate  325 mg Oral BID WC    budesonide-formoterol  2 puff Inhalation BID    levothyroxine  75 mcg Oral Daily    mirtazapine  15 mg Oral Nightly    multivitamin  1 tablet Oral Daily     PRN Medications: sodium chloride, sodium chloride, sodium chloride flush, sodium chloride, ondansetron **OR** ondansetron, acetaminophen **OR** acetaminophen, albuterol sulfate HFA      Allergies:  Codeine, Moxifloxacin, Oxycodone, Pcn [penicillins], and Warfarin and related    Social History:   TOBACCO:   reports that she quit smoking about 11 years ago. Her smoking use included cigarettes. She smoked 1.00 pack per day. She has never used smokeless tobacco.  ETOH:   reports current alcohol use. Family History:       Problem Relation Age of Onset    High Blood Pressure Mother     Heart Disease Mother     Early Death Father     Substance Abuse Father     Diabetes Sister        No family history of colon cancer, Crohn's disease, or ulcerative colitis. REVIEW OF SYSTEMS:    The positive ROS will be identified in bold, otherwise ROS are negative     CONSTITUTIONAL:  Neg   Recent weight changes, fatigue, fever, chills or night sweats  MOUTH/THROAT:  Neg  bleeding gums, hoarseness or sore throat.   RESPIRATORY:   Neg SOB, wheeze, cough, sputum, hemoptysis or bronchitis  CARDIOVASCULAR:  Neg chest pain, palpitations, dyspnea on exertion, orthopnea, paroxysmal nocturnal dyspnea or edema  GASTROINTESTINAL:  SEE HPI  HEMATOLOGIC/LYMPHATIC:  Neg  Anemia, bleeding tendency  MUSCULOSKELETAL:    New myalgias, bone pain, joint pain, swelling or stiffness and has had change in gait. NEUROLOGICAL:  Neg  Loss of Consciousness, memory loss, forgetfulness, periods of confusion, difficulty concentrating, seizures, decline in intellect, nervousness, insomina, aphasia or dysarthria. SKIN:  Neg  skin or hair changes, and has no itching, rashes, or sores. PSYCHIATRIC:  Neg depression, personality changes, anxiety. ENDOCRINE:  Neg polydipsia, polyuria, abnormal weight changes, heat /cold intolerance. ALL/IMM:  Neg reactions to drugs other than listed    PHYSICAL EXAM:      Vitals:    BP (!) 110/50   Pulse 71   Temp 98.6 °F (37 °C) (Oral)   Resp 22   SpO2 100%     General Appearance:    Alert, cooperative, no distress, appears stated age   HEENT:    Normocephalic, atraumatic, Conjunctiva pale   Neck:   Supple, symmetrical, trachea midline   Lungs:     Clear to auscultation bilaterally, respirations unlabored   Chest Wall:    No tenderness or deformity    Heart:    Regular rate and rhythm, S1 and S2 normal   Abdomen:     Soft, non-tender, bowel sounds active all four quadrants,     no masses, no organomegaly, no ascites    Rectal:    Deferred   Extremities:   Extremities normal, atraumatic, no cyanosis or edema   Pulses:   2+ and symmetric all extremities   Skin:   Skin color, texture, turgor normal, no rashes or lesions       Neurologic:   No focal deficits, moving all four extremities            DATA:    ABGs:   Lab Results   Component Value Date    PO2ART 54 10/02/2021    MME2MIN 45.0 10/02/2021     CBC:   Recent Labs     11/13/21 0318 11/13/21  1300 11/14/21  0340 11/14/21  1628 11/14/21 2039   WBC 13.7*  --   --   --   --    HGB 9.6*   < > 7.5* 6.7* 6.8*     --   --   --   --     < > = values in this interval not displayed.      BMP:    Recent Labs     11/13/21 0318      K 4.9   CL 95*   CO2 32   BUN 21   CREATININE 1.9*   GLUCOSE 129*     Magnesium:   Lab Results   Component Value Date    MG 2.1 09/13/2021     Hepatic:   Recent Labs     11/13/21 0318   AST 31   ALT 11   BILITOT 0.2   ALKPHOS 79     No results for input(s): LIPASE, AMYLASE in the last 72 hours. Recent Labs     11/13/21 0318   PROTIME 13.0   INR 1.01     No results for input(s): PTT in the last 72 hours. Lipids: No results for input(s): CHOL, HDL in the last 72 hours. Invalid input(s): LDLCALCU  INR:   Recent Labs     11/13/21 0318   INR 1.01     TSH: No results found for: TSH    Intake/Output Summary (Last 24 hours) at 11/15/2021 0815  Last data filed at 11/15/2021 4470  Gross per 24 hour   Intake 1126.58 ml   Output 900 ml   Net 226.58 ml      sodium chloride      sodium chloride 100 mL/hr at 11/14/21 1356    sodium chloride      sodium chloride         Imaging Studies:reviewed      IMPRESSION:      Patient Active Problem List   Diagnosis Code    Coronary atherosclerosis of native coronary artery I25.10    Anemia D64.9    Chronic hypoxemic respiratory failure ([facility]) J96.11    Bilateral edema of lower extremity R60.0    Moderate COPD (chronic obstructive pulmonary disease) ([facility]) J44.9    COPD exacerbation ([facility]) J44.1    Hypoxia R09.02    COPD with acute exacerbation ([facility]) J44.1    Morbid obesity due to excess calories ([facility]) E66.01    Acute on chronic diastolic congestive heart failure ([facility]) I50.33    Depression F32. A    Hypertension I10    Pneumonia J18.9    Chronic diastolic CHF (congestive heart failure) ([facility]) I50.32    GIB (gastrointestinal bleeding) K92.2    Other chest pain R07.89    GI bleed K92.2       ASSESSMENT:  Anemia   hgb 6.8, getting 1 unit today   Attempted to do c scope 9/15/21 pt would not finish prep and declined any procedures   multiple hospitalizations for anemia and no gross bleeding then   May be hemorrhoidal or diverticular      RECOMMENDATIONS:  Start miralax tid, dulcolax gentle bowel prep, hopes to do c scope and EGD Thursday   Start clear liquid diet   Covid test   PPI BID  CBC daily     Discussed plan of care with patient and RN    Patient clinical, biochemical, and radiological information discussed with Dr. Tessa Chaudhari. He agrees with the assessment and plan. AMPARO Arredondo CNP, CNP  11/15/2021  8:15 AM     Acute on chronic anemia no overt bleed  Patient refused GI work-up with the past nonetheless we will attempt 1 more time EGD and colonoscopy patient agrees inpatient C scope will start prep today  Plan for procedures on Thursday    I have seen and examined this patient personally, and independently of the nurse practitioner. The plan was developed mutually at the time of the visit with the patient. Michelle Dick and myself have spoken with patient, nursing staff and provided written and verbal instructions .     The above note has been reviewed and I agree with the Assessment,  Diagnosis, and Treatment plan as suggested by Michelle Dick CNP      [facility] gastroenterology

## 2021-11-15 NOTE — PROGRESS NOTES
[facility] HOSPITALIST PROGRESS NOTE      PCP: Virgen Horne MD    Date of Admission: 11/13/2021    Subjective: no more bleeding in stool     Brief Hospital summary patient is an 80-year-old female with history of coronary artery disease, CHF, paroxysmal A. fib, COPD, chronic respiratory failure and essential hypertension who was admitted with rectal bleeding. Hemoglobin 9.6 on admission, creatinine 1.9  CT abdomen pelvis showed no acute process, chronic diverticulosis    Vitals signs:  Afebrile, heart rate seventies to eighties range, blood pressure 107 59, on 4 L of oxygen    Medications: Lipitor, Symbicort, clindamycin, ferrous sulfate, levothyroxine, metronidazole, pantoprazole, MiraLAX,    Antibiotics: Clindamycin and metronidazole day 2    Fluid status: 900 cc    Labs:   Sodium 143, potassium 4, chloride 105, bicarb 30, creatinine 1  Hemoglobin 9.1, hematocrit 30.5    Imaging:   None     Assessment/Plan:     Acute GI bleed  Acute blood loss anemia  Acute kidney injury  Chronic respiratory failure  History of paroxysmal A. fib  Chronic COPD  Chronic diastolic heart failure  Morbid obesity    Admitted with anemia, hemoglobin 6.5, improved after transfusion  Creatinine back to baseline, DC IV fluids  GI consulted  Clindamycin and metronidazole started, patient allergic to penicillin and fluoroquinolones  Awaiting GI consult  Continue levothyroxine  PT OT consultation      DVT prophlaxis:   SCDs    Last BM:   None since admission    Ambulation:   PT OT consultation    Disposition:   Home    Diet: full loquid diet     Physical Exam Performed:       BP (!) 107/59   Pulse 71   Temp 98.1 °F (36.7 °C) (Oral)   Resp 17   SpO2 100%     Physical Exam  Constitutional:       General: She is not in acute distress. Appearance: Normal appearance. HENT:      Head: Normocephalic and atraumatic.       Right Ear: External ear normal.      Left Ear: External ear normal.   Eyes:      Extraocular Movements: Extraocular movements intact. Pupils: Pupils are equal, round, and reactive to light. Cardiovascular:      Rate and Rhythm: Normal rate and regular rhythm. Heart sounds: No murmur heard. Pulmonary:      Effort: Pulmonary effort is normal. No respiratory distress. Breath sounds: Normal breath sounds. No wheezing. Abdominal:      General: Bowel sounds are normal. There is no distension. Palpations: Abdomen is soft. Tenderness: There is no abdominal tenderness. Musculoskeletal:         General: No swelling. Cervical back: Normal range of motion. Comments: Bilateral LE edema    Skin:     General: Skin is warm. Neurological:      General: No focal deficit present. Mental Status: She is alert and oriented to person, place, and time. Cranial Nerves: No cranial nerve deficit. Labs:   Recent Labs     11/13/21 0318 11/13/21  1300 11/14/21  1628 11/14/21  2039 11/15/21  1009   WBC 13.7*  --   --   --   --    HGB 9.6*   < > 6.7* 6.8* 9.1*   HCT 32.5*   < > 23.8* 24.5* 30.5*     --   --   --   --     < > = values in this interval not displayed. Recent Labs     11/13/21  0318 11/15/21  1009    143   K 4.9 4.0   CL 95* 105   CO2 32 30   BUN 21 26*   CREATININE 1.9* 1.0   CALCIUM 8.9 7.8*   PHOS  --  3.9     Recent Labs     11/13/21 0318   AST 31   ALT 11   BILITOT 0.2   ALKPHOS 79     Recent Labs     11/13/21 0318   INR 1.01     No results for input(s): Zeenat Auguste in the last 72 hours. Urinalysis:      Lab Results   Component Value Date    NITRU NEGATIVE 10/01/2021    WBCUA <1 10/01/2021    BACTERIA RARE 10/01/2021    RBCUA <1 10/01/2021    BLOODU LARGE 10/01/2021    SPECGRAV 1.021 10/01/2021       Radiology:  CT ABDOMEN PELVIS WO CONTRAST Additional Contrast? None   Final Result   1. No acute intra-abdominal abnormality to explain patient's symptoms. Specifically, no obvious rectal mass on noncontrast imaging.    2. Stable left base opacity with improving right base opacity since September 30, 2021.   3. Severe diverticulosis. 4. Severe atherosclerosis. 5. IVC filter in place.                  Dre Paul MD  11/15/2021 1:34 PM

## 2021-11-16 LAB
ABO/RH: NORMAL
ANION GAP SERPL CALCULATED.3IONS-SCNC: 9 MMOL/L (ref 4–16)
ANTIBODY SCREEN: NEGATIVE
BUN BLDV-MCNC: 20 MG/DL (ref 6–23)
CALCIUM SERPL-MCNC: 7.8 MG/DL (ref 8.3–10.6)
CHLORIDE BLD-SCNC: 106 MMOL/L (ref 99–110)
CO2: 27 MMOL/L (ref 21–32)
COMPONENT: NORMAL
COMPONENT: NORMAL
CREAT SERPL-MCNC: 1 MG/DL (ref 0.6–1.1)
CROSSMATCH RESULT: NORMAL
CROSSMATCH RESULT: NORMAL
GFR AFRICAN AMERICAN: >60 ML/MIN/1.73M2
GFR NON-AFRICAN AMERICAN: 53 ML/MIN/1.73M2
GLUCOSE BLD-MCNC: 83 MG/DL (ref 70–99)
HCT VFR BLD CALC: 31.1 % (ref 37–47)
HEMOGLOBIN: 9.2 GM/DL (ref 12.5–16)
MCH RBC QN AUTO: 27.4 PG (ref 27–31)
MCHC RBC AUTO-ENTMCNC: 29.6 % (ref 32–36)
MCV RBC AUTO: 92.6 FL (ref 78–100)
PDW BLD-RTO: 16.1 % (ref 11.7–14.9)
PLATELET # BLD: 235 K/CU MM (ref 140–440)
PMV BLD AUTO: 11.3 FL (ref 7.5–11.1)
POTASSIUM SERPL-SCNC: 3.9 MMOL/L (ref 3.5–5.1)
RBC # BLD: 3.36 M/CU MM (ref 4.2–5.4)
SARS-COV-2, NAAT: NOT DETECTED
SODIUM BLD-SCNC: 142 MMOL/L (ref 135–145)
STATUS: NORMAL
STATUS: NORMAL
TRANSFUSION STATUS: NORMAL
TRANSFUSION STATUS: NORMAL
UNIT DIVISION: 0
UNIT DIVISION: 0
UNIT NUMBER: NORMAL
UNIT NUMBER: NORMAL
WBC # BLD: 11 K/CU MM (ref 4–10.5)

## 2021-11-16 PROCEDURE — 80048 BASIC METABOLIC PNL TOTAL CA: CPT

## 2021-11-16 PROCEDURE — C9113 INJ PANTOPRAZOLE SODIUM, VIA: HCPCS | Performed by: FAMILY MEDICINE

## 2021-11-16 PROCEDURE — 6360000002 HC RX W HCPCS: Performed by: FAMILY MEDICINE

## 2021-11-16 PROCEDURE — 87635 SARS-COV-2 COVID-19 AMP PRB: CPT

## 2021-11-16 PROCEDURE — 6370000000 HC RX 637 (ALT 250 FOR IP): Performed by: NURSE PRACTITIONER

## 2021-11-16 PROCEDURE — 94761 N-INVAS EAR/PLS OXIMETRY MLT: CPT

## 2021-11-16 PROCEDURE — 2700000000 HC OXYGEN THERAPY PER DAY

## 2021-11-16 PROCEDURE — 2580000003 HC RX 258: Performed by: INTERNAL MEDICINE

## 2021-11-16 PROCEDURE — 2500000003 HC RX 250 WO HCPCS: Performed by: INTERNAL MEDICINE

## 2021-11-16 PROCEDURE — 2140000000 HC CCU INTERMEDIATE R&B

## 2021-11-16 PROCEDURE — 2580000003 HC RX 258: Performed by: HOSPITALIST

## 2021-11-16 PROCEDURE — 6370000000 HC RX 637 (ALT 250 FOR IP): Performed by: INTERNAL MEDICINE

## 2021-11-16 PROCEDURE — 36415 COLL VENOUS BLD VENIPUNCTURE: CPT

## 2021-11-16 PROCEDURE — 6370000000 HC RX 637 (ALT 250 FOR IP): Performed by: FAMILY MEDICINE

## 2021-11-16 PROCEDURE — 85027 COMPLETE CBC AUTOMATED: CPT

## 2021-11-16 PROCEDURE — 94640 AIRWAY INHALATION TREATMENT: CPT

## 2021-11-16 RX ORDER — 0.9 % SODIUM CHLORIDE 0.9 %
500 INTRAVENOUS SOLUTION INTRAVENOUS ONCE
Status: COMPLETED | OUTPATIENT
Start: 2021-11-16 | End: 2021-11-16

## 2021-11-16 RX ADMIN — SODIUM CHLORIDE, PRESERVATIVE FREE 10 ML: 5 INJECTION INTRAVENOUS at 20:47

## 2021-11-16 RX ADMIN — POLYETHYLENE GLYCOL (3350) 17 G: 17 POWDER, FOR SOLUTION ORAL at 20:47

## 2021-11-16 RX ADMIN — ATORVASTATIN CALCIUM 40 MG: 40 TABLET, FILM COATED ORAL at 11:45

## 2021-11-16 RX ADMIN — THERA TABS 1 TABLET: TAB at 11:45

## 2021-11-16 RX ADMIN — CLINDAMYCIN PHOSPHATE 600 MG: 600 INJECTION, SOLUTION INTRAVENOUS at 21:55

## 2021-11-16 RX ADMIN — LEVOTHYROXINE SODIUM 75 MCG: 25 TABLET ORAL at 06:08

## 2021-11-16 RX ADMIN — BUDESONIDE AND FORMOTEROL FUMARATE DIHYDRATE 2 PUFF: 80; 4.5 AEROSOL RESPIRATORY (INHALATION) at 08:09

## 2021-11-16 RX ADMIN — SODIUM CHLORIDE, PRESERVATIVE FREE 10 ML: 5 INJECTION INTRAVENOUS at 11:47

## 2021-11-16 RX ADMIN — POLYETHYLENE GLYCOL (3350) 17 G: 17 POWDER, FOR SOLUTION ORAL at 09:46

## 2021-11-16 RX ADMIN — ACETAMINOPHEN 650 MG: 325 TABLET ORAL at 11:53

## 2021-11-16 RX ADMIN — FERROUS SULFATE TAB 325 MG (65 MG ELEMENTAL FE) 325 MG: 325 (65 FE) TAB at 16:25

## 2021-11-16 RX ADMIN — PANTOPRAZOLE SODIUM 40 MG: 40 INJECTION, POWDER, FOR SOLUTION INTRAVENOUS at 20:47

## 2021-11-16 RX ADMIN — BISACODYL 10 MG: 5 TABLET, COATED ORAL at 11:53

## 2021-11-16 RX ADMIN — POLYETHYLENE GLYCOL (3350) 17 G: 17 POWDER, FOR SOLUTION ORAL at 12:01

## 2021-11-16 RX ADMIN — METRONIDAZOLE 500 MG: 500 INJECTION, SOLUTION INTRAVENOUS at 12:07

## 2021-11-16 RX ADMIN — MIRTAZAPINE 15 MG: 15 TABLET, FILM COATED ORAL at 20:47

## 2021-11-16 RX ADMIN — BISACODYL 10 MG: 5 TABLET, COATED ORAL at 16:25

## 2021-11-16 RX ADMIN — FERROUS SULFATE TAB 325 MG (65 MG ELEMENTAL FE) 325 MG: 325 (65 FE) TAB at 11:45

## 2021-11-16 RX ADMIN — POLYETHYLENE GLYCOL (3350) 17 G: 17 POWDER, FOR SOLUTION ORAL at 16:25

## 2021-11-16 RX ADMIN — Medication 1 TABLET: at 11:45

## 2021-11-16 RX ADMIN — METRONIDAZOLE 500 MG: 500 INJECTION, SOLUTION INTRAVENOUS at 20:52

## 2021-11-16 RX ADMIN — Medication 1 TABLET: at 16:26

## 2021-11-16 RX ADMIN — SODIUM CHLORIDE 500 ML: 9 INJECTION, SOLUTION INTRAVENOUS at 15:00

## 2021-11-16 RX ADMIN — PANTOPRAZOLE SODIUM 40 MG: 40 INJECTION, POWDER, FOR SOLUTION INTRAVENOUS at 11:45

## 2021-11-16 RX ADMIN — METRONIDAZOLE 500 MG: 500 INJECTION, SOLUTION INTRAVENOUS at 04:18

## 2021-11-16 RX ADMIN — CLINDAMYCIN PHOSPHATE 600 MG: 600 INJECTION, SOLUTION INTRAVENOUS at 05:39

## 2021-11-16 RX ADMIN — CLINDAMYCIN PHOSPHATE 600 MG: 600 INJECTION, SOLUTION INTRAVENOUS at 16:15

## 2021-11-16 ASSESSMENT — PAIN SCALES - GENERAL
PAINLEVEL_OUTOF10: 9
PAINLEVEL_OUTOF10: 0
PAINLEVEL_OUTOF10: 4

## 2021-11-16 ASSESSMENT — PAIN DESCRIPTION - LOCATION: LOCATION: RECTUM

## 2021-11-16 ASSESSMENT — PAIN DESCRIPTION - PAIN TYPE: TYPE: ACUTE PAIN

## 2021-11-16 NOTE — PROGRESS NOTES
[facility] HOSPITALIST PROGRESS NOTE      PCP: Shadi Angulo MD    Date of Admission: 11/13/2021    Subjective: blood pressure low     Brief Hospital summary patient is an 81-year-old female with history of coronary artery disease, CHF, paroxysmal A. fib, COPD, chronic respiratory failure and essential hypertension who was admitted with rectal bleeding. Hemoglobin 9.6 on admission, creatinine 1.9  CT abdomen pelvis showed no acute process, chronic diverticulosis  GI consulted     Vitals signs:  Afebrile, HR 70s range, on 3 liters of oxygen, blood pressure 92/49       Medications: Lipitor, Symbicort, clindamycin, levothyroxine, metronidazole, mirtazapine, miralax     Antibiotics: Clindamycin and metronidazole day 3    Fluid status: 900 cc    Labs:   Sodium 143, potassium 4, chloride 105, bicarb 30, creatinine 1  Hemoglobin 9.1, hematocrit 30.5    Imaging:   None     Assessment/Plan:     Acute GI bleed  Acute blood loss anemia  Acute kidney injury  Chronic respiratory failure  History of paroxysmal A. fib  Chronic COPD  Chronic diastolic heart failure  Morbid obesity    Admitted with anemia, hemoglobin 6.5, improved after transfusion  Creatinine back to baseline, DC IV fluids  GI consulted  patient allergic to penicillin and fluoroquinolones  Continue clindamycin and metronidazole     Blood pressure low, gentle hydration if remains low   GI consulted  Prep and EGD/colonoscopy planned for Monday   Continue levothyroxine  PT OT consultation      DVT prophlaxis:   SCDs    Last BM:   None since admission    Ambulation:   PT OT consultation    Disposition:   Home    Diet: full loquid diet     Physical Exam Performed:       BP (!) 92/49   Pulse 76   Temp 98.6 °F (37 °C) (Oral)   Resp 18   SpO2 100%     Physical Exam  Constitutional:       General: She is not in acute distress. Appearance: Normal appearance. HENT:      Head: Normocephalic and atraumatic.       Right Ear: External ear normal.      Left Ear: External ear normal.   Eyes:      Extraocular Movements: Extraocular movements intact. Pupils: Pupils are equal, round, and reactive to light. Cardiovascular:      Rate and Rhythm: Normal rate and regular rhythm. Heart sounds: No murmur heard. Pulmonary:      Effort: Pulmonary effort is normal. No respiratory distress. Breath sounds: Normal breath sounds. No wheezing. Abdominal:      General: Bowel sounds are normal. There is no distension. Palpations: Abdomen is soft. Tenderness: There is no abdominal tenderness. Musculoskeletal:         General: No swelling. Cervical back: Normal range of motion. Comments: Bilateral LE edema    Skin:     General: Skin is warm. Neurological:      General: No focal deficit present. Mental Status: She is alert and oriented to person, place, and time. Cranial Nerves: No cranial nerve deficit. Labs:   Recent Labs     11/14/21  2039 11/15/21  1009 11/16/21  0523   WBC  --   --  11.0*   HGB 6.8* 9.1* 9.2*   HCT 24.5* 30.5* 31.1*   PLT  --   --  235     Recent Labs     11/15/21  1009 11/16/21  0523    142   K 4.0 3.9    106   CO2 30 27   BUN 26* 20   CREATININE 1.0 1.0   CALCIUM 7.8* 7.8*   PHOS 3.9  --      No results for input(s): AST, ALT, BILIDIR, BILITOT, ALKPHOS in the last 72 hours. No results for input(s): INR in the last 72 hours. No results for input(s): Kaleen Hope in the last 72 hours. Urinalysis:      Lab Results   Component Value Date    NITRU NEGATIVE 10/01/2021    WBCUA <1 10/01/2021    BACTERIA RARE 10/01/2021    RBCUA <1 10/01/2021    BLOODU LARGE 10/01/2021    SPECGRAV 1.021 10/01/2021       Radiology:  CT ABDOMEN PELVIS WO CONTRAST Additional Contrast? None   Final Result   1. No acute intra-abdominal abnormality to explain patient's symptoms. Specifically, no obvious rectal mass on noncontrast imaging.    2. Stable left base opacity with improving right base opacity since September 30, 2021.   3. Severe diverticulosis. 4. Severe atherosclerosis. 5. IVC filter in place.                  Jasmina Hubbard MD  11/16/2021 9:56 AM

## 2021-11-16 NOTE — PROGRESS NOTES
Physician Progress Note      PATIENT:               Dora Lowery  CSN #:                  [id number]  :                       1940  ADMIT DATE:       2021 2:44 AM  DISCH DATE:  RESPONDING  PROVIDER #:        NICK Jeong MD          QUERY TEXT:    Hospitalists,    Patient admitted with rectal bleeding. Noted previous documentation of rectal   bleeding due to diverticular disease from Internal Medicine dropped from   progress note documentation. If possible, please document in the progress   notes and discharge summary if rectal bleeding due to diverticular disease   was: The medical record reflects the following:  Risk Factors: diverticular disease  Clinical Indicators: imaging shows diverticular disease, previous   documentation of rectal bleeding due to diverticular disease from Internal   Medicine dropped from progress note documentation, ABLA, per GI consult: \"May   be hemorrhoidal or diverticular now as well as anemia of chronic disease\"  Treatment: labs, imaging, GI consult, 2 U PRBC's    Thank you,  Esperanza Flores RN CDS  [phone]  Options provided:  -- Rectal bleeding due to diverticular disease confirmed after study  -- Rectal bleeding due to diverticular disease ruled out after study, ##please   specify suspected cause of bleeding, please specify cause of rectal bleeding.  -- Other - I will add my own diagnosis  -- Disagree - Not applicable / Not valid  -- Disagree - Clinically unable to determine / Unknown  -- Refer to Clinical Documentation Reviewer    PROVIDER RESPONSE TEXT:    Provider is clinically unable to determine a response to this query.     Query created by: Kay Bass on 2021 1:47 PM      Electronically signed by:  Luis Carlos Kimabll MD 2021 1:50 PM

## 2021-11-16 NOTE — ADT AUTH CERT
Gastrointestinal Bleeding, Lower - Care Day 2 (11/14/2021) by Darvin Abernathy RN       Review Status Review Entered   Completed 11/15/2021 11:43      Criteria Review      Care Day: 2 Care Date: 11/14/2021 Level of Care: Intermediate Care    Guideline Day 2    Clinical Status    ( ) * Hypotension absent    11/15/2021 11:43 AM EST by [email or web address]      11/14/21  98.3 (36.8) 22 89 91/55  107/45, 103/48    ( ) * Bleeding absent or reduced    11/15/2021 11:43 AM EST by Bailey Joan      further drop in h/h    Routes    ( ) * Oral hydration tolerated    ( ) * Oral diet tolerated    11/15/2021 11:43 AM EST by [email or web address]      clear liquid    * Milestone   Additional Notes   11/14      Results for Kaycee Boogie (MRN [id number]) as of 11/15/2021 11:37      11/14/2021 16:28   Hemoglobin Quant: 6.7 (LL)   Hematocrit: 23.8 (L)      11/14/2021 20:39   Hemoglobin Quant: 6.8 (LL)   Hematocrit: 24.5 (L)            Internal Medicine:   Assessment/Plan    1.  Acute GI bleed most likely lower as CT of the abdomen shows severe diverticulosis.  White count is high.  Some abdominal discomfort-we will add IV antibiotics to the for possibility of diverticulitis as well.  Hemodynamically stable GI-requested.  Continue to monitor hemoglobin and transfuse as needed. IV antibiotics patient allergic to penicillin       Further drop in H&H plan to transfuse 2 more units GI has been consulted I will also involve general surgery CT shows severe diverticulosis most likely is bleeding from her diverticular disease.            2.  SALTY: Creatinine is 1.9 baseline is 1.1 most likely  dehydration.  IV fluids.     3.  History of COPD stable at the present time no breathing issues noted.  Today for monitoring continue medication.    4.  Stroke patient is unable to move her right leg seems to be an old complaint.  Patient is a nursing home resident and is bedbound except for more than a chair for patient.       Patient is DNR CCA patient states that she states the nursing home          Scheduled Medications   · sodium chloride flush 5-40 mL IntraVENous 2 times per day   · pantoprazole 40 mg IntraVENous BID   · atorvastatin 40 mg Oral Daily   · calcium-cholecalciferol 1 tablet Oral BID WC   · ferrous sulfate 325 mg Oral BID WC   · budesonide-formoterol 2 puff Inhalation BID   · levothyroxine 75 mcg Oral Daily   · mirtazapine 15 mg Oral Nightly   · multivitamin 1 tablet Oral Daily                  Infusion :   Infusions Meds   · sodium chloride    · sodium chloride 50 mL/hr at 11/13/21 1826                      Gastrointestinal Bleeding, Lower - Care Day 1 (11/13/2021) by Shirley Harrison RN       Review Status Review Entered   Completed 11/15/2021 11:39      Criteria Review      Care Day: 1 Care Date: 11/13/2021 Level of Care: Intermediate Care    Guideline Day 1    Level Of Care    (X) ICU or floor    11/15/2021 11:39 AM EST by Faiza Shearer      [facility]    Clinical Status    (X) * Clinical Indications met    Routes    (X) IV fluids    11/15/2021 11:39 AM EST by Faiza Shearer      0.9 % sodium chloride infusion  Rate: 50 mL/hr Freq: CONTINUOUS Route: IV    (X) Possible liquid diet in evening    11/15/2021 11:39 AM EST by Faiza Shearer      clear liquid    * Milestone   Additional Notes   Internal Medicine:   Assessment/Plan:   Navdeep Jones is a [de-identified] y.o. female with a history of CAD, CHF, PAF, COPD, chronic respiratory failure on home oxygen, hypertension who presented to [facility] 11/13/2021 with rectal bleeding intermittently x 2 weeks. Labs showed hgb 9.6, WBC 13.7, creatinine 1.9, glucose 129.  CT A/P showed no acute process and severe diverticulosis with IVC filter in place.  Admitted for GI evaluation and monitoring.       1. Rectal Bleeding: intermittent x 2 weeks with dark blood and clots. Recent admit 9/30-10/4/21 with GI bleed. GOC discussed. Eliquis was stopped and pt was transfused. Hgb improved.  It does not appear that patient had endoscopy at that time although GI did follow.  Hemoglobin overall appears to be stable actually improved from recent admission at 9.6.  CT A/P showed severe diverticulosis is most likely contributing.  Serial H&H's.  IV PPI, GI consult, and transfuse as needed. 2. Acute Blood Loss Anemia: BL Hgb ~7-8 and stable at 9.6 in the setting above. Required 2 units of PRBC's on recent. Will monitor and transfuse as needed.  GI evaluation as above.     3. SALTY: Creatinine 1.1 on previous discharge on 10/2021 and up to 1.9 in ED.  Suspect related to GI bleed as well as home diuretics which will be held.  Gentle IV hydration while n.p.o.  Will avoid nephrotoxic medication. 4. Chronic respiratory failure: Patient states she is on 2 to 3 L at home but previous documentation indicates higher and patient is on 4 L nasal cannula at emergency room.  We will continue titrate as needed. 5. PAF: Per history, appears to be in sinus rhythm on admit.  Stable rate.  Patient not on anticoagulation or beta-blocker at this time.  Will monitor on telemetry. 6. COPD: per history, no evidence of exacerbation.  Bronchodilators as needed.  Continue home inhalers. 7. Leukocytosis: Possibly reactive in nature.  No obvious infection at this juncture but will monitor and obtain an additional work-up as needed. 8. Chronic diastolic heart failure: EF of 55% on TTE in 9/2021.  No evidence of exacerbation.  Holding home Lasix while patient n.p.o. and with SALTY.  Provide IV diuresis as needed. 9. Obesity: BMI 39.87, counseled on lifestyle modifications. 10. DVT Prophylaxis: SCDs.    11. Code Status: DNR CCA confirmed on admission with patient as well as last admission           Current living situation: Home with    Expected Disposition: TBD   Estimated discharge date: Possibly 11/14-11/15/2021 if hemoglobin remains stable and no work-up indicated per GI after evaluation            Gastrointestinal Bleeding, Lower - Clinical Indications for Admission to Inpatient Care by Zuleika Batista RN       Review Status Review Entered   Completed 11/15/2021 11:38      Criteria Review      Clinical Indications for Admission to Inpatient Care    Most Recent : Cecy Valdez Most Recent Date: 11/15/2021 11:38 AM EST    (X) Admission is indicated for  1 or more  of the following  (1) (2) (3) (4) (5) (6):       (X) Ongoing active bleeding (eg, decreasing hematocrit)   Additional Notes   CHIEF COMPLAINT:    Abdominal pain   Rectal bleeding       HPI: Pietro Feliz is a [de-identified] y.o. female who presents to the emergency department, via EMS, from [facility] for evaluation of rectal bleeding.  Per EMS, the nurses at [facility] reported that the patient was passing multiple large clots from her rectal area.  The patient states that she has been bleeding intermittently for several days. Shante Bennett complains of generalized abdominal pain.  She states it is achy.  It is associated with nausea but no vomiting.  Symptoms have been intermittent.  There are no exacerbating or alleviating factors.  The patient has a history of rectal bleeding with acute blood loss anemia requiring transfusion for which she was admitted  to this facility in October 2021.  She was on Eliquis at that time for atrial fibrillation which appears to have been discontinued. Shante Bennett was seen in consult by Dr. Emy Young of GI.  She declined an EGD at that hospitalization.  She was agreeable with comfort measures and blood transfusions as needed.  She denies fevers, chills, chest pain, shortness of breath or any other complaints. PHYSICAL EXAM:   VITAL SIGNS:    ED Triage Vitals [11/13/21 0247]   Enc Vitals Group      /68      Pulse 99      Resp 18      Temp 98.1 °F (36.7 °C)      Temp Source Oral      SpO2 95 %      Constitutional:  Non-toxic appearance   HENT: Normocephalic, Atraumatic   Eyes:  PERRL, Conjunctiva normal, No discharge.    Neck:   Normal range of motion, No tenderness, Supple, No stridor, No lymphadenopathy. Cardiovascular:  Normal heart rate, Normal rhythm   Pulmonary/Chest:  Normal breath sounds, No respiratory distress, No wheezing   Abdomen:  Bowel sounds normal, Soft, generalized mild tenderness to palpation with no guarding or rebound, No masses, No pulsatile masses   Back:  No tenderness, No CVA tenderness   Extremities:  Normal range of motion, Intact distal pulses, No edema, No tenderness   Neurologic:  Alert & oriented x 3, Normal motor function, Sensation intact to light touch throughout, No focal deficits   Skin:  Warm, Dry, No erythema, No rash           EKG Interpretation   None       Cardiac Monitor Strip Interpretation   Interpreted by me   Monitor strip interpreted for greater than 10 seconds   Rhythm: normal sinus   Rate: normal   Ectopy: none   ST Segments: normal           Radiology / Procedures:   CT ABDOMEN PELVIS WO CONTRAST Additional Contrast? None (Preliminary result)   Result time 11/13/21 04:41:42   Procedure changed from [facility] [place] [address]   Preliminary result by Dorothy Starr MD (11/13/21 04:41:42)                     Impression:        1. No acute intra-abdominal abnormality to explain patient's symptoms. Specifically, no obvious rectal mass on noncontrast imaging. 2. Stable left base opacity with improving right base opacities since    September 30, 2021.    3. Severe diverticulosis. 4. Severe atherosclerosis.     5. IVC filter in place.                 Labs Reviewed   CBC WITH AUTO DIFFERENTIAL - Abnormal; Notable for the following components:       Result Value      WBC 13.7 (*)       RBC 3.59 (*)       Hemoglobin 9.6 (*)       Hematocrit 32.5 (*)       MCH 26.7 (*)       MCHC 29.5 (*)       RDW 15.9 (*)       MPV 11.4 (*)       Segs Relative 85.4 (*)       Lymphocytes % 8.2 (*)       Monocytes % 5.2 (*)       Immature Neutrophil % 0.7 (*)       All other components within normal limits   COMPREHENSIVE METABOLIC recommended admission to the hospital and the patient was agreeable. I discussed the case with the patient's GI doctor, Dr. Trudy Ahumada, who agrees that this is unlikely to be an upper GI bleed and states that we can discontinue the Protonix drip.  I discussed the case with the hospitalist who will admit the patient.       Clinical Impression:   1. Rectal bleeding    2. SALTY (acute kidney injury) ([facility] [address]    3.  Anemia, unspecified type

## 2021-11-16 NOTE — CARE COORDINATION
CM in to see Pt to initiate discharge planning. Pt sleeping soundly and would not wake. CM call to Pt spouse Henry Parnell, left  for return call. CM call to [facility]. Pt is long term care and on a bed hold. Pt has no needs to return.       CM following

## 2021-11-16 NOTE — PROGRESS NOTES
106   CO2 30 27   BUN 26* 20   CREATININE 1.0 1.0   GLUCOSE 85 83     Magnesium:   Lab Results   Component Value Date    MG 2.1 09/13/2021     Hepatic: No results for input(s): AST, ALT, ALB, BILITOT, ALKPHOS in the last 72 hours. INR: No results for input(s): INR in the last 72 hours. Intake/Output Summary (Last 24 hours) at 11/16/2021 0759  Last data filed at 11/15/2021 1006  Gross per 24 hour   Intake 10 ml   Output --   Net 10 ml         Medications    Scheduled Medications:    polyethylene glycol  17 g Oral 4x Daily    metroNIDAZOLE  500 mg IntraVENous Q8H    clindamycin (CLEOCIN) IV  600 mg IntraVENous Q8H    sodium chloride flush  5-40 mL IntraVENous 2 times per day    pantoprazole  40 mg IntraVENous BID    atorvastatin  40 mg Oral Daily    calcium-cholecalciferol  1 tablet Oral BID WC    ferrous sulfate  325 mg Oral BID WC    budesonide-formoterol  2 puff Inhalation BID    levothyroxine  75 mcg Oral Daily    mirtazapine  15 mg Oral Nightly    multivitamin  1 tablet Oral Daily     PRN Medications: sodium chloride, sodium chloride, sodium chloride flush, sodium chloride, ondansetron **OR** ondansetron, acetaminophen **OR** acetaminophen, albuterol sulfate HFA  Infusions:    sodium chloride      sodium chloride      sodium chloride             Patient Active Problem List   Diagnosis Code    Coronary atherosclerosis of native coronary artery I25.10    Anemia D64.9    Chronic hypoxemic respiratory failure ([facility]) J96.11    Bilateral edema of lower extremity R60.0    Moderate COPD (chronic obstructive pulmonary disease) ([facility]) J44.9    COPD exacerbation ([facility]) J44.1    Hypoxia R09.02    COPD with acute exacerbation ([facility] [address] J44.1    Morbid obesity due to excess calories ([facility]) E66.01    Acute on chronic diastolic congestive heart failure ([facility]) I50.33    Depression F32. A    Hypertension I10    Pneumonia J18.9    Chronic diastolic CHF (congestive heart failure) ([facility]) I50.32    GIB (gastrointestinal bleeding) K92.2    Other chest pain R07.89    GI bleed K92.2       ASSESSMENT:  Anemia   hgb 9.2, s/p prbc   Iron 18, tibc 171  Attempted to do c scope 9/15/21 pt would not finish prep and declined any procedures   multiple hospitalizations for anemia and no gross bleeding then   May be hemorrhoidal or diverticular now as well as anemia of chronic disease     RECOMMENDATIONS:  Start miralax tid, dulcolax gentle bowel prep, in hopes to do c scope and EGD Thursday, consent signed in chart  Start clear liquid diet   Covid test   PPI BID  CBC daily     Discussed plan of care with patient and RN    Patient clinical, biochemical, and radiological information discussed with Dr. Barbara Robles. He agrees with the assessment and plan. AMPARO Ba CNP, CNP  11/16/2021  7:59 AM    Well-known patient poor compliance  Hemoglobin 9 point after transfusions we will plan for GI work-up  Hopefully patient agrees    I have seen and examined this patient personally, and independently of the nurse practitioner. The plan was developed mutually at the time of the visit with the patient. Rashad Colmenares and myself have spoken with patient, nursing staff and provided written and verbal instructions .     The above note has been reviewed and I agree with the Assessment,  Diagnosis, and Treatment plan as suggested by Rashad Colmenares CNP      [address] [place] [address] gastroenterology

## 2021-11-17 ENCOUNTER — ANESTHESIA EVENT (OUTPATIENT)
Dept: ENDOSCOPY | Age: 81
DRG: 392 | End: 2021-11-17
Payer: MEDICARE

## 2021-11-17 LAB
HCT VFR BLD CALC: 31.5 % (ref 37–47)
HEMOGLOBIN: 9 GM/DL (ref 12.5–16)
MCH RBC QN AUTO: 27.3 PG (ref 27–31)
MCHC RBC AUTO-ENTMCNC: 28.6 % (ref 32–36)
MCV RBC AUTO: 95.5 FL (ref 78–100)
PDW BLD-RTO: 16.2 % (ref 11.7–14.9)
PLATELET # BLD: 266 K/CU MM (ref 140–440)
PMV BLD AUTO: 10.8 FL (ref 7.5–11.1)
RBC # BLD: 3.3 M/CU MM (ref 4.2–5.4)
WBC # BLD: 8.8 K/CU MM (ref 4–10.5)

## 2021-11-17 PROCEDURE — 85027 COMPLETE CBC AUTOMATED: CPT

## 2021-11-17 PROCEDURE — 6370000000 HC RX 637 (ALT 250 FOR IP): Performed by: NURSE PRACTITIONER

## 2021-11-17 PROCEDURE — C9113 INJ PANTOPRAZOLE SODIUM, VIA: HCPCS | Performed by: FAMILY MEDICINE

## 2021-11-17 PROCEDURE — 6370000000 HC RX 637 (ALT 250 FOR IP): Performed by: FAMILY MEDICINE

## 2021-11-17 PROCEDURE — 2580000003 HC RX 258: Performed by: NURSE PRACTITIONER

## 2021-11-17 PROCEDURE — 2580000003 HC RX 258: Performed by: INTERNAL MEDICINE

## 2021-11-17 PROCEDURE — 2500000003 HC RX 250 WO HCPCS: Performed by: INTERNAL MEDICINE

## 2021-11-17 PROCEDURE — 94640 AIRWAY INHALATION TREATMENT: CPT

## 2021-11-17 PROCEDURE — 6360000002 HC RX W HCPCS: Performed by: INTERNAL MEDICINE

## 2021-11-17 PROCEDURE — 36415 COLL VENOUS BLD VENIPUNCTURE: CPT

## 2021-11-17 PROCEDURE — 2140000000 HC CCU INTERMEDIATE R&B

## 2021-11-17 PROCEDURE — 6360000002 HC RX W HCPCS: Performed by: FAMILY MEDICINE

## 2021-11-17 RX ORDER — SODIUM CHLORIDE 9 MG/ML
INJECTION, SOLUTION INTRAVENOUS CONTINUOUS
Status: DISCONTINUED | OUTPATIENT
Start: 2021-11-17 | End: 2021-11-19

## 2021-11-17 RX ORDER — FUROSEMIDE 10 MG/ML
20 INJECTION INTRAMUSCULAR; INTRAVENOUS ONCE
Status: DISCONTINUED | OUTPATIENT
Start: 2021-11-17 | End: 2021-11-22 | Stop reason: HOSPADM

## 2021-11-17 RX ORDER — 0.9 % SODIUM CHLORIDE 0.9 %
500 INTRAVENOUS SOLUTION INTRAVENOUS ONCE
Status: COMPLETED | OUTPATIENT
Start: 2021-11-17 | End: 2021-11-18

## 2021-11-17 RX ORDER — POLYETHYLENE GLYCOL 3350 17 G/17G
238 POWDER, FOR SOLUTION ORAL ONCE
Status: COMPLETED | OUTPATIENT
Start: 2021-11-17 | End: 2021-11-17

## 2021-11-17 RX ADMIN — THERA TABS 1 TABLET: TAB at 08:18

## 2021-11-17 RX ADMIN — POLYETHYLENE GLYCOL 3350 238 G: 17 POWDER, FOR SOLUTION ORAL at 09:02

## 2021-11-17 RX ADMIN — SODIUM CHLORIDE: 9 INJECTION, SOLUTION INTRAVENOUS at 09:28

## 2021-11-17 RX ADMIN — BISACODYL 10 MG: 5 TABLET, COATED ORAL at 08:29

## 2021-11-17 RX ADMIN — SODIUM CHLORIDE, PRESERVATIVE FREE 10 ML: 5 INJECTION INTRAVENOUS at 08:18

## 2021-11-17 RX ADMIN — MIRTAZAPINE 15 MG: 15 TABLET, FILM COATED ORAL at 20:46

## 2021-11-17 RX ADMIN — SODIUM CHLORIDE, PRESERVATIVE FREE 10 ML: 5 INJECTION INTRAVENOUS at 20:46

## 2021-11-17 RX ADMIN — SODIUM CHLORIDE 500 ML: 9 INJECTION, SOLUTION INTRAVENOUS at 23:47

## 2021-11-17 RX ADMIN — ATORVASTATIN CALCIUM 40 MG: 40 TABLET, FILM COATED ORAL at 08:19

## 2021-11-17 RX ADMIN — PANTOPRAZOLE SODIUM 40 MG: 40 INJECTION, POWDER, FOR SOLUTION INTRAVENOUS at 08:20

## 2021-11-17 RX ADMIN — Medication 1 TABLET: at 17:57

## 2021-11-17 RX ADMIN — BISACODYL 10 MG: 5 TABLET, COATED ORAL at 17:57

## 2021-11-17 RX ADMIN — FERROUS SULFATE TAB 325 MG (65 MG ELEMENTAL FE) 325 MG: 325 (65 FE) TAB at 17:57

## 2021-11-17 RX ADMIN — Medication 1 TABLET: at 08:20

## 2021-11-17 RX ADMIN — METRONIDAZOLE 500 MG: 500 INJECTION, SOLUTION INTRAVENOUS at 04:25

## 2021-11-17 RX ADMIN — BUDESONIDE AND FORMOTEROL FUMARATE DIHYDRATE 2 PUFF: 80; 4.5 AEROSOL RESPIRATORY (INHALATION) at 20:50

## 2021-11-17 RX ADMIN — FERROUS SULFATE TAB 325 MG (65 MG ELEMENTAL FE) 325 MG: 325 (65 FE) TAB at 08:20

## 2021-11-17 RX ADMIN — ONDANSETRON 4 MG: 2 INJECTION INTRAMUSCULAR; INTRAVENOUS at 14:51

## 2021-11-17 RX ADMIN — LEVOTHYROXINE SODIUM 75 MCG: 25 TABLET ORAL at 06:13

## 2021-11-17 RX ADMIN — CLINDAMYCIN PHOSPHATE 600 MG: 600 INJECTION, SOLUTION INTRAVENOUS at 05:32

## 2021-11-17 RX ADMIN — METRONIDAZOLE 500 MG: 500 INJECTION, SOLUTION INTRAVENOUS at 20:49

## 2021-11-17 RX ADMIN — CLINDAMYCIN PHOSPHATE 600 MG: 600 INJECTION, SOLUTION INTRAVENOUS at 13:38

## 2021-11-17 RX ADMIN — CLINDAMYCIN PHOSPHATE 600 MG: 600 INJECTION, SOLUTION INTRAVENOUS at 21:57

## 2021-11-17 RX ADMIN — PANTOPRAZOLE SODIUM 40 MG: 40 INJECTION, POWDER, FOR SOLUTION INTRAVENOUS at 20:46

## 2021-11-17 RX ADMIN — METRONIDAZOLE 500 MG: 500 INJECTION, SOLUTION INTRAVENOUS at 12:52

## 2021-11-17 ASSESSMENT — PAIN SCALES - GENERAL
PAINLEVEL_OUTOF10: 0
PAINLEVEL_OUTOF10: 10
PAINLEVEL_OUTOF10: 7
PAINLEVEL_OUTOF10: 0
PAINLEVEL_OUTOF10: 0

## 2021-11-17 ASSESSMENT — PAIN SCALES - WONG BAKER: WONGBAKER_NUMERICALRESPONSE: 0

## 2021-11-17 NOTE — PROGRESS NOTES
[facility] HOSPITALIST PROGRESS NOTE      PCP: Flonnie Kussmaul, MD    Date of Admission: 11/13/2021    Subjective: complains of lower abdominal  Pain   Blood pressure always runs low at home  Is still having dark stool     Brief Hospital summary patient is an 81-year-old female with history of coronary artery disease, CHF, paroxysmal A. fib, COPD, chronic respiratory failure and essential hypertension who was admitted with rectal bleeding. Hemoglobin 9.6 on admission, creatinine 1.9  CT abdomen pelvis showed no acute process, chronic diverticulosis  GI consulted   EGD planned for tomorrow     Vitals signs:  Afebrile, HR 70s range, blood pressure 92/52, on 3 liters of oxygen     Medications: Lipitor, Symbicort, clindamycin, levothyroxine, metronidazole, mirtazapine, miralax     Antibiotics: Clindamycin and metronidazole day 4    Fluid status: 900 cc    Labs:   No labs drawn today     Imaging:   None     Assessment/Plan:     Acute GI bleed  Acute blood loss anemia  Acute kidney injury  Chronic respiratory failure  History of paroxysmal A. fib  Chronic COPD  Chronic diastolic heart failure  Morbid obesity    Admitted with anemia, hemoglobin 6.5, improved after transfusion  Creatinine back to baseline, DC IV fluids  GI consulted  patient allergic to penicillin and fluoroquinolones  Continue clindamycin and metronidazole   NPO after midnight     Blood pressure low, gentle hydration if remains low   GI consulted  Prep and EGD/colonoscopy planned for Thursday   Lasix x 1, today   Continue levothyroxine  PT OT consultation      DVT prophlaxis:   SCDs    Last BM:   None since admission    Ambulation:   PT OT consultation    Disposition:   Home    Diet: full loquid diet     Physical Exam Performed:       BP (!) 92/52   Pulse 69   Temp 98.8 °F (37.1 °C) (Oral)   Resp 21   Wt 207 lb 14.3 oz (94.3 kg)   SpO2 100%   BMI 39.28 kg/m²     Physical Exam  Constitutional:       General: She is not in acute distress. intra-abdominal abnormality to explain patient's symptoms. Specifically, no obvious rectal mass on noncontrast imaging. 2. Stable left base opacity with improving right base opacity since September 30, 2021.   3. Severe diverticulosis. 4. Severe atherosclerosis. 5. IVC filter in place.                  Seth Jennings MD  11/17/2021 10:11 AM

## 2021-11-17 NOTE — ANESTHESIA PRE PROCEDURE
(SYNTHROID) 75 MCG tablet Take 75 mcg by mouth Daily    Historical Provider, MD   Acetaminophen (TYLENOL PO) Take 650 mg by mouth every 6 hours as needed (PAIN OR FEVER)     Historical Provider, MD   atorvastatin (LIPITOR) 40 MG tablet Take 40 mg by mouth daily    Historical Provider, MD   lactobacillus (CULTURELLE) capsule Take 1 capsule by mouth daily (with breakfast) 6/26/16   Brandt Client, DO   ferrous sulfate 325 (65 FE) MG tablet Take 1 tablet by mouth every 12 hours With a meal. 9/11/15   Griffin Bishop MD       Current medications:    Current Facility-Administered Medications   Medication Dose Route Frequency Provider Last Rate Last Admin    bisacodyl (DULCOLAX) EC tablet 10 mg  10 mg Oral BID Rufus Crowley, APRN - CNP   10 mg at 11/16/21 1625    0.9 % sodium chloride infusion   IntraVENous PRN AMPARO Nichole - NP        polyethylene glycol (GLYCOLAX) packet 17 g  17 g Oral 4x Daily Rufus Crowley, APRN - CNP   17 g at 11/16/21 2047    metronidazole (FLAGYL) 500 mg in NaCl 100 mL IVPB premix  500 mg IntraVENous Juan Wilburn MD   Stopped at 11/17/21 0533    clindamycin (CLEOCIN) 600 mg in dextrose 5 % 50 mL IVPB  600 mg IntraVENous Juan Wilburn MD   Stopped at 11/17/21 0613    0.9 % sodium chloride infusion   IntraVENous PRN Marcos Bloom MD        sodium chloride flush 0.9 % injection 5-40 mL  5-40 mL IntraVENous 2 times per day Ayaan Burgess MD   10 mL at 11/16/21 2047    sodium chloride flush 0.9 % injection 5-40 mL  5-40 mL IntraVENous PRN Ron Barbara Gonzales MD        0.9 % sodium chloride infusion  25 mL IntraVENous PRN Ron Barbara Gonzales MD        ondansetron (ZOFRAN-ODT) disintegrating tablet 4 mg  4 mg Oral Q8H PRN Ron Barbara Gonzales MD        Or    ondansetron (ZOFRAN) injection 4 mg  4 mg IntraVENous Q6H PRN Ron Barbara Gonzales MD        acetaminophen (TYLENOL) tablet 650 mg  650 mg Oral Q6H PRN Ron Barbara Gonzales MD   650 mg at 11/16/21 1153    Or    congestive heart failure (HCC) I50.33    Depression F32. A    Hypertension I10    Pneumonia J18.9    Chronic diastolic CHF (congestive heart failure) ([facility]) I50.32    GIB (gastrointestinal bleeding) K92.2    Other chest pain R07.89    GI bleed K92.2       Past Medical History:        Diagnosis Date    ASHD (arteriosclerotic heart disease)     Bilateral edema of lower extremity 2015    CHF (congestive heart failure) ([facility])     COPD (chronic obstructive pulmonary disease) [facility] [address]     Depression     Hypertension     MRSA (methicillin resistant staph aureus) culture positive 2021    Nasal    MRSA (methicillin resistant staph aureus) culture positive 2021    NASAL       Past Surgical History:        Procedure Laterality Date    ANUS SURGERY      fistula repair    APPENDECTOMY      HYSTERECTOMY      TONSILLECTOMY      UPPER GASTROINTESTINAL ENDOSCOPY N/A 2020    EGD BIOPSY performed by Quinton Strong MD at [facility] ENDOSCOPY       Social History:    Social History     Tobacco Use    Smoking status: Former Smoker     Packs/day: 1.00     Types: Cigarettes     Quit date: 2010     Years since quittin.2    Smokeless tobacco: Never Used   Substance Use Topics    Alcohol use:  Yes     Alcohol/week: 0.0 standard drinks     Comment: wine twice a year                                Counseling given: Not Answered      Vital Signs (Current):   Vitals:    21 2310 21 0000 21 0039 21 0405   BP: (!) 94/57 (!) 90/46 (!) 101/52 (!) 107/51   Pulse: 75 73 79 71   Resp:  22   Temp:   36.7 °C (98.1 °F) 36.9 °C (98.4 °F)   TempSrc:   Oral Oral   SpO2:   100% 100%   Weight:    207 lb 14.3 oz (94.3 kg)                                              BP Readings from Last 3 Encounters:   21 (!) 107/51   10/04/21 118/60   21 126/77       NPO Status:                                                                                 BMI:   Wt Readings from Last 3 Encounters:   11/17/21 207 lb 14.3 oz (94.3 kg)   10/02/21 211 lb (95.7 kg)   09/16/21 208 lb 15.9 oz (94.8 kg)     Body mass index is 39.28 kg/m². CBC:   Lab Results   Component Value Date    WBC 11.0 11/16/2021    RBC 3.36 11/16/2021    HGB 9.2 11/16/2021    HCT 31.1 11/16/2021    MCV 92.6 11/16/2021    RDW 16.1 11/16/2021     11/16/2021       CMP:   Lab Results   Component Value Date     11/16/2021    K 3.9 11/16/2021     11/16/2021    CO2 27 11/16/2021    BUN 20 11/16/2021    CREATININE 1.0 11/16/2021    GFRAA >60 11/16/2021    LABGLOM 53 11/16/2021    GLUCOSE 83 11/16/2021    PROT 6.7 11/13/2021    CALCIUM 7.8 11/16/2021    BILITOT 0.2 11/13/2021    ALKPHOS 79 11/13/2021    AST 31 11/13/2021    ALT 11 11/13/2021       POC Tests: No results for input(s): POCGLU, POCNA, POCK, POCCL, POCBUN, POCHEMO, POCHCT in the last 72 hours.     Coags:   Lab Results   Component Value Date    PROTIME 13.0 11/13/2021    INR 1.01 11/13/2021    APTT 26.9 11/13/2021       HCG (If Applicable): No results found for: PREGTESTUR, PREGSERUM, HCG, HCGQUANT     ABGs:   Lab Results   Component Value Date    PO2ART 54 10/02/2021    INC0SJW 45.0 10/02/2021    YIH6VSF 24.3 10/02/2021        Type & Screen (If Applicable):  No results found for: LABABO, LABRH    Drug/Infectious Status (If Applicable):  No results found for: HIV, HEPCAB    COVID-19 Screening (If Applicable):   Lab Results   Component Value Date    COVID19 NOT DETECTED 11/16/2021           Anesthesia Evaluation    Airway: Mallampati: II  TM distance: >3 FB   Neck ROM: full  Mouth opening: > = 3 FB Dental:          Pulmonary:   (+) pneumonia: no interval change,  COPD:                            ROS comment: Chronic hypoxemic respiratory failure (   Cardiovascular:  Exercise tolerance: poor (<4 METS),   (+) hypertension:, CAD:, CHF: diastolic, hyperlipidemia         Beta Blocker:  Not on Beta Blocker         Neuro/Psych:   (+) psychiatric history:depression/anxiety             GI/Hepatic/Renal:   (+) GERD:, morbid obesity         ROS comment: Gi bleed. Endo/Other:    (+) hypothyroidism, blood dyscrasia: anemia:., electrolyte abnormalities, . Abdominal:   (+) obese,           Vascular: Other Findings:           Anesthesia Plan      MAC and general     ASA 4     (Chart review)  Induction: intravenous. Anesthetic plan and risks discussed with patient. AMPARO Long - CRNA   11/17/2021    Pre Anesthesia Evaluation complete. Anesthesia plan, risks, benefits, alternatives, and personnel discussed with patient and/or legal guardian. Patient and/or legal guardian verbalized an understanding and agreed to proceed. Anesthesia plan discussed with care team members and agreed upon.   AMPARO Valencia - NATHALIE  11/18/2021

## 2021-11-17 NOTE — PROGRESS NOTES
Patient is lying in bed, sleeping. Seems very tired, only responds to my voice and then falls back asleep. Assessed patient and recorded a new set of vital signs, given morning medications, and patient is resting with call light in lap. Will continue to monitor.    Maylin [facility], [place]

## 2021-11-17 NOTE — PROGRESS NOTES
No compression stockings available on floor. Unable to obtain compression stockings from [facility]. Unable to leave message, no one answered phone. Will attempt to call [facility] again later.

## 2021-11-17 NOTE — CARE COORDINATION
CM in to see Pt to follow up on discharge planning. Pt has DME to include home o2, Pt states she uses it as needed. Pt denies the need for home care at this time. Pt has insurance, pcp, and can afford medications. Pt denies any needs at this time. Discharge plan remains [facility] when medically ready. CM following.

## 2021-11-17 NOTE — PROGRESS NOTES
[facility] Gastroenterology        Progress Note       2021  8:15 AM    Patient:    Margarita Marmolejo  : 1940   [de-identified] y.o. MRN: [id number]  Admitted: 2021  2:44 AM ATT: Giacomo Mahmood MD   [id number]  AdmitDx: Rectal bleeding [K62.5]  GIB (gastrointestinal bleeding) [K92.2]  SALTY (acute kidney injury) ([facility] [address] [N17.9]  Anemia, unspecified type [D64.9]  PCP: Shadi Angulo MD    SUBJECTIVE:  Chart reviewed, events noted  Patient feeling well. No complaints. Tolerating clear liquids. Denies N/V or abd pain. No bleeding noted. Multiple loose bm's overnight.    ROS:  The positive ROS will be identified in bold     CONSTITUTIONAL:  Neg  Weight loss, fatigue, fever  MOUTH/THROAT:  Neg  Bleeding gums, hoarseness or sore throat  RESPIRATORY:   Neg SOB, wheeze, cough, hemoptysis or bronchitis  CARDIOVASCULAR:  Neg Chest pain, palpitations, dyspnea on exertion, edema  GASTROINTESTINAL:  SEE HPI  HEMATOLOGIC/LYMPHATIC:  Neg  Anemia, bleeding tendency  MUSCULOSKELETAL: Neg  New myalgias, joint pain, swelling or stiffness  NEUROLOGICAL:  Neg  Loss of Consciousness, memory loss, forgetfulness, periods of confusion, difficulty concentrating, seizures, insomnia, aphasia    SKIN:  Neg No itching, rashes, or sores  PSYCHIATRIC:  Neg Depression, personality changes, anxiety    OBJECTIVE:      BP (!) 92/52   Pulse 69   Temp 98.8 °F (37.1 °C) (Oral)   Resp 21   Wt 207 lb 14.3 oz (94.3 kg)   SpO2 100%   BMI 39.28 kg/m²     NAD, appears comfortable  Lips and mucous membranes pink and moist  RRR, Nl s1s2  Lungs CTA bilaterally, respirations even and unlabored   Abdomen soft, ND, NT, bowel sounds normal  Skin pink, warm and dry  3+ edema bilateral lower extremities   AAOx3     CBC: Recent Labs     11/14/21  2039 11/15/21  1009 21  0523   WBC  --   --  11.0*   HGB 6.8* 9.1* 9.2*   PLT  --   --  235     BMP:    Recent Labs     11/15/21  1009 11/16/21  0523    142   K 4.0 3.9    106   CO2 30 27   BUN 26* 20   CREATININE 1.0 1.0   GLUCOSE 85 83     Magnesium:   Lab Results   Component Value Date    MG 2.1 09/13/2021     Hepatic: No results for input(s): AST, ALT, ALB, BILITOT, ALKPHOS in the last 72 hours. INR: No results for input(s): INR in the last 72 hours. Intake/Output Summary (Last 24 hours) at 11/17/2021 0815  Last data filed at 11/17/2021 2047  Gross per 24 hour   Intake --   Output 750 ml   Net -750 ml         Medications    Scheduled Medications:    bisacodyl  10 mg Oral BID    polyethylene glycol  17 g Oral 4x Daily    metroNIDAZOLE  500 mg IntraVENous Q8H    clindamycin (CLEOCIN) IV  600 mg IntraVENous Q8H    sodium chloride flush  5-40 mL IntraVENous 2 times per day    pantoprazole  40 mg IntraVENous BID    atorvastatin  40 mg Oral Daily    calcium-cholecalciferol  1 tablet Oral BID WC    ferrous sulfate  325 mg Oral BID WC    budesonide-formoterol  2 puff Inhalation BID    levothyroxine  75 mcg Oral Daily    mirtazapine  15 mg Oral Nightly    multivitamin  1 tablet Oral Daily     PRN Medications: sodium chloride, sodium chloride, sodium chloride flush, sodium chloride, ondansetron **OR** ondansetron, acetaminophen **OR** acetaminophen, albuterol sulfate HFA  Infusions:    sodium chloride      sodium chloride      sodium chloride             Patient Active Problem List   Diagnosis Code    Coronary atherosclerosis of native coronary artery I25.10    Anemia D64.9    Chronic hypoxemic respiratory failure ([facility]) J96.11    Bilateral edema of lower extremity R60.0    Moderate COPD (chronic obstructive pulmonary disease) ([facility]) J44.9    COPD exacerbation ([facility]) J44.1    Hypoxia R09.02    COPD with acute exacerbation ([facility] [address] J44.1    Morbid obesity due to excess calories ([facility]) E66.01    Acute on chronic diastolic congestive heart failure ([facility]) I50.33    Depression F32. A    Hypertension I10    Pneumonia J18.9  Chronic diastolic CHF (congestive heart failure) (HCC) I50.32    GIB (gastrointestinal bleeding) K92.2    Other chest pain R07.89    GI bleed K92.2         ASSESSMENT:  Anemia   hgb 9.2, s/p prbc   Iron 18, tibc 171  Attempted to do c scope 9/15/21 pt would not finish prep and declined any procedures   multiple hospitalizations for anemia and no gross bleeding then   May be hemorrhoidal or diverticular now as well as anemia of chronic disease     RECOMMENDATIONS:  Start bowel prep  c scope and EGD Thursday, consent signed in chart  Clear liquid diet   NPO after midnight   Covid 11/16/21 neg   PPI BID  CBC daily   IVF @ 50 /hr   ESSIE hose   Lasix 20 mg x1 for leg edema     Discussed plan of care with patient and RN    Patient clinical, biochemical, and radiological information discussed with Dr. Eliot Barnes. He agrees with the assessment and plan. AMPARO Poe CNP, CNP  11/17/2021  8:15 AM     Patient agrees with GI work-up  Hemoglobin stable will plan for EGD colonoscopy soon    I have seen and examined this patient personally, and independently of the nurse practitioner. The plan was developed mutually at the time of the visit with the patient. Rhiannon Gold and myself have spoken with patient, nursing staff and provided written and verbal instructions .     The above note has been reviewed and I agree with the Assessment,  Diagnosis, and Treatment plan as suggested by Rhiannon Gold CNP      [address] [place] [address] gastroenterology

## 2021-11-18 ENCOUNTER — ANESTHESIA (OUTPATIENT)
Dept: ENDOSCOPY | Age: 81
DRG: 392 | End: 2021-11-18
Payer: MEDICARE

## 2021-11-18 VITALS
DIASTOLIC BLOOD PRESSURE: 75 MMHG | RESPIRATION RATE: 20 BRPM | SYSTOLIC BLOOD PRESSURE: 146 MMHG | OXYGEN SATURATION: 91 %

## 2021-11-18 LAB
HCT VFR BLD CALC: 36 % (ref 37–47)
HEMOGLOBIN: 10 GM/DL (ref 12.5–16)

## 2021-11-18 PROCEDURE — 3700000001 HC ADD 15 MINUTES (ANESTHESIA): Performed by: INTERNAL MEDICINE

## 2021-11-18 PROCEDURE — 2580000003 HC RX 258: Performed by: INTERNAL MEDICINE

## 2021-11-18 PROCEDURE — 2500000003 HC RX 250 WO HCPCS: Performed by: INTERNAL MEDICINE

## 2021-11-18 PROCEDURE — 6370000000 HC RX 637 (ALT 250 FOR IP): Performed by: FAMILY MEDICINE

## 2021-11-18 PROCEDURE — 0DJ08ZZ INSPECTION OF UPPER INTESTINAL TRACT, VIA NATURAL OR ARTIFICIAL OPENING ENDOSCOPIC: ICD-10-PCS | Performed by: INTERNAL MEDICINE

## 2021-11-18 PROCEDURE — 85018 HEMOGLOBIN: CPT

## 2021-11-18 PROCEDURE — 2500000003 HC RX 250 WO HCPCS: Performed by: NURSE ANESTHETIST, CERTIFIED REGISTERED

## 2021-11-18 PROCEDURE — 2700000000 HC OXYGEN THERAPY PER DAY

## 2021-11-18 PROCEDURE — 0DBN8ZX EXCISION OF SIGMOID COLON, VIA NATURAL OR ARTIFICIAL OPENING ENDOSCOPIC, DIAGNOSTIC: ICD-10-PCS | Performed by: INTERNAL MEDICINE

## 2021-11-18 PROCEDURE — 85014 HEMATOCRIT: CPT

## 2021-11-18 PROCEDURE — 7100000000 HC PACU RECOVERY - FIRST 15 MIN: Performed by: INTERNAL MEDICINE

## 2021-11-18 PROCEDURE — C9113 INJ PANTOPRAZOLE SODIUM, VIA: HCPCS | Performed by: FAMILY MEDICINE

## 2021-11-18 PROCEDURE — 6360000002 HC RX W HCPCS: Performed by: FAMILY MEDICINE

## 2021-11-18 PROCEDURE — 2709999900 HC NON-CHARGEABLE SUPPLY: Performed by: INTERNAL MEDICINE

## 2021-11-18 PROCEDURE — 2140000000 HC CCU INTERMEDIATE R&B

## 2021-11-18 PROCEDURE — 94640 AIRWAY INHALATION TREATMENT: CPT

## 2021-11-18 PROCEDURE — 36415 COLL VENOUS BLD VENIPUNCTURE: CPT

## 2021-11-18 PROCEDURE — 3609010300 HC COLONOSCOPY W/BIOPSY SINGLE/MULTIPLE: Performed by: INTERNAL MEDICINE

## 2021-11-18 PROCEDURE — 99221 1ST HOSP IP/OBS SF/LOW 40: CPT | Performed by: SURGERY

## 2021-11-18 PROCEDURE — 80053 COMPREHEN METABOLIC PANEL: CPT

## 2021-11-18 PROCEDURE — 88305 TISSUE EXAM BY PATHOLOGIST: CPT

## 2021-11-18 PROCEDURE — 6370000000 HC RX 637 (ALT 250 FOR IP): Performed by: NURSE PRACTITIONER

## 2021-11-18 PROCEDURE — 3700000000 HC ANESTHESIA ATTENDED CARE: Performed by: INTERNAL MEDICINE

## 2021-11-18 PROCEDURE — 94761 N-INVAS EAR/PLS OXIMETRY MLT: CPT

## 2021-11-18 PROCEDURE — 6360000002 HC RX W HCPCS: Performed by: NURSE ANESTHETIST, CERTIFIED REGISTERED

## 2021-11-18 PROCEDURE — 7100000001 HC PACU RECOVERY - ADDTL 15 MIN: Performed by: INTERNAL MEDICINE

## 2021-11-18 PROCEDURE — 3609017100 HC EGD: Performed by: INTERNAL MEDICINE

## 2021-11-18 PROCEDURE — 2580000003 HC RX 258: Performed by: NURSE PRACTITIONER

## 2021-11-18 RX ORDER — LIDOCAINE HYDROCHLORIDE 20 MG/ML
INJECTION, SOLUTION EPIDURAL; INFILTRATION; INTRACAUDAL; PERINEURAL PRN
Status: DISCONTINUED | OUTPATIENT
Start: 2021-11-18 | End: 2021-11-18 | Stop reason: SDUPTHER

## 2021-11-18 RX ORDER — SODIUM CHLORIDE 9 MG/ML
INJECTION, SOLUTION INTRAVENOUS CONTINUOUS PRN
Status: DISCONTINUED | OUTPATIENT
Start: 2021-11-18 | End: 2021-11-18 | Stop reason: SDUPTHER

## 2021-11-18 RX ORDER — 0.9 % SODIUM CHLORIDE 0.9 %
1000 INTRAVENOUS SOLUTION INTRAVENOUS ONCE
Status: COMPLETED | OUTPATIENT
Start: 2021-11-18 | End: 2021-11-18

## 2021-11-18 RX ORDER — METRONIDAZOLE 250 MG/1
500 TABLET ORAL EVERY 8 HOURS SCHEDULED
Status: CANCELLED | OUTPATIENT
Start: 2021-11-18

## 2021-11-18 RX ORDER — MIDODRINE HYDROCHLORIDE 5 MG/1
5 TABLET ORAL
Status: DISCONTINUED | OUTPATIENT
Start: 2021-11-18 | End: 2021-11-22 | Stop reason: HOSPADM

## 2021-11-18 RX ORDER — PROPOFOL 10 MG/ML
INJECTION, EMULSION INTRAVENOUS PRN
Status: DISCONTINUED | OUTPATIENT
Start: 2021-11-18 | End: 2021-11-18 | Stop reason: SDUPTHER

## 2021-11-18 RX ADMIN — PROPOFOL 10 MG: 10 INJECTION, EMULSION INTRAVENOUS at 14:12

## 2021-11-18 RX ADMIN — SODIUM CHLORIDE, PRESERVATIVE FREE 10 ML: 5 INJECTION INTRAVENOUS at 09:05

## 2021-11-18 RX ADMIN — LEVOTHYROXINE SODIUM 75 MCG: 25 TABLET ORAL at 06:04

## 2021-11-18 RX ADMIN — PANTOPRAZOLE SODIUM 40 MG: 40 INJECTION, POWDER, FOR SOLUTION INTRAVENOUS at 21:48

## 2021-11-18 RX ADMIN — METRONIDAZOLE 500 MG: 500 INJECTION, SOLUTION INTRAVENOUS at 22:05

## 2021-11-18 RX ADMIN — METRONIDAZOLE 500 MG: 500 INJECTION, SOLUTION INTRAVENOUS at 12:00

## 2021-11-18 RX ADMIN — CLINDAMYCIN PHOSPHATE 600 MG: 600 INJECTION, SOLUTION INTRAVENOUS at 23:09

## 2021-11-18 RX ADMIN — MIDODRINE HYDROCHLORIDE 5 MG: 5 TABLET ORAL at 09:04

## 2021-11-18 RX ADMIN — MIDODRINE HYDROCHLORIDE 5 MG: 5 TABLET ORAL at 15:42

## 2021-11-18 RX ADMIN — CLINDAMYCIN PHOSPHATE 600 MG: 600 INJECTION, SOLUTION INTRAVENOUS at 06:03

## 2021-11-18 RX ADMIN — BUDESONIDE AND FORMOTEROL FUMARATE DIHYDRATE 2 PUFF: 80; 4.5 AEROSOL RESPIRATORY (INHALATION) at 08:34

## 2021-11-18 RX ADMIN — SODIUM CHLORIDE: 9 INJECTION, SOLUTION INTRAVENOUS at 01:06

## 2021-11-18 RX ADMIN — PROPOFOL 10 MG: 10 INJECTION, EMULSION INTRAVENOUS at 13:58

## 2021-11-18 RX ADMIN — METRONIDAZOLE 500 MG: 500 INJECTION, SOLUTION INTRAVENOUS at 04:37

## 2021-11-18 RX ADMIN — MIRTAZAPINE 15 MG: 15 TABLET, FILM COATED ORAL at 21:49

## 2021-11-18 RX ADMIN — PROPOFOL 10 MG: 10 INJECTION, EMULSION INTRAVENOUS at 13:54

## 2021-11-18 RX ADMIN — Medication 1 TABLET: at 15:42

## 2021-11-18 RX ADMIN — PROPOFOL 10 MG: 10 INJECTION, EMULSION INTRAVENOUS at 14:08

## 2021-11-18 RX ADMIN — PROPOFOL 10 MG: 10 INJECTION, EMULSION INTRAVENOUS at 14:03

## 2021-11-18 RX ADMIN — PROPOFOL 10 MG: 10 INJECTION, EMULSION INTRAVENOUS at 14:17

## 2021-11-18 RX ADMIN — SODIUM CHLORIDE: 9 INJECTION, SOLUTION INTRAVENOUS at 15:44

## 2021-11-18 RX ADMIN — PHENYLEPHRINE HYDROCHLORIDE 100 MCG: 10 INJECTION INTRAVENOUS at 13:59

## 2021-11-18 RX ADMIN — MIDODRINE HYDROCHLORIDE 5 MG: 5 TABLET ORAL at 11:56

## 2021-11-18 RX ADMIN — SODIUM CHLORIDE 1000 ML: 9 INJECTION, SOLUTION INTRAVENOUS at 01:36

## 2021-11-18 RX ADMIN — CLINDAMYCIN PHOSPHATE 600 MG: 600 INJECTION, SOLUTION INTRAVENOUS at 15:44

## 2021-11-18 RX ADMIN — PROPOFOL 10 MG: 10 INJECTION, EMULSION INTRAVENOUS at 13:51

## 2021-11-18 RX ADMIN — SODIUM CHLORIDE: 9 INJECTION, SOLUTION INTRAVENOUS at 13:35

## 2021-11-18 RX ADMIN — FERROUS SULFATE TAB 325 MG (65 MG ELEMENTAL FE) 325 MG: 325 (65 FE) TAB at 15:42

## 2021-11-18 RX ADMIN — PANTOPRAZOLE SODIUM 40 MG: 40 INJECTION, POWDER, FOR SOLUTION INTRAVENOUS at 09:04

## 2021-11-18 RX ADMIN — LIDOCAINE HYDROCHLORIDE 100 MG: 20 INJECTION, SOLUTION EPIDURAL; INFILTRATION; INTRACAUDAL; PERINEURAL at 13:49

## 2021-11-18 RX ADMIN — PHENYLEPHRINE HYDROCHLORIDE 100 MCG: 10 INJECTION INTRAVENOUS at 14:01

## 2021-11-18 RX ADMIN — PROPOFOL 50 MG: 10 INJECTION, EMULSION INTRAVENOUS at 13:49

## 2021-11-18 ASSESSMENT — PAIN SCALES - WONG BAKER
WONGBAKER_NUMERICALRESPONSE: 0

## 2021-11-18 ASSESSMENT — PAIN SCALES - GENERAL
PAINLEVEL_OUTOF10: 0
PAINLEVEL_OUTOF10: 0
PAINLEVEL_OUTOF10: 6
PAINLEVEL_OUTOF10: 0

## 2021-11-18 ASSESSMENT — PAIN - FUNCTIONAL ASSESSMENT: PAIN_FUNCTIONAL_ASSESSMENT: 0-10

## 2021-11-18 NOTE — PROGRESS NOTES
[facility] Gastroenterology    I have examined the patient before the procedure and there is no change in the history and physical exam recorded by me previously. I have reviewed with the patient and/or family the risks, benefits, and alternatives to the procedure. Hypotension since admission, will start midodrine   Tap water enema now   IVF @ [address] [facility], [address] [place] [address], [address] [place], [place] [address]  11/18/2021  8:07 AM      [address] [place] [address]        I have examined the patient within 24 hours  before the procedure and there is no change in the previous history and physical exam,which has been reviewed. There is no history of sleep apnea, snoring, or stridor. There has been no  previous adverse experience with sedation/anesthesia. There is no increased risk for aspiration of gastric contents. The patient has been instructed that all resuscitative measures (during the operative and immediate perioperative period) will be instituted in the unlikely event that they will be needed. ASA Class: 3  AIRWAY Class: 3     Consent form signed, witnessed and in soft chart           The patient was counseled at length about the risks of prem Covid -!9 in the pascual-operative and post -operative states including the recovery window of their procedure. The patient was made aware that prem Covid -19 after an endoscopic procedure may worsen their prognosis for recovering from the virus and lend to a higher morbidity and mortality risk. The patient was given the options of postponing their procedure. I have reviewed with the patient and/or family the risks, benefits, and alternatives to the procedure. The patient wishes to proceed with the procedure.     MD Yolanda Moreno gastroenterology  11/18/2021  8:28 AM

## 2021-11-18 NOTE — CONSULTS
Department of General Surgery   Surgical Service Dr. Fernanda Hart   Consult Note    Date of Consult: 11/18/21    Reason for Consult:  GI bleed  Requesting Physician:  Dr. Rodriguez Form:  GI bleed    History Obtained From:  patient, electronic medical record    HISTORY OF PRESENT ILLNESS:    The patient is a [de-identified] y.o. female who is a poor historian. She presented with complaints of GI bleed. She reports having blood in the stool for several weeks but with recent worsening. She was apparently admitted in September for similar symptoms. She is unable to contribute much more to history. She denies abdominal pain or nausea and feels hungry, requesting a diet. I spoke with her nurse and she has had no further bloody BMs since her EGD/colonoscopy today. EGD showed mild gastritis, colonoscopy showed diverticulosis and internal hemorrhoids as well as mild colitis, no active bleeding.       Past Medical History:    Past Medical History:   Diagnosis Date    ASHD (arteriosclerotic heart disease)     Bilateral edema of lower extremity 12/16/2015    CHF (congestive heart failure) (HCC)     COPD (chronic obstructive pulmonary disease) (HCC)     Depression     Hypertension     MRSA (methicillin resistant staph aureus) culture positive 09/09/2021    Nasal    MRSA (methicillin resistant staph aureus) culture positive 09/09/2021    NASAL       Past Surgical History:    Past Surgical History:   Procedure Laterality Date    ANUS SURGERY      fistula repair    APPENDECTOMY      HYSTERECTOMY      TONSILLECTOMY      UPPER GASTROINTESTINAL ENDOSCOPY N/A 11/25/2020    EGD BIOPSY performed by Jocelyn Christopher MD at [facility] ENDOSCOPY       Current Medications:   Current Facility-Administered Medications   Medication Dose Route Frequency Provider Last Rate Last Admin    midodrine (PROAMATINE) tablet 5 mg  5 mg Oral TID  AMPARO Mendoza - CNP   5 mg at 11/18/21 1542    0.9 % sodium chloride infusion   IntraVENous Continuous Khanh Sinclair MD 75 mL/hr at 11/18/21 1544 New Bag at 11/18/21 1544    furosemide (LASIX) injection 20 mg  20 mg IntraVENous Once AMPARO Brannon - CNP        0.9 % sodium chloride infusion   IntraVENous PRN AMPARO Contreras - NP        metronidazole (FLAGYL) 500 mg in NaCl 100 mL IVPB premix  500 mg IntraVENous Q8H Ar Burnett MD   Stopped at 11/18/21 1242    clindamycin (CLEOCIN) 600 mg in dextrose 5 % 50 mL IVPB  600 mg IntraVENous Brantley Simmonds, MD   Stopped at 11/18/21 1704    0.9 % sodium chloride infusion   IntraVENous PRN Ar Burnett MD        sodium chloride flush 0.9 % injection 5-40 mL  5-40 mL IntraVENous 2 times per day Tessie Fuller MD   10 mL at 11/18/21 0905    sodium chloride flush 0.9 % injection 5-40 mL  5-40 mL IntraVENous PRN Ron Epperson MD        0.9 % sodium chloride infusion  25 mL IntraVENous PRN Ron Epperson MD        ondansetron (ZOFRAN-ODT) disintegrating tablet 4 mg  4 mg Oral Q8H PRN Ron Epperson MD        Or    ondansetron (ZOFRAN) injection 4 mg  4 mg IntraVENous Q6H PRN Ron Epperson MD   4 mg at 11/17/21 1451    acetaminophen (TYLENOL) tablet 650 mg  650 mg Oral Q6H PRN Ron Epperson MD   650 mg at 11/16/21 1153    Or    acetaminophen (TYLENOL) suppository 650 mg  650 mg Rectal Q6H PRN Ron pEperson MD        pantoprazole (PROTONIX) injection 40 mg  40 mg IntraVENous BID Quoc Smith MD   40 mg at 11/18/21 0904    albuterol sulfate  (90 Base) MCG/ACT inhaler 2 puff  2 puff Inhalation Q4H PRN Quoc Smith MD        atorvastatin (LIPITOR) tablet 40 mg  40 mg Oral Daily Quoc Smith MD   40 mg at 11/17/21 0819    calcium-cholecalciferol 500-200 MG-UNIT per tablet 1 tablet  1 tablet Oral BID  Quoc Smith MD   1 tablet at 11/18/21 1542    ferrous sulfate (IRON 325) tablet 325 mg  325 mg Oral BID  Quoc Smith MD   325 mg at 11/18/21 1545    budesonide-formoterol (SYMBICORT) 80-4.5 MCG/ACT inhaler 2 puff  2 puff Inhalation BID Bonnielee Mortimer, MD   2 puff at 21 0834    levothyroxine (SYNTHROID) tablet 75 mcg  75 mcg Oral Daily Bonnielee Mortimer, MD   75 mcg at 21 0604    mirtazapine (REMERON) tablet 15 mg  15 mg Oral Nightly Bonnielee Mortimer, MD   15 mg at 216    multivitamin 1 tablet  1 tablet Oral Daily Bonnielee Mortimer, MD   1 tablet at 21 0818       Allergies:  Codeine, Moxifloxacin, Oxycodone, Pcn [penicillins], and Warfarin and related    Social History:   Social History     Socioeconomic History    Marital status:      Spouse name: Glenroy Jimenez Number of children: 3    Years of education: None    Highest education level: None   Occupational History    None   Tobacco Use    Smoking status: Former Smoker     Packs/day: 1.00     Types: Cigarettes     Quit date: 2010     Years since quittin.2    Smokeless tobacco: Never Used   Substance and Sexual Activity    Alcohol use: Yes     Alcohol/week: 0.0 standard drinks     Comment: wine twice a year    Drug use: No    Sexual activity: Yes     Partners: Male   Other Topics Concern    None   Social History Narrative    None     Social Determinants of Health     Financial Resource Strain:     Difficulty of Paying Living Expenses: Not on file   Food Insecurity:     Worried About Running Out of Food in the Last Year: Not on file    Cuate of Food in the Last Year: Not on file   Transportation Needs:     Lack of Transportation (Medical): Not on file    Lack of Transportation (Non-Medical):  Not on file   Physical Activity:     Days of Exercise per Week: Not on file    Minutes of Exercise per Session: Not on file   Stress:     Feeling of Stress : Not on file   Social Connections:     Frequency of Communication with Friends and Family: Not on file    Frequency of Social Gatherings with Friends and Family: Not on file    Attends Religion Services: Not on file    Active Member of Clubs or Organizations: Not on file    Attends Club or Organization Meetings: Not on file    Marital Status: Not on file   Intimate Partner Violence:     Fear of Current or Ex-Partner: Not on file    Emotionally Abused: Not on file    Physically Abused: Not on file    Sexually Abused: Not on file   Housing Stability:     Unable to Pay for Housing in the Last Year: Not on file    Number of Places Lived in the Last Year: Not on file    Unstable Housing in the Last Year: Not on file       Family History:   Family History   Problem Relation Age of Onset    High Blood Pressure Mother     Heart Disease Mother     Early Death Father     Substance Abuse Father     Diabetes Sister        REVIEW OF SYSTEMS:    Unable to obtain as patient is a poor historian. PHYSICAL EXAM:  Vitals:    11/18/21 1455 11/18/21 1500 11/18/21 1530 11/18/21 1647   BP:  (!) 114/48 (!) 83/59 107/63   Pulse: 86 85 75 65   Resp:  18 23 18   Temp:  96.8 °F (36 °C) 98.3 °F (36.8 °C) 98.2 °F (36.8 °C)   TempSrc:  Temporal Oral Oral   SpO2: 96% 95% (!) 89%    Weight:           Physical Exam  General: awake, alert, in no acute distress  HEENT: mucous membranes moist  Respiratory: normal effort, no wheezes appreciated  CV: appears well perfused  Abdomen: Soft, mildly tender diffusely, non-distended. No guarding or rebound tenderness. Skin: warm and dry  Extremities: atraumatic  Neuro: no focal deficits noted  Psych: mood normal        DATA:    Lab Results   Component Value Date    WBC 8.8 11/17/2021    HGB 10.0 (L) 11/18/2021    HCT 36.0 (L) 11/18/2021    MCV 95.5 11/17/2021     11/17/2021     Lab Results   Component Value Date     11/16/2021    K 3.9 11/16/2021     11/16/2021    CO2 27 11/16/2021    BUN 20 11/16/2021    CREATININE 1.0 11/16/2021    GLUCOSE 83 11/16/2021    CALCIUM 7.8 11/16/2021      CT  Impression   1.  No acute intra-abdominal abnormality to explain patient's symptoms. Specifically, no obvious rectal mass on noncontrast imaging. 2. Stable left base opacity with improving right base opacity since September 30, 2021.   3. Severe diverticulosis. 4. Severe atherosclerosis. 5. IVC filter in place.             IMPRESSION:    [de-identified] y.o. female with GI bleed. Possibly from diverticular disease. No evidence of active bleeding currently. Patient Active Problem List:     Coronary atherosclerosis of native coronary artery     Anemia     Chronic hypoxemic respiratory failure (HCC)     Bilateral edema of lower extremity     Moderate COPD (chronic obstructive pulmonary disease) (HCC)     COPD exacerbation (HCC)     Hypoxia     COPD with acute exacerbation ([facility] [address]     Morbid obesity due to excess calories (HCC)     Acute on chronic diastolic congestive heart failure (HCC)     Depression     Hypertension     Pneumonia     Chronic diastolic CHF (congestive heart failure) (HCC)     GIB (gastrointestinal bleeding)     Other chest pain     GI bleed        PLAN:  - will give full liquid diet  - no indication for surgical intervention  - will follow peripherally, if rebleeding occurs would recommend repeat endoscopy or CTA if bleeding is brisk. If bleeding is localized it may be controlled with endoscopy or IR embolization.   Surgery would be last resort  - would be happy to re-evaluate if bleeding recurs        Electronically signed by Lore Laura MD on 11/18/2021 at 5:48 PM

## 2021-11-18 NOTE — PLAN OF CARE
Problem: Skin Integrity:  Goal: Will show no infection signs and symptoms  Description: Will show no infection signs and symptoms  11/18/2021 0127 by Domenica Lanza RN  Outcome: Ongoing  11/18/2021 0125 by Domenica Lanza RN  Outcome: Ongoing  Goal: Absence of new skin breakdown  Description: Absence of new skin breakdown  11/18/2021 0127 by Domenica Lanza RN  Outcome: Ongoing  11/18/2021 0125 by Domenica Lanza RN  Outcome: Ongoing     Problem: Falls - Risk of:  Goal: Will remain free from falls  Description: Will remain free from falls  11/18/2021 0127 by Domenica Lanza RN  Outcome: Met This Shift  11/18/2021 0125 by Domenica Lanza RN  Outcome: Met This Shift  Goal: Absence of physical injury  Description: Absence of physical injury  11/18/2021 0127 by Domenica Lanza RN  Outcome: Met This Shift  11/18/2021 0125 by Domenica Lanza RN  Outcome: Met This Shift     Problem: Pain:  Goal: Pain level will decrease  Description: Pain level will decrease  11/18/2021 0127 by Domenica Lanza RN  Outcome: Met This Shift  11/18/2021 0125 by Domenica Lanza RN  Outcome: Met This Shift  Goal: Control of acute pain  Description: Control of acute pain  11/18/2021 0127 by Domenica Lanza RN  Outcome: Met This Shift  11/18/2021 0125 by Domenica Lanza RN  Outcome: Met This Shift  Goal: Control of chronic pain  Description: Control of chronic pain  11/18/2021 0127 by Domenica Lanza RN  Outcome: Met This Shift  11/18/2021 0125 by Domenica Lanza RN  Outcome: Met This Shift

## 2021-11-18 NOTE — PROGRESS NOTES
[facility] HOSPITALIST PROGRESS NOTE      PCP: Philippe Mckeon MD    Date of Admission: 11/13/2021    Subjective: complains of lower abdominal  Pain   Blood pressure always runs low at home  Is still having dark stool     Brief Hospital summary patient is an 45-year-old female with history of coronary artery disease, CHF, paroxysmal A. fib, COPD, chronic respiratory failure and essential hypertension who was admitted with rectal bleeding. Hemoglobin 9.6 on admission, creatinine 1.9  CT abdomen pelvis showed no acute process, chronic diverticulosis    Assessment/Plan    Acute GI bleed  Acute blood loss anemia  Sigmoid colitis  Acute kidney injury, resolved  Chronic respiratory failure  Paroxysmal A. fib  Chronic COPD  Chronic diastolic heart failure  Morbid obesity    Anemia improved after transfusion  Hemoglobin 10 today  Normal EGD today; colonoscopy today showed sigmoid diverticulosis with colitis. Patient allergic to penicillin and fluoroquinolones  Continue clindamycin and metronidazole   Blood pressure borderline; continue to monitor  Continue 1   Transfuse as needed to keep hemoglobin over 7  Continue levothyroxine  PT OT    Disposition: discharge on home oxygen tomorrow if stable    DVT prophlaxis:   SCDs    Last BM:   None since admission    Ambulation:   PT OT consultation    Disposition:   Home    Diet: full loquid diet     Physical Exam Performed:       /63   Pulse 65   Temp 98.2 °F (36.8 °C) (Oral)   Resp 18   Wt 207 lb 14.3 oz (94.3 kg)   SpO2 (!) 89%   BMI 39.28 kg/m²     Physical Exam  Constitutional:       General: She is not in acute distress. Appearance: Normal appearance. HENT:      Head: Normocephalic and atraumatic. Right Ear: External ear normal.      Left Ear: External ear normal.   Eyes:      Extraocular Movements: Extraocular movements intact. Pupils: Pupils are equal, round, and reactive to light.    Cardiovascular:      Rate and Rhythm: Normal rate

## 2021-11-18 NOTE — ANESTHESIA POSTPROCEDURE EVALUATION
Department of Anesthesiology  Postprocedure Note    Patient: Margarita Marmolejo  MRN: [id number]  YOB: 1940  Date of evaluation: 11/18/2021  Time:  3:06 PM     Procedure Summary     Date: 11/18/21 Room / Location: [address]    Anesthesia Start: 1335 Anesthesia Stop: 0327    Procedures:       EGD DIAGNOSTIC ONLY (N/A )      COLONOSCOPY WITH BIOPSY (N/A ) Diagnosis: (ANEMIA - RECTAL BLEED)    Surgeons: Dominic Espino MD Responsible Provider: Rowan Mays MD    Anesthesia Type: MAC, general ASA Status: 4          Anesthesia Type: MAC, general    Santiago Phase I: Santiago Score: 7    Santiago Phase II:      Last vitals: Reviewed and per EMR flowsheets.        Anesthesia Post Evaluation    Patient location during evaluation: PACU  Patient participation: complete - patient participated  Level of consciousness: awake  Pain score: 3  Airway patency: patent  Nausea & Vomiting: no vomiting and no nausea  Complications: no  Cardiovascular status: blood pressure returned to baseline and hemodynamically stable  Respiratory status: acceptable  Hydration status: stable

## 2021-11-18 NOTE — PLAN OF CARE
Problem: Falls - Risk of:  Goal: Absence of physical injury  Description: Absence of physical injury  11/18/2021 0127 by Jonathan Harrell RN  Outcome: Met This Shift  11/18/2021 0125 by Jonathan Harrell RN  Outcome: Met This Shift

## 2021-11-18 NOTE — PLAN OF CARE
Problem: Skin Integrity:  Goal: Will show no infection signs and symptoms  Description: Will show no infection signs and symptoms  Outcome: Ongoing  Goal: Absence of new skin breakdown  Description: Absence of new skin breakdown  Outcome: Ongoing     Problem: Falls - Risk of:  Goal: Will remain free from falls  Description: Will remain free from falls  Outcome: Met This Shift  Goal: Absence of physical injury  Description: Absence of physical injury  Outcome: Met This Shift     Problem: Pain:  Goal: Pain level will decrease  Description: Pain level will decrease  Outcome: Met This Shift  Goal: Control of acute pain  Description: Control of acute pain  Outcome: Met This Shift  Goal: Control of chronic pain  Description: Control of chronic pain  Outcome: Met This Shift

## 2021-11-18 NOTE — OP NOTE
Operative Note      Patient: Amelia Nascimento  YOB: 1940  MRN: [id number]    Date of Procedure: 11/18/2021    Pre-Op Diagnosis: ANEMIA - RECTAL BLEED    [facility]      BRIEF OP REPORT:    Impression:    1) normal EGD apart from mild gastritis in antrum   2) C scope severe pancolonic diverticulosis.   Grade 2 internal hemorrhoids   3) sigmoid colon mucosa was erythematous , biopsy done        Suggest:   1) May have mild sigmoid diverticulosis associated colitis, will give CIPRO and Flagyl for 7 days   2) soft diet        Full EGD/COLONOSCOPY report available by going to \"chart review\" then \"procedures\" then  \"EGD/Colonoscopy\"  then \"View Endoscopy Report\"   Electronically signed by Sury Vergara MD on 11/18/2021 at 2:40 PM

## 2021-11-18 NOTE — PROGRESS NOTES
1433 Patient arrived to PACU from Endo. Monitors applied and alarms on. Report from Louie Mcintyre. 1440 Patient switched from simple mask to high flow nasal canula. 1445 Patient turned side to side and bedding changed. 1506 PACU care complete. 1506 Attempted to reach nurse for report on floor. Waiting return call. 1526 Patient transferred out of PACU to floor.

## 2021-11-19 LAB
CULTURE: NORMAL
Lab: NORMAL
SPECIMEN: NORMAL

## 2021-11-19 PROCEDURE — 2140000000 HC CCU INTERMEDIATE R&B

## 2021-11-19 PROCEDURE — 94640 AIRWAY INHALATION TREATMENT: CPT

## 2021-11-19 PROCEDURE — 6370000000 HC RX 637 (ALT 250 FOR IP): Performed by: INTERNAL MEDICINE

## 2021-11-19 PROCEDURE — 2500000003 HC RX 250 WO HCPCS: Performed by: INTERNAL MEDICINE

## 2021-11-19 PROCEDURE — 2700000000 HC OXYGEN THERAPY PER DAY

## 2021-11-19 PROCEDURE — 6370000000 HC RX 637 (ALT 250 FOR IP): Performed by: NURSE PRACTITIONER

## 2021-11-19 PROCEDURE — 6370000000 HC RX 637 (ALT 250 FOR IP): Performed by: FAMILY MEDICINE

## 2021-11-19 PROCEDURE — 6360000002 HC RX W HCPCS: Performed by: FAMILY MEDICINE

## 2021-11-19 PROCEDURE — C9113 INJ PANTOPRAZOLE SODIUM, VIA: HCPCS | Performed by: FAMILY MEDICINE

## 2021-11-19 PROCEDURE — 2580000003 HC RX 258: Performed by: INTERNAL MEDICINE

## 2021-11-19 PROCEDURE — 85027 COMPLETE CBC AUTOMATED: CPT

## 2021-11-19 PROCEDURE — 94761 N-INVAS EAR/PLS OXIMETRY MLT: CPT

## 2021-11-19 RX ORDER — MIDODRINE HYDROCHLORIDE 5 MG/1
10 TABLET ORAL ONCE
Status: COMPLETED | OUTPATIENT
Start: 2021-11-19 | End: 2021-11-19

## 2021-11-19 RX ORDER — TRAMADOL HYDROCHLORIDE 50 MG/1
50 TABLET ORAL ONCE
Status: COMPLETED | OUTPATIENT
Start: 2021-11-19 | End: 2021-11-19

## 2021-11-19 RX ADMIN — METRONIDAZOLE 500 MG: 500 INJECTION, SOLUTION INTRAVENOUS at 05:38

## 2021-11-19 RX ADMIN — LEVOTHYROXINE SODIUM 75 MCG: 25 TABLET ORAL at 05:34

## 2021-11-19 RX ADMIN — BUDESONIDE AND FORMOTEROL FUMARATE DIHYDRATE 2 PUFF: 80; 4.5 AEROSOL RESPIRATORY (INHALATION) at 22:05

## 2021-11-19 RX ADMIN — SODIUM CHLORIDE 25 ML: 9 INJECTION, SOLUTION INTRAVENOUS at 21:35

## 2021-11-19 RX ADMIN — Medication 1 TABLET: at 17:28

## 2021-11-19 RX ADMIN — SODIUM CHLORIDE, PRESERVATIVE FREE 10 ML: 5 INJECTION INTRAVENOUS at 09:50

## 2021-11-19 RX ADMIN — MIDODRINE HYDROCHLORIDE 5 MG: 5 TABLET ORAL at 17:28

## 2021-11-19 RX ADMIN — FERROUS SULFATE TAB 325 MG (65 MG ELEMENTAL FE) 325 MG: 325 (65 FE) TAB at 17:28

## 2021-11-19 RX ADMIN — CLINDAMYCIN PHOSPHATE 600 MG: 600 INJECTION, SOLUTION INTRAVENOUS at 13:11

## 2021-11-19 RX ADMIN — SODIUM CHLORIDE 25 ML: 9 INJECTION, SOLUTION INTRAVENOUS at 22:51

## 2021-11-19 RX ADMIN — MIRTAZAPINE 15 MG: 15 TABLET, FILM COATED ORAL at 21:30

## 2021-11-19 RX ADMIN — THERA TABS 1 TABLET: TAB at 09:49

## 2021-11-19 RX ADMIN — PANTOPRAZOLE SODIUM 40 MG: 40 INJECTION, POWDER, FOR SOLUTION INTRAVENOUS at 09:49

## 2021-11-19 RX ADMIN — CLINDAMYCIN PHOSPHATE 600 MG: 600 INJECTION, SOLUTION INTRAVENOUS at 05:29

## 2021-11-19 RX ADMIN — CLINDAMYCIN PHOSPHATE 600 MG: 600 INJECTION, SOLUTION INTRAVENOUS at 21:36

## 2021-11-19 RX ADMIN — MIDODRINE HYDROCHLORIDE 10 MG: 5 TABLET ORAL at 03:30

## 2021-11-19 RX ADMIN — MIDODRINE HYDROCHLORIDE 5 MG: 5 TABLET ORAL at 13:11

## 2021-11-19 RX ADMIN — ATORVASTATIN CALCIUM 40 MG: 40 TABLET, FILM COATED ORAL at 09:49

## 2021-11-19 RX ADMIN — TRAMADOL HYDROCHLORIDE 50 MG: 50 TABLET, FILM COATED ORAL at 22:01

## 2021-11-19 RX ADMIN — SODIUM CHLORIDE, PRESERVATIVE FREE 10 ML: 5 INJECTION INTRAVENOUS at 21:30

## 2021-11-19 RX ADMIN — Medication 1 TABLET: at 09:49

## 2021-11-19 RX ADMIN — FERROUS SULFATE TAB 325 MG (65 MG ELEMENTAL FE) 325 MG: 325 (65 FE) TAB at 09:49

## 2021-11-19 RX ADMIN — PANTOPRAZOLE SODIUM 40 MG: 40 INJECTION, POWDER, FOR SOLUTION INTRAVENOUS at 21:30

## 2021-11-19 RX ADMIN — METRONIDAZOLE 500 MG: 500 INJECTION, SOLUTION INTRAVENOUS at 22:52

## 2021-11-19 RX ADMIN — MIDODRINE HYDROCHLORIDE 5 MG: 5 TABLET ORAL at 09:49

## 2021-11-19 RX ADMIN — METRONIDAZOLE 500 MG: 500 INJECTION, SOLUTION INTRAVENOUS at 14:42

## 2021-11-19 ASSESSMENT — PAIN SCALES - GENERAL
PAINLEVEL_OUTOF10: 0
PAINLEVEL_OUTOF10: 10
PAINLEVEL_OUTOF10: 0
PAINLEVEL_OUTOF10: 10

## 2021-11-19 ASSESSMENT — PAIN SCALES - WONG BAKER
WONGBAKER_NUMERICALRESPONSE: 0

## 2021-11-19 ASSESSMENT — PAIN DESCRIPTION - PAIN TYPE: TYPE: ACUTE PAIN

## 2021-11-19 ASSESSMENT — PAIN DESCRIPTION - LOCATION: LOCATION: BUTTOCKS;GROIN

## 2021-11-19 NOTE — PLAN OF CARE
Nutrition Problem #1: Inadequate oral intake  Intervention: Food and/or Nutrient Delivery: Continue Current Diet, Start Oral Nutrition Supplement  Nutritional Goals: Pt will consume at least half of her meals and supplements

## 2021-11-19 NOTE — PLAN OF CARE
Problem: Skin Integrity:  Goal: Will show no infection signs and symptoms  Description: Will show no infection signs and symptoms  Outcome: Ongoing  Goal: Absence of new skin breakdown  Description: Absence of new skin breakdown  Outcome: Ongoing     Problem: Falls - Risk of:  Goal: Will remain free from falls  Description: Will remain free from falls  11/19/2021 0109 by Naomi Kohler RN  Outcome: Ongoing  11/18/2021 1338 by Prakash Martinez  Outcome: Ongoing  Goal: Absence of physical injury  Description: Absence of physical injury  Outcome: Ongoing     Problem: Pain:  Goal: Pain level will decrease  Description: Pain level will decrease  Outcome: Ongoing  Goal: Control of acute pain  Description: Control of acute pain  Outcome: Ongoing  Goal: Control of chronic pain  Description: Control of chronic pain  Outcome: Ongoing

## 2021-11-19 NOTE — PROGRESS NOTES
[facility] HOSPITALIST PROGRESS NOTE      PCP: Chace Reid MD    Date of Admission: 11/13/2021    Subjective: Seen and examined at bedside. Sleepy and lethargic. No active complaints. Blood pressure always runs low at home  No fresh bleeding reported overnight    Brief Hospital summary patient is an 81-year-old female with history of coronary artery disease, CHF, paroxysmal A. fib, COPD, chronic respiratory failure and essential hypertension who was admitted with rectal bleeding. Hemoglobin 9.6 on admission, creatinine 1.9  CT abdomen pelvis showed no acute process, chronic diverticulosis    Assessment/Plan    Acute GI bleed, improved  Acute blood loss anemia, resolved  Sigmoid colitis, likely ischemic   acute kidney injury, resolved  Chronic respiratory failure  Paroxysmal A. fib  Chronic COPD  Chronic diastolic heart failure  Morbid obesity    Lethargic today; vitals are stable  Anemia improved after transfusion  Hemoglobin 10; monitor CBC daily  Normal EGD today; colonoscopy today showed sigmoid diverticulosis with colitis. Patient allergic to penicillin and fluoroquinolones  Continue clindamycin and metronidazole   Blood pressure borderline; continue to monitor  Transfuse as needed to keep hemoglobin over 7  Seen by surgery; appreciated  In case of rebleed, obtain a stat CT angio  Start Full liquid diet  Continue levothyroxine  PT OT    Disposition: discharge on home oxygen tomorrow if HH stable    DVT prophlaxis:   SCDs    Last BM:   None since admission    Ambulation:   PT OT consultation    Disposition:   Home    Diet: full loquid diet     Physical Exam Performed:       BP (!) 102/52   Pulse 60   Temp 98.2 °F (36.8 °C) (Axillary)   Resp 20   Ht 5' 1\" (1.549 m)   Wt 207 lb 14.3 oz (94.3 kg)   SpO2 100%   BMI 39.28 kg/m²     Physical Exam  Constitutional:       General: She is not in acute distress. Appearance: Normal appearance. HENT:      Head: Normocephalic and atraumatic. Right Ear: External ear normal.      Left Ear: External ear normal.   Eyes:      Extraocular Movements: Extraocular movements intact. Pupils: Pupils are equal, round, and reactive to light. Cardiovascular:      Rate and Rhythm: Normal rate and regular rhythm. Heart sounds: No murmur heard. Pulmonary:      Effort: Pulmonary effort is normal. No respiratory distress. Breath sounds: Normal breath sounds. No wheezing. Abdominal:      General: Bowel sounds are normal. There is no distension. Palpations: Abdomen is soft. Tenderness: There is no abdominal tenderness. There is no guarding. Comments: Tenderness in lower abdomen    Musculoskeletal:         General: No swelling. Cervical back: Normal range of motion. Comments: Bilateral LE edema    Skin:     General: Skin is warm. Neurological:      General: No focal deficit present. Mental Status: She is lethargic. Cranial Nerves: No cranial nerve deficit. Labs:   Recent Labs     11/17/21  1430 11/18/21  0200   WBC 8.8  --    HGB 9.0* 10.0*   HCT 31.5* 36.0*     --      No results for input(s): NA, K, CL, CO2, BUN, CREATININE, CALCIUM, PHOS in the last 72 hours. Invalid input(s): MAGNES  No results for input(s): AST, ALT, BILIDIR, BILITOT, ALKPHOS in the last 72 hours. No results for input(s): INR in the last 72 hours. No results for input(s): Driss Shorts in the last 72 hours. Urinalysis:      Lab Results   Component Value Date    NITRU NEGATIVE 10/01/2021    WBCUA <1 10/01/2021    BACTERIA RARE 10/01/2021    RBCUA <1 10/01/2021    BLOODU LARGE 10/01/2021    SPECGRAV 1.021 10/01/2021       Radiology:  CT ABDOMEN PELVIS WO CONTRAST Additional Contrast? None   Final Result   1. No acute intra-abdominal abnormality to explain patient's symptoms. Specifically, no obvious rectal mass on noncontrast imaging.    2. Stable left base opacity with improving right base opacity since September 30, 2021.   3. Severe diverticulosis. 4. Severe atherosclerosis. 5. IVC filter in place.              Kaylah Roach MD  11/19/2021 4:41 PM

## 2021-11-19 NOTE — PROGRESS NOTES
Comprehensive Nutrition Assessment    Type and Reason for Visit:  Initial (los 6 d)    Nutrition Recommendations/Plan:   · Continue Full Liquid Diet, advance to Low Fiber Diet as timely as able. · Begin low esteban high protein oral nutrition supplement tid    Nutrition Assessment:  Pt was admitted with rectal bleeding. H/O coronary artery disease, CHF, paroxysmal A. fib, COPD, chronic respiratory failure and essential hypertension. Normal EGD yesterday, colonoscopy showed sigmoid diverticulosis with colitis noted. Pt diet recently advanced to Full Liquid, Pt reporting hunger. Feeding self and tolerating. Will add oral supplement to optimize po intake. No recent wt loss. Will continue to follow as moderate nutrition risk. Malnutrition Assessment:  Malnutrition Status:  No malnutrition    Context:  Acute Illness       Estimated Daily Nutrient Needs:  Energy (kcal):  6710-2068 ([place] Je[employer]); Weight Used for Energy Requirements:  Current     Protein (g):  48-57 (1-1.2 g/kg IBW); Weight Used for Protein Requirements:  Ideal        Fluid (ml/day):  2843-2411; Method Used for Fluid Requirements:  1 ml/kcal      Nutrition Related Findings:  H/H  10/36      Wounds:  None       Current Nutrition Therapies:    ADULT DIET;  Full Liquid    Anthropometric Measures:  · Height: 5' 1\" (154.9 cm)  · Current Body Weight: 207 lb 1.3 oz (93.9 kg)   · Admission Body Weight: 207 lb 14.3 oz (94.3 kg)    · Usual Body Weight: 173 lb 12.8 oz (78.8 kg) (11/24/20)     · Ideal Body Weight: 105 lbs; % Ideal Body Weight 197.2 %   · BMI: 39.1  · BMI Categories: Obese Class 2 (BMI 35.0 -39.9)       Nutrition Diagnosis:   · Inadequate oral intake related to altered GI function as evidenced by NPO or clear liquid status due to medical condition (for past 5 days)    Nutrition Interventions:   Food and/or Nutrient Delivery:  Continue Current Diet, Start Oral Nutrition Supplement  Nutrition Education/Counseling:  No recommendation at this time Coordination of Nutrition Care:  Continue to monitor while inpatient    Goals:  Pt will consume at least half of her meals and supplements       Nutrition Monitoring and Evaluation:   Behavioral-Environmental Outcomes:  None Identified   Food/Nutrient Intake Outcomes:  Diet Advancement/Tolerance, Food and Nutrient Intake, Supplement Intake  Physical Signs/Symptoms Outcomes:  Biochemical Data, GI Status, Fluid Status or Edema, Skin, Weight     Discharge Planning:    No discharge needs at this time     Electronically signed by Martha Hardwick RD, LD on 11/19/21 at 9:37 AM EST    Contact: [phone]

## 2021-11-19 NOTE — PROGRESS NOTES
[facility]        Progress Note       2021  2:34 PM    Patient:    Ilana Robbins  : 1940   [de-identified] y.o. MRN: [id number]  Admitted: 2021  2:44 AM ATT: Lary Peacock MD   [id number]  AdmitDx: Rectal bleeding [K62.5]  GIB (gastrointestinal bleeding) [K92.2]  SALTY (acute kidney injury) ([address] [N17.9]  Anemia, unspecified type [D64.9]  PCP: Sean Senior MD    SUBJECTIVE:  Chart reviewed, events noted  Patient feeling better. C/o generalized Abdominal pain. Denies nausea and vomiting. Tolerating soft diet. Last Bm two nights ago.   ROS:  The positive ROS will be identified in bold     CONSTITUTIONAL:  Neg  Weight loss, fatigue, fever  MOUTH/THROAT:  Neg  Bleeding gums, hoarseness or sore throat  RESPIRATORY:   Neg SOB, wheeze, cough, hemoptysis or bronchitis  CARDIOVASCULAR:  Neg Chest pain, palpitations, dyspnea on exertion, edema  GASTROINTESTINAL:  SEE HPI  HEMATOLOGIC/LYMPHATIC:  Neg  Anemia, bleeding tendency  MUSCULOSKELETAL: Neg  New myalgias, joint pain, swelling or stiffness  NEUROLOGICAL:  Neg  Loss of Consciousness, memory loss, forgetfulness, periods of confusion, difficulty concentrating, seizures, insomnia, aphasia    SKIN:  Neg No itching, rashes, or sores  PSYCHIATRIC:  Neg Depression, personality changes, anxiety    OBJECTIVE:      BP (!) 102/52   Pulse 60   Temp 98.2 °F (36.8 °C) (Axillary)   Resp 18   Ht 5' 1\" (1.549 m)   Wt 207 lb 14.3 oz (94.3 kg)   SpO2 98%   BMI 39.28 kg/m²     NAD, appears comfortable  Lips and mucous membranes pink and moist  RRR, Nl s1s2, no murmurs, no JVD  Lungs CTA bilaterally, diminished all fields,  respirations even and unlabored   Abdomen soft, ND, tender upon palpation lower quads, bowel sounds normal  Skin pink, warm and dry  3+ edema bilateral lower extremities   Alert and disoriented to time, follows commands    CBC: Recent Labs     21  1430 21  0200   WBC 8.8  --    HGB 9.0* 10.0*     -- BMP:  No results for input(s): NA, K, CL, CO2, BUN, CREATININE, GLUCOSE in the last 72 hours. Magnesium:   Lab Results   Component Value Date    MG 2.1 09/13/2021     Hepatic: No results for input(s): AST, ALT, ALB, BILITOT, ALKPHOS in the last 72 hours. INR: No results for input(s): INR in the last 72 hours.       Intake/Output Summary (Last 24 hours) at 11/19/2021 1434  Last data filed at 11/18/2021 1508  Gross per 24 hour   Intake 50 ml   Output --   Net 50 ml         Medications    Scheduled Medications:    midodrine  5 mg Oral TID WC    furosemide  20 mg IntraVENous Once    metroNIDAZOLE  500 mg IntraVENous Q8H    clindamycin (CLEOCIN) IV  600 mg IntraVENous Q8H    sodium chloride flush  5-40 mL IntraVENous 2 times per day    pantoprazole  40 mg IntraVENous BID    atorvastatin  40 mg Oral Daily    calcium-cholecalciferol  1 tablet Oral BID WC    ferrous sulfate  325 mg Oral BID WC    budesonide-formoterol  2 puff Inhalation BID    levothyroxine  75 mcg Oral Daily    mirtazapine  15 mg Oral Nightly    multivitamin  1 tablet Oral Daily     PRN Medications: sodium chloride, sodium chloride, sodium chloride flush, sodium chloride, ondansetron **OR** ondansetron, acetaminophen **OR** acetaminophen, albuterol sulfate HFA  Infusions:    sodium chloride 75 mL/hr at 11/18/21 1544    sodium chloride      sodium chloride      sodium chloride         ASSESSMENT AND PLAN :      Patient Active Problem List   Diagnosis Code    Coronary atherosclerosis of native coronary artery I25.10    Anemia D64.9    Chronic hypoxemic respiratory failure ([facility]) J96.11    Bilateral edema of lower extremity R60.0    Moderate COPD (chronic obstructive pulmonary disease) ([facility]) J44.9    COPD exacerbation ([facility]) J44.1    Hypoxia R09.02    COPD with acute exacerbation ([facility] J44.1    Morbid obesity due to excess calories ([facility]) E66.01    Acute on chronic diastolic congestive heart failure ([facility]) I50.33    Depression F32.A    Hypertension I10    Pneumonia J18.9    Chronic diastolic CHF (congestive heart failure) ([facility]) I50.32    GIB (gastrointestinal bleeding) K92.2    Other chest pain R07.89    GI bleed K92.2     ASSESSMENT:  Anemia   hgb 10.0/ hct 36  Iron 18, tibc 171  multiple hospitalizations for anemia and no gross bleeding     EGD and colonoscopy yesterday:  1) normal EGD apart from mild gastritis in antrum  2) C scope severe pancolonic diverticulosis. Grade 2 internal hemorrhoids  3) sigmoid colon mucosa was erythematous , biopsy done           May have mild sigmoid diverticulosis associated colitis     RECOMMENDATIONS:  CBC daily  Protonix 40 mg IV BID   Clindamycin and Flagyl for 7 days  Soft diet  Will sign off   Follow up as outpatient     Discussed plan of care with patient and RN    Patient clinical, biochemical, and radiological information discussed with Dr. Gutierrez Swenson. He agrees with the assessment and plan. AMPARO Miranda - CNP, CNP  11/19/2021  2:34 PM     May have some sigmoid diverticulosis associated colitis  Agree with antibiotics per 7 days  Advance diet as tolerated  Can be discharged from GI standpoint follow-up as outpatient    I have seen and examined this patient personally, and independently of the nurse practitioner. The plan was developed mutually at the time of the visit with the patient. Lauren Abernathy and myself have spoken with patient, nursing staff and provided written and verbal instructions .     The above note has been reviewed and I agree with the Assessment,  Diagnosis, and Treatment plan as suggested by Lauren Abernathy [id number] gastroenterology

## 2021-11-20 LAB
CULTURE: NORMAL
HCT VFR BLD CALC: 29.2 % (ref 37–47)
HEMOGLOBIN: 8.6 GM/DL (ref 12.5–16)
Lab: NORMAL
MCH RBC QN AUTO: 27.6 PG (ref 27–31)
MCHC RBC AUTO-ENTMCNC: 29.5 % (ref 32–36)
MCV RBC AUTO: 93.6 FL (ref 78–100)
PDW BLD-RTO: 16.8 % (ref 11.7–14.9)
PLATELET # BLD: 314 K/CU MM (ref 140–440)
PMV BLD AUTO: 10.7 FL (ref 7.5–11.1)
RBC # BLD: 3.12 M/CU MM (ref 4.2–5.4)
SPECIMEN: NORMAL
WBC # BLD: 7.4 K/CU MM (ref 4–10.5)

## 2021-11-20 PROCEDURE — 6360000002 HC RX W HCPCS: Performed by: FAMILY MEDICINE

## 2021-11-20 PROCEDURE — 85027 COMPLETE CBC AUTOMATED: CPT

## 2021-11-20 PROCEDURE — 2700000000 HC OXYGEN THERAPY PER DAY

## 2021-11-20 PROCEDURE — 2500000003 HC RX 250 WO HCPCS: Performed by: INTERNAL MEDICINE

## 2021-11-20 PROCEDURE — 6370000000 HC RX 637 (ALT 250 FOR IP): Performed by: NURSE PRACTITIONER

## 2021-11-20 PROCEDURE — C9113 INJ PANTOPRAZOLE SODIUM, VIA: HCPCS | Performed by: FAMILY MEDICINE

## 2021-11-20 PROCEDURE — 94761 N-INVAS EAR/PLS OXIMETRY MLT: CPT

## 2021-11-20 PROCEDURE — 6370000000 HC RX 637 (ALT 250 FOR IP): Performed by: FAMILY MEDICINE

## 2021-11-20 PROCEDURE — 36415 COLL VENOUS BLD VENIPUNCTURE: CPT

## 2021-11-20 PROCEDURE — 2580000003 HC RX 258: Performed by: INTERNAL MEDICINE

## 2021-11-20 PROCEDURE — 2140000000 HC CCU INTERMEDIATE R&B

## 2021-11-20 RX ADMIN — Medication 1 TABLET: at 10:28

## 2021-11-20 RX ADMIN — MIDODRINE HYDROCHLORIDE 5 MG: 5 TABLET ORAL at 12:18

## 2021-11-20 RX ADMIN — FERROUS SULFATE TAB 325 MG (65 MG ELEMENTAL FE) 325 MG: 325 (65 FE) TAB at 10:28

## 2021-11-20 RX ADMIN — LEVOTHYROXINE SODIUM 75 MCG: 25 TABLET ORAL at 06:23

## 2021-11-20 RX ADMIN — PANTOPRAZOLE SODIUM 40 MG: 40 INJECTION, POWDER, FOR SOLUTION INTRAVENOUS at 10:28

## 2021-11-20 RX ADMIN — MIRTAZAPINE 15 MG: 15 TABLET, FILM COATED ORAL at 21:02

## 2021-11-20 RX ADMIN — SODIUM CHLORIDE, PRESERVATIVE FREE 10 ML: 5 INJECTION INTRAVENOUS at 10:28

## 2021-11-20 RX ADMIN — SODIUM CHLORIDE 25 ML: 9 INJECTION, SOLUTION INTRAVENOUS at 05:19

## 2021-11-20 RX ADMIN — SODIUM CHLORIDE 25 ML: 9 INJECTION, SOLUTION INTRAVENOUS at 06:30

## 2021-11-20 RX ADMIN — METRONIDAZOLE 500 MG: 500 INJECTION, SOLUTION INTRAVENOUS at 06:33

## 2021-11-20 RX ADMIN — THERA TABS 1 TABLET: TAB at 10:28

## 2021-11-20 RX ADMIN — CLINDAMYCIN PHOSPHATE 600 MG: 600 INJECTION, SOLUTION INTRAVENOUS at 21:17

## 2021-11-20 RX ADMIN — MIDODRINE HYDROCHLORIDE 5 MG: 5 TABLET ORAL at 10:29

## 2021-11-20 RX ADMIN — METRONIDAZOLE 500 MG: 500 INJECTION, SOLUTION INTRAVENOUS at 15:31

## 2021-11-20 RX ADMIN — METRONIDAZOLE 500 MG: 500 INJECTION, SOLUTION INTRAVENOUS at 21:43

## 2021-11-20 RX ADMIN — SODIUM CHLORIDE, PRESERVATIVE FREE 10 ML: 5 INJECTION INTRAVENOUS at 21:02

## 2021-11-20 RX ADMIN — PANTOPRAZOLE SODIUM 40 MG: 40 INJECTION, POWDER, FOR SOLUTION INTRAVENOUS at 21:02

## 2021-11-20 RX ADMIN — Medication 1 TABLET: at 17:26

## 2021-11-20 RX ADMIN — FERROUS SULFATE TAB 325 MG (65 MG ELEMENTAL FE) 325 MG: 325 (65 FE) TAB at 17:26

## 2021-11-20 RX ADMIN — MIDODRINE HYDROCHLORIDE 5 MG: 5 TABLET ORAL at 17:26

## 2021-11-20 RX ADMIN — CLINDAMYCIN PHOSPHATE 600 MG: 600 INJECTION, SOLUTION INTRAVENOUS at 05:20

## 2021-11-20 RX ADMIN — CLINDAMYCIN PHOSPHATE 600 MG: 600 INJECTION, SOLUTION INTRAVENOUS at 12:22

## 2021-11-20 ASSESSMENT — PAIN DESCRIPTION - PAIN TYPE: TYPE: ACUTE PAIN

## 2021-11-20 ASSESSMENT — PAIN DESCRIPTION - ONSET: ONSET: ON-GOING

## 2021-11-20 ASSESSMENT — PAIN SCALES - WONG BAKER
WONGBAKER_NUMERICALRESPONSE: 0

## 2021-11-20 ASSESSMENT — PAIN DESCRIPTION - DESCRIPTORS: DESCRIPTORS: STABBING

## 2021-11-20 ASSESSMENT — PAIN SCALES - GENERAL: PAINLEVEL_OUTOF10: 9

## 2021-11-20 ASSESSMENT — PAIN DESCRIPTION - ORIENTATION: ORIENTATION: RIGHT;LEFT

## 2021-11-20 ASSESSMENT — PAIN DESCRIPTION - PROGRESSION: CLINICAL_PROGRESSION: NOT CHANGED

## 2021-11-20 ASSESSMENT — PAIN DESCRIPTION - LOCATION: LOCATION: BUTTOCKS;LEG

## 2021-11-20 ASSESSMENT — PAIN DESCRIPTION - FREQUENCY: FREQUENCY: CONTINUOUS

## 2021-11-20 NOTE — PROGRESS NOTES
.        Hospitalist Progress Note      Name:  Silvestre Lopez /Age/Sex: 1940  ([de-identified] y.o. female)   MRN & CSN:  [id number] & [id number] Admission Date/Time: 2021  2:44 AM   [facility]  [facility] PCP: Jose Manuel Ardon, [address] Day: 8    Assessment and Plan:   Silvestre Lopez is a [de-identified] y.o.  female  who presents with <principal problem not specified>    1. Acute GI bleed, improved. Normal EGD. Colonoscopy- sigmoid diverticulosis with colitis. On Antibiotics   2. Acute blood loss anemia,. S/p blood transfusion. Hb down to 8.6 today. Recheck h/h in AM   3. acute kidney injury, resolved  4. Chronic respiratory failure  5. Paroxysmal A. fib  6. Chronic COPD  7. Chronic diastolic heart failure  8. Morbid obesity    Diet ADULT DIET; Full Liquid  ADULT ORAL NUTRITION SUPPLEMENT; Breakfast, Lunch, Dinner; Low Calorie/High Protein Oral Supplement   DVT Prophylaxis [] Lovenox, []  Heparin, [] SCDs, [] Ambulation   GI Prophylaxis [] PPI,  [] H2 Blocker,  [] Carafate,  [] Diet/Tube Feeds   Code Status DNR-CCA   Disposition Patient requires continued admission due to   MDM [] Low, [] Moderate,[]  High  Patient's risk as above due to      History of Present Illness:     Chief Complaint: <principal problem not specified>  Silvestre Lopez is a [de-identified] y.o.  female  with history of coronary artery disease, CHF, paroxysmal A. fib, COPD, chronic respiratory failure and essential hypertension who was admitted with rectal bleeding. Hemoglobin 9.6 on admission, dropped to 6.8, was transfused. C/o pain all over       Ten point ROS reviewed negative, unless as noted above    Objective:   No intake or output data in the 24 hours ending 21 1047   Vitals:   Vitals:    21 1000   BP: 114/70   Pulse: 69   Resp: 15   Temp: 98 °F (36.7 °C)   SpO2: 96%     Physical Exam:   GEN Awake female, in bed in no apparent distress. Appears given age. EYES Pupils are equally round.   No scleral erythema, discharge, or conjunctivitis. HENT Mucous membranes are moist. Oral pharynx without exudates, no evidence of thrush. NECK Supple, no apparent thyromegaly or masses. RESP Diminished BS. Symmetric chest movement. CARDIO/VASC  S1/S2 auscultated. Regular rate without appreciable murmurs, rubs, or gallops. No JVD or carotid bruits. Peripheral pulses equal bilaterally and palpable. Trace peripheral edema. GI Abdomen is soft, diffuse tenderness, masses, or guarding. Bowel sounds are normoactive. Rectal exam deferred. MSK No gross joint deformities. Legs are wrapped. SKIN Normal coloration, warm, dry. NEURO  Weak. Non ambulatory  PSYCH Awake, alert. Flat Affect.     Medications:   Medications:    midodrine  5 mg Oral TID WC    furosemide  20 mg IntraVENous Once    metroNIDAZOLE  500 mg IntraVENous Q8H    clindamycin (CLEOCIN) IV  600 mg IntraVENous Q8H    sodium chloride flush  5-40 mL IntraVENous 2 times per day    pantoprazole  40 mg IntraVENous BID    atorvastatin  40 mg Oral Daily    calcium-cholecalciferol  1 tablet Oral BID WC    ferrous sulfate  325 mg Oral BID WC    budesonide-formoterol  2 puff Inhalation BID    levothyroxine  75 mcg Oral Daily    mirtazapine  15 mg Oral Nightly    multivitamin  1 tablet Oral Daily      Infusions:    sodium chloride      sodium chloride      sodium chloride 25 mL (11/20/21 0630)     PRN Meds: sodium chloride, , PRN  sodium chloride, , PRN  sodium chloride flush, 5-40 mL, PRN  sodium chloride, 25 mL, PRN  ondansetron, 4 mg, Q8H PRN   Or  ondansetron, 4 mg, Q6H PRN  acetaminophen, 650 mg, Q6H PRN   Or  acetaminophen, 650 mg, Q6H PRN  albuterol sulfate HFA, 2 puff, Q4H PRN            Electronically signed by Rosette Holter, MD on 11/20/2021 at 10:47 AM

## 2021-11-21 LAB
HCT VFR BLD CALC: 28.1 % (ref 37–47)
HEMOGLOBIN: 8.4 GM/DL (ref 12.5–16)
MCH RBC QN AUTO: 27.1 PG (ref 27–31)
MCHC RBC AUTO-ENTMCNC: 29.9 % (ref 32–36)
MCV RBC AUTO: 90.6 FL (ref 78–100)
PDW BLD-RTO: 16.8 % (ref 11.7–14.9)
PLATELET # BLD: 288 K/CU MM (ref 140–440)
PMV BLD AUTO: 10.1 FL (ref 7.5–11.1)
RBC # BLD: 3.1 M/CU MM (ref 4.2–5.4)
WBC # BLD: 6.9 K/CU MM (ref 4–10.5)

## 2021-11-21 PROCEDURE — 94761 N-INVAS EAR/PLS OXIMETRY MLT: CPT

## 2021-11-21 PROCEDURE — 2580000003 HC RX 258: Performed by: INTERNAL MEDICINE

## 2021-11-21 PROCEDURE — 36415 COLL VENOUS BLD VENIPUNCTURE: CPT

## 2021-11-21 PROCEDURE — 6370000000 HC RX 637 (ALT 250 FOR IP): Performed by: FAMILY MEDICINE

## 2021-11-21 PROCEDURE — 6370000000 HC RX 637 (ALT 250 FOR IP): Performed by: INTERNAL MEDICINE

## 2021-11-21 PROCEDURE — 85027 COMPLETE CBC AUTOMATED: CPT

## 2021-11-21 PROCEDURE — 6370000000 HC RX 637 (ALT 250 FOR IP): Performed by: NURSE PRACTITIONER

## 2021-11-21 PROCEDURE — C9113 INJ PANTOPRAZOLE SODIUM, VIA: HCPCS | Performed by: FAMILY MEDICINE

## 2021-11-21 PROCEDURE — 2500000003 HC RX 250 WO HCPCS: Performed by: INTERNAL MEDICINE

## 2021-11-21 PROCEDURE — 2700000000 HC OXYGEN THERAPY PER DAY

## 2021-11-21 PROCEDURE — 2140000000 HC CCU INTERMEDIATE R&B

## 2021-11-21 PROCEDURE — 6360000002 HC RX W HCPCS: Performed by: FAMILY MEDICINE

## 2021-11-21 RX ORDER — TRAMADOL HYDROCHLORIDE 50 MG/1
50 TABLET ORAL ONCE
Status: COMPLETED | OUTPATIENT
Start: 2021-11-22 | End: 2021-11-22

## 2021-11-21 RX ADMIN — ACETAMINOPHEN 650 MG: 325 TABLET ORAL at 04:43

## 2021-11-21 RX ADMIN — THERA TABS 1 TABLET: TAB at 09:47

## 2021-11-21 RX ADMIN — MIDODRINE HYDROCHLORIDE 5 MG: 5 TABLET ORAL at 12:11

## 2021-11-21 RX ADMIN — LEVOTHYROXINE SODIUM 75 MCG: 25 TABLET ORAL at 05:21

## 2021-11-21 RX ADMIN — MIRTAZAPINE 15 MG: 15 TABLET, FILM COATED ORAL at 20:39

## 2021-11-21 RX ADMIN — METRONIDAZOLE 500 MG: 500 INJECTION, SOLUTION INTRAVENOUS at 14:46

## 2021-11-21 RX ADMIN — Medication 1 TABLET: at 09:47

## 2021-11-21 RX ADMIN — PANTOPRAZOLE SODIUM 40 MG: 40 INJECTION, POWDER, FOR SOLUTION INTRAVENOUS at 09:47

## 2021-11-21 RX ADMIN — SODIUM CHLORIDE, PRESERVATIVE FREE 10 ML: 5 INJECTION INTRAVENOUS at 20:39

## 2021-11-21 RX ADMIN — MIDODRINE HYDROCHLORIDE 5 MG: 5 TABLET ORAL at 17:20

## 2021-11-21 RX ADMIN — CLINDAMYCIN PHOSPHATE 600 MG: 600 INJECTION, SOLUTION INTRAVENOUS at 05:19

## 2021-11-21 RX ADMIN — CLINDAMYCIN PHOSPHATE 600 MG: 600 INJECTION, SOLUTION INTRAVENOUS at 20:46

## 2021-11-21 RX ADMIN — CLINDAMYCIN PHOSPHATE 600 MG: 600 INJECTION, SOLUTION INTRAVENOUS at 14:07

## 2021-11-21 RX ADMIN — ATORVASTATIN CALCIUM 40 MG: 40 TABLET, FILM COATED ORAL at 09:47

## 2021-11-21 RX ADMIN — PANTOPRAZOLE SODIUM 40 MG: 40 INJECTION, POWDER, FOR SOLUTION INTRAVENOUS at 20:39

## 2021-11-21 RX ADMIN — METRONIDAZOLE 500 MG: 500 INJECTION, SOLUTION INTRAVENOUS at 22:02

## 2021-11-21 RX ADMIN — MIDODRINE HYDROCHLORIDE 5 MG: 5 TABLET ORAL at 09:47

## 2021-11-21 RX ADMIN — METRONIDAZOLE 500 MG: 500 INJECTION, SOLUTION INTRAVENOUS at 06:05

## 2021-11-21 RX ADMIN — FERROUS SULFATE TAB 325 MG (65 MG ELEMENTAL FE) 325 MG: 325 (65 FE) TAB at 09:47

## 2021-11-21 RX ADMIN — SODIUM CHLORIDE, PRESERVATIVE FREE 10 ML: 5 INJECTION INTRAVENOUS at 09:48

## 2021-11-21 RX ADMIN — Medication 1 TABLET: at 17:20

## 2021-11-21 RX ADMIN — FERROUS SULFATE TAB 325 MG (65 MG ELEMENTAL FE) 325 MG: 325 (65 FE) TAB at 17:20

## 2021-11-21 ASSESSMENT — PAIN DESCRIPTION - DESCRIPTORS: DESCRIPTORS: STABBING

## 2021-11-21 ASSESSMENT — PAIN DESCRIPTION - PAIN TYPE: TYPE: ACUTE PAIN

## 2021-11-21 ASSESSMENT — PAIN DESCRIPTION - PROGRESSION: CLINICAL_PROGRESSION: NOT CHANGED

## 2021-11-21 ASSESSMENT — PAIN SCALES - WONG BAKER: WONGBAKER_NUMERICALRESPONSE: 0

## 2021-11-21 ASSESSMENT — PAIN SCALES - GENERAL
PAINLEVEL_OUTOF10: 10
PAINLEVEL_OUTOF10: 8
PAINLEVEL_OUTOF10: 9
PAINLEVEL_OUTOF10: 10
PAINLEVEL_OUTOF10: 8

## 2021-11-21 ASSESSMENT — PAIN DESCRIPTION - FREQUENCY: FREQUENCY: CONTINUOUS

## 2021-11-21 ASSESSMENT — PAIN DESCRIPTION - LOCATION: LOCATION: LEG

## 2021-11-21 ASSESSMENT — PAIN DESCRIPTION - ONSET: ONSET: ON-GOING

## 2021-11-21 ASSESSMENT — PAIN DESCRIPTION - ORIENTATION: ORIENTATION: RIGHT;LEFT

## 2021-11-21 NOTE — PROGRESS NOTES
.        Hospitalist Progress Note      Name:  Margarita Marmolejo /Age/Sex: 1940  ([de-identified] y.o. female)   MRN & CSN:  [id number] & [id number] Admission Date/Time: 2021  2:44 AM   [facility]:  [facility] PCP: Shadi Angulo MD         Hospital Day: 9    Assessment and Plan:   Margarita Marmolejo is a [de-identified] y.o.  female  who presents with <principal problem not specified>    1. Acute GI bleed, improved. Normal EGD. Colonoscopy- sigmoid diverticulosis with colitis. On Antibiotics   2. Acute blood loss anemia,. S/p blood transfusion. Hb down to 8.6 today. Recheck h/h in AM   3. acute kidney injury, resolved  4. Chronic respiratory failure  5. Paroxysmal A. fib  6. Chronic COPD  7. Chronic diastolic heart failure  8. Morbid obesity    Disposition- [facility]    Diet ADULT DIET; Full Liquid  ADULT ORAL NUTRITION SUPPLEMENT; Breakfast, Lunch, Dinner; Low Calorie/High Protein Oral Supplement   DVT Prophylaxis [] Lovenox, []  Heparin, [] SCDs, [] Ambulation   GI Prophylaxis [] PPI,  [] H2 Blocker,  [] Carafate,  [] Diet/Tube Feeds   Code Status DNR-CCA   Disposition Patient requires continued admission due to   MDM [] Low, [] Moderate,[]  High  Patient's risk as above due to      History of Present Illness:     Chief Complaint: <principal problem not specified>  Margarita Marmolejo is a [de-identified] y.o.  female  with history of coronary artery disease, CHF, paroxysmal A. fib, COPD, chronic respiratory failure and essential hypertension who was admitted with rectal bleeding. Hemoglobin 9.6 on admission, dropped to 6.8, was transfused. No new complaint    No bleeding       Ten point ROS reviewed negative, unless as noted above    Objective:        Intake/Output Summary (Last 24 hours) at 2021 1333  Last data filed at 2021 0437  Gross per 24 hour   Intake 10 ml   Output 1700 ml   Net -1690 ml      Vitals:   Vitals:    21 0943   BP: 119/65   Pulse: 73   Resp: 20   Temp: 98.7 °F (37.1 °C)   SpO2: 98%     Physical Exam: GEN Awake female, in bed in no apparent distress. Appears given age. EYES Pupils are equally round. No scleral erythema, discharge, or conjunctivitis. HENT Mucous membranes are moist. Oral pharynx without exudates, no evidence of thrush. NECK Supple, no apparent thyromegaly or masses. RESP Diminished BS. Symmetric chest movement. CARDIO/VASC  S1/S2 auscultated. Regular rate without appreciable murmurs, rubs, or gallops. No JVD or carotid bruits. Peripheral pulses equal bilaterally and palpable. Trace peripheral edema. GI Abdomen is soft, diffuse tenderness, masses, or guarding. Bowel sounds are normoactive. Rectal exam deferred. MSK No gross joint deformities. Legs are wrapped. SKIN Normal coloration, warm, dry. NEURO  Weak. Non ambulatory  PSYCH Awake, alert. Flat Affect.     Medications:   Medications:    midodrine  5 mg Oral TID WC    furosemide  20 mg IntraVENous Once    metroNIDAZOLE  500 mg IntraVENous Q8H    clindamycin (CLEOCIN) IV  600 mg IntraVENous Q8H    sodium chloride flush  5-40 mL IntraVENous 2 times per day    pantoprazole  40 mg IntraVENous BID    atorvastatin  40 mg Oral Daily    calcium-cholecalciferol  1 tablet Oral BID WC    ferrous sulfate  325 mg Oral BID WC    budesonide-formoterol  2 puff Inhalation BID    levothyroxine  75 mcg Oral Daily    mirtazapine  15 mg Oral Nightly    multivitamin  1 tablet Oral Daily      Infusions:    sodium chloride      sodium chloride      sodium chloride 25 mL (11/20/21 0630)     PRN Meds: sodium chloride, , PRN  sodium chloride, , PRN  sodium chloride flush, 5-40 mL, PRN  sodium chloride, 25 mL, PRN  ondansetron, 4 mg, Q8H PRN   Or  ondansetron, 4 mg, Q6H PRN  acetaminophen, 650 mg, Q6H PRN   Or  acetaminophen, 650 mg, Q6H PRN  albuterol sulfate HFA, 2 puff, Q4H PRN            Electronically signed by Kath Pettit MD on 11/21/2021 at 1:33 PM

## 2021-11-21 NOTE — PLAN OF CARE
Problem: Skin Integrity:  Goal: Will show no infection signs and symptoms  Description: Will show no infection signs and symptoms  Outcome: Met This Shift  Goal: Absence of new skin breakdown  Description: Absence of new skin breakdown  Outcome: Met This Shift     Problem: Falls - Risk of:  Goal: Will remain free from falls  Description: Will remain free from falls  Outcome: Met This Shift  Goal: Absence of physical injury  Description: Absence of physical injury  Outcome: Met This Shift     Problem: Pain:  Goal: Pain level will decrease  Description: Pain level will decrease  Outcome: Met This Shift  Goal: Control of acute pain  Description: Control of acute pain  Outcome: Met This Shift  Goal: Control of chronic pain  Description: Control of chronic pain  Outcome: Met This Shift     Problem: Nutrition  Goal: Optimal nutrition therapy  Outcome: Met This Shift

## 2021-11-22 VITALS
SYSTOLIC BLOOD PRESSURE: 115 MMHG | HEART RATE: 70 BPM | RESPIRATION RATE: 18 BRPM | HEIGHT: 61 IN | BODY MASS INDEX: 40.12 KG/M2 | TEMPERATURE: 97 F | OXYGEN SATURATION: 94 % | WEIGHT: 212.52 LBS | DIASTOLIC BLOOD PRESSURE: 62 MMHG

## 2021-11-22 PROCEDURE — 94761 N-INVAS EAR/PLS OXIMETRY MLT: CPT

## 2021-11-22 PROCEDURE — C9113 INJ PANTOPRAZOLE SODIUM, VIA: HCPCS | Performed by: FAMILY MEDICINE

## 2021-11-22 PROCEDURE — 2580000003 HC RX 258: Performed by: INTERNAL MEDICINE

## 2021-11-22 PROCEDURE — 2500000003 HC RX 250 WO HCPCS: Performed by: INTERNAL MEDICINE

## 2021-11-22 PROCEDURE — 6370000000 HC RX 637 (ALT 250 FOR IP): Performed by: NURSE PRACTITIONER

## 2021-11-22 PROCEDURE — 6370000000 HC RX 637 (ALT 250 FOR IP): Performed by: FAMILY MEDICINE

## 2021-11-22 PROCEDURE — 2700000000 HC OXYGEN THERAPY PER DAY

## 2021-11-22 PROCEDURE — 6360000002 HC RX W HCPCS: Performed by: FAMILY MEDICINE

## 2021-11-22 PROCEDURE — 6370000000 HC RX 637 (ALT 250 FOR IP): Performed by: INTERNAL MEDICINE

## 2021-11-22 RX ORDER — METRONIDAZOLE 250 MG/1
250 TABLET ORAL 3 TIMES DAILY
Qty: 15 TABLET | Refills: 0 | Status: SHIPPED | OUTPATIENT
Start: 2021-11-22 | End: 2021-11-27

## 2021-11-22 RX ORDER — MIDODRINE HYDROCHLORIDE 5 MG/1
5 TABLET ORAL
Qty: 90 TABLET | Refills: 0 | Status: SHIPPED | OUTPATIENT
Start: 2021-11-22

## 2021-11-22 RX ORDER — CLINDAMYCIN HYDROCHLORIDE 300 MG/1
300 CAPSULE ORAL 3 TIMES DAILY
Qty: 15 CAPSULE | Refills: 0 | Status: SHIPPED | OUTPATIENT
Start: 2021-11-22 | End: 2021-11-27

## 2021-11-22 RX ADMIN — THERA TABS 1 TABLET: TAB at 09:39

## 2021-11-22 RX ADMIN — SODIUM CHLORIDE, PRESERVATIVE FREE 10 ML: 5 INJECTION INTRAVENOUS at 09:38

## 2021-11-22 RX ADMIN — ATORVASTATIN CALCIUM 40 MG: 40 TABLET, FILM COATED ORAL at 09:38

## 2021-11-22 RX ADMIN — CLINDAMYCIN PHOSPHATE 600 MG: 600 INJECTION, SOLUTION INTRAVENOUS at 12:02

## 2021-11-22 RX ADMIN — METRONIDAZOLE 500 MG: 500 INJECTION, SOLUTION INTRAVENOUS at 05:54

## 2021-11-22 RX ADMIN — MIDODRINE HYDROCHLORIDE 5 MG: 5 TABLET ORAL at 12:01

## 2021-11-22 RX ADMIN — MIDODRINE HYDROCHLORIDE 5 MG: 5 TABLET ORAL at 09:39

## 2021-11-22 RX ADMIN — TRAMADOL HYDROCHLORIDE 50 MG: 50 TABLET, FILM COATED ORAL at 00:10

## 2021-11-22 RX ADMIN — Medication 1 TABLET: at 09:39

## 2021-11-22 RX ADMIN — METRONIDAZOLE 500 MG: 500 INJECTION, SOLUTION INTRAVENOUS at 13:55

## 2021-11-22 RX ADMIN — FERROUS SULFATE TAB 325 MG (65 MG ELEMENTAL FE) 325 MG: 325 (65 FE) TAB at 09:39

## 2021-11-22 RX ADMIN — ACETAMINOPHEN 650 MG: 325 TABLET ORAL at 09:47

## 2021-11-22 RX ADMIN — PANTOPRAZOLE SODIUM 40 MG: 40 INJECTION, POWDER, FOR SOLUTION INTRAVENOUS at 09:38

## 2021-11-22 RX ADMIN — LEVOTHYROXINE SODIUM 75 MCG: 25 TABLET ORAL at 05:51

## 2021-11-22 RX ADMIN — CLINDAMYCIN PHOSPHATE 600 MG: 600 INJECTION, SOLUTION INTRAVENOUS at 04:33

## 2021-11-22 ASSESSMENT — PAIN DESCRIPTION - LOCATION
LOCATION: LEG
LOCATION: LEG

## 2021-11-22 ASSESSMENT — PAIN DESCRIPTION - ORIENTATION
ORIENTATION: RIGHT;LEFT
ORIENTATION: RIGHT;LEFT

## 2021-11-22 ASSESSMENT — PAIN DESCRIPTION - ONSET
ONSET: ON-GOING
ONSET: ON-GOING

## 2021-11-22 ASSESSMENT — PAIN SCALES - GENERAL
PAINLEVEL_OUTOF10: 9
PAINLEVEL_OUTOF10: 6
PAINLEVEL_OUTOF10: 5
PAINLEVEL_OUTOF10: 3
PAINLEVEL_OUTOF10: 9

## 2021-11-22 ASSESSMENT — PAIN DESCRIPTION - DESCRIPTORS
DESCRIPTORS: ACHING
DESCRIPTORS: ACHING

## 2021-11-22 ASSESSMENT — PAIN DESCRIPTION - PAIN TYPE
TYPE: CHRONIC PAIN
TYPE: CHRONIC PAIN

## 2021-11-22 ASSESSMENT — PAIN DESCRIPTION - FREQUENCY
FREQUENCY: CONTINUOUS
FREQUENCY: CONTINUOUS

## 2021-11-22 NOTE — DISCHARGE INSTR - COC
Continuity of Care Form    Patient Name: Patric Gilmore   :  1940  MRN:  [id number]    [facility] date:  2021  Discharge date:  ***    Code Status Order: DNR-CCA   Advance Directives:   [facility] Documentation       Date/Time Healthcare Directive Type of Healthcare Directive Copy in [address] Agent's Name Healthcare Agent's Phone Number    21 [phone] No, patient does not have an advance directive for healthcare treatment -- -- -- -- --            Admitting Physician:  Randal Cabrera MD  PCP: Arturo Roche MD    Discharging Nurse: [facility] CARE Unit/Room#: 5633/3275-F  Discharging Unit Phone Number: [phone]    Emergency Contact:   Extended Emergency Contact Information  Primary Emergency Contact: Houston Hall  Address: [address] [place] [address] Phone: [phone]  Mobile Phone: [phone]  Relation: Spouse    Past Surgical History:  Past Surgical History:   Procedure Laterality Date    ANUS SURGERY      fistula repair    APPENDECTOMY      COLONOSCOPY N/A 2021    COLONOSCOPY WITH BIOPSY performed by Stephany Kimbrough MD at [place] [address] N/A 2020    EGD BIOPSY performed by Stephany Kimbrough MD at [address] [place] [address] 2021    EGD DIAGNOSTIC ONLY performed by Stephany Kimbrough MD at [facility] ENDOSCOPY       Immunization History:   Immunization History   Administered Date(s) Administered    COVID-19, Pfizer, PF, 30mcg/0.3mL 2020, 2021    Influenza Virus Vaccine 2017    Pneumococcal Conjugate 13-valent (Noreene Hoof) 2016    Pneumococcal Polysaccharide ([id number]) 2015       Active Problems:  Patient Active Problem List   Diagnosis Code    Coronary atherosclerosis of native coronary artery I25.10    Anemia D64.9    Chronic hypoxemic respiratory failure ([facility]) J96.11    Bilateral edema of lower extremity R60.0    Moderate COPD (chronic obstructive pulmonary disease) ([facility]) J44.9    COPD exacerbation ([facility]) J44.1    Hypoxia R09.02    COPD with acute exacerbation ([facility] J44.1    Morbid obesity due to excess calories ([facility]) E66.01    Acute on chronic diastolic congestive heart failure ([facility] I50.33    Depression F32. A    Hypertension I10    Pneumonia J18.9    Chronic diastolic CHF (congestive heart failure) ([facility]) I50.32    GIB (gastrointestinal bleeding) K92.2    Other chest pain R07.89    GI bleed K92.2       Isolation/Infection:   Isolation            Contact          Patient Infection Status       Infection Onset Added Last Indicated Last Indicated By Review Planned Expiration Resolved Resolved By    MRSA 09/09/21 09/11/21 09/10/21 Culture, MRSA, Screening        Resolved    COVID-19 (Rule Out) 09/30/21 09/30/21 09/30/21 COVID-19, Rapid (Ordered)   09/30/21 Rule-Out Test Resulted    COVID-19 (Rule Out) 09/08/21 09/08/21 09/08/21 COVID-19, Rapid (Ordered)   09/08/21 Rule-Out Test Resulted            Nurse Assessment:  Last Vital Signs: /62   Pulse 70   Temp 97 °F (36.1 °C) (Oral)   Resp 18   Ht 5' 1\" (1.549 m)   Wt 212 lb 8.4 oz (96.4 kg)   SpO2 94%   BMI 40.16 kg/m²     Last documented pain score (0-10 scale): Pain Level: 3  Last Weight:   Wt Readings from Last 1 Encounters:   11/20/21 212 lb 8.4 oz (96.4 kg)     Mental Status:  oriented, alert, and logical    IV Access:  - None    Nursing Mobility/ADLs:  Walking   Dependent  Transfer  Dependent  Bathing  Dependent  Dressing  Dependent  Toileting  Dependent  Feeding  Assisted  Med Admin  Dependent  Med Delivery   whole    Wound Care Documentation and Therapy:        Elimination:  Continence:    Bowel: No  Bladder: No  Urinary Catheter: None   Colostomy/Ileostomy/Ileal Conduit: No       Date of Last BM: 11/18/2021    Intake/Output Summary (Last 24 hours) at 11/22/2021 1452  Last data filed at 11/21/2021 2300  Gross per 24 hour   Intake 120 ml   Output --   Net 120 ml     I/O last 3 completed shifts: In: 0498 [P.O.:120; I.V.:116.8; IV Piggyback:4490.2]  Out: -     Safety Concerns:     None    Impairments/Disabilities:      None    Nutrition Therapy:  Current Nutrition Therapy:   - Oral Diet:  Full Liquid    Routes of Feeding: Oral  Liquids: Thin Liquids  Daily Fluid Restriction: no  Last Modified Barium Swallow with Video (Video Swallowing Test): not done    Treatments at the Time of Hospital Discharge:   Respiratory Treatments: n/a  Oxygen Therapy:  is on oxygen at 2 L/min per nasal cannula. Ventilator:    - No ventilator support    Weight Bearing Status/Restrictions: No weight bearing restirctions  Other Medical Equipment (for information only, NOT a DME order):  resume previous treatments for patient  Other Treatments: low esteban/high protein oral supplement    Patient's personal belongings (please select all that are sent with patient):  None    RN SIGNATURE:  Electronically signed by Patrica Duran RN on 11/22/21 at 2:56 PM EST          PHYSICIAN SECTION    Prognosis: Guarded    Condition at Discharge: Stable    Rehab Potential (if transferring to Rehab): Fair    Recommended Labs or Other Treatments After Discharge: none    Physician Certification: I certify the above information and transfer of Hilda Solano  is necessary for the continuing treatment of the diagnosis listed and that she requires [facility] for less 30 days.      Update Admission H&P: No change in H&P    PHYSICIAN SIGNATURE:  Electronically signed by Ronnell Mcmanus MD on 11/22/21 at 4:15 PM EST

## 2021-11-22 NOTE — DISCHARGE SUMMARY
.    Discharge Summary    Name:  Leslie Huber /Age/Sex: 1940  ([de-identified] y.o. female)   MRN & CSN:  [id number] & [id number] Admission Date/Time: 2021  2:44 AM   Attending:  Dianna Koo MD Discharging Physician: Dianna Koo MD     Hospital Course:   Leslie Huber is a [de-identified] y.o.  female  who presents with <principal problem not specified>       1. Acute GI bleed, improved. Normal EGD. Colonoscopy- sigmoid diverticulosis with colitis. On Antibiotics   2. Acute blood loss anemia,. S/p blood transfusion. Hb 8.4.   3. acute kidney injury, resolved  4. Chronic respiratory failure  5. Paroxysmal A. fib  6. Chronic COPD  7. Chronic diastolic heart failure  8. Morbid obesity      [de-identified] y.o.  female  with history of coronary artery disease, CHF, paroxysmal A. fib, COPD, chronic respiratory failure and essential hypertension who was admitted with rectal bleeding.  Hemoglobin 9.6 on admission, dropped to 6.8, was transfused. Was seen by GI, endoscopy done as noted above. Treated with flagyl and clindamycin. The patient expressed appropriate understanding of and agreement with the discharge recommendations, medications, and plan.      Consults this admission:  IP CONSULT TO GI  IP CONSULT TO HOSPITALIST  IP CONSULT TO IV TEAM  IP CONSULT TO GENERAL SURGERY    Discharge Instruction:   Follow up appointments: GI  Primary care physician:  within 2 weeks    Diet:  cardiac diet   Activity: activity as tolerated  Disposition: Discharged to:   []Home, []C, [x]SNF, []Acute Rehab, []Hospice   Condition on discharge: Stable    Discharge Medications:        Medication List      START taking these medications    clindamycin 300 MG capsule  Commonly known as: CLEOCIN  Take 1 capsule by mouth 3 times daily for 5 days     metroNIDAZOLE 250 MG tablet  Commonly known as: FLAGYL  Take 1 tablet by mouth 3 times daily for 5 days     midodrine 5 MG tablet  Commonly known as: PROAMATINE  Take 1 tablet by mouth 3 times daily (with meals)        CONTINUE taking these medications    * albuterol (2.5 MG/3ML) 0.083% nebulizer solution  Commonly known as: PROVENTIL  Take 3 mLs by nebulization every 6 hours as needed for Wheezing     * albuterol sulfate  (90 Base) MCG/ACT inhaler     atorvastatin 40 MG tablet  Commonly known as: LIPITOR     calcium citrate-vitamin D 315-250 MG-UNIT Tabs per tablet  Commonly known as: CITRICAL + D     ferrous sulfate 325 (65 Fe) MG tablet  Commonly known as: IRON 325  Take 1 tablet by mouth every 12 hours With a meal.     fluticasone-vilanterol 100-25 MCG/INH Aepb inhaler  Commonly known as: BREO ELLIPTA     furosemide 40 MG tablet  Commonly known as: Lasix  Take 1 tablet by mouth daily     lactobacillus capsule  Take 1 capsule by mouth daily (with breakfast)     levothyroxine 75 MCG tablet  Commonly known as: SYNTHROID     linaclotide 145 MCG capsule  Commonly known as: LINZESS     mirtazapine 15 MG tablet  Commonly known as: REMERON  Take 1 tablet by mouth nightly     multivitamin Tabs tablet  Take 1 tablet by mouth daily     ondansetron 4 MG disintegrating tablet  Commonly known as: Zofran ODT  Take 1 tablet by mouth every 8 hours as needed for Nausea     OXYGEN  Inhale 5 L into the lungs continuous     pantoprazole 40 MG tablet  Commonly known as: PROTONIX  Take 1 tablet by mouth 2 times daily (before meals)     potassium chloride 10 MEQ extended release tablet  Commonly known as: KLOR-CON M     TYLENOL PO         * This list has 2 medication(s) that are the same as other medications prescribed for you. Read the directions carefully, and ask your doctor or other care provider to review them with you.                Where to Get Your Medications      These medications were sent to [address] [place] [address] 14, [facility]     Phone: [phone]   · clindamycin 300 MG capsule  · metroNIDAZOLE 250 MG tablet  · midodrine 5 MG tablet         Objective Findings at Discharge:   /63   Pulse 76   Temp 97.8 °F (36.6 °C) (Oral)   Resp 18   Ht 5' 1\" (1.549 m)   Wt 212 lb 8.4 oz (96.4 kg)   SpO2 93%   BMI 40.16 kg/m²            PHYSICAL EXAM   GEN Awake female, sitting upright in bed in no apparent distress. Appears given age. EYES Pupils are equally round. No scleral erythema, discharge, or conjunctivitis. HENT Mucous membranes are moist. Oral pharynx without exudates, no evidence of thrush. NECK Supple, no apparent thyromegaly or masses. RESP Clear to auscultation, no wheezes, rales or rhonchi. Symmetric chest movement while on room air. CARDIO/VASC  S1/S2 auscultated. Regular rate without appreciable murmurs, rubs, or gallops. No JVD or carotid bruits. Peripheral pulses equal bilaterally and palpable. No peripheral edema. GI Abdomen is soft without significant tenderness, masses, or guarding. Bowel sounds are normoactive. Rectal exam deferred. MSK No gross joint deformities. Legs wrapped  SKIN Normal coloration, warm, dry. NEURO Cranial nerves appear grossly intact, normal speech, no lateralizing weakness. PSYCH Awake, alert, oriented x 3. Affect appropriate.     BMP/CBC  Recent Labs     11/20/21  0807 11/21/21  0306   WBC 7.4 6.9   HCT 29.2* 28.1*    288       IMAGING:      Discharge Time of 35 minutes    Electronically signed by Eden Aquino MD on 11/22/2021 at 12:05 PM

## 2021-11-22 NOTE — PROGRESS NOTES
Called report to Linda/nurse at [facility] with all questions answered. Midline discontinued and discharge information reviewed with patient also.

## 2021-11-23 NOTE — ADT AUTH CERT
Gastrointestinal Bleeding, Lower - Care Day 8 (11/20/2021) by Cindy Morfin RN       Review Entered Review Status   11/23/2021 13:00 Completed      Criteria Review      Care Day: 8 Care Date: 11/20/2021 Level of Care: Intermediate Care    Guideline Day 2    Level Of Care    (X) Floor    Clinical Status    ( ) * Hypotension absent    11/23/2021 1:00 PM EST by Simon Antunez      bp dropped to 93/48 - then 86/53    ( ) * Bleeding absent or reduced    11/23/2021 1:00 PM EST by Simon Antunez      H/H dropped two grams - to 8.6/29.2    Routes    (X) * Oral hydration tolerated    (X) * Oral diet tolerated    * Milestone   Additional Notes   Hospital Day: 8       Assessment and Plan:   Connie Angel is a [de-identified] y.o. Alejandro Lara presents with <principal problem not specified>       1. Acute GI bleed, improved. Normal EGD. Colonoscopy- sigmoid diverticulosis with colitis. On Antibiotics    2. Acute blood loss anemia,. S/p blood transfusion. Hb down to 8.6 today. Recheck h/h in AM    3. acute kidney injury, resolved   4. Chronic respiratory failure   5. Paroxysmal A. fib   6. Chronic COPD   7. Chronic diastolic heart failure   8. Morbid obesity       Diet ADULT DIET;  Full Liquid            11/20/21 1000   BP: 86/53   Pulse: 69   Resp: 15   Temp: 98 °F (36.7 °C)   SpO2: 96%      Scheduled Medications   · midodrine 5 mg Oral TID WC   · furosemide 20 mg IntraVENous Once   · metroNIDAZOLE 500 mg IntraVENous Q8H   · clindamycin (CLEOCIN)  mg IntraVENous Q8H   · sodium chloride flush 5-40 mL IntraVENous 2 times per day   · pantoprazole 40 mg IntraVENous BID   · atorvastatin 40 mg Oral Daily   · calcium-cholecalciferol 1 tablet Oral BID WC   · ferrous sulfate 325 mg Oral BID WC   · budesonide-formoterol 2 puff Inhalation BID   · levothyroxine 75 mcg Oral Daily   · mirtazapine 15 mg Oral Nightly   · multivitamin 1 tablet Oral Daily          Infusions:    Infusions Meds   · sodium chloride    · sodium chloride    · sodium chloride 25 mL (11/20/21 0630)                 Gastrointestinal Bleeding, Lower - Care Day 6 (11/18/2021) by Eleni Mora RN       Review Entered Review Status   11/23/2021 12:54 Completed      Criteria Review      Care Day: 6 Care Date: 11/18/2021 Level of Care: Intermediate Care    Guideline Day 2    Clinical Status    (X) * Hypotension absent    11/23/2021 12:54 PM EST by Adan Mojica      /63   Pulse 65   Temp 98.2 °F (36.8 °C) (Oral)   Resp 18    ( ) * Bleeding absent or reduced    Routes    ( ) * Oral hydration tolerated    ( ) * Oral diet tolerated    * Milestone   Additional Notes   11/18         Abdominal:       General: Bowel sounds are normal. There is no distension.       Palpations: Abdomen is soft.       Tenderness: There is abdominal tenderness. There is guarding.       Comments: Tenderness in lower abdomen           H/H    10.0/36.0         Internal Medicine:   Assessment/Plan       Acute GI bleed   Acute blood loss anemia   Sigmoid colitis   Acute kidney injury, resolved   Chronic respiratory failure   Paroxysmal A. fib   Chronic COPD   Chronic diastolic heart failure   Morbid obesity       Anemia improved after transfusion   Hemoglobin 10 today   Normal EGD today; colonoscopy today showed sigmoid diverticulosis with colitis.    Patient allergic to penicillin and fluoroquinolones   Continue clindamycin and metronidazole    Blood pressure borderline; continue to monitor   Continue 1    Transfuse as needed to keep hemoglobin over 7   Continue levothyroxine   PT OT            General Surgery:   PLAN:   - will give full liquid diet   - no indication for surgical intervention   - will follow peripherally, if rebleeding occurs would recommend repeat endoscopy or CTA if bleeding is brisk.  If bleeding is localized it may be controlled with endoscopy or IR embolization.  Surgery would be last resort   - would be happy to re-evaluate if bleeding recurs            11/18/2021  1:35 PM EGD DIAGNOSTIC ONLY   COLONOSCOPY WITH BIOPSY      Forest Ontiveros MD              Signed             Operative Note           Patient: Janneth Wheeler   YOB: 1940   MRN: [id number]       Date of Procedure: 11/18/2021       Pre-Op Diagnosis: ANEMIA - RECTAL BLEED       [facility]           BRIEF OP REPORT:       Impression:                  1) normal EGD apart from mild gastritis in antrum                 2) C scope severe pancolonic diverticulosis.  Grade 2 internal hemorrhoids                 3) sigmoid colon mucosa was erythematous , biopsy done          Suggest:                 1) May have mild sigmoid diverticulosis associated colitis, will give CIPRO and Flagyl for 7 days                 2) soft diet                                     Full EGD/COLONOSCOPY report available by going to \"chart review\" then \"procedures\" then  \"EGD/Colonoscopy\" [facility] \"View Endoscopy Report\"    Electronically signed by Forest Ontiveros MD on 11/18/2021 at 2:40 PM                       Gastrointestinal Bleeding, Lower - Care Day 2 (11/14/2021) by Wil Hawkins RN       Review Entered Review Status   11/15/2021 11:43 Completed      Criteria Review      Care Day: 2 Care Date: 11/14/2021 Level of Care: Intermediate Care    Guideline Day 2    Clinical Status    ( ) * Hypotension absent    11/15/2021 11:43 AM EST by Shahid Flores      11/14/21  98.3 (36.8) 22 89 91/55  107/45, 103/48    ( ) * Bleeding absent or reduced    11/15/2021 11:43 AM EST by Shahid Flores      further drop in h/h    Routes    ( ) * Oral hydration tolerated    ( ) * Oral diet tolerated    11/15/2021 11:43 AM EST by Shahid Flores      clear liquid    * Milestone   Additional Notes   11/14      Results for Juan Manuel Luong (MRN [id number]) as of 11/15/2021 11:37      11/14/2021 16:28   Hemoglobin Quant: 6.7 (LL)   Hematocrit: 23.8 (L)      11/14/2021 20:39   Hemoglobin Quant: 6.8 (LL)   Hematocrit: 24.5 (L)

## 2022-05-11 ENCOUNTER — HOSPITAL ENCOUNTER (INPATIENT)
Age: 82
LOS: 3 days | Discharge: NURSING, REHAB OR LONG-TERM CARE | DRG: 378 | End: 2022-05-15
Attending: EMERGENCY MEDICINE | Admitting: INTERNAL MEDICINE
Payer: MEDICARE

## 2022-05-11 DIAGNOSIS — D62 ACUTE BLOOD LOSS ANEMIA: ICD-10-CM

## 2022-05-11 DIAGNOSIS — K92.2 GASTROINTESTINAL HEMORRHAGE, UNSPECIFIED GASTROINTESTINAL HEMORRHAGE TYPE: Primary | ICD-10-CM

## 2022-05-11 LAB
ANION GAP SERPL CALCULATED.3IONS-SCNC: 6 MMOL/L (ref 4–16)
BASOPHILS ABSOLUTE: 0 K/CU MM
BASOPHILS RELATIVE PERCENT: 0.1 % (ref 0–1)
BUN BLDV-MCNC: 22 MG/DL (ref 6–23)
CALCIUM SERPL-MCNC: 7.9 MG/DL (ref 8.3–10.6)
CHLORIDE BLD-SCNC: 100 MMOL/L (ref 99–110)
CO2: 30 MMOL/L (ref 21–32)
CREAT SERPL-MCNC: 0.9 MG/DL (ref 0.6–1.1)
DIFFERENTIAL TYPE: ABNORMAL
EOSINOPHILS ABSOLUTE: 0.4 K/CU MM
EOSINOPHILS RELATIVE PERCENT: 4.4 % (ref 0–3)
GFR AFRICAN AMERICAN: >60 ML/MIN/1.73M2
GFR NON-AFRICAN AMERICAN: >60 ML/MIN/1.73M2
GLUCOSE BLD-MCNC: 104 MG/DL (ref 70–99)
HCT VFR BLD CALC: 28 % (ref 37–47)
HEMOGLOBIN: 7.7 GM/DL (ref 12.5–16)
IMMATURE NEUTROPHIL %: 0.3 % (ref 0–0.43)
LACTATE: 0.8 MMOL/L (ref 0.4–2)
LYMPHOCYTES ABSOLUTE: 1.2 K/CU MM
LYMPHOCYTES RELATIVE PERCENT: 14.7 % (ref 24–44)
MCH RBC QN AUTO: 26.2 PG (ref 27–31)
MCHC RBC AUTO-ENTMCNC: 27.5 % (ref 32–36)
MCV RBC AUTO: 95.2 FL (ref 78–100)
MONOCYTES ABSOLUTE: 0.6 K/CU MM
MONOCYTES RELATIVE PERCENT: 7.4 % (ref 0–4)
NUCLEATED RBC %: 0 %
PDW BLD-RTO: 14.9 % (ref 11.7–14.9)
PLATELET # BLD: 194 K/CU MM (ref 140–440)
PMV BLD AUTO: 11.4 FL (ref 7.5–11.1)
POTASSIUM SERPL-SCNC: 4.1 MMOL/L (ref 3.5–5.1)
RBC # BLD: 2.94 M/CU MM (ref 4.2–5.4)
SEGMENTED NEUTROPHILS ABSOLUTE COUNT: 5.8 K/CU MM
SEGMENTED NEUTROPHILS RELATIVE PERCENT: 73.1 % (ref 36–66)
SODIUM BLD-SCNC: 136 MMOL/L (ref 135–145)
TOTAL IMMATURE NEUTOROPHIL: 0.02 K/CU MM
TOTAL NUCLEATED RBC: 0 K/CU MM
WBC # BLD: 8 K/CU MM (ref 4–10.5)

## 2022-05-11 PROCEDURE — 86901 BLOOD TYPING SEROLOGIC RH(D): CPT

## 2022-05-11 PROCEDURE — 83605 ASSAY OF LACTIC ACID: CPT

## 2022-05-11 PROCEDURE — 93005 ELECTROCARDIOGRAM TRACING: CPT | Performed by: EMERGENCY MEDICINE

## 2022-05-11 PROCEDURE — 80048 BASIC METABOLIC PNL TOTAL CA: CPT

## 2022-05-11 PROCEDURE — 86922 COMPATIBILITY TEST ANTIGLOB: CPT

## 2022-05-11 PROCEDURE — 99285 EMERGENCY DEPT VISIT HI MDM: CPT

## 2022-05-11 PROCEDURE — 86900 BLOOD TYPING SEROLOGIC ABO: CPT

## 2022-05-11 PROCEDURE — 85025 COMPLETE CBC W/AUTO DIFF WBC: CPT

## 2022-05-11 PROCEDURE — 86850 RBC ANTIBODY SCREEN: CPT

## 2022-05-11 RX ORDER — SODIUM CHLORIDE 9 MG/ML
INJECTION, SOLUTION INTRAVENOUS CONTINUOUS
Status: DISCONTINUED | OUTPATIENT
Start: 2022-05-11 | End: 2022-05-14

## 2022-05-11 RX ADMIN — SODIUM CHLORIDE: 9 INJECTION, SOLUTION INTRAVENOUS at 23:03

## 2022-05-12 LAB
ANION GAP SERPL CALCULATED.3IONS-SCNC: 5 MMOL/L (ref 4–16)
APTT: 28.5 SECONDS (ref 25.1–37.1)
BUN BLDV-MCNC: 20 MG/DL (ref 6–23)
CALCIUM SERPL-MCNC: 8.1 MG/DL (ref 8.3–10.6)
CHLORIDE BLD-SCNC: 105 MMOL/L (ref 99–110)
CO2: 32 MMOL/L (ref 21–32)
CREAT SERPL-MCNC: 0.8 MG/DL (ref 0.6–1.1)
EKG ATRIAL RATE: 67 BPM
EKG DIAGNOSIS: NORMAL
EKG P AXIS: 82 DEGREES
EKG P-R INTERVAL: 164 MS
EKG Q-T INTERVAL: 412 MS
EKG QRS DURATION: 70 MS
EKG QTC CALCULATION (BAZETT): 435 MS
EKG R AXIS: -24 DEGREES
EKG T AXIS: 21 DEGREES
EKG VENTRICULAR RATE: 67 BPM
GFR AFRICAN AMERICAN: >60 ML/MIN/1.73M2
GFR NON-AFRICAN AMERICAN: >60 ML/MIN/1.73M2
GLUCOSE BLD-MCNC: 92 MG/DL (ref 70–99)
HCT VFR BLD CALC: 23.2 % (ref 37–47)
HCT VFR BLD CALC: 26.2 % (ref 37–47)
HCT VFR BLD CALC: 27.8 % (ref 37–47)
HEMOGLOBIN: 6.7 GM/DL (ref 12.5–16)
HEMOGLOBIN: 7.4 GM/DL (ref 12.5–16)
HEMOGLOBIN: 7.6 GM/DL (ref 12.5–16)
INR BLD: 0.96 INDEX
IRON: 26 UG/DL (ref 37–145)
MCH RBC QN AUTO: 26.2 PG (ref 27–31)
MCH RBC QN AUTO: 26.3 PG (ref 27–31)
MCH RBC QN AUTO: 26.4 PG (ref 27–31)
MCHC RBC AUTO-ENTMCNC: 27.3 % (ref 32–36)
MCHC RBC AUTO-ENTMCNC: 28.2 % (ref 32–36)
MCHC RBC AUTO-ENTMCNC: 28.9 % (ref 32–36)
MCV RBC AUTO: 91 FL (ref 78–100)
MCV RBC AUTO: 93.6 FL (ref 78–100)
MCV RBC AUTO: 95.9 FL (ref 78–100)
PCT TRANSFERRIN: 13 % (ref 10–44)
PDW BLD-RTO: 15 % (ref 11.7–14.9)
PDW BLD-RTO: 15.1 % (ref 11.7–14.9)
PDW BLD-RTO: 15.1 % (ref 11.7–14.9)
PLATELET # BLD: 168 K/CU MM (ref 140–440)
PLATELET # BLD: 187 K/CU MM (ref 140–440)
PLATELET # BLD: 200 K/CU MM (ref 140–440)
PMV BLD AUTO: 10.8 FL (ref 7.5–11.1)
PMV BLD AUTO: 11.3 FL (ref 7.5–11.1)
PMV BLD AUTO: 11.9 FL (ref 7.5–11.1)
POTASSIUM SERPL-SCNC: 4.3 MMOL/L (ref 3.5–5.1)
PROTHROMBIN TIME: 12.4 SECONDS (ref 11.7–14.5)
RBC # BLD: 2.55 M/CU MM (ref 4.2–5.4)
RBC # BLD: 2.8 M/CU MM (ref 4.2–5.4)
RBC # BLD: 2.9 M/CU MM (ref 4.2–5.4)
SODIUM BLD-SCNC: 142 MMOL/L (ref 135–145)
TOTAL IRON BINDING CAPACITY: 199 UG/DL (ref 250–450)
UNSATURATED IRON BINDING CAPACITY: 173 UG/DL (ref 110–370)
WBC # BLD: 6.6 K/CU MM (ref 4–10.5)
WBC # BLD: 7.4 K/CU MM (ref 4–10.5)
WBC # BLD: 7.4 K/CU MM (ref 4–10.5)

## 2022-05-12 PROCEDURE — 94664 DEMO&/EVAL PT USE INHALER: CPT

## 2022-05-12 PROCEDURE — 89220 SPUTUM SPECIMEN COLLECTION: CPT

## 2022-05-12 PROCEDURE — 2580000003 HC RX 258: Performed by: INTERNAL MEDICINE

## 2022-05-12 PROCEDURE — 6360000002 HC RX W HCPCS: Performed by: NURSE PRACTITIONER

## 2022-05-12 PROCEDURE — 85045 AUTOMATED RETICULOCYTE COUNT: CPT

## 2022-05-12 PROCEDURE — 82746 ASSAY OF FOLIC ACID SERUM: CPT

## 2022-05-12 PROCEDURE — C9113 INJ PANTOPRAZOLE SODIUM, VIA: HCPCS | Performed by: INTERNAL MEDICINE

## 2022-05-12 PROCEDURE — 6370000000 HC RX 637 (ALT 250 FOR IP): Performed by: INTERNAL MEDICINE

## 2022-05-12 PROCEDURE — 85730 THROMBOPLASTIN TIME PARTIAL: CPT

## 2022-05-12 PROCEDURE — 6360000002 HC RX W HCPCS: Performed by: INTERNAL MEDICINE

## 2022-05-12 PROCEDURE — 85610 PROTHROMBIN TIME: CPT

## 2022-05-12 PROCEDURE — 94761 N-INVAS EAR/PLS OXIMETRY MLT: CPT

## 2022-05-12 PROCEDURE — 83550 IRON BINDING TEST: CPT

## 2022-05-12 PROCEDURE — A4216 STERILE WATER/SALINE, 10 ML: HCPCS | Performed by: INTERNAL MEDICINE

## 2022-05-12 PROCEDURE — 80048 BASIC METABOLIC PNL TOTAL CA: CPT

## 2022-05-12 PROCEDURE — 2140000000 HC CCU INTERMEDIATE R&B

## 2022-05-12 PROCEDURE — 85025 COMPLETE CBC W/AUTO DIFF WBC: CPT

## 2022-05-12 PROCEDURE — 94640 AIRWAY INHALATION TREATMENT: CPT

## 2022-05-12 PROCEDURE — 83540 ASSAY OF IRON: CPT

## 2022-05-12 PROCEDURE — 82728 ASSAY OF FERRITIN: CPT

## 2022-05-12 PROCEDURE — 85027 COMPLETE CBC AUTOMATED: CPT

## 2022-05-12 PROCEDURE — 2700000000 HC OXYGEN THERAPY PER DAY

## 2022-05-12 PROCEDURE — 36415 COLL VENOUS BLD VENIPUNCTURE: CPT

## 2022-05-12 PROCEDURE — 82607 VITAMIN B-12: CPT

## 2022-05-12 PROCEDURE — 93010 ELECTROCARDIOGRAM REPORT: CPT | Performed by: INTERNAL MEDICINE

## 2022-05-12 PROCEDURE — 2580000003 HC RX 258: Performed by: EMERGENCY MEDICINE

## 2022-05-12 PROCEDURE — 2580000003 HC RX 258: Performed by: NURSE PRACTITIONER

## 2022-05-12 RX ORDER — ONDANSETRON 2 MG/ML
4 INJECTION INTRAMUSCULAR; INTRAVENOUS EVERY 6 HOURS PRN
Status: DISCONTINUED | OUTPATIENT
Start: 2022-05-12 | End: 2022-05-15 | Stop reason: HOSPADM

## 2022-05-12 RX ORDER — MIDODRINE HYDROCHLORIDE 5 MG/1
5 TABLET ORAL
Status: DISCONTINUED | OUTPATIENT
Start: 2022-05-12 | End: 2022-05-15 | Stop reason: HOSPADM

## 2022-05-12 RX ORDER — BUDESONIDE AND FORMOTEROL FUMARATE DIHYDRATE 160; 4.5 UG/1; UG/1
2 AEROSOL RESPIRATORY (INHALATION) 2 TIMES DAILY
Status: DISCONTINUED | OUTPATIENT
Start: 2022-05-12 | End: 2022-05-15 | Stop reason: HOSPADM

## 2022-05-12 RX ORDER — ACETAMINOPHEN 325 MG/1
650 TABLET ORAL EVERY 6 HOURS PRN
Status: DISCONTINUED | OUTPATIENT
Start: 2022-05-12 | End: 2022-05-15 | Stop reason: HOSPADM

## 2022-05-12 RX ORDER — ATORVASTATIN CALCIUM 40 MG/1
40 TABLET, FILM COATED ORAL DAILY
Status: DISCONTINUED | OUTPATIENT
Start: 2022-05-12 | End: 2022-05-15 | Stop reason: HOSPADM

## 2022-05-12 RX ORDER — LACTOBACILLUS RHAMNOSUS GG 10B CELL
1 CAPSULE ORAL
Status: DISCONTINUED | OUTPATIENT
Start: 2022-05-12 | End: 2022-05-15 | Stop reason: HOSPADM

## 2022-05-12 RX ORDER — SODIUM CHLORIDE 0.9 % (FLUSH) 0.9 %
5-40 SYRINGE (ML) INJECTION PRN
Status: DISCONTINUED | OUTPATIENT
Start: 2022-05-12 | End: 2022-05-15 | Stop reason: HOSPADM

## 2022-05-12 RX ORDER — SODIUM CHLORIDE 0.9 % (FLUSH) 0.9 %
5-40 SYRINGE (ML) INJECTION EVERY 12 HOURS SCHEDULED
Status: DISCONTINUED | OUTPATIENT
Start: 2022-05-12 | End: 2022-05-15 | Stop reason: HOSPADM

## 2022-05-12 RX ORDER — MIRTAZAPINE 15 MG/1
15 TABLET, FILM COATED ORAL NIGHTLY
Status: DISCONTINUED | OUTPATIENT
Start: 2022-05-12 | End: 2022-05-15 | Stop reason: HOSPADM

## 2022-05-12 RX ORDER — FERROUS SULFATE 325(65) MG
325 TABLET ORAL 2 TIMES DAILY WITH MEALS
Status: DISCONTINUED | OUTPATIENT
Start: 2022-05-12 | End: 2022-05-15 | Stop reason: HOSPADM

## 2022-05-12 RX ORDER — M-VIT,TX,IRON,MINS/CALC/FOLIC 27MG-0.4MG
1 TABLET ORAL DAILY
Status: DISCONTINUED | OUTPATIENT
Start: 2022-05-12 | End: 2022-05-15 | Stop reason: HOSPADM

## 2022-05-12 RX ORDER — FUROSEMIDE 40 MG/1
40 TABLET ORAL DAILY
Status: DISCONTINUED | OUTPATIENT
Start: 2022-05-12 | End: 2022-05-15 | Stop reason: HOSPADM

## 2022-05-12 RX ORDER — ACETAMINOPHEN 650 MG/1
650 SUPPOSITORY RECTAL EVERY 6 HOURS PRN
Status: DISCONTINUED | OUTPATIENT
Start: 2022-05-12 | End: 2022-05-15 | Stop reason: HOSPADM

## 2022-05-12 RX ORDER — SULFAMETHOXAZOLE AND TRIMETHOPRIM 800; 160 MG/1; MG/1
1 TABLET ORAL EVERY 12 HOURS SCHEDULED
Status: DISCONTINUED | OUTPATIENT
Start: 2022-05-12 | End: 2022-05-14

## 2022-05-12 RX ORDER — LEVOTHYROXINE SODIUM 0.07 MG/1
75 TABLET ORAL DAILY
Status: DISCONTINUED | OUTPATIENT
Start: 2022-05-12 | End: 2022-05-15 | Stop reason: HOSPADM

## 2022-05-12 RX ORDER — ALBUTEROL SULFATE 2.5 MG/3ML
2.5 SOLUTION RESPIRATORY (INHALATION) EVERY 6 HOURS PRN
Status: DISCONTINUED | OUTPATIENT
Start: 2022-05-12 | End: 2022-05-15 | Stop reason: HOSPADM

## 2022-05-12 RX ORDER — ONDANSETRON 4 MG/1
4 TABLET, ORALLY DISINTEGRATING ORAL EVERY 8 HOURS PRN
Status: DISCONTINUED | OUTPATIENT
Start: 2022-05-12 | End: 2022-05-15 | Stop reason: HOSPADM

## 2022-05-12 RX ORDER — SODIUM CHLORIDE 9 MG/ML
INJECTION, SOLUTION INTRAVENOUS PRN
Status: DISCONTINUED | OUTPATIENT
Start: 2022-05-12 | End: 2022-05-15 | Stop reason: HOSPADM

## 2022-05-12 RX ADMIN — Medication 325 MG: at 09:39

## 2022-05-12 RX ADMIN — ACETAMINOPHEN 650 MG: 325 TABLET ORAL at 23:04

## 2022-05-12 RX ADMIN — LEVOTHYROXINE SODIUM 75 MCG: 0.07 TABLET ORAL at 09:37

## 2022-05-12 RX ADMIN — MIDODRINE HYDROCHLORIDE 5 MG: 5 TABLET ORAL at 09:38

## 2022-05-12 RX ADMIN — Medication 1 CAPSULE: at 09:37

## 2022-05-12 RX ADMIN — ATORVASTATIN CALCIUM 40 MG: 40 TABLET, FILM COATED ORAL at 09:34

## 2022-05-12 RX ADMIN — SODIUM CHLORIDE, PRESERVATIVE FREE 40 MG: 5 INJECTION INTRAVENOUS at 15:03

## 2022-05-12 RX ADMIN — SODIUM CHLORIDE: 9 INJECTION, SOLUTION INTRAVENOUS at 13:46

## 2022-05-12 RX ADMIN — BUDESONIDE AND FORMOTEROL FUMARATE DIHYDRATE 2 PUFF: 160; 4.5 AEROSOL RESPIRATORY (INHALATION) at 07:30

## 2022-05-12 RX ADMIN — MIRTAZAPINE 15 MG: 15 TABLET, FILM COATED ORAL at 20:38

## 2022-05-12 RX ADMIN — SODIUM CHLORIDE, PRESERVATIVE FREE 10 ML: 5 INJECTION INTRAVENOUS at 09:40

## 2022-05-12 RX ADMIN — BUDESONIDE AND FORMOTEROL FUMARATE DIHYDRATE 2 PUFF: 160; 4.5 AEROSOL RESPIRATORY (INHALATION) at 20:16

## 2022-05-12 RX ADMIN — MULTIPLE VITAMINS W/ MINERALS TAB 1 TABLET: TAB at 09:39

## 2022-05-12 RX ADMIN — IRON SUCROSE 300 MG: 20 INJECTION, SOLUTION INTRAVENOUS at 15:05

## 2022-05-12 RX ADMIN — SULFAMETHOXAZOLE AND TRIMETHOPRIM 1 TABLET: 800; 160 TABLET ORAL at 20:38

## 2022-05-12 RX ADMIN — MIDODRINE HYDROCHLORIDE 5 MG: 5 TABLET ORAL at 13:47

## 2022-05-12 RX ADMIN — Medication 325 MG: at 17:02

## 2022-05-12 RX ADMIN — SULFAMETHOXAZOLE AND TRIMETHOPRIM 1 TABLET: 800; 160 TABLET ORAL at 09:33

## 2022-05-12 RX ADMIN — MIDODRINE HYDROCHLORIDE 5 MG: 5 TABLET ORAL at 17:02

## 2022-05-12 ASSESSMENT — PAIN SCALES - GENERAL: PAINLEVEL_OUTOF10: 4

## 2022-05-12 NOTE — PROGRESS NOTES
Patient was admitted by my colleague earlier this morning for evaluation of GI bleed. Patient seen and examined at bedside. [de-identified]years old female poor historian at baseline presented with bright red blood per rectum for few days and admitted for GI bleed. Blood pressure still running on the lower side 90/45 with map of 67, heart rate of 65. Hemoglobin 7.4 down from 7.7 since last night. Patient denies any active bleeding this morning. Noted GI recommendation regarding recent scope to start clear liquid diet and serial H&H monitoring. 1. Rectal bleeding hemorrhoidal versus hematochezia. On PPI, GI following clear liquid diet. No emergent plan for endoscopy    2. Acute blood loss anemia due to above-not anticoagulation prior to admission per records    3. UTI diagnosed yesterday at outside ER. On Bactrim    4. Chronic respiratory failure on 2 to 3 L at home. Compensated  5. Paroxysmal A. Fib. On metoprolol, not on anticoagulation prior to admission per records  6. Chronic hypotension on midodrine  7. COPD compensated  8. Chronic diastolic heart failure (on hold due to above  9. Obesity. BMI of 39.   Lifestyle modifications consult    CODE STATUS DNR CCA  DVT prophylaxis SCD

## 2022-05-12 NOTE — DISCHARGE INSTR - COC
excess calories ([facility]) E66.01    Acute on chronic diastolic congestive heart failure ([facility] [address] I50.33    Depression F32. A    Hypertension I10    Pneumonia J18.9    Chronic diastolic CHF (congestive heart failure) ([facility]) I50.32    GIB (gastrointestinal bleeding) K92.2    Other chest pain R07.89    GI bleed K92.2       Isolation/Infection:   Isolation            Contact          Patient Infection Status       Infection Onset Added Last Indicated Last Indicated By Review Planned Expiration Resolved Resolved By    MRSA 09/09/21 09/11/21 09/10/21 Culture, MRSA, Screening        Resolved    COVID-19 (Rule Out) 09/30/21 09/30/21 09/30/21 COVID-19, Rapid (Ordered)   09/30/21 Rule-Out Test Resulted    COVID-19 (Rule Out) 09/08/21 09/08/21 09/08/21 COVID-19, Rapid (Ordered)   09/08/21 Rule-Out Test Resulted            Nurse Assessment:  Last Vital Signs: BP (!) 92/46   Pulse 65   Temp 98.9 °F (37.2 °C) (Oral)   Resp 18   Ht 5' 1\" (1.549 m)   Wt 210 lb (95.3 kg)   SpO2 99%   BMI 39.68 kg/m²     Last documented pain score (0-10 scale):    Last Weight:   Wt Readings from Last 1 Encounters:   05/11/22 210 lb (95.3 kg)     Mental Status:  oriented and alert    IV Access:  - None    Nursing Mobility/ADLs:  Walking   Dependent  Transfer  Dependent  Bathing  Dependent  Dressing  Dependent  Toileting  Dependent  Feeding  Assisted  Med Admin  Dependent  Med Delivery   whole    Wound Care Documentation and Therapy:        Elimination:  Continence: Bowel: No  Bladder: No  Urinary Catheter: None   Colostomy/Ileostomy/Ileal Conduit: No       Date of Last BM: 5/15/2022  No intake or output data in the 24 hours ending 05/12/22 1431  No intake/output data recorded.     Safety Concerns:     None    Impairments/Disabilities:      None      Patient's personal belongings (please select all that are sent with patient):  Glasses    RN SIGNATURE:  Electronically signed by Kun Parkinson RN on 5/15/22 at 6:35 PM EDT    CASE MANAGEMENT/SOCIAL WORK SECTION    Inpatient Status Date: ***    Readmission Risk Assessment Score:  Readmission Risk              Risk of Unplanned Readmission:  18           Discharging to Facility/ Agency   Name: [facility]  Address: [address]  Phone: [phone]  Fax: [phone]    Dialysis Facility (if applicable)   Name:  Address:  Dialysis Schedule:  Phone:  Fax:      PHYSICIAN SECTION    Nutrition Therapy:  Current Nutrition Therapy:   - Oral Diet:  General    Routes of Feeding: Oral  Liquids: No Restrictions  Daily Fluid Restriction: no  Last Modified Barium Swallow with Video (Video Swallowing Test): not done    Treatments at the Time of Hospital Discharge:   Respiratory Treatments: None  Oxygen Therapy:  oN 3 liters of oxygen   Ventilator:    - No ventilator support    Rehab Therapies: Physical Therapy and Occupational Therapy  Weight Bearing Status/Restrictions: No weight bearing restrictions  Other Medical Equipment (for information only, NOT a DME order): Other Treatments: *    Prognosis: Fair    Condition at Discharge: Stable    Rehab Potential (if transferring to Rehab): Fair    Recommended Labs or Other Treatments After Discharge: cbc in 1 week    Physician Certification: I certify the above information and transfer of Delfino Johnston  is necessary for the continuing treatment of the diagnosis listed and that she requires [facility] for less 30 days.      Update Admission H&P: No change in H&P    PHYSICIAN SIGNATURE:  Electronically signed by Candace Ward MD on 5/15/22 at 8:13 PM EDT

## 2022-05-12 NOTE — H&P
History and Physical      Name:  Romain Gill /Age/Sex: 1940  ([de-identified] y.o. female)   MRN & CSN:  [id number] & [id number] Encounter Date/Time: 2022 2:17 AM EDT   Location:  6642/4400-Z PCP: Austyn Calle MD       Hospital Day: 2    Assessment and Plan:   Romain Gill is a [de-identified] y.o. female who presents with     Rectal Bleeding:   Acute Blood Loss Anemia:  Hold anticoagulation  GI consult  Trend hemoglobin  N.p.o.    UTI  Continue Bactrim    Chronic respiratory failure: Patient states she is on 2 to 3 L at home but previous documentation indicates higher and patient is on 4 L nasal cannula at emergency room. We will continue titrate as needed. PAF: Per history, appears to be in sinus rhythm on admit. Stable rate. Patient not on anticoagulation or beta-blocker at this time. Will monitor on telemetry. COPD: per history, no evidence of exacerbation. Bronchodilators as needed. Continue home inhalers. Chronic diastolic heart failure: EF of 55% on TTE in 2021. No evidence of exacerbation. Holding home Lasix while patient n.p.o. and with SALTY. Provide IV diuresis as needed. Obesity: BMI 39, counseled on lifestyle modifications. D/w ED provider  Code status DNR CCA  DVT PPx SCDs      History of Present Illness:     Romain Gill is a [de-identified] y.o. female p/w blood per rectum, states that she has been having blood per rectum, elevated patient is limited historian, states that she was sent in for blood per rectum. Patient states that she is being treated when she has been having blood from down bottom. She has no other complaints, denies any abdominal pain. Has no nausea or vomiting. Denies any dizziness or chest pain. Review of Systems: Need 10 Elements       Pt otherwise has no complaints of CP, SOB, dizziness, N/V/C/D, abdominal pain, dysuria, joint pains, rash/boils, cough or fevers.        Objective:   No intake or output data in the 24 hours ending 22 9206 Vitals:   Vitals:    05/12/22 0032 05/12/22 0047 05/12/22 0119 05/12/22 0120   BP: (!) 109/59 (!) 106/59 (!) 105/52    Pulse: 66 64 72 66   Resp: 17 18 19    Temp:   99.7 °F (37.6 °C)    TempSrc:   Oral    SpO2: 97% 98% 96%    Weight:       Height:           Medications Prior to Admission     Prior to Admission medications    Medication Sig Start Date End Date Taking?  Authorizing Provider   midodrine (PROAMATINE) 5 MG tablet Take 1 tablet by mouth 3 times daily (with meals) 11/22/21   Grecia Guerra MD   OXYGEN Inhale 5 L into the lungs continuous 10/4/21   Liana Corrales MD   furosemide (LASIX) 40 MG tablet Take 1 tablet by mouth daily 10/4/21   Liana Corrales MD   mirtazapine (REMERON) 15 MG tablet Take 1 tablet by mouth nightly 11/30/20   Emperatriz Morel MD   pantoprazole (PROTONIX) 40 MG tablet Take 1 tablet by mouth 2 times daily (before meals) 11/30/20 12/30/20  Emperatriz Morel MD   Multiple Vitamin (MULTIVITAMIN) TABS tablet Take 1 tablet by mouth daily 12/1/20   Emperatriz Morel MD   fluticasone-vilanterol (BREO ELLIPTA) 100-25 MCG/INH AEPB inhaler Inhale 1 puff into the lungs daily    Historical Provider, MD   linaclotide (LINZESS) 145 MCG capsule Take 145 mcg by mouth every morning (before breakfast)    Historical Provider, MD   calcium citrate-vitamin D (CITRICAL + D) 315-250 MG-UNIT TABS per tablet Take 1 tablet by mouth 2 times daily (with meals)    Historical Provider, MD   albuterol sulfate  (90 Base) MCG/ACT inhaler Inhale 2 puffs into the lungs every 4 hours as needed for Wheezing    Historical Provider, MD   ondansetron (ZOFRAN ODT) 4 MG disintegrating tablet Take 1 tablet by mouth every 8 hours as needed for Nausea 1/12/17   Tracie Zhao DO   albuterol (PROVENTIL) (2.5 MG/3ML) 0.083% nebulizer solution Take 3 mLs by nebulization every 6 hours as needed for Wheezing 1/12/17   Tracie Zhao, DO   potassium chloride (KLOR-CON M) 10 MEQ extended release tablet Take 10 mEq by mouth daily    Historical Provider, MD   levothyroxine (SYNTHROID) 75 MCG tablet Take 75 mcg by mouth Daily    Historical Provider, MD   Acetaminophen (TYLENOL PO) Take 650 mg by mouth every 6 hours as needed (PAIN OR FEVER)     Historical Provider, MD   atorvastatin (LIPITOR) 40 MG tablet Take 40 mg by mouth daily    Historical Provider, MD   lactobacillus (CULTURELLE) capsule Take 1 capsule by mouth daily (with breakfast) 6/26/16   Thierno Moore DO   ferrous sulfate 325 (65 FE) MG tablet Take 1 tablet by mouth every 12 hours With a meal. 9/11/15   Tia Martinez MD       Physical Exam: Need 8 Elements   General: NAD   Eyes: EOMI  ENT: neck supple  Cardiovascular: Regular rate. NO edema  Respiratory: Symmetrical expansion,   Gastrointestinal: Soft, non tender  Genitourinary: no suprapubic tenderness  Skin: warm, dry  Neuro: Alert. Oriented  Psych: Mood appropriate. Past Medical History:   PMHx   Past Medical History:   Diagnosis Date    ASHD (arteriosclerotic heart disease)     Bilateral edema of lower extremity 12/16/2015    [facility]est[facility]     COPD (chronic obstructive pulmonary disease) ([facility]     Depression     Hypertension     MRSA (methicillin resistant staph aureus) culture positive 09/09/2021    Nasal    MRSA (methicillin resistant staph aureus) culture positive 09/09/2021    NASAL     PSHX:  has a past surgical history that includes Anus surgery; Hysterectomy; Appendectomy; Tonsillectomy; Upper gastrointestinal endoscopy (N/A, 11/25/2020); Upper gastrointestinal endoscopy (N/A, 11/18/2021); and Colonoscopy (N/A, 11/18/2021). Allergies:    Allergies   Allergen Reactions    Codeine Itching    Moxifloxacin Other (See Comments)     Listed as allergy from ecf    Oxycodone Other (See Comments)     Listed as allergy from ecf     Pcn [Penicillins] Hives and Rash    Warfarin And Related Other (See Comments)     listed as allergy from ecf     Fam HX:  family history includes Diabetes in her sister; Early Death in her father; Heart Disease in her mother; High Blood Pressure in her mother; Substance Abuse in her father. Soc HX:   Social History     Socioeconomic History    Marital status:      Spouse name: Priti Payan Number of children: 3    Years of education: None    Highest education level: None   Occupational History    None   Tobacco Use    Smoking status: Former Smoker     Packs/day: 1.00     Types: Cigarettes     Quit date: 2010     Years since quittin.6    Smokeless tobacco: Never Used   Substance and Sexual Activity    Alcohol use: Yes     Alcohol/week: 0.0 standard drinks     Comment: wine twice a year    Drug use: No    Sexual activity: Yes     Partners: Male   Other Topics Concern    None   Social History Narrative    None     Social Determinants of Health     Financial Resource Strain:     Difficulty of Paying Living Expenses: Not on file   Food Insecurity:     Worried About Running Out of Food in the Last Year: Not on file    Cuate of Food in the Last Year: Not on file   Transportation Needs:     Lack of Transportation (Medical): Not on file    Lack of Transportation (Non-Medical):  Not on file   Physical Activity:     Days of Exercise per Week: Not on file    Minutes of Exercise per Session: Not on file   Stress:     Feeling of Stress : Not on file   Social Connections:     Frequency of Communication with Friends and Family: Not on file    Frequency of Social Gatherings with Friends and Family: Not on file    Attends Gnosticist Services: Not on file    Active Member of Clubs or Organizations: Not on file    Attends Club or Organization Meetings: Not on file    Marital Status: Not on file   Intimate Partner Violence:     Fear of Current or Ex-Partner: Not on file    Emotionally Abused: Not on file    Physically Abused: Not on file    Sexually Abused: Not on file   Housing Stability:     Unable to Pay for Housing in the Last Year: Not on file    Number of Places Lived in the Last Year: Not on file    Unstable Housing in the Last Year: Not on file       Medications:   Medications:    atorvastatin  40 mg Oral Daily    ferrous sulfate  325 mg Oral BID WC    budesonide-formoterol  2 puff Inhalation BID    [Held by provider] furosemide  40 mg Oral Daily    lactobacillus  1 capsule Oral Daily with breakfast    levothyroxine  75 mcg Oral Daily    midodrine  5 mg Oral TID WC    mirtazapine  15 mg Oral Nightly    therapeutic multivitamin-minerals  1 tablet Oral Daily    sodium chloride flush  5-40 mL IntraVENous 2 times per day    pantoprazole (PROTONIX) 40 mg injection  40 mg IntraVENous Daily      Infusions:    sodium chloride      sodium chloride 100 mL/hr at 05/11/22 2303     PRN Meds: albuterol, 2.5 mg, Q6H PRN  sodium chloride flush, 5-40 mL, PRN  sodium chloride, , PRN  ondansetron, 4 mg, Q8H PRN   Or  ondansetron, 4 mg, Q6H PRN  acetaminophen, 650 mg, Q6H PRN   Or  acetaminophen, 650 mg, Q6H PRN        Labs      CBC:   Recent Labs     05/11/22  2209   WBC 8.0   HGB 7.7*        BMP:    Recent Labs     05/11/22 2209      K 4.1      CO2 30   BUN 22   CREATININE 0.9   GLUCOSE 104*     Hepatic: No results for input(s): AST, ALT, ALB, BILITOT, ALKPHOS in the last 72 hours. Lipids:   Lab Results   Component Value Date    CHOL 114 01/02/2019    HDL 58 01/02/2019    TRIG 58 01/02/2019     Hemoglobin A1C:   Lab Results   Component Value Date    LABA1C 4.8 06/17/2016     TSH: No results found for: TSH  Troponin:   Lab Results   Component Value Date    TROPONINT 0.023 10/01/2021    TROPONINT 0.016 09/30/2021    TROPONINT 0.025 09/30/2021     Lactic Acid: No results for input(s): LACTA in the last 72 hours. BNP: No results for input(s): PROBNP in the last 72 hours.   UA:  Lab Results   Component Value Date    NITRU NEGATIVE 10/01/2021    COLORU YELLOW 10/01/2021    WBCUA <1 10/01/2021    RBCUA <1 10/01/2021    MUCUS RARE 10/01/2021    TRICHOMONAS NONE SEEN 10/01/2021    BACTERIA RARE 10/01/2021    CLARITYU CLEAR 10/01/2021    SPECGRAV 1.021 10/01/2021    LEUKOCYTESUR TRACE 10/01/2021    UROBILINOGEN NEGATIVE 10/01/2021    BILIRUBINUR NEGATIVE 10/01/2021    BLOODU LARGE 10/01/2021    KETUA SMALL 10/01/2021     Urine Cultures: No results found for: LABURIN  Blood Cultures: No results found for: BC  No results found for: BLOODCULT2  Organism:   Lab Results   Component Value Date    [facility] 68/63/5192       Imaging/Diagnostics Last 24 Hours   No results found.         Electronically signed by Claudette Reyes MD on 5/12/2022 at 2:17 AM

## 2022-05-12 NOTE — ED TRIAGE NOTES
Patient presents to ED via ems for hematuria and possible GI bleed. Per [place] staff, patient seen at [facility] yesterday and diagnosed w/ hematuria and UTI; given Bactrim DS prescription. Nursing staff noted large clots in patient's depend, concerned for potential GI bleed.

## 2022-05-12 NOTE — PROGRESS NOTES
4 Eyes Skin Assessment     NAME:  Marylee Santa  YOB: 1940  MEDICAL RECORD NUMBER:  [id number]    The patient is being assess for  Skin breakdown    I agree that 2 RN's have performed a thorough Head to Toe Skin Assessment on the patient. ALL assessment sites listed below have been assessed. Areas assessed by both nurses:  Coccyx, heels, back, elbows, all skin         Does the Patient have a Wound? NO       Jeffrey Prevention initiated:  NO  Wound Care Orders initiated:  NO    Pressure Injury (Stage 3,4, Unstageable, DTI, NWPT, and Complex wounds) if present place consult order under [de-identified] NO    New and Established Ostomies if present place consult order under : NO    Nurse 1 eSignature:  Lizabeth Muñoz    **SHARE this note so that the co-signing nurse is able to place an eSignature**    Nurse 2 eSignature: Rusty Dick

## 2022-05-12 NOTE — CARE COORDINATION
D/c plan is to return to LTC at [facility] whenever medically ready. Pt is a bed hold and will NOT require a pre-cert per Nahomy/DANIEL.   Rapid covid needed on day of d/c.  TE     D/C INSTRUCTIONS ARE ON FRONT OF PACKET LOCATED WITH THE SOFT CHART

## 2022-05-12 NOTE — CONSULTS
[facility] Gastroenterology  Gastroenterology Consultation    2022  8:00 AM    Patient:    Roderick Reyes  : 1940   [de-identified] y.o. MRN: [id number]  Admitted: 2022  9:59 PM ATT: Fidel Pang MD   3116/3116-[id number]  AdmitDx: Acute blood loss anemia [D62]  GIB (gastrointestinal bleeding) [K92.2]  Gastrointestinal hemorrhage, unspecified gastrointestinal hemorrhage type [K92.2]  PCP: Alex Gibson MD    Reason for Consult: GIB    Requesting Physician:  Fidel Pang MD      History Obtained From:  Patient and review of all records    HISTORY OF PRESENT ILLNESS:                The patient is a [de-identified] y.o. female with significant past medical history as below who presents with above mentioned causes in reason for consult. Presented to [facility] from [facility] for bleeding in stool. Pt poor historian. Unsure of color. Denies Abdominal pain  Denies N/V, GERD, dyspepsia, dysphagia   Last BM unsure    History of EGD & colonoscopy 21    1) normal EGD apart from mild gastritis in antrum                2) C scope severe pancolonic diverticulosis.   Grade 2 internal hemorrhoids                3) sigmoid colon mucosa was erythematous , biopsy done     NSAIDs daily IBU for knee   Denies Anti-platelet therapy    Non Smoker  Denies Alcohol intake    Past Medical History:        Diagnosis Date    ASHD (arteriosclerotic heart disease)     Bilateral edema of lower extremity 2015    CHF (congestive heart failure) (HCC)     COPD (chronic obstructive pulmonary disease) ([address]     Depression     Hypertension     MRSA (methicillin resistant staph aureus) culture positive 2021    Nasal    MRSA (methicillin resistant staph aureus) culture positive 2021    NASAL       Past Surgical History:        Procedure Laterality Date    ANUS SURGERY      fistula repair    APPENDECTOMY      COLONOSCOPY N/A 2021    COLONOSCOPY WITH BIOPSY performed by Carla Worthy MD at [address]      UPPER GASTROINTESTINAL ENDOSCOPY N/A 11/25/2020    EGD BIOPSY performed by Carla Worthy MD at [address] N/A 11/18/2021    EGD DIAGNOSTIC ONLY performed by Carla Worthy MD at [facility] ENDOSCOPY         Current Medications:    Medications    Scheduled Medications:    atorvastatin  40 mg Oral Daily    ferrous sulfate  325 mg Oral BID WC    budesonide-formoterol  2 puff Inhalation BID    [Held by provider] furosemide  40 mg Oral Daily    lactobacillus  1 capsule Oral Daily with breakfast    levothyroxine  75 mcg Oral Daily    midodrine  5 mg Oral TID WC    mirtazapine  15 mg Oral Nightly    therapeutic multivitamin-minerals  1 tablet Oral Daily    sodium chloride flush  5-40 mL IntraVENous 2 times per day    pantoprazole (PROTONIX) 40 mg injection  40 mg IntraVENous Daily    sulfamethoxazole-trimethoprim  1 tablet Oral 2 times per day     PRN Medications: albuterol, sodium chloride flush, sodium chloride, ondansetron **OR** ondansetron, acetaminophen **OR** acetaminophen      Allergies:  Codeine, Moxifloxacin, Oxycodone, Pcn [penicillins], and Warfarin and related    Social History:   TOBACCO:   reports that she quit smoking about 11 years ago. Her smoking use included cigarettes. She smoked 1.00 pack per day. She has never used smokeless tobacco.  ETOH:   reports current alcohol use. Family History:       Problem Relation Age of Onset    High Blood Pressure Mother     Heart Disease Mother     Early Death Father     Substance Abuse Father     Diabetes Sister        No family history of colon cancer, Crohn's disease, or ulcerative colitis.     REVIEW OF SYSTEMS:    The positive ROS will be identified in bold, otherwise ROS are negative     CONSTITUTIONAL:  Neg   Recent weight changes, fatigue, fever, chills or night sweats  RESPIRATORY:   Neg SOB, wheeze, cough, sputum, hemoptysis or bronchitis  CARDIOVASCULAR:  Neg chest pain, palpitations, dyspnea on exertion, orthopnea, paroxysmal nocturnal dyspnea or edema  GASTROINTESTINAL:  SEE HPI  HEMATOLOGIC/LYMPHATIC:  Neg  Anemia, bleeding tendency  MUSCULOSKELETAL:    New myalgias, bone pain, joint pain, swelling or stiffness and has had change in gait. NEUROLOGICAL:  Neg  Loss of Consciousness, memory loss, forgetfulness, periods of confusion, difficulty concentrating, seizures, decline in intellect, nervousness, insomina, aphasia or dysarthria. SKIN:  Neg  skin or hair changes, and has no itching, rashes, or sores. PSYCHIATRIC:  Neg depression, personality changes, anxiety. ENDOCRINE:  Neg polydipsia, polyuria, abnormal weight changes, heat /cold intolerance.   ALL/IMM:  Neg reactions to drugs other than listed    PHYSICAL EXAM:      Vitals:    BP (!) 98/42   Pulse 65   Temp 99.7 °F (37.6 °C) (Oral)   Resp 19   Ht 5' 1\" (1.549 m)   Wt 210 lb (95.3 kg)   SpO2 96%   BMI 39.68 kg/m²     General Appearance:    Alert, cooperative, no distress, appears stated age   HEENT:    Normocephalic, atraumatic, Conjunctiva pallor   Neck:   Supple, symmetrical, trachea midline   Lungs:    Decreased auscultation bilaterally, respirations unlabored   Chest Wall:    No tenderness or deformity    Heart:    Regular rate and rhythm, S1 and S2 normal   Abdomen:     Soft, non-tender, bowel sounds active all four quadrants,     no masses, no organomegaly, no ascites    Rectal:    Deferred   Extremities:   Extremities normal, atraumatic, no cyanosis; nonpitting edema    Pulses:   2+ and symmetric all extremities   Skin:   Skin color, texture, turgor normal, no rashes or lesions       Neurologic:   No focal deficits, moving all four extremities            DATA:    ABGs:   Lab Results   Component Value Date    PO2ART 54 10/02/2021    VOV5AXT 45.0 10/02/2021     CBC:   Recent Labs     05/11/22  2209   WBC 8.0   HGB 7.7*        BMP:    Recent Labs 05/11/22  2209      K 4.1      CO2 30   BUN 22   CREATININE 0.9   GLUCOSE 104*     Magnesium:   Lab Results   Component Value Date    MG 2.1 09/13/2021     Hepatic: No results for input(s): AST, ALT, ALB, BILITOT, ALKPHOS in the last 72 hours. No results for input(s): LIPASE, AMYLASE in the last 72 hours. No results for input(s): PROTIME, INR in the last 72 hours. No results for input(s): PTT in the last 72 hours. Lipids: No results for input(s): CHOL, HDL in the last 72 hours. Invalid input(s): LDLCALCU  INR: No results for input(s): INR in the last 72 hours. TSH: No results found for: TSH  No intake or output data in the 24 hours ending 05/12/22 0800   sodium chloride      sodium chloride 100 mL/hr at 05/11/22 2303       Imaging Studies:reviewed      IMPRESSION:      Patient Active Problem List   Diagnosis Code    Coronary atherosclerosis of native coronary artery I25.10    Anemia D64.9    Chronic hypoxemic respiratory failure ([facility]) J96.11    Bilateral edema of lower extremity R60.0    Moderate COPD (chronic obstructive pulmonary disease) ([facility]) J44.9    COPD exacerbation ([facility]) J44.1    Hypoxia R09.02    COPD with acute exacerbation ([facility] J44.1    Morbid obesity due to excess calories ([facility] E66.01    Acute on chronic diastolic congestive heart failure ([facility] I50.33    Depression F32. A    Hypertension I10    Pneumonia J18.9    Chronic diastolic CHF (congestive heart failure) ([facility]) I50.32    GIB (gastrointestinal bleeding) K92.2    Other chest pain R07.89    GI bleed K92.2       ASSESSMENT:  GIB   LAST CT 11/13/21  No acute intra-abdominal abnormality to explain patient's symptoms. Specifically, no obvious rectal mass on noncontrast imaging. 2. Stable left base opacity with improving right base opacity since September 30, 2021.   3. Severe diverticulosis. 4. Severe atherosclerosis. 5. IVC filter in place.     hgb 7.7  Acute on chronic anemia.    May be hemorrhoids  May be diverticular   May be NSAID enteropathy     RECOMMENDATIONS:  Anemia panel, may need IV iron   Stop all NSAIDS at [facility]  Serial H&H   PPI daily   Clear liquid diet   No endoscopic procedures at this time, had within the last year no bleedng then     Discussed plan of care with patient and RN    Patient clinical, biochemical, and radiological information discussed with Dr. Josue Trammell. He agrees with the assessment and plan. AMPARO Pacheco CNP, CNP  5/12/2022  8:00 AM     GI bleed  Appears to be diverticular bleed  Recent colonoscopy severe pancolonic diverticulosis  Treat symptomatically  Keep clear liquid diet  If rebleeds might need RBC bleeding scan might also need colectomy  No colonoscopy procedures at this point of time    I have seen and examined this patient personally, and independently of the nurse practitioner. The plan was developed mutually at the time of the visit with the patient. Cheng Kuhn and myself have spoken with patient, nursing staff and provided written and verbal instructions .     The above note has been reviewed and I agree with the Assessment,  Diagnosis, and Treatment plan as suggested by Cheng Kuhn CNP      [address] [place] [address] gastroenterology

## 2022-05-12 NOTE — ED PROVIDER NOTES
Triage Chief Complaint:   Hematuria (diagnosed with UTI and hematuria yesterday at [facility]) and Other [facility] staff noted large clots, concernred for GI bleed)    Nikolai:  Lui Agosto is a [de-identified] y.o. female that presents from nursing facility with concern for GI bleeding. The patient states that over the past few days she has had lower abdominal cramping and has been having dark red blood in her pads that she wears. She is incontinent at baseline and is unsure where the blood is coming from. She was seen at another emergency department yesterday and diagnosed with possible UTI and started on Bactrim. Staff at nursing facility is concerned that patient is having GI bleeding. She has a history of GI bleeding. Patient is not anticoagulated. She has reportedly been passing large clots and dark red blood. Patient denies any pain at this time. Denies any nausea, vomiting, fevers. Denies any dysuria or urinary frequency. ROS:  At least 10 systems reviewed and otherwise acutely negative except as in the [address]     Past Medical History:   Diagnosis Date    ASHD (arteriosclerotic heart disease)     Bilateral edema of lower extremity 12/16/2015    CHF (congestive heart failure) (HCC)     COPD (chronic obstructive pulmonary disease) (HCC)     Depression     Hypertension     MRSA (methicillin resistant staph aureus) culture positive 09/09/2021    Nasal    MRSA (methicillin resistant staph aureus) culture positive 09/09/2021    NASAL     Past Surgical History:   Procedure Laterality Date    ANUS SURGERY      fistula repair    APPENDECTOMY      COLONOSCOPY N/A 11/18/2021    COLONOSCOPY WITH BIOPSY performed by Tato Fraga MD at [place] [id number] ENDOSCOPY N/A 11/25/2020    EGD BIOPSY performed by Tato Fraga MD at [facility] N/A 11/18/2021    EGD DIAGNOSTIC ONLY performed by Tato Fraga MD at [facility] ENDOSCOPY     Family History   Problem Relation Age of Onset    High Blood Pressure Mother     Heart Disease Mother     Early Death Father     Substance Abuse Father     Diabetes Sister      Social History     Socioeconomic History    Marital status:      Spouse name: Dale Bowen    Number of children: 4    Years of education: Not on file    Highest education level: Not on file   Occupational History    Not on file   Tobacco Use    Smoking status: Former Smoker     Packs/day: 1.00     Types: Cigarettes     Quit date: 2010     Years since quittin.6    Smokeless tobacco: Never Used   Substance and Sexual Activity    Alcohol use: Yes     Alcohol/week: 0.0 standard drinks     Comment: wine twice a year    Drug use: No    Sexual activity: Yes     Partners: Male   Other Topics Concern    Not on file   Social History Narrative    Not on file     Social Determinants of Health     Financial Resource Strain:     Difficulty of Paying Living Expenses: Not on file   Food Insecurity:     Worried About [address] in the Last Year: Not on file    Ran Out of Food in the Last Year: Not on file   Transportation Needs:     Lack of Transportation (Medical): Not on file    Lack of Transportation (Non-Medical):  Not on file   Physical Activity:     Days of Exercise per Week: Not on file    Minutes of Exercise per Session: Not on file   Stress:     Feeling of Stress : Not on file   Social Connections:     Frequency of Communication with Friends and Family: Not on file    Frequency of Social Gatherings with Friends and Family: Not on file    Attends Judaism Services: Not on file    Active Member of Clubs or Organizations: Not on file    Attends Club or Organization Meetings: Not on file    Marital Status: Not on file   Intimate Partner Violence:     Fear of Current or Ex-Partner: Not on file    Emotionally Abused: Not on file    Physically Abused: Not on file    Sexually Abused: Not on file   Housing Stability:     Unable to Pay for Housing in the Last Year: Not on file    Number of Places Lived in the Last Year: Not on file    Unstable Housing in the Last Year: Not on file     Current Facility-Administered Medications   Medication Dose Route Frequency Provider Last Rate Last Admin    0.9 % sodium chloride infusion   IntraVENous Continuous Ace MD [place] 100 mL/hr at 05/11/22 2303 New Bag at 05/11/22 2303     Current Outpatient Medications   Medication Sig Dispense Refill    midodrine (PROAMATINE) 5 MG tablet Take 1 tablet by mouth 3 times daily (with meals) 90 tablet 0    OXYGEN Inhale 5 L into the lungs continuous 1 Device 0    furosemide (LASIX) 40 MG tablet Take 1 tablet by mouth daily 60 tablet 3    mirtazapine (REMERON) 15 MG tablet Take 1 tablet by mouth nightly 30 tablet 3    pantoprazole (PROTONIX) 40 MG tablet Take 1 tablet by mouth 2 times daily (before meals) 30 tablet 0    Multiple Vitamin (MULTIVITAMIN) TABS tablet Take 1 tablet by mouth daily  0    fluticasone-vilanterol (BREO ELLIPTA) 100-25 MCG/INH AEPB inhaler Inhale 1 puff into the lungs daily      linaclotide (LINZESS) 145 MCG capsule Take 145 mcg by mouth every morning (before breakfast)      calcium citrate-vitamin D (CITRICAL + D) 315-250 MG-UNIT TABS per tablet Take 1 tablet by mouth 2 times daily (with meals)      albuterol sulfate  (90 Base) MCG/ACT inhaler Inhale 2 puffs into the lungs every 4 hours as needed for Wheezing      ondansetron (ZOFRAN ODT) 4 MG disintegrating tablet Take 1 tablet by mouth every 8 hours as needed for Nausea 15 tablet 0    albuterol (PROVENTIL) (2.5 MG/3ML) 0.083% nebulizer solution Take 3 mLs by nebulization every 6 hours as needed for Wheezing 120 each 3    potassium chloride (KLOR-CON M) 10 MEQ extended release tablet Take 10 mEq by mouth daily      levothyroxine (SYNTHROID) 75 MCG tablet Take 75 mcg by mouth Daily      Acetaminophen (TYLENOL PO) Take 650 mg by mouth every 6 hours as needed (PAIN OR FEVER) atorvastatin (LIPITOR) 40 MG tablet Take 40 mg by mouth daily      lactobacillus (CULTURELLE) capsule Take 1 capsule by mouth daily (with breakfast) 30 capsule     ferrous sulfate 325 (65 FE) MG tablet Take 1 tablet by mouth every 12 hours With a meal. 60 tablet 3     Allergies   Allergen Reactions    Codeine Itching    Moxifloxacin Other (See Comments)     Listed as allergy from [facility]    Oxycodone Other (See Comments)     Listed as allergy from [facility]     Pcn [Penicillins] Hives and Rash    Warfarin And Related Other (See Comments)     listed as allergy from [facility]       Nursing Notes Reviewed    Physical Exam:  ED Triage Vitals [05/11/22 2205]   Enc Vitals Group      BP (!) 107/51      Pulse 68      Resp 24      Temp 99.1 °F (37.3 °C)      Temp Source Oral      SpO2 96 %      Weight 210 lb (95.3 kg)      Height 5' 1\" (1.549 m)      Head Circumference       Peak Flow       Pain Score       Pain Loc       Pain Edu? Excl. in [address]? GENERAL APPEARANCE: Awake and alert. Cooperative. No acute distress. HEAD: Normocephalic. Atraumatic. EYES: EOM's grossly intact. Sclera anicteric. ENT: Mucous membranes are moist. Tolerates saliva. No trismus. NECK: Supple. Trachea midline. HEART: RRR. Radial pulses 2+. LUNGS: Respirations unlabored. CTAB  ABDOMEN: Soft. Non-tender. No guarding or rebound. RECTAL: Red blood on digital exam without obvious hernia or fissure. EXTREMITIES: No acute deformities. SKIN: Warm and dry. NEUROLOGICAL: No gross facial drooping. Moves all 4 extremities spontaneously. PSYCHIATRIC: Normal mood.     I have reviewed and interpreted all of the currently available lab results from this visit (if applicable):  Results for orders placed or performed during the hospital encounter of 05/11/22   CBC with Auto Differential   Result Value Ref Range    WBC 8.0 4.0 - 10.5 K/CU MM    RBC 2.94 (L) 4.2 - 5.4 M/CU MM    Hemoglobin 7.7 (L) 12.5 - 16.0 GM/DL    Hematocrit 28.0 (L) 37 - 47 %    MCV 95.2 78 - 100 FL    MCH 26.2 (L) 27 - 31 PG    MCHC 27.5 (L) 32.0 - 36.0 %    RDW 14.9 11.7 - 14.9 %    Platelets 239 649 - 599 K/CU MM    MPV 11.4 (H) 7.5 - 11.1 FL    Differential Type AUTOMATED DIFFERENTIAL     Segs Relative 73.1 (H) 36 - 66 %    Lymphocytes % 14.7 (L) 24 - 44 %    Monocytes % 7.4 (H) 0 - 4 %    Eosinophils % 4.4 (H) 0 - 3 %    Basophils % 0.1 0 - 1 %    Segs Absolute 5.8 K/CU MM    Lymphocytes Absolute 1.2 K/CU MM    Monocytes Absolute 0.6 K/CU MM    Eosinophils Absolute 0.4 K/CU MM    Basophils Absolute 0.0 K/CU MM    Nucleated RBC % 0.0 %    Total Nucleated RBC 0.0 K/CU MM    Total Immature Neutrophil 0.02 K/CU MM    Immature Neutrophil % 0.3 0 - 0.43 %   Basic Metabolic Panel   Result Value Ref Range    Sodium 136 135 - 145 MMOL/L    Potassium 4.1 3.5 - 5.1 MMOL/L    Chloride 100 99 - 110 mMol/L    CO2 30 21 - 32 MMOL/L    Anion Gap 6 4 - 16    BUN 22 6 - 23 MG/DL    CREATININE 0.9 0.6 - 1.1 MG/DL    Glucose 104 (H) 70 - 99 MG/DL    Calcium 7.9 (L) 8.3 - 10.6 MG/DL    GFR Non-African American >60 >60 mL/min/1.73m2    GFR African American >60 >60 mL/min/1.73m2   Lactic Acid   Result Value Ref Range    Lactate 0.8 0.4 - 2.0 mMOL/L   TYPE AND SCREEN   Result Value Ref Range    ABO/Rh B POSITIVE     Antibody Screen NEGATIVE       Radiographs (if obtained):  [] The following radiograph was interpreted by myself in the absence of a radiologist:  [x] Radiologist's Report Reviewed:    EKG (if obtained): (All EKG's are interpreted by myself in the absence of a cardiologist)  12 lead EKG as interpreted by me reveals normal sinus rhythm. Axis is normal. There are no ischemic ST elevations or other suspicious ST changes;  QRS interval is narrow, QT interval is not prolonged. Final interpretation: Normal sinus rhythm. MDM:  Plan of care is discussed thoroughly with the patient and family if present. If performed, all imaging and lab work also discussed with patient.   All relevant prior results and chart reviewed if available. Patient presents as above. She is in no acute distress. Vital signs within appropriate ranges at this time. She has benign abdominal exam.  She had a CT yesterday that was unremarkable. Recently diagnosed with possible UTI and is on Bactrim. It appears that she is having a GI bleed at this time. She has a history of internal hemorrhoids, diverticulosis and gastritis. Last time she was seen by GI was in November of last year. Patient is not anticoagulated. Hemoglobin is 7.7, down from 9.0 yesterday. Remainder of metabolic work-up is largely unremarkable. Patient is kept n.p.o. and started on IV fluids. Dr. Marlys Magallon consulted and agrees with care. Admitted for further management. Clinical Impression:  1. Gastrointestinal hemorrhage, unspecified gastrointestinal hemorrhage type    2.  Acute blood loss anemia      (Please note that portions of this note may have been completed with a voice recognition program. Efforts were made to edit the dictations but occasionally words are mis-transcribed.)    MD Manuela Cristobal MD  05/12/22 8727 Depressed

## 2022-05-13 ENCOUNTER — APPOINTMENT (OUTPATIENT)
Dept: NUCLEAR MEDICINE | Age: 82
DRG: 378 | End: 2022-05-13
Payer: MEDICARE

## 2022-05-13 LAB
DIFFERENTIAL TYPE: ABNORMAL
FERRITIN: 36 NG/ML (ref 15–150)
FOLATE: 7.6 NG/ML (ref 3.1–17.5)
HCT VFR BLD CALC: 24.4 % (ref 37–47)
HCT VFR BLD CALC: 24.6 % (ref 37–47)
HCT VFR BLD CALC: ABNORMAL % (ref 37–47)
HEMOGLOBIN: 6.7 GM/DL (ref 12.5–16)
HEMOGLOBIN: 7.3 GM/DL (ref 12.5–16)
HEMOGLOBIN: ABNORMAL GM/DL (ref 12.5–16)
IRON: 23 UG/DL (ref 37–145)
MCH RBC QN AUTO: 25.9 PG (ref 27–31)
MCH RBC QN AUTO: 27.1 PG (ref 27–31)
MCH RBC QN AUTO: ABNORMAL PG (ref 27–31)
MCHC RBC AUTO-ENTMCNC: 27.5 % (ref 32–36)
MCHC RBC AUTO-ENTMCNC: 29.7 % (ref 32–36)
MCHC RBC AUTO-ENTMCNC: ABNORMAL % (ref 32–36)
MCV RBC AUTO: 91.4 FL (ref 78–100)
MCV RBC AUTO: 94.2 FL (ref 78–100)
MCV RBC AUTO: ABNORMAL FL (ref 78–100)
PCT TRANSFERRIN: 13 % (ref 10–44)
PDW BLD-RTO: 15.3 % (ref 11.7–14.9)
PDW BLD-RTO: 15.4 % (ref 11.7–14.9)
PDW BLD-RTO: ABNORMAL % (ref 11.7–14.9)
PLATELET # BLD: 175 K/CU MM (ref 140–440)
PLATELET # BLD: 179 K/CU MM (ref 140–440)
PLATELET # BLD: ABNORMAL K/CU MM (ref 140–440)
PMV BLD AUTO: 11.3 FL (ref 7.5–11.1)
PMV BLD AUTO: 12 FL (ref 7.5–11.1)
PMV BLD AUTO: ABNORMAL FL (ref 7.5–11.1)
RBC # BLD: 2.59 M/CU MM (ref 4.2–5.4)
RBC # BLD: 2.69 M/CU MM (ref 4.2–5.4)
RBC # BLD: ABNORMAL M/CU MM (ref 4.2–5.4)
RETICULOCYTE COUNT PCT: 3.3 % (ref 0.2–2.2)
SEGMENTED NEUTROPHILS RELATIVE PERCENT: ABNORMAL % (ref 36–66)
TOTAL IRON BINDING CAPACITY: 183 UG/DL (ref 250–450)
UNSATURATED IRON BINDING CAPACITY: 160 UG/DL (ref 110–370)
VITAMIN B-12: 706.3 PG/ML (ref 211–911)
WBC # BLD: 6.5 K/CU MM (ref 4–10.5)
WBC # BLD: 9.2 K/CU MM (ref 4–10.5)
WBC # BLD: ABNORMAL K/CU MM (ref 4–10.5)

## 2022-05-13 PROCEDURE — 36415 COLL VENOUS BLD VENIPUNCTURE: CPT

## 2022-05-13 PROCEDURE — 2140000000 HC CCU INTERMEDIATE R&B

## 2022-05-13 PROCEDURE — 94761 N-INVAS EAR/PLS OXIMETRY MLT: CPT

## 2022-05-13 PROCEDURE — 85027 COMPLETE CBC AUTOMATED: CPT

## 2022-05-13 PROCEDURE — 3430000000 HC RX DIAGNOSTIC RADIOPHARMACEUTICAL: Performed by: NURSE PRACTITIONER

## 2022-05-13 PROCEDURE — 2580000003 HC RX 258: Performed by: INTERNAL MEDICINE

## 2022-05-13 PROCEDURE — 36430 TRANSFUSION BLD/BLD COMPNT: CPT

## 2022-05-13 PROCEDURE — 94640 AIRWAY INHALATION TREATMENT: CPT

## 2022-05-13 PROCEDURE — 6360000002 HC RX W HCPCS: Performed by: INTERNAL MEDICINE

## 2022-05-13 PROCEDURE — A4216 STERILE WATER/SALINE, 10 ML: HCPCS | Performed by: INTERNAL MEDICINE

## 2022-05-13 PROCEDURE — A9560 TC99M LABELED RBC: HCPCS | Performed by: NURSE PRACTITIONER

## 2022-05-13 PROCEDURE — 2700000000 HC OXYGEN THERAPY PER DAY

## 2022-05-13 PROCEDURE — C9113 INJ PANTOPRAZOLE SODIUM, VIA: HCPCS | Performed by: INTERNAL MEDICINE

## 2022-05-13 PROCEDURE — 78278 ACUTE GI BLOOD LOSS IMAGING: CPT

## 2022-05-13 PROCEDURE — 85018 HEMOGLOBIN: CPT

## 2022-05-13 PROCEDURE — 6370000000 HC RX 637 (ALT 250 FOR IP): Performed by: INTERNAL MEDICINE

## 2022-05-13 PROCEDURE — 2580000003 HC RX 258: Performed by: EMERGENCY MEDICINE

## 2022-05-13 PROCEDURE — P9016 RBC LEUKOCYTES REDUCED: HCPCS

## 2022-05-13 RX ORDER — SODIUM CHLORIDE 9 MG/ML
INJECTION, SOLUTION INTRAVENOUS PRN
Status: COMPLETED | OUTPATIENT
Start: 2022-05-13 | End: 2022-05-13

## 2022-05-13 RX ADMIN — Medication 325 MG: at 16:22

## 2022-05-13 RX ADMIN — SODIUM CHLORIDE: 9 INJECTION, SOLUTION INTRAVENOUS at 17:06

## 2022-05-13 RX ADMIN — SULFAMETHOXAZOLE AND TRIMETHOPRIM 1 TABLET: 800; 160 TABLET ORAL at 22:09

## 2022-05-13 RX ADMIN — SODIUM CHLORIDE, PRESERVATIVE FREE 10 ML: 5 INJECTION INTRAVENOUS at 08:58

## 2022-05-13 RX ADMIN — MULTIPLE VITAMINS W/ MINERALS TAB 1 TABLET: TAB at 08:57

## 2022-05-13 RX ADMIN — SODIUM CHLORIDE: 9 INJECTION, SOLUTION INTRAVENOUS at 12:34

## 2022-05-13 RX ADMIN — Medication 1 CAPSULE: at 08:58

## 2022-05-13 RX ADMIN — BUDESONIDE AND FORMOTEROL FUMARATE DIHYDRATE 2 PUFF: 160; 4.5 AEROSOL RESPIRATORY (INHALATION) at 19:28

## 2022-05-13 RX ADMIN — MIRTAZAPINE 15 MG: 15 TABLET, FILM COATED ORAL at 22:09

## 2022-05-13 RX ADMIN — LEVOTHYROXINE SODIUM 75 MCG: 0.07 TABLET ORAL at 05:09

## 2022-05-13 RX ADMIN — MIDODRINE HYDROCHLORIDE 5 MG: 5 TABLET ORAL at 16:22

## 2022-05-13 RX ADMIN — Medication 26.2 MILLICURIE: at 14:50

## 2022-05-13 RX ADMIN — SULFAMETHOXAZOLE AND TRIMETHOPRIM 1 TABLET: 800; 160 TABLET ORAL at 08:57

## 2022-05-13 RX ADMIN — ATORVASTATIN CALCIUM 40 MG: 40 TABLET, FILM COATED ORAL at 08:57

## 2022-05-13 RX ADMIN — MIDODRINE HYDROCHLORIDE 5 MG: 5 TABLET ORAL at 08:57

## 2022-05-13 RX ADMIN — SODIUM CHLORIDE, PRESERVATIVE FREE 40 MG: 5 INJECTION INTRAVENOUS at 12:43

## 2022-05-13 RX ADMIN — BUDESONIDE AND FORMOTEROL FUMARATE DIHYDRATE 2 PUFF: 160; 4.5 AEROSOL RESPIRATORY (INHALATION) at 12:21

## 2022-05-13 RX ADMIN — Medication 325 MG: at 08:57

## 2022-05-13 ASSESSMENT — PAIN SCALES - GENERAL: PAINLEVEL_OUTOF10: 0

## 2022-05-13 NOTE — PROGRESS NOTES
Physician Progress Note      PATIENT:               Dora Trinidad  CSN #:                  [id number]  :                       1940  ADMIT DATE:       2022 9:59 PM  DISCH DATE:  RESPONDING  PROVIDER #:        Robson Boyle          QUERY TEXT:    Pt admitted with Rectal bleeding. Noted documentation of diverticular bleed   on 22 consult note by ordered GI consultant. If possible, please   document in progress notes and discharge summary:    The medical record reflects the following:  Risk Factors: Recent colonoscopy severe pancolonic diverticulosis  Clinical Indicators: acute blood loss anemia, rectal bleeding Hemoglobin 7.4   down from 7.7 now 6.7, nursing staff noted large clots, reportedly having dark   red blood in her pads  Treatment: GI consult, PPI, Clear liquid diet, hold NSAIDS, serial H&H, anemia   panel, no endoscopy at this time    Thank you, Armando NORTHN,RN,[facility] ([phone])  Options provided:  -- diverticular bleed confirmed present on admission  -- diverticular bleed ruled out  -- Defer to GI consultant documentation regarding diverticular bleed  -- Other - I will add my own diagnosis  -- Disagree - Not applicable / Not valid  -- Disagree - Clinically unable to determine / Unknown  -- Refer to Clinical Documentation Reviewer    PROVIDER RESPONSE TEXT:    I defer to GI consultant regarding documentation of diverticular bleed.     Query created by: Bree Jacobs on 2022 7:32 AM      Electronically signed by:  Robson Boyle 2022 3:36 PM

## 2022-05-13 NOTE — PROGRESS NOTES
Devika Blancas MD    Hospitalist Progress Note      Name:  Lui Agosto /Age/Sex: 1940  ([de-identified] y.o. female)   MRN & CSN:  [id number] & [id number] Admission Date/Time: 2022  9:59 PM   Location:  [facility]/1494-L PCP: Destini Romero MD       I saw and examined the patient on 2022 at 9:17 AM.    Hospital Day: 3  Barriers to discharge: PRBC transfusion, GI bleed  Assessment and Plan:     [de-identified]years old female poor historian at baseline presented with bright red blood per rectum for few days and admitted for GI bleed. Blood pressure still running on the lower side 90/45 with map of 67, heart rate of 65. Hemoglobin 7.4 down from 7.7 since last night. Patient denies any active bleeding this morning. Noted GI recommendation regarding recent scope to start clear liquid diet and serial H&H monitoring. Drop in hemoglobin overnight with melanotic stool this morning     1. Rectal bleeding hemorrhoidal versus hematochezia. On PPI, GI following clear liquid diet. Patient had drop in hemoglobin overnight needing PRBC this morning still having melanotic stools. Noted plan for bleeding scan and general surgery consult by GI. Dynamically stable at this time     2. Acute blood loss anemia due to above-not anticoagulation prior to admission per records     3. UTI diagnosed yesterday at outside ER. On Bactrim     4. Chronic respiratory failure on 2 to 3 L at home. Compensated  5. Paroxysmal A. Fib. On metoprolol, not on anticoagulation prior to admission per records  6. Chronic hypotension on midodrine  7. COPD compensated  8. Chronic diastolic heart failure (on hold due to above  9. Obesity. BMI of 39. Lifestyle modifications consult     CODE STATUS DNR CCA  DVT prophylaxis SCD      Overnight patient remained afebrile heart rate in 60s blood pressure low this morning of 90 over 40s, reported darker stool at night and this morning. Hemoglobin 6.7. Ordered PRBC.   Labs significant for H&H of 6.7/24.4, WBC 6.5     Subjective:     Patient seen and examined at bedside. Overnight events noted, having melanotic stool last night and this morning. Afebrile overnight. Objective: Intake/Output Summary (Last 24 hours) at 5/13/2022 0917  Last data filed at 5/13/2022 0600  Gross per 24 hour   Intake --   Output 650 ml   Net -650 ml      Vitals:   Vitals:    05/13/22 0900   BP: (!) 95/47   Pulse: 65   Resp: 23   Temp: 98.6 °F (37 °C)   SpO2:        Ten point ROS reviewed negative, unless as noted above    Physical Exam:     GENERAL APPEARANCE: not in any acute distress,  appears comfortable. NECK: Supple, no JVD.   CARDIOVASCULAR: Regular rate and rhythm, no murmurs, rubs or gallops  LUNGS: clear to auscultation bilaterally   ABDOMEN: normoactive bowel sounds, soft non-tender and non distended  EXTREMITIES: No edema  MUSCULOSKELETAL S: normal movement and normal bulk in all ext  NEUROLOGIC: Alert andawake- grossly non focal exam, moving all extremities   SKIN: No Rashes       Electronically signed by Angely Whitley MD on 5/13/2022 at 9:17 AM     Medications:   Medications:    atorvastatin  40 mg Oral Daily    ferrous sulfate  325 mg Oral BID WC    budesonide-formoterol  2 puff Inhalation BID    [Held by provider] furosemide  40 mg Oral Daily    lactobacillus  1 capsule Oral Daily with breakfast    levothyroxine  75 mcg Oral Daily    midodrine  5 mg Oral TID WC    mirtazapine  15 mg Oral Nightly    therapeutic multivitamin-minerals  1 tablet Oral Daily    sodium chloride flush  5-40 mL IntraVENous 2 times per day    pantoprazole (PROTONIX) 40 mg injection  40 mg IntraVENous Daily    sulfamethoxazole-trimethoprim  1 tablet Oral 2 times per day    iron sucrose  300 mg IntraVENous Q24H      Infusions:    sodium chloride      sodium chloride      sodium chloride 100 mL/hr at 05/12/22 1346     PRN Meds: sodium chloride, , PRN  albuterol, 2.5 mg, Q6H PRN  sodium chloride flush, 5-40 mL, PRN  sodium chloride, , PRN  ondansetron, 4 mg, Q8H PRN   Or  ondansetron, 4 mg, Q6H PRN  acetaminophen, 650 mg, Q6H PRN   Or  acetaminophen, 650 mg, Q6H PRN

## 2022-05-13 NOTE — PROGRESS NOTES
[facility]        Progress Note       2022  12:31 PM    Patient:    Allison Mir  : 1940   [de-identified] y.o. MRN: [id number]  Admitted: 2022  9:59 PM ATT: Maureen Jacinto MD   3116/3116-A  AdmitDx: Acute blood loss anemia [D62]  GIB (gastrointestinal bleeding) [K92.2]  Gastrointestinal hemorrhage, unspecified gastrointestinal hemorrhage type [K92.2]  PCP: Romana Kenney MD    SUBJECTIVE:  Chart reviewed, events noted  Patient feeling ok. Hgb down to 6.1 this morning, Bloody BM this morning. Tolerating clear liquid diet. No N/V or abdominal pain.     ROS:  The positive ROS will be identified in bold     CONSTITUTIONAL:  Neg  Weight loss, fatigue, fever  MOUTH/THROAT:  Neg  Bleeding gums, hoarseness or sore throat  RESPIRATORY:   Neg SOB, wheeze, cough, hemoptysis or bronchitis  CARDIOVASCULAR:  Neg Chest pain, palpitations, dyspnea on exertion, edema  GASTROINTESTINAL:  SEE HPI  HEMATOLOGIC/LYMPHATIC:  Neg  Anemia, bleeding tendency  MUSCULOSKELETAL: Neg  New myalgias, joint pain, swelling or stiffness  NEUROLOGICAL:  Neg  Loss of Consciousness, memory loss, forgetfulness, periods of confusion, difficulty concentrating, seizures, insomnia, aphasia    SKIN:  Neg No itching, rashes, or sores  PSYCHIATRIC:  Neg Depression, personality changes, anxiety    OBJECTIVE:      BP (!) 98/42   Pulse 65   Temp 98.6 °F (37 °C) (Oral)   Resp 21   Ht 5' 1\" (1.549 m)   Wt 199 lb 1.2 oz (90.3 kg)   SpO2 95%   BMI 37.62 kg/m²     NAD, appears comfortable, lying in bed  Lips and mucous membranes pink and moist  RRR, Nl s1s2, no murmurs, no JVD  Lungs CTA bilaterally, respirations even and unlabored   Abdomen soft, right side tenderness on palpation, no HSM, bowel sounds normal  Skin pink, warm, dry and CR brisk   No edema bilateral lower extremities   AAOx3      CBC: Recent Labs     22  1325 22  2119 22  0806   WBC 7.4 7.4 6.5   HGB 7.6* 6.7* 6.7*    168 179 BMP:    Recent Labs     05/11/22  2209 05/12/22  0839    142   K 4.1 4.3    105   CO2 30 32   BUN 22 20   CREATININE 0.9 0.8   GLUCOSE 104* 92     Magnesium:   Lab Results   Component Value Date    MG 2.1 09/13/2021     Hepatic: No results for input(s): AST, ALT, ALB, BILITOT, ALKPHOS in the last 72 hours.   INR:   Recent Labs     05/12/22  0839   INR 0.96         Intake/Output Summary (Last 24 hours) at 5/13/2022 1231  Last data filed at 5/13/2022 0600  Gross per 24 hour   Intake --   Output 650 ml   Net -650 ml         Medications    Scheduled Medications:    atorvastatin  40 mg Oral Daily    ferrous sulfate  325 mg Oral BID WC    budesonide-formoterol  2 puff Inhalation BID    [Held by provider] furosemide  40 mg Oral Daily    lactobacillus  1 capsule Oral Daily with breakfast    levothyroxine  75 mcg Oral Daily    midodrine  5 mg Oral TID WC    mirtazapine  15 mg Oral Nightly    therapeutic multivitamin-minerals  1 tablet Oral Daily    sodium chloride flush  5-40 mL IntraVENous 2 times per day    pantoprazole (PROTONIX) 40 mg injection  40 mg IntraVENous Daily    sulfamethoxazole-trimethoprim  1 tablet Oral 2 times per day    iron sucrose  300 mg IntraVENous Q24H     PRN Medications: sodium chloride, albuterol, sodium chloride flush, sodium chloride, ondansetron **OR** ondansetron, acetaminophen **OR** acetaminophen  Infusions:    sodium chloride      sodium chloride      sodium chloride 100 mL/hr at 05/12/22 1346         Patient Active Problem List   Diagnosis Code    Coronary atherosclerosis of native coronary artery I25.10    Anemia D64.9    Chronic hypoxemic respiratory failure ([facility]) J96.11    Bilateral edema of lower extremity R60.0    Moderate COPD (chronic obstructive pulmonary disease) ([facility]) J44.9    COPD exacerbation ([facility]) J44.1    Hypoxia R09.02    COPD with acute exacerbation ([facility] [address] J44.1    Morbid obesity due to excess calories ([facility]) E66.01    Acute on chronic diastolic congestive heart failure (HCC) I50.33    Depression F32. A    Hypertension I10    Pneumonia J18.9    Chronic diastolic CHF (congestive heart failure) (HCC) I50.32    GIB (gastrointestinal bleeding) K92.2    Other chest pain R07.89    GI bleed K92.2       ASSESSMENT:  GIB   LAST CT 11/13/21  No acute intra-abdominal abnormality to explain patient's symptoms. Specifically, no obvious rectal mass on noncontrast imaging. 2. Stable left base opacity with improving right base opacity since September 30, 2021.   3. Severe diverticulosis. 4. Severe atherosclerosis. 5. IVC filter in place.      hgb 6.1, receiving 1 unit prbc  Acute on chronic anemia  Recent colonoscopy 11/2021 severe pancolonic diverticulosis, ct with severe diverticulosis   Appears to be diverticular bleed, may have nsaid enteropathy, could have some hemorrhoidal bleeding also    Iron 23, tibc 183     RECOMMENDATIONS:    Stop all NSAIDS at [facility]  Serial H&H   PPI daily   Clear liquid diet   No endoscopic procedures at this time, last colonoscopy 6 months ago, no bleeding   Venofer 300 mg daily x 3   Stat RBC bleeding scan   Consult general surgery for possible colectomy    Discussed plan of care with patient and RN    Patient clinical, biochemical, and radiological information discussed with Dr. Ubaldo Wallace. He agrees with the assessment and plan. Emy Angel, APRN - CNP, CNP  5/13/2022  12:31 PM     Recurrent GI bleed  Severe pancolonic diverticulosis  1PM this morning bloody  Otherwise abdomen soft  Agree with transfusion to keep hemoglobin more than 7  No plan for GI procedures  Advance diet as tolerated    I have seen and examined this patient personally, and independently of the nurse practitioner. The plan was developed mutually at the time of the visit with the patient. Adrienne Cunningham and myself have spoken with patient, nursing staff and provided written and verbal instructions .     The above note has been reviewed and I agree with the Assessment,  Diagnosis, and Treatment plan as suggested by Twanda Cockayne CNP      [address] [facility]ology

## 2022-05-14 LAB
ABO/RH: NORMAL
ANTIBODY SCREEN: NEGATIVE
COMPONENT: NORMAL
CROSSMATCH RESULT: NORMAL
HCT VFR BLD CALC: 24.8 % (ref 37–47)
HCT VFR BLD CALC: 25.6 % (ref 37–47)
HCT VFR BLD CALC: 26.6 % (ref 37–47)
HCT VFR BLD CALC: 31 % (ref 37–47)
HEMOGLOBIN: 7.4 GM/DL (ref 12.5–16)
HEMOGLOBIN: 7.4 GM/DL (ref 12.5–16)
HEMOGLOBIN: 7.6 GM/DL (ref 12.5–16)
HEMOGLOBIN: 8.7 GM/DL (ref 12.5–16)
MCH RBC QN AUTO: 26.6 PG (ref 27–31)
MCH RBC QN AUTO: 27 PG (ref 27–31)
MCHC RBC AUTO-ENTMCNC: 28.6 % (ref 32–36)
MCHC RBC AUTO-ENTMCNC: 29.8 % (ref 32–36)
MCV RBC AUTO: 90.5 FL (ref 78–100)
MCV RBC AUTO: 93 FL (ref 78–100)
PDW BLD-RTO: 15.7 % (ref 11.7–14.9)
PDW BLD-RTO: 15.7 % (ref 11.7–14.9)
PLATELET # BLD: 193 K/CU MM (ref 140–440)
PLATELET # BLD: 199 K/CU MM (ref 140–440)
PMV BLD AUTO: 11.4 FL (ref 7.5–11.1)
PMV BLD AUTO: 11.7 FL (ref 7.5–11.1)
RBC # BLD: 2.74 M/CU MM (ref 4.2–5.4)
RBC # BLD: 2.86 M/CU MM (ref 4.2–5.4)
STATUS: NORMAL
TRANSFUSION STATUS: NORMAL
UNIT DIVISION: 0
UNIT NUMBER: NORMAL
WBC # BLD: 10.4 K/CU MM (ref 4–10.5)
WBC # BLD: 7.8 K/CU MM (ref 4–10.5)

## 2022-05-14 PROCEDURE — 85014 HEMATOCRIT: CPT

## 2022-05-14 PROCEDURE — 2580000003 HC RX 258: Performed by: INTERNAL MEDICINE

## 2022-05-14 PROCEDURE — 2580000003 HC RX 258: Performed by: NURSE PRACTITIONER

## 2022-05-14 PROCEDURE — C9113 INJ PANTOPRAZOLE SODIUM, VIA: HCPCS | Performed by: INTERNAL MEDICINE

## 2022-05-14 PROCEDURE — 6360000002 HC RX W HCPCS: Performed by: INTERNAL MEDICINE

## 2022-05-14 PROCEDURE — 36415 COLL VENOUS BLD VENIPUNCTURE: CPT

## 2022-05-14 PROCEDURE — 6370000000 HC RX 637 (ALT 250 FOR IP): Performed by: INTERNAL MEDICINE

## 2022-05-14 PROCEDURE — 6360000002 HC RX W HCPCS: Performed by: NURSE PRACTITIONER

## 2022-05-14 PROCEDURE — 85027 COMPLETE CBC AUTOMATED: CPT

## 2022-05-14 PROCEDURE — 2580000003 HC RX 258: Performed by: EMERGENCY MEDICINE

## 2022-05-14 PROCEDURE — 2700000000 HC OXYGEN THERAPY PER DAY

## 2022-05-14 PROCEDURE — 94640 AIRWAY INHALATION TREATMENT: CPT

## 2022-05-14 PROCEDURE — 2140000000 HC CCU INTERMEDIATE R&B

## 2022-05-14 PROCEDURE — 99221 1ST HOSP IP/OBS SF/LOW 40: CPT | Performed by: SURGERY

## 2022-05-14 PROCEDURE — 94761 N-INVAS EAR/PLS OXIMETRY MLT: CPT

## 2022-05-14 PROCEDURE — 85018 HEMOGLOBIN: CPT

## 2022-05-14 RX ORDER — NITROFURANTOIN 25; 75 MG/1; MG/1
100 CAPSULE ORAL EVERY 12 HOURS SCHEDULED
Status: DISCONTINUED | OUTPATIENT
Start: 2022-05-14 | End: 2022-05-15 | Stop reason: HOSPADM

## 2022-05-14 RX ORDER — LIDOCAINE 4 G/G
1 PATCH TOPICAL DAILY
Status: DISCONTINUED | OUTPATIENT
Start: 2022-05-14 | End: 2022-05-15 | Stop reason: HOSPADM

## 2022-05-14 RX ADMIN — SODIUM CHLORIDE, PRESERVATIVE FREE 10 ML: 5 INJECTION INTRAVENOUS at 20:39

## 2022-05-14 RX ADMIN — BUDESONIDE AND FORMOTEROL FUMARATE DIHYDRATE 2 PUFF: 160; 4.5 AEROSOL RESPIRATORY (INHALATION) at 19:55

## 2022-05-14 RX ADMIN — SODIUM CHLORIDE, PRESERVATIVE FREE 40 MG: 5 INJECTION INTRAVENOUS at 08:56

## 2022-05-14 RX ADMIN — MIDODRINE HYDROCHLORIDE 5 MG: 5 TABLET ORAL at 11:58

## 2022-05-14 RX ADMIN — LEVOTHYROXINE SODIUM 75 MCG: 0.07 TABLET ORAL at 06:38

## 2022-05-14 RX ADMIN — Medication 325 MG: at 17:02

## 2022-05-14 RX ADMIN — Medication 1 CAPSULE: at 08:40

## 2022-05-14 RX ADMIN — SODIUM CHLORIDE, PRESERVATIVE FREE 10 ML: 5 INJECTION INTRAVENOUS at 08:40

## 2022-05-14 RX ADMIN — MULTIPLE VITAMINS W/ MINERALS TAB 1 TABLET: TAB at 08:40

## 2022-05-14 RX ADMIN — Medication 325 MG: at 08:40

## 2022-05-14 RX ADMIN — NITROFURANTOIN (MONOHYDRATE/MACROCRYSTALS) 100 MG: 75; 25 CAPSULE ORAL at 20:39

## 2022-05-14 RX ADMIN — ATORVASTATIN CALCIUM 40 MG: 40 TABLET, FILM COATED ORAL at 08:40

## 2022-05-14 RX ADMIN — MIRTAZAPINE 15 MG: 15 TABLET, FILM COATED ORAL at 20:38

## 2022-05-14 RX ADMIN — MIDODRINE HYDROCHLORIDE 5 MG: 5 TABLET ORAL at 08:40

## 2022-05-14 RX ADMIN — SULFAMETHOXAZOLE AND TRIMETHOPRIM 1 TABLET: 800; 160 TABLET ORAL at 08:30

## 2022-05-14 RX ADMIN — BUDESONIDE AND FORMOTEROL FUMARATE DIHYDRATE 2 PUFF: 160; 4.5 AEROSOL RESPIRATORY (INHALATION) at 07:25

## 2022-05-14 RX ADMIN — IRON SUCROSE 300 MG: 20 INJECTION, SOLUTION INTRAVENOUS at 11:58

## 2022-05-14 RX ADMIN — MIDODRINE HYDROCHLORIDE 5 MG: 5 TABLET ORAL at 17:02

## 2022-05-14 RX ADMIN — SODIUM CHLORIDE: 9 INJECTION, SOLUTION INTRAVENOUS at 08:27

## 2022-05-14 ASSESSMENT — ENCOUNTER SYMPTOMS
BACK PAIN: 0
TROUBLE SWALLOWING: 0
COLOR CHANGE: 0
COUGH: 0
STRIDOR: 0
ABDOMINAL PAIN: 0
SHORTNESS OF BREATH: 0
BLOOD IN STOOL: 1
VOMITING: 0
CHOKING: 0
ABDOMINAL DISTENTION: 0
NAUSEA: 0
SORE THROAT: 0

## 2022-05-14 ASSESSMENT — PAIN DESCRIPTION - LOCATION: LOCATION: LEG

## 2022-05-14 ASSESSMENT — PAIN DESCRIPTION - PAIN TYPE: TYPE: CHRONIC PAIN

## 2022-05-14 ASSESSMENT — PAIN DESCRIPTION - ORIENTATION: ORIENTATION: RIGHT

## 2022-05-14 ASSESSMENT — PAIN DESCRIPTION - DESCRIPTORS: DESCRIPTORS: ACHING

## 2022-05-14 ASSESSMENT — PAIN SCALES - GENERAL
PAINLEVEL_OUTOF10: 9
PAINLEVEL_OUTOF10: 4

## 2022-05-14 NOTE — PLAN OF CARE
Problem: Discharge Planning  Goal: Discharge to home or other facility with appropriate resources  5/14/2022 1036 by Seble Dumont LPN  Outcome: Progressing  5/14/2022 1004 by Seble Dumont LPN  Outcome: Progressing  Flowsheets (Taken 5/14/2022 0745 by Vladimir Ruano RN)  Discharge to home or other facility with appropriate resources:   Identify barriers to discharge with patient and caregiver   Identify discharge learning needs (meds, wound care, etc)   Refer to discharge planning if patient needs post-hospital services based on physician order or complex needs related to functional status, cognitive ability or social support system   Arrange for needed discharge resources and transportation as appropriate   Arrange for interpreters to assist at discharge as needed     Problem: Safety - Adult  Goal: Free from fall injury  5/14/2022 1036 by Seble Dumont LPN  Outcome: Progressing  5/14/2022 1004 by Seble Dumont LPN  Outcome: Progressing

## 2022-05-14 NOTE — PROGRESS NOTES
[address] [facility] - [facility]       Progress Note       2022  10:05 AM    Patient:    Deborah Jovel  : 1940   [de-identified] y.o. MRN: [id number]  Admitted: 2022  9:59 PM ATT: Kenneth Najjar, MD   [id number]  AdmitDx: Acute blood loss anemia [D62]  GIB (gastrointestinal bleeding) [K92.2]  Gastrointestinal hemorrhage, unspecified gastrointestinal hemorrhage type [K92.2]  PCP: Cristina Thurman MD    ASSESSMENT AND PLAN :  Rectal bleed  Appears to have resolved  Most probably diverticular bleed  Advanced diet  No plan for GI work-up  Will sign off call if needed    Patient Active Problem List   Diagnosis Code    Coronary atherosclerosis of native coronary artery I25.10    Anemia D64.9    Chronic hypoxemic respiratory failure (HCC) J96.11    Bilateral edema of lower extremity R60.0    Moderate COPD (chronic obstructive pulmonary disease) ([facility]) J44.9    COPD exacerbation ([address] J44.1    Hypoxia R09.02    COPD with acute exacerbation ([address] J44.1    Morbid obesity due to excess calories ([address] E66.01    Acute on chronic diastolic congestive heart failure ([address] I50.33    Depression F32. A    Hypertension I10    Pneumonia J18.9    Chronic diastolic CHF (congestive heart failure) ([facility]) I50.32    GIB (gastrointestinal bleeding) K92.2    Other chest pain R07.89    GI bleed K92.2       Dr Marc Virgen MD  2022  10:05 AM      SUBJECTIVE:  Chart reviewed, events noted  Patient drowsy  No abdominal pain  No BMs in the last 24 hours    ROS:  Cardiovascular ROS: No chest pain  Gastrointestinal ROS: see above  Respiratory ROS: No SOB    OBJECTIVE:      BP (!) 106/51   Pulse 64   Temp 98.7 °F (37.1 °C) (Oral)   Resp 20   Ht 5' 1\" (1.549 m)   Wt 199 lb 1.2 oz (90.3 kg)   SpO2 97%   BMI 37.62 kg/m²     NAD  RRR, Nl s1s2  Lungs CTA Bilaterally, normal effort  Abdomen soft,   CBC:   Recent Labs     22  2119 22  9879 05/13/22  2143   WBC 7.4 6.5 9.2   HGB 6.7* 6.7* 7.3*    179 175     BMP:    Recent Labs     05/11/22  2209 05/12/22  0839    142   K 4.1 4.3    105   CO2 30 32   BUN 22 20   CREATININE 0.9 0.8   GLUCOSE 104* 92     Magnesium:   Lab Results   Component Value Date    MG 2.1 09/13/2021     Hepatic: No results for input(s): AST, ALT, ALB, BILITOT, ALKPHOS in the last 72 hours. INR:   Recent Labs     05/12/22  0839   INR 0.96         Intake/Output Summary (Last 24 hours) at 5/14/2022 1005  Last data filed at 5/14/2022 0954  Gross per 24 hour   Intake --   Output 1475 ml   Net -1475 ml         Medications      Prior to Admission medications    Medication Sig Start Date End Date Taking?  Authorizing Provider   midodrine (PROAMATINE) 5 MG tablet Take 1 tablet by mouth 3 times daily (with meals) 11/22/21   Dominic Nathan MD   OXYGEN Inhale 5 L into the lungs continuous 10/4/21   Stephany Bamberger, MD   furosemide (LASIX) 40 MG tablet Take 1 tablet by mouth daily 10/4/21   Stephany Bamberger, MD   mirtazapine (REMERON) 15 MG tablet Take 1 tablet by mouth nightly 11/30/20   Honore Najjar, MD   pantoprazole (PROTONIX) 40 MG tablet Take 1 tablet by mouth 2 times daily (before meals) 11/30/20 12/30/20  Honore Najjar, MD   Multiple Vitamin (MULTIVITAMIN) TABS tablet Take 1 tablet by mouth daily 12/1/20   Honore Najjar, MD   fluticasone-vilanterol (BREO ELLIPTA) 100-25 MCG/INH AEPB inhaler Inhale 1 puff into the lungs daily    Historical Provider, MD   linaclotide (LINZESS) 145 MCG capsule Take 145 mcg by mouth every morning (before breakfast)    Historical Provider, MD   calcium citrate-vitamin D (CITRICAL + D) 315-250 MG-UNIT TABS per tablet Take 1 tablet by mouth 2 times daily (with meals)    Historical Provider, MD   albuterol sulfate  (90 Base) MCG/ACT inhaler Inhale 2 puffs into the lungs every 4 hours as needed for Wheezing    Historical Provider, MD   ondansetron (ZOFRAN ODT) 4 MG disintegrating tablet Take 1 tablet by mouth every 8 hours as needed for Nausea 1/12/17   Nelida Natarajan DO   albuterol (PROVENTIL) (2.5 MG/3ML) 0.083% nebulizer solution Take 3 mLs by nebulization every 6 hours as needed for Wheezing 1/12/17   Nelida Natarajan DO   potassium chloride (KLOR-CON M) 10 MEQ extended release tablet Take 10 mEq by mouth daily    Historical Provider, MD   levothyroxine (SYNTHROID) 75 MCG tablet Take 75 mcg by mouth Daily    Historical Provider, MD   Acetaminophen (TYLENOL PO) Take 650 mg by mouth every 6 hours as needed (PAIN OR FEVER)     Historical Provider, MD   atorvastatin (LIPITOR) 40 MG tablet Take 40 mg by mouth daily    Historical Provider, MD   lactobacillus (CULTURELLE) capsule Take 1 capsule by mouth daily (with breakfast) 6/26/16   Biju Rain DO   ferrous sulfate 325 (65 FE) MG tablet Take 1 tablet by mouth every 12 hours With a meal. 9/11/15   Melody Cook MD      Scheduled Medications:    atorvastatin  40 mg Oral Daily    ferrous sulfate  325 mg Oral BID WC    budesonide-formoterol  2 puff Inhalation BID    [Held by provider] furosemide  40 mg Oral Daily    lactobacillus  1 capsule Oral Daily with breakfast    levothyroxine  75 mcg Oral Daily    midodrine  5 mg Oral TID WC    mirtazapine  15 mg Oral Nightly    therapeutic multivitamin-minerals  1 tablet Oral Daily    sodium chloride flush  5-40 mL IntraVENous 2 times per day    pantoprazole (PROTONIX) 40 mg injection  40 mg IntraVENous Daily    sulfamethoxazole-trimethoprim  1 tablet Oral 2 times per day    iron sucrose  300 mg IntraVENous Q24H     Infusions:    sodium chloride      sodium chloride 100 mL/hr at 05/14/22 0827     PRN Medications: albuterol, sodium chloride flush, sodium chloride, ondansetron **OR** ondansetron, acetaminophen **OR** acetaminophen  Allergies:    Allergies   Allergen Reactions    Codeine Itching    Moxifloxacin Other (See Comments)     Listed as allergy from [facility]    Oxycodone Other (See Comments)     Listed as allergy from f     Pcn [Penicillins] Hives and Rash    Warfarin And Related Other (See Comments)     listed as allergy from [facility]

## 2022-05-14 NOTE — PROGRESS NOTES
[place] [facility] called and said patient's urinaylsis came back and they suggested switching from bactrim to macrobid. Hospitalist notified.

## 2022-05-14 NOTE — CONSENT
Informed Consent for Blood Component Transfusion Note    I have discussed with the patient the rationale for blood component transfusion; its benefits in treating or preventing fatigue, organ damage, or death; and its risk which includes mild transfusion reactions, rare risk of blood borne infection, or more serious but rare reactions. I have discussed the alternatives to transfusion, including the risk and consequences of not receiving transfusion. The patient had an opportunity to ask questions and had agreed to proceed with transfusion of blood components.     Electronically signed by Sherryle Champ, MD on 5/14/22 at 11:02 AM EDT

## 2022-05-14 NOTE — CONSULTS
Department of GeneralSurgery   Surgical Service Dr. Jonh Estrada   Consult Note    Date of Consult: 5/14/22    Reason for Consult:  gib    CHIEF COMPLAINT:  big    History Obtained From:  patient, electronic medical record    HISTORY OF PRESENT ILLNESS:                The patient is a [de-identified] y.o. female who presents with blood per rectum. Has had a history of GI bleed in past.  She had a EGD/colonoscopy 11/18/2021. Findings on EGD showed mild gastritis without evidence of bleed. Colonoscopy showed severe pancolonic diverticulosis and grade 2 internal hemorrhoids. Hemoglobin from 7.7-6.7 and patient received 1 unit PRBCs. Bleeding seems to have stopped. She is being advancing diet today. Hemodynamically stable.       Past Medical History:    Past Medical History:   Diagnosis Date    ASHD (arteriosclerotic heart disease)     Bilateral edema of lower extremity 12/16/2015    CHF (congestive heart failure) (HCC)     COPD (chronic obstructive pulmonary disease) (HCC)     Depression     Hypertension     MRSA (methicillin resistant staph aureus) culture positive 09/09/2021    Nasal    MRSA (methicillin resistant staph aureus) culture positive 09/09/2021    NASAL       Past Surgical History:    Past Surgical History:   Procedure Laterality Date    ANUS SURGERY      fistula repair    APPENDECTOMY      COLONOSCOPY N/A 11/18/2021    COLONOSCOPY WITH BIOPSY performed by Frank Beck MD at [place] [address] ENDOSCOPY N/A 11/25/2020    EGD BIOPSY performed by Frank Beck MD at [facility] N/A 11/18/2021    EGD DIAGNOSTIC ONLY performed by Frank Beck MD at [facility] ENDOSCOPY       Current Medications:   Current Facility-Administered Medications   Medication Dose Route Frequency Provider Last Rate Last Admin    nitrofurantoin (macrocrystal-monohydrate) (MACROBID) capsule 100 mg  100 mg Oral 2 times per day Angely Whitley MD  albuterol (PROVENTIL) nebulizer solution 2.5 mg  2.5 mg Nebulization Q6H PRN Ron Seble Elliott MD        atorvastatin (LIPITOR) tablet 40 mg  40 mg Oral Daily Ron Seble Ellitot MD   40 mg at 05/14/22 0840    ferrous sulfate (IRON 325) tablet 325 mg  325 mg Oral BID  Ron Seble Elliott MD   325 mg at 05/14/22 0840    budesonide-formoterol (SYMBICORT) 160-4.5 MCG/ACT inhaler 2 puff  2 puff Inhalation BID James LOMAX MD   2 puff at 05/14/22 0725    [Held by provider] furosemide (LASIX) tablet 40 mg  40 mg Oral Daily Ron Seble Elliott MD        lactobacillus (CULTURELLE) capsule 1 capsule  1 capsule Oral Daily with breakfast Fabien Merida MD   1 capsule at 05/14/22 0840    levothyroxine (SYNTHROID) tablet 75 mcg  75 mcg Oral Daily James LOMAX MD   75 mcg at 05/14/22 0638    midodrine (PROAMATINE) tablet 5 mg  5 mg Oral TID  Ron Seble Elliott MD   5 mg at 05/14/22 1158    mirtazapine (REMERON) tablet 15 mg  15 mg Oral Nightly James LOMAX MD   15 mg at 05/13/22 2209    therapeutic multivitamin-minerals 1 tablet  1 tablet Oral Daily Fabien Merida MD   1 tablet at 05/14/22 0840    sodium chloride flush 0.9 % injection 5-40 mL  5-40 mL IntraVENous 2 times per day Fabien Merida MD   10 mL at 05/14/22 0840    sodium chloride flush 0.9 % injection 5-40 mL  5-40 mL IntraVENous PRN Ron Seble Elliott MD        0.9 % sodium chloride infusion   IntraVENous PRN Ron Seble Elliott MD        ondansetron (ZOFRAN-ODT) disintegrating tablet 4 mg  4 mg Oral Q8H PRN Ron Seble Elliott MD        Or    ondansetron (ZOFRAN) injection 4 mg  4 mg IntraVENous Q6H PRN Ron Seble Elliott MD        acetaminophen (TYLENOL) tablet 650 mg  650 mg Oral Q6H PRN Ron Seble Elliott MD   650 mg at 05/12/22 2304    Or    acetaminophen (TYLENOL) suppository 650 mg  650 mg Rectal Q6H PRN Ron Elliott MD        pantoprazole (PROTONIX) 40 mg in sodium chloride (PF) 10 mL injection  40 mg IntraVENous Daily Ron Elliott MD   40 mg at 22 0856    iron sucrose (VENOFER) 300 mg in sodium chloride 0.9 % 250 mL IVPB  300 mg IntraVENous Q24H [address] - CNP   Stopped at 22 1328    0.9 % sodium chloride infusion   IntraVENous Continuous Frances Umanzor  mL/hr at 22 0827 New Bag at 22 0827       Allergies:  Codeine, Moxifloxacin, Oxycodone, Pcn [penicillins], and Warfarin and related    Social History:   Social History     Socioeconomic History    Marital status:      Spouse name: Yin Martinez Number of children: 3    Years of education: None    Highest education level: None   Occupational History    None   Tobacco Use    Smoking status: Former Smoker     Packs/day: 1.00     Types: Cigarettes     Quit date: 2010     Years since quittin.7    Smokeless tobacco: Never Used   Substance and Sexual Activity    Alcohol use: Yes     Alcohol/week: 0.0 standard drinks     Comment: wine twice a year    Drug use: No    Sexual activity: Yes     Partners: Male   Other Topics Concern    None   Social History Narrative    None     Social Determinants of Health     Financial Resource Strain:     Difficulty of Paying Living Expenses: Not on file   Food Insecurity:     Worried About Running Out of Food in the Last Year: Not on file    Cuate of Food in the Last Year: Not on file   Transportation Needs:     Lack of Transportation (Medical): Not on file    Lack of Transportation (Non-Medical):  Not on file   Physical Activity:     Days of Exercise per Week: Not on file    Minutes of Exercise per Session: Not on file   Stress:     Feeling of Stress : Not on file   Social Connections:     Frequency of Communication with Friends and Family: Not on file    Frequency of Social Gatherings with Friends and Family: Not on file    Attends Anabaptist Services: Not on file    Active Member of Clubs or Organizations: Not on file    Attends Club or Organization Meetings: Not on file    Marital Status: Not on file Intimate Partner Violence:     Fear of Current or Ex-Partner: Not on file    Emotionally Abused: Not on file    Physically Abused: Not on file    Sexually Abused: Not on file   Housing Stability:     Unable to Pay for Housing in the Last Year: Not on file    Number of Places Lived in the Last Year: Not on file    Unstable Housing in the Last Year: Not on file       Family History:   Family History   Problem Relation Age of Onset    High Blood Pressure Mother     Heart Disease Mother     Early Death Father     Substance Abuse Father     Diabetes Sister        REVIEW OFSYSTEMS:    Review of Systems   Constitutional: Negative for chills, fatigue, fever and unexpected weight change. HENT: Negative for sore throat and trouble swallowing. Respiratory: Negative for cough, choking, shortness of breath and stridor. Cardiovascular: Negative for chest pain, palpitations and leg swelling. Gastrointestinal: Positive for blood in stool. Negative for abdominal distention, abdominal pain, nausea and vomiting. Musculoskeletal: Negative for back pain, gait problem and joint swelling. Skin: Negative for color change, rash and wound. Allergic/Immunologic: Negative for immunocompromised state. Neurological: Negative for dizziness, speech difficulty, weakness and light-headedness. Hematological: Negative for adenopathy. Does not bruise/bleed easily. Psychiatric/Behavioral: Negative for agitation and confusion. The patient is not nervous/anxious.           PHYSICAL EXAM:  Vitals:    05/14/22 0729 05/14/22 0830 05/14/22 1127 05/14/22 1617   BP:  (!) 106/51 110/60 (!) 100/59   Pulse:  64 61 62   Resp: 15 20 18 18   Temp:  98.7 °F (37.1 °C) 97.8 °F (36.6 °C) 98.8 °F (37.1 °C)   TempSrc:  Oral Oral Oral   SpO2: 97%  97% 94%   Weight:       Height:           Physical Exam  General: No acute distress  Neck: No JVD, supple  Lungs: Chest rise equal bilaterally  Cardiac: Appears well perfused   Abdomen: Soft, nontender, nondistended, no rebound or guarding  Extremities, no edema, cyanosis, or clubbing  Skin: No rashes or breakdown  Neurologic: cranial nerves II - XII grossly intact    DATA:    CBC:   Lab Results   Component Value Date    WBC 7.8 05/14/2022    RBC 2.86 05/14/2022    HGB 8.7 05/14/2022    HCT 31.0 05/14/2022    MCV 93.0 05/14/2022    MCH 26.6 05/14/2022    MCHC 28.6 05/14/2022    RDW 15.7 05/14/2022     05/14/2022    MPV 11.7 05/14/2022       IMPRESSION:        Patient Active Problem List:     Coronary atherosclerosis of native coronary artery     Anemia     Chronic hypoxemic respiratory failure (HCC)     Bilateral edema of lower extremity     Moderate COPD (chronic obstructive pulmonary disease) (HCC)     COPD exacerbation (HCC)     Hypoxia     COPD with acute exacerbation ([address]     Morbid obesity due to excess calories ([address]     Acute on chronic diastolic congestive heart failure (HCC)     Depression     Hypertension     Pneumonia     Chronic diastolic CHF (congestive heart failure) ([address]     GIB (gastrointestinal bleeding)     Other chest pain     GI bleed      GI bleed    PLAN:    Given history likely diverticular bleed  PRBC no sign of bleeding  No acute surgical intervention  Appears to have resolved  Diet being advanced          Jessica Cuba DO

## 2022-05-14 NOTE — PROGRESS NOTES
Angely Whitley MD    Hospitalist Progress Note      Name:  Tab Courtney /Age/Sex: 1940  ([de-identified] y.o. female)   MRN & CSN:  [id number] & [id number] Admission Date/Time: 2022  9:59 PM   Location:  [id number]-[id number] PCP: Ankur Reid MD       I saw and examined the patient on 2022 at 11:08 AM.    Hospital Day: 4  Barriers to discharge: Advance diet, monitor H&H  Assessment and Plan:     [de-identified]years old female poor historian at baseline presented with bright red blood per rectum for few days and admitted for GI bleed.  Blood pressure still running on the lower side 90/45 with map of 67, heart rate of 65.  Hemoglobin 7.4 down from 7.7 since last night.  Patient denies any active bleeding this morning.  Noted GI recommendation regarding recent scope to start clear liquid diet and serial H&H monitoring. Drop in hemoglobin overnight, underwent bleeding scan which was unremarkable. GI evaluated no plans for colonoscopy. Noted plan to advance diet today and monitor H&H.     1. Rectal bleeding hemorrhoidal versus hematochezia.  On PPI, GI following. Initially managed conservatively,  Patient had drop in hemoglobin overnight needing PRBC. Status post unremarkable stat bleeding scan. Hemoglobin increased from 6.7-7.3 with a unit of blood. Hemoglobin pending this morning. Continue close monitoring. Noted GI plan for advancing diet today. On IV iron. Discussed with RN to follow-up on blood work-up     2. Acute blood loss anemia due to above-not anticoagulation prior to admission per records     3. UTI diagnosed yesterday at outside ER.  On Bactrim     4. Chronic respiratory failure on 2 to 3 L at home.  Compensated  5. Paroxysmal A. Fib.  On metoprolol, not on anticoagulation prior to admission per records  6. Chronic hypotension on midodrine  7. COPD compensated  8. Chronic diastolic heart failure (on hold due to above  9.  Obesity.  BMI of 39.  Lifestyle modifications consult     CODE STATUS DNR CCA  DVT prophylaxis SCD          Subjective:     Patient seen and examined at bedside. No acute events overnight. Afebrile this morning. Excited that she is allowed to eat now. No fresh blood per rectum this morning. Objective: Intake/Output Summary (Last 24 hours) at 5/14/2022 1108  Last data filed at 5/14/2022 0954  Gross per 24 hour   Intake --   Output 1475 ml   Net -1475 ml      Vitals:   Vitals:    05/14/22 0830   BP: (!) 106/51   Pulse: 64   Resp: 20   Temp: 98.7 °F (37.1 °C)   SpO2:        Ten point ROS reviewed negative, unless as noted above    Physical Exam:     GENERAL APPEARANCE: not in any acute distress,  appears comfortable. NECK: Supple, no JVD.   CARDIOVASCULAR: Regular rate and rhythm, no murmurs, rubs or gallops  LUNGS: clear to auscultation bilaterally   ABDOMEN: normoactive bowel sounds, soft non-tender and non distended  EXTREMITIES: No edema  MUSCULOSKELETAL S: normal movement and normal bulk in all ext  NEUROLOGIC: Alert and awake- grossly non focal exam, moving all extremities   SKIN: No Rashes       Electronically signed by Macey Huggins MD on 5/14/2022 at 11:08 AM       Medications:   Medications:    atorvastatin  40 mg Oral Daily    ferrous sulfate  325 mg Oral BID WC    budesonide-formoterol  2 puff Inhalation BID    [Held by provider] furosemide  40 mg Oral Daily    lactobacillus  1 capsule Oral Daily with breakfast    levothyroxine  75 mcg Oral Daily    midodrine  5 mg Oral TID WC    mirtazapine  15 mg Oral Nightly    therapeutic multivitamin-minerals  1 tablet Oral Daily    sodium chloride flush  5-40 mL IntraVENous 2 times per day    pantoprazole (PROTONIX) 40 mg injection  40 mg IntraVENous Daily    sulfamethoxazole-trimethoprim  1 tablet Oral 2 times per day    iron sucrose  300 mg IntraVENous Q24H      Infusions:    sodium chloride      sodium chloride 100 mL/hr at 05/14/22 0827     PRN Meds: albuterol, 2.5 mg, Q6H PRN  sodium chloride flush, 5-40 mL, PRN  sodium chloride, , PRN  ondansetron, 4 mg, Q8H PRN   Or  ondansetron, 4 mg, Q6H PRN  acetaminophen, 650 mg, Q6H PRN   Or  acetaminophen, 650 mg, Q6H PRN

## 2022-05-15 VITALS
OXYGEN SATURATION: 96 % | RESPIRATION RATE: 22 BRPM | BODY MASS INDEX: 37.38 KG/M2 | DIASTOLIC BLOOD PRESSURE: 49 MMHG | HEIGHT: 61 IN | TEMPERATURE: 98.9 F | SYSTOLIC BLOOD PRESSURE: 116 MMHG | WEIGHT: 198 LBS | HEART RATE: 68 BPM

## 2022-05-15 LAB
HCT VFR BLD CALC: 25.7 % (ref 37–47)
HCT VFR BLD CALC: 27.1 % (ref 37–47)
HEMOGLOBIN: 7.4 GM/DL (ref 12.5–16)
HEMOGLOBIN: 7.7 GM/DL (ref 12.5–16)
MCH RBC QN AUTO: 26.5 PG (ref 27–31)
MCHC RBC AUTO-ENTMCNC: 28.8 % (ref 32–36)
MCV RBC AUTO: 92.1 FL (ref 78–100)
PDW BLD-RTO: 15.6 % (ref 11.7–14.9)
PLATELET # BLD: 200 K/CU MM (ref 140–440)
PMV BLD AUTO: 11.8 FL (ref 7.5–11.1)
RBC # BLD: 2.79 M/CU MM (ref 4.2–5.4)
SARS-COV-2, NAAT: NOT DETECTED
SOURCE: NORMAL
WBC # BLD: 10.5 K/CU MM (ref 4–10.5)

## 2022-05-15 PROCEDURE — 2700000000 HC OXYGEN THERAPY PER DAY

## 2022-05-15 PROCEDURE — 6370000000 HC RX 637 (ALT 250 FOR IP): Performed by: INTERNAL MEDICINE

## 2022-05-15 PROCEDURE — 2580000003 HC RX 258: Performed by: INTERNAL MEDICINE

## 2022-05-15 PROCEDURE — 85014 HEMATOCRIT: CPT

## 2022-05-15 PROCEDURE — 94640 AIRWAY INHALATION TREATMENT: CPT

## 2022-05-15 PROCEDURE — 85018 HEMOGLOBIN: CPT

## 2022-05-15 PROCEDURE — 85027 COMPLETE CBC AUTOMATED: CPT

## 2022-05-15 PROCEDURE — 87635 SARS-COV-2 COVID-19 AMP PRB: CPT

## 2022-05-15 PROCEDURE — 94761 N-INVAS EAR/PLS OXIMETRY MLT: CPT

## 2022-05-15 PROCEDURE — 36415 COLL VENOUS BLD VENIPUNCTURE: CPT

## 2022-05-15 RX ORDER — FERROUS SULFATE 325(65) MG
325 TABLET ORAL
Qty: 60 TABLET | Refills: 3 | Status: SHIPPED | OUTPATIENT
Start: 2022-05-15

## 2022-05-15 RX ADMIN — SODIUM CHLORIDE, PRESERVATIVE FREE 10 ML: 5 INJECTION INTRAVENOUS at 08:27

## 2022-05-15 RX ADMIN — NITROFURANTOIN (MONOHYDRATE/MACROCRYSTALS) 100 MG: 75; 25 CAPSULE ORAL at 08:28

## 2022-05-15 RX ADMIN — MIDODRINE HYDROCHLORIDE 5 MG: 5 TABLET ORAL at 12:39

## 2022-05-15 RX ADMIN — MIDODRINE HYDROCHLORIDE 5 MG: 5 TABLET ORAL at 08:28

## 2022-05-15 RX ADMIN — LEVOTHYROXINE SODIUM 75 MCG: 0.07 TABLET ORAL at 05:39

## 2022-05-15 RX ADMIN — BUDESONIDE AND FORMOTEROL FUMARATE DIHYDRATE 2 PUFF: 160; 4.5 AEROSOL RESPIRATORY (INHALATION) at 07:40

## 2022-05-15 RX ADMIN — Medication 1 CAPSULE: at 08:29

## 2022-05-15 RX ADMIN — Medication 325 MG: at 08:28

## 2022-05-15 RX ADMIN — ATORVASTATIN CALCIUM 40 MG: 40 TABLET, FILM COATED ORAL at 08:48

## 2022-05-15 RX ADMIN — MULTIPLE VITAMINS W/ MINERALS TAB 1 TABLET: TAB at 08:28

## 2022-05-15 RX ADMIN — MIDODRINE HYDROCHLORIDE 5 MG: 5 TABLET ORAL at 17:21

## 2022-05-15 RX ADMIN — Medication 325 MG: at 17:21

## 2022-05-15 ASSESSMENT — PAIN SCALES - GENERAL: PAINLEVEL_OUTOF10: 0

## 2022-05-15 NOTE — PROGRESS NOTES
[facility] HOSPITALIST PROGRESS NOTE      PCP: Concetta Nash MD    Date of Admission: 5/11/2022    Subjective: No more blood in stool  Wants to go home    [facility] summary the patient is an 28-year-old female with history of chronic diastolic heart failure, paroxysmal A. fib, COPD who presented to the ER with bleeding per rectum  GI consulted, PPI initiated, was hypotensive on admission hemoglobin 7.4,  Hemoglobin dropped to 6.7, transfuse 1 packed RBC unit 5/13  Bleeding scan did not show any active bleeding, previous colonoscopy showed severe diverticulosis, iron 23, Venofer IV started  Surgery consulted, bleeding scan remained normal, no acute interventions per GI   surgery consulted, no interventions per surgery  Vitals signs:  Afebrile, heart rate 60s range, blood pressure 98/52, on 2 L of oxygen    Medications: Lipitor, Symbicort, ferrous sulfate, Lasix held, levothyroxine, midodrine, mirtazapine, nitrofurantoin, pantoprazole, Remeron  Antibiotics:     Diagnosis: Acute cystitis without hematuria    Drug: Bactrim, nitrofurantoin  Dose/Route: P.o.     Duration: Day 4 today, DC tomorrow        Fluid status: 1825 cc overnight    Labs:   WBC 10.5, hemoglobin 7.4, platelets 980    Imaging:   Bleeding scan showed no evidence of active GI bleed    Assessment/Plan:     Acute blood loss anemia secondary to suspected diverticular bleed  Acute cystitis without hematuria  Chronic hypoxic respiratory failure  Paroxysmal A. fib  Chronic hypertension  Chronic COPD  Chronic diastolic heart failure  Morbid obesity  Hypothyroidism      Admitted with rectal bleeding, no interventions per GI, status post 1 packed RBC transfusion, hemoglobin trending downwards, continue to monitor for 1 more day if remains stable will discharge, continue iron supplementation  DC IV Protonix, DC CBC and H&H every 6, check H&H in a.m. and later today      Continue nitrofurantoin, DC after tomorrow    Continue midodrine for hypotension    Continue metoprolol if blood pressure improving, not on anticoagulation secondary GI bleed    Remains on 2 to 3 L of oxygen which is baseline for patient  Continue Toprol record, daily weight, salt and fluid restriction will resume Lasix  BMI 38.6 complicates all care  Continue Synthroid      DVT prophlaxis:   SCDs    Physical Exam Performed:       BP (!) 98/52   Pulse 62   Temp 98.4 °F (36.9 °C) (Axillary)   Resp 20   Ht 5' 1\" (1.549 m)   Wt 198 lb (89.8 kg)   SpO2 94%   BMI 37.41 kg/m²     Physical Exam  Constitutional:       General: She is not in acute distress. Appearance: Normal appearance. HENT:      Head: Normocephalic and atraumatic. Right Ear: External ear normal.      Left Ear: External ear normal.   Eyes:      Extraocular Movements: Extraocular movements intact. Pupils: Pupils are equal, round, and reactive to light. Cardiovascular:      Rate and Rhythm: Normal rate and regular rhythm. Heart sounds: No murmur heard. Pulmonary:      Effort: Pulmonary effort is normal. No respiratory distress. Breath sounds: Normal breath sounds. No wheezing. Abdominal:      General: Bowel sounds are normal. There is no distension. Palpations: Abdomen is soft. Tenderness: There is no abdominal tenderness. Musculoskeletal:         General: No swelling. Cervical back: Normal range of motion. Skin:     General: Skin is warm. Neurological:      General: No focal deficit present. Mental Status: She is alert and oriented to person, place, and time. Cranial Nerves: No cranial nerve deficit. Psychiatric:         Mood and Affect: Mood normal.       Labs:   Recent Labs     05/14/22  1112 05/14/22  1112 05/14/22  1204 05/14/22  2240 05/15/22  0142   WBC 7.8  --   --  10.4 10.5   HGB 7.6*   < > 8.7* 7.4*  7.4* 7.4*   HCT 26.6*   < > 31.0* 24.8*  25.6* 25.7*     --   --  199 200    < > = values in this interval not displayed.      No

## 2022-05-15 NOTE — DISCHARGE INSTR - DIET

## 2022-05-15 NOTE — PROGRESS NOTES
[facility] Per [facility] report has been called to nurse Lillie Cleaning at [facility]. CLAUDIA is complete per [facility] pending physician. At [facility] at bedside and report given. Gage Hutchison who is in process of completing physician portion of Es [facility]. This RN returned to room at [address] [place] [address] and patient has exited this facility in the care of [facility] en route to [facility].

## 2022-05-16 NOTE — ADT AUTH CERT
Gastrointestinal Bleeding, Lower - Care Day 2 (5/13/2022) by Brandi Ac RN       Review Status Review Entered   Completed 5/13/2022 18:36      Criteria Review      Care Day: 2 Care Date: 5/13/2022 Level of Care: Intermediate Care    Guideline Day 2    Level Of Care    (X) Floor    5/13/2022 6:36 PM EDT by Oral Drones      telemetry    Clinical Status    (X) * Hypotension absent    5/13/2022 6:36 PM EDT by Oral Drones      33/60    ( ) * Bleeding absent or reduced    Activity    (X) Activity as tolerated    5/13/2022 6:36 PM EDT by Oral Drones      up with assistance    Routes    (X) * Oral hydration tolerated    5/13/2022 6:36 PM EDT by Nichelle Arnett      Tolerating clear liquid diet    (X) * Oral diet tolerated    5/13/2022 6:36 PM EDT by Oral Drones      Tolerating clear liquid diet    Interventions    (X) Hgb/Hct    5/13/2022 6:36 PM EDT by Nichelle Arnett      Hgb 6.7  Hct 24.4    5/13/2022 6:36 PM EDT by Nichelle Arnett    Subject: Additional Clinical Information    lipitor 40mg qd PO      symbicort 2 puff bin IN      iron 325mg bid PO      culturelle 1 capsule qd PO      synthroid 7mcq PO      proamatine 5mg tid PO      remeron 15mg qhs PO      protonix 40mg qd IV      bactrim 1 tab bid PO      NS 100ml/hr cont IV      ultratag 26.2millicure IMG once prn IIV       * Milestone   Additional Notes   DATE:    05/13      Relevant baselines: (lab values, vitals, o2 amount/delivery, etc.)   2-3L NC      Pertinent Updates:   1 U PRBC, Hgb 6.7      Vitals:   98.4f, 24r, 74p, 99/42, 94% 3L NC      Abnl/Pertinent Labs/Radiology/Diagnostic Studies:         Physical Exam:   NAD, appears comfortable, lying in bed   Lips and mucous membranes pink and moist   RRR, Nl s1s2, no murmurs, no JVD   Lungs CTA bilaterally, respirations even and unlabored    Abdomen soft, right side tenderness on palpation, no HSM, bowel sounds normal   Skin pink, warm, dry and CR brisk    No edema bilateral lower extremities    AAOx3        MD Consults/Assessments & Plans:   GI   hgb 6.1, receiving 1 unit prbc   Acute on chronic anemia   Recent colonoscopy 11/2021 severe pancolonic diverticulosis, ct with severe diverticulosis    Appears to be diverticular bleed, may have nsaid enteropathy, could have some hemorrhoidal bleeding also     Iron 23, tibc 183       RECOMMENDATIONS:       Stop all NSAIDS at [facility]   Serial H&H    PPI daily    Clear liquid diet    No endoscopic procedures at this time, last colonoscopy 6 months ago, no bleeding    Venofer 300 mg daily x 3    Stat RBC bleeding scan    Consult general surgery for possible colectomy         Progress note   [de-identified]years old female poor historian at baseline presented with bright red blood per rectum for few days and admitted for GI bleed.  Blood pressure still running on the lower side 90/45 with map of 67, heart rate of 65.  Hemoglobin 7.4 down from 7.7 since last night.  Patient denies any active bleeding this morning.  Noted GI recommendation regarding recent scope to start clear liquid diet and serial H&H monitoring.  Drop in hemoglobin overnight with melanotic stool this morning       Rectal bleeding hemorrhoidal versus hematochezia.  On PPI, GI following clear liquid diet.  Patient had drop in hemoglobin overnight needing PRBC this morning still having melanotic stools.  Noted plan for bleeding scan and general surgery consult by GI.  Dynamically stable at this time       Acute blood loss anemia due to above-not anticoagulation prior to admission per records       UTI diagnosed yesterday at outside ER.  On Bactrim       Chronic respiratory failure on 2 to 3 L at home.  Compensated   Paroxysmal A. Fib.  On metoprolol, not on anticoagulation prior to admission per records   Chronic hypotension on midodrine   COPD compensated                    Gastrointestinal Bleeding, Lower - Care Day 1 (5/12/2022) by Fany Duval RN       Review Status Review Entered   Completed 5/13/2022 14:21      Criteria Review      Care Day: 1 Care Date: 5/12/2022 Level of Care: Inpatient Floor    Guideline Day 1    Level Of Care    (X) ICU or floor    (X) Readmission Risk Assessment    5/13/2022 2:21 PM EDT by Uyen Ramirez      19%    Clinical Status    (X) * Clinical Indications met    Routes    (X) IV fluids    5/13/2022 2:21 PM EDT by Uyen Ramirez      0.9 % sodium chloride infusion  Rate: 100 mL/hr Freq: CONTINUOUS Route: IV    (X) Possible liquid diet in evening    5/13/2022 2:21 PM EDT by Uyen Ramirez      CLD    Interventions    (X) Hgb/Hct    5/13/2022 2:21 PM EDT by Marquis Chu dropped a little further to 7.4/26.2    (X) Large-bore or central IV access    (X) Monitor vital signs and blood counts closely    5/13/2022 2:21 PM EDT by Uyen Ramirez      per protocol    * Milestone   Additional Notes   DATE:       5/12/22         Chief Complaint/ED Presentation:   Triage Chief Complaint:    Hematuria (diagnosed with UTI and hematuria yesterday at [facility]) and [facility] ([facility], concernred for GI bleed)       [facility]:   Delfino Johnston is a [de-identified] y.o. female that presents from nursing facility with concern for GI bleeding.  The patient states that over the past few days she has had lower abdominal cramping and has been having dark red blood in her pads that she wears. Robert Lemus is incontinent at baseline and is unsure where the blood is coming from. Robert Lemus was seen at another emergency department yesterday and diagnosed with possible UTI and started on Bactrim.  Staff at nursing facility is concerned that patient is having GI bleeding.  She has a history of GI bleeding.  Patient is not anticoagulated.  She has reportedly been passing large clots and dark red blood.  Patient denies any pain at this time.  Denies any nausea, vomiting, fevers.  Denies any dysuria or urinary frequency.             PMH:   ASHD (arteriosclerotic heart disease)      · Bilateral edema of lower extremity 12/16/2015   · CHF (congestive heart failure) ([facility])     · COPD (chronic obstructive pulmonary disease) ([facility])     · Depression     · Hypertension     · MRSA (methicillin resistant staph aureus) culture positive                 Vitals:   Temp 99.1   Resp 24   Pulse 68   /51, 96/55      4LNC 96%             GENERAL APPEARANCE: Awake and alert. Cooperative. No acute distress. HEAD: Normocephalic. Atraumatic. EYES: EOM's grossly intact. Sclera anicteric. ENT: Mucous membranes are moist. Tolerates saliva. No trismus. NECK: Supple.  Trachea midline. HEART: RRR. Radial pulses 2+. LUNGS: Respirations unlabored. CTAB   ABDOMEN: Soft. Non-tender. No guarding or rebound. RECTAL: Red blood on digital exam without obvious hernia or fissure. EXTREMITIES: No acute deformities. SKIN: Warm and dry. NEUROLOGICAL: No gross facial drooping. Moves all 4 extremities spontaneously. PSYCHIATRIC: Normal mood.             Abnl/Pertinent Labs/Radiology/Diagnostic Studies:   RBC 2.94 (L) 4.2 - 5.4 M/CU MM     Hemoglobin 7.7 (L) 12.5 - 16.0 GM/DL     Hematocrit 28.0 (L) 37 - 47 %     MCH 26.2 (L) 27 - 31 PG     MCHC 27.5 (L) 32.0 - 36.0 %     MPV 11.4 (H) 7.5 - 11.1 FL      Segs Relative 73.1 (H) 36 - 66 %     Lymphocytes % 14.7 (L) 24 - 44 %     Monocytes % 7.4 (H) 0 - 4 %     Eosinophils % 4.4 (H) 0 - 3 %      12 lead EKG as interpreted by me reveals normal sinus rhythm. Axis is normal. There are no ischemic ST elevations or other suspicious ST changes;  QRS interval is narrow, QT interval is not prolonged. Final interpretation: Normal sinus rhythm.          ED treatment:   Nacl bolus      Last time she was seen by GI was in November of last year. Alessia Dalal is not anticoagulated.  Hemoglobin is 7.7, down from 9.0 yesterday. Admission Diagnosis/Op Note:   Clinical Impression:   1. Gastrointestinal hemorrhage, unspecified gastrointestinal hemorrhage type    2.  Acute blood loss anemia                      MD Consults/Assessments & Plans:   IM:   Assessment and Plan:   Lester Daniels is a [de-identified] y.o. female who presents with        Rectal Bleeding:    Acute Blood Loss Anemia:   Hold anticoagulation   GI consult   Trend hemoglobin   N.p.o.       UTI   Continue Bactrim       Chronic respiratory failure: Patient states she is on 2 to 3 L at home but previous documentation indicates higher and patient is on 4 L nasal cannula at emergency room.  We will continue titrate as needed.       PAF: Per history, appears to be in sinus rhythm on admit.  Stable rate.  Patient not on anticoagulation or beta-blocker at this time.  Will monitor on telemetry.       COPD: per history, no evidence of exacerbation.  Bronchodilators as needed.  Continue home inhalers.       Chronic diastolic heart failure: EF of 55% on TTE in 9/2021.  No evidence of exacerbation.  Holding home Lasix while patient n.p.o. and with SALTY.  Provide IV diuresis as needed.       Obesity: BMI 39, counseled on lifestyle modifications.       D/w ED provider   Code status DNR CCA   DVT PPx SCDs            Gastro:      ASSESSMENT:   GIB    LAST CT 11/13/21   No acute intra-abdominal abnormality to explain patient's symptoms. Specifically, no obvious rectal mass on noncontrast imaging. 2. Stable left base opacity with improving right base opacity since September 30, 2021.   3. Severe diverticulosis. 4. Severe atherosclerosis. 5. IVC filter in place.       hgb 7.7   Acute on chronic anemia.     May be hemorrhoids   May be diverticular    May be NSAID enteropathy        RECOMMENDATIONS:   Anemia panel, may need IV iron    Stop all NSAIDS at [facility]   Serial H&H    PPI daily    Clear liquid diet    No endoscopic procedures at this time, had within the last year no bleedng then                Medications:   Resume home meds   Iv venofer 300 mg daily   Iv protonix 40mg daily      0.9 % sodium chloride infusion   Rate: 100 mL/hr Freq: CONTINUOUS Route: IV      Po tylenol 650mg x 1      Orders: Tele   [facility] GI blood los imaging   Prepare RBC   CBC Q8H         PT/OT/SLP/CM Assessments or Notes:   CM:   D/c plan is to return to LTC at [facility] whenever medically ready.  Pt is a bed hold and will NOT require a pre-cert per [facility]/AV.   Rapid covid needed on day of d/c.  TE                  Gastrointestinal Bleeding, Lower - Clinical Indications for Admission to Inpatient Care by Meera Vela RN       Review Status Review Entered   Completed 5/13/2022 14:19      Criteria Review      Clinical Indications for Admission to Inpatient Care    Most Recent : Ar Black Most Recent Date: 5/13/2022 2:19 PM EDT    (X) Admission is indicated for  1 or more  of the following  (1) (2) (3) (4) (5) (6):       (X) Ongoing active bleeding (eg, decreasing hematocrit)       5/13/2022 2:19 PM EDT by Ar Black         H/H 10./36.0 in [facility] baseline - now 7.7/28.0     (X) Other Indication: increased o2 needs      Entered 5/13/2022 2:19 PM EDT by Yanique Klein 2-3 L at home.   needing 4LNC

## 2022-05-19 NOTE — DISCHARGE SUMMARY
[facility]    [facility] Demographics    Patient. Mishel Blount  Date of Birth. 1940  MRN. [id number]     Primary care provider. Trav Arreola MD  (Tel: [phone])    Admit date: 5/11/2022    Discharge date (blank if same as Note Date): 5/15/2022  Note Date: 5/19/2022     Reason for Hospitalization. Chief Complaint   Patient presents with    Hematuria     diagnosed with UTI and hematuria yesterday at [facility] staff noted large clots, concernred for GI bleed         Diagnosis. Principal Problem:    GIB (gastrointestinal bleeding)  Resolved Problems:    * No resolved hospital problems. *     Hospital Course:  the patient is an 43-year-old female with history of chronic diastolic heart failure, paroxysmal A. fib, COPD who presented to the ER with bleeding per rectum  GI consulted, PPI initiated, was hypotensive on admission hemoglobin 7.4,  Hemoglobin dropped to 6.7, transfuse 1 packed RBC unit 5/13  Bleeding scan did not show any active bleeding, previous colonoscopy showed severe diverticulosis, iron 23, Venofer IV started  Surgery consulted, bleeding scan remained normal, no acute interventions per GI   surgery consulted, no interventions per surgery    hB STABLE  Was discharged home  CBC in 1 week     Invasive procedures and treatments. 1. None     Consults. IP CONSULT TO GI  IP CONSULT TO HOSPITALIST  IP CONSULT TO GI  IP CONSULT TO GENERAL SURGERY    Physical examination on discharge day. BP (!) 116/49   Pulse 68   Temp 98.9 °F (37.2 °C) (Oral)   Resp 22   Ht 5' 1\" (1.549 m)   Wt 198 lb (89.8 kg)   SpO2 96%   BMI 37.41 kg/m²   General appearance. Alert. Looks comfortable. HEENT. Sclera clear. Moist mucus membranes. Cardiovascular. Regular rate and rhythm, normal S1, S2. No murmur. Respiratory. Not using accessory muscles. Clear to auscultation bilaterally, no wheeze. Gastrointestinal. Abdomen soft, non-tender, not distended, normal bowel sounds  Neurology. Facial symmetry. No speech deficits. Moving all extremities equally. Extremities. No edema in lower extremities. Skin. Warm, dry, normal turgor    Condition at time of discharge stable    Medication instructions provided to patient at discharge.      Medication List      CHANGE how you take these medications    ferrous sulfate 325 (65 Fe) MG tablet  Commonly known as: IRON 325  Take 1 tablet by mouth 3 times daily (with meals) With a meal.  What changed: when to take this        CONTINUE taking these medications    * albuterol (2.5 MG/3ML) 0.083% nebulizer solution  Commonly known as: PROVENTIL  Take 3 mLs by nebulization every 6 hours as needed for Wheezing     * albuterol sulfate  (90 Base) MCG/ACT inhaler     atorvastatin 40 MG tablet  Commonly known as: LIPITOR     calcium citrate-vitamin D 315-250 MG-UNIT Tabs per tablet  Commonly known as: CITRICAL + D     fluticasone-vilanterol 100-25 MCG/INH Aepb inhaler  Commonly known as: BREO ELLIPTA     furosemide 40 MG tablet  Commonly known as: Lasix  Take 1 tablet by mouth daily     lactobacillus capsule  Take 1 capsule by mouth daily (with breakfast)     levothyroxine 75 MCG tablet  Commonly known as: SYNTHROID     linaclotide 145 MCG capsule  Commonly known as: LINZESS     midodrine 5 MG tablet  Commonly known as: PROAMATINE  Take 1 tablet by mouth 3 times daily (with meals)     mirtazapine 15 MG tablet  Commonly known as: REMERON  Take 1 tablet by mouth nightly     multivitamin Tabs tablet  Take 1 tablet by mouth daily     ondansetron 4 MG disintegrating tablet  Commonly known as: Zofran ODT  Take 1 tablet by mouth every 8 hours as needed for Nausea     OXYGEN  Inhale 5 L into the lungs continuous     pantoprazole 40 MG tablet  Commonly known as: PROTONIX  Take 1 tablet by mouth 2 times daily (before meals)     potassium chloride 10 MEQ extended release tablet  Commonly known as: KLOR-CON M     TYLENOL PO         * This list has 2 medication(s) that are the same as other medications prescribed for you. Read the directions carefully, and ask your doctor or other care provider to review them with you. Where to Get Your Medications      These medications were sent to [facility] [address] [place] [address] [phone]  [address] [place] [address] [address] [facility] [address]    Phone: [phone]   · ferrous sulfate 325 (65 Fe) MG tablet         Discharge recommendations given to patient. Follow Up. PCP in 1 week   Disposition. home  Activity. As tolerated  Diet: No diet orders on file      Spent 23 minutes in discharge process.     Signed:  Maury Bagley MD     5/19/2022 12:17 PM

## 2022-08-22 ENCOUNTER — INPATIENT HOSPITAL (OUTPATIENT)
Dept: URBAN - METROPOLITAN AREA HOSPITAL 56 | Facility: HOSPITAL | Age: 82
End: 2022-08-22
Payer: MEDICARE

## 2022-08-22 DIAGNOSIS — K29.50 UNSPECIFIED CHRONIC GASTRITIS WITHOUT BLEEDING: ICD-10-CM

## 2022-08-22 DIAGNOSIS — K31.89 OTHER DISEASES OF STOMACH AND DUODENUM: ICD-10-CM

## 2022-08-22 DIAGNOSIS — R57.8 OTHER SHOCK: ICD-10-CM

## 2022-08-22 DIAGNOSIS — K92.0 HEMATEMESIS: ICD-10-CM

## 2022-08-22 DIAGNOSIS — R93.3 ABNORMAL FINDINGS ON DIAGNOSTIC IMAGING OF OTHER PARTS OF DI: ICD-10-CM

## 2022-08-22 DIAGNOSIS — K31.82 DIEULAFOY LESION (HEMORRHAGIC) OF STOMACH AND DUODENUM: ICD-10-CM

## 2022-08-22 DIAGNOSIS — D50.0 IRON DEFICIENCY ANEMIA SECONDARY TO BLOOD LOSS (CHRONIC): ICD-10-CM

## 2022-08-22 PROCEDURE — 43255 EGD CONTROL BLEEDING ANY: CPT | Performed by: INTERNAL MEDICINE

## 2022-08-22 PROCEDURE — 99223 1ST HOSP IP/OBS HIGH 75: CPT | Mod: 25

## 2022-08-23 PROCEDURE — 99232 SBSQ HOSP IP/OBS MODERATE 35: CPT

## 2022-09-29 NOTE — DISCHARGE SUMMARY
Discharge Summary    Name:  Adonis Jackson /Age/Sex: 1940  (89 y.o. female)   MRN & CSN:  [id number] & [id number] Admission Date/Time: 2020  1:31 PM   Attending:  Joes Sanders MD Discharging Physician: Darnell Hernandez MD     HPI:     Adonis Jackson is a 79 y.o. female admitted for nausea and vomiting this been going for the last 4 weeks, she reports as nonbloody nonbilious. ED provider states that there is reports of rectal bleeding, patient does endorse black stools, denies any associated abdominal pain. Patient states that she has not had any cough or shortness of breath. She is reportedly COVID-19 positive as discussed with . Pertechnetate and she was also reported to be COVID-19 positive on . Patient is currently being weaned off oxygen as per ED nurse.     Patient is Covid negative in the ED here. Patient is able to provide history, however is a limited historian. Hospital Course:   Adonis Jackson is a 79 y.o.  female  who presents with GIB (gastrointestinal bleeding)    > Acute Gastritis  > Decreased po intake  > Upper GI bleed  > Acute blood loss anemia  -N/V with decreased p.o. intake and melanotic stools  -Given 2 L IV fluid bolus in ED, as patient was initially hypotensive  - GI consulted, Placed on Protonix drip  -  EGD with Gastritis, PPI recommended, consulted Dietary , started dextrose due to low BG and not eating. Consult PT/OT/Case management  -  reports nausea. ? Sec to medications ( hold po iron, martha ) H&H stable  -  , poor po intake, try change meds decrease symbicort, hold ditropan, paxil, start remeron, consult palliative care. - suspected depression, agreed on remeron, code status changed to DNR CCA.  And pt was discharged back to SNF in a stable condition.      >SALTY possibly d/t ATN  - avoid nephrotoxic medication  - monitor I/Os  - nephrology following, appreciate recs  - Holding Lasix and Lisinopril  - resolved     >H/o COVID + on the 20th of Nov, on O2 here, being weaned off in ED, COVID negative here.  Discussed with nursing supervisor by night time admitting attending, given patient's Covid negative here, states that okay to be admitted to non-Covid unit  -If patient have worsening or decompensation in respiratory status, consider ID consult and pulmonary consult      > Airspace opacities at the posterior left lung base on CT  -  OP imaging Follow-up is recommended to ensure resolution and exclude underlying mass. >1.3 cm intermediate attenuation lesion at the lower pole left kidney on CT,   -new or increased since the prior study.    - OP imaging Follow-up CT or MRI renal mass protocol is recommended to exclude renal neoplasm.     > Mild ectatic bilateral common iliac arteries on CT  - measuring 1.4-1.5 cm. OP imaging Follow-up is recommended.      >H/o CHF-stable; continue diuretics as able  >H/o COPD, Hold martha due to nausea and ulcer     >Hypertension- anti-hypertensive held    The patient expressed appropriate understanding of and agreement with the discharge recommendations, medications, and plan.      Consults this admission:  IP CONSULT TO IV TEAM  IP CONSULT TO GI  IP CONSULT TO HOSPITALIST  IP CONSULT TO NEPHROLOGY  IP CONSULT TO DIETITIAN  IP CONSULT TO CASE MANAGEMENT  IP CONSULT TO [address] [place] [address]    Discharge Instruction:   Follow up appointments:     See AVS    Disposition: Discharged to:   []SNF  Condition on discharge: Stable    Discharge Medications:      [facility] Medication Instructions [id number]    Printed on:11/30/20 8144   Medication Information                      Acetaminophen (TYLENOL PO)  Take 650 mg by mouth every 6 hours as needed (PAIN OR FEVER)              albuterol (PROVENTIL) (2.5 MG/3ML) 0.083% nebulizer solution  Take 3 mLs by nebulization every 6 hours as needed for Wheezing             albuterol sulfate  (90 Base) MCG/ACT inhaler  Inhale 2 puffs into the lungs every 4 hours as needed for Wheezing             atorvastatin (LIPITOR) 40 MG tablet  Take 40 mg by mouth daily             calcium citrate-vitamin D (CITRICAL + D) 315-250 MG-UNIT TABS per tablet  Take 1 tablet by mouth 2 times daily (with meals)             Cranberry 450 MG CAPS  Take 900 mg by mouth 2 times daily              docusate sodium (COLACE) 100 MG capsule  Take 1 capsule by mouth 2 times daily             ferrous sulfate 325 (65 FE) MG tablet  Take 1 tablet by mouth every 12 hours With a meal.             fluticasone-vilanterol (BREO ELLIPTA) 100-25 MCG/INH AEPB inhaler  Inhale 1 puff into the lungs daily             lactobacillus (CULTURELLE) capsule  Take 1 capsule by mouth daily (with breakfast)             levothyroxine (SYNTHROID) 75 MCG tablet  Take 75 mcg by mouth Daily             linaclotide (LINZESS) 145 MCG capsule  Take 145 mcg by mouth every morning (before breakfast)             mirtazapine (REMERON) 15 MG tablet  Take 1 tablet by mouth nightly             Multiple Vitamin (MULTIVITAMIN) TABS tablet  Take 1 tablet by mouth daily             ondansetron (ZOFRAN ODT) 4 MG disintegrating tablet  Take 1 tablet by mouth every 8 hours as needed for Nausea             OXYGEN  Inhale 3 L into the lungs continuous              pantoprazole (PROTONIX) 40 MG tablet  Take 1 tablet by mouth 2 times daily (before meals)             potassium chloride (KLOR-CON M) 10 MEQ extended release tablet  Take 10 mEq by mouth daily             promethazine (PHENERGAN) 12.5 MG tablet  Take 1 tablet by mouth every 4 hours as needed for Nausea                 Objective Findings at Discharge:   /63   Pulse 63   Temp 98.4 °F (36.9 °C) (Oral)   Resp 13   Ht 5' 1\" (1.549 m)   Wt 173 lb 12.8 oz (78.8 kg)   SpO2 99%   BMI 32.84 kg/m²            PHYSICAL EXAM   GEN    Alert female, cooperative, no apparent distress. RESP  Decreased air sounds. Symmetric chest movement .   CARDIO/VASC S1/S2 auscultated. Regular rate. GI        Abdomen is soft without significant tenderness, Bowel sounds are normoactive. MSK    No gross joint deformities. SKIN    Normal coloration, warm, dry. NEURO/PSYCH         alert, oriented . Affect appropriate.     BMP/CBC  Recent Labs     11/28/20  1216 11/29/20  0408 11/30/20  0925   NA  --  139  --    K  --  3.7  --    CL  --  110  --    CO2  --  22  --    BUN  --  5*  --    CREATININE  --  0.9  --    WBC  --  5.5  --    HCT 30.9* 28.7* 31.4*   PLT  --  134*  --        IMAGING:  Ct Abdomen Pelvis Wo Contrast Additional Contrast? None    Result Date: 11/24/2020  EXAMINATION: CT OF THE ABDOMEN AND PELVIS WITHOUT CONTRAST 11/24/2020 2:56 pm TECHNIQUE: CT of the abdomen and pelvis was performed without the administration of intravenous contrast. Multiplanar reformatted images are provided for review. Dose modulation, iterative reconstruction, and/or weight based adjustment of the mA/kV was utilized to reduce the radiation dose to as low as reasonably achievable. COMPARISON: CT abdomen pelvis December 19, 2016 HISTORY: ORDERING SYSTEM PROVIDED HISTORY: abdominal pain, rectal bleeding, vomiting TECHNOLOGIST PROVIDED HISTORY: Reason for exam:->abdominal pain, rectal bleeding, vomiting Additional Contrast?->None Reason for Exam: abdominal pain, rectal bleeding, vomiting FINDINGS: Lower Chest: There are airspace opacities at the posterior left lung base, likely related to pneumonia versus atelectasis. There is dependent atelectasis at the posterior right lung base. The heart is of normal size. No pleural or pericardial effusion. Organs: Limited evaluation of the solid organs without IV contrast. The liver, gallbladder, pancreas, spleen, and bilateral adrenal glands are within normal limits. There is a 1.3 cm intermediate attenuation lesion at the lower pole left kidney. No left or right hydronephrosis. GI/Bowel: There is no bowel obstruction or pneumoperitoneum. The appendix is not definitely visualized. No secondary signs to suggest acute appendicitis. There is moderate colonic diverticulosis without acute diverticulitis. Pelvis: The urinary bladder is within normal limits. The patient is status post hysterectomy. The remainder of the pelvic structures are otherwise grossly within normal limits. There is no free pelvic fluid. Peritoneum/Retroperitoneum: There are surgical clips along the midline ventral abdominal wall. An IVC filter is in place. There is no ascites or pneumoperitoneum. The abdominal aorta is of normal size. There is mild prominence of the bilateral common iliac arteries measuring up to 1.4 cm on the right and 1.5 cm on the left. There is no retroperitoneal or mesenteric lymphadenopathy. Bones/Soft Tissues: There is no acute osseous abnormality. There is no acute soft tissue abnormality. Airspace opacities at the posterior left lung base, likely related to pneumonia versus atelectasis. Follow-up is recommended to ensure resolution and exclude underlying mass. Moderate colonic diverticulosis without acute diverticulitis. 1.3 cm intermediate attenuation lesion at the lower pole left kidney, new or increased since the prior study. Follow-up CT or MRI renal mass protocol is recommended to exclude renal neoplasm. Mild ectatic bilateral common iliac arteries measuring 1.4-1.5 cm. Follow-up is recommended. Xr Chest Portable    Result Date: 11/24/2020  EXAMINATION: ONE XRAY VIEW OF THE CHEST 11/24/2020 5:12 pm COMPARISON: December 22, 2017. November 24, 2017. HISTORY: ORDERING SYSTEM PROVIDED HISTORY: possible pneumonia TECHNOLOGIST PROVIDED HISTORY: Reason for exam:->possible pneumonia FINDINGS: Frontal portable view of the chest.  Normal lung volume. Diffusely prominent interstitium. Bibasilar subsegmental atelectasis/scarring. No lobar consolidation. No pleural effusion or pneumothorax. Tortuous and atherosclerotic thoracic aorta. Cardiomegaly. Stable regional skeleton. Diffusely prominent interstitium could relate to pulmonary vascular congestion or chronic interstitial process. Cardiomegaly. Bibasilar subsegmental atelectasis/scarring. No lobar consolidation.        Discharge Time of 38 minutes    Electronically signed by Padmini Fraire MD on 11/30/2020 at 2:55 PM Oral Minoxidil Counseling- I discussed with the patient the risks of oral minoxidil including but not limited to shortness of breath, swelling of the feet or ankles, dizziness, lightheadedness, unwanted hair growth and allergic reaction.  The patient verbalized understanding of the proper use and possible adverse effects of oral minoxidil.  All of the patient's questions and concerns were addressed.

## 2023-06-13 ENCOUNTER — APPOINTMENT (OUTPATIENT)
Dept: CT IMAGING | Age: 83
End: 2023-06-13
Payer: MEDICARE

## 2023-06-13 ENCOUNTER — APPOINTMENT (OUTPATIENT)
Dept: GENERAL RADIOLOGY | Age: 83
End: 2023-06-13
Payer: MEDICARE

## 2023-06-13 ENCOUNTER — HOSPITAL ENCOUNTER (EMERGENCY)
Age: 83
Discharge: NURSING, REHAB OR LONG-TERM CARE | End: 2023-06-13
Attending: EMERGENCY MEDICINE
Payer: MEDICARE

## 2023-06-13 VITALS
BODY MASS INDEX: 37.79 KG/M2 | HEART RATE: 58 BPM | RESPIRATION RATE: 20 BRPM | SYSTOLIC BLOOD PRESSURE: 95 MMHG | DIASTOLIC BLOOD PRESSURE: 65 MMHG | WEIGHT: 200 LBS | OXYGEN SATURATION: 94 % | TEMPERATURE: 97.9 F

## 2023-06-13 DIAGNOSIS — R07.9 CHEST PAIN, UNSPECIFIED TYPE: Primary | ICD-10-CM

## 2023-06-13 LAB
ALBUMIN SERPL-MCNC: 2.9 GM/DL (ref 3.4–5)
ALP BLD-CCNC: 80 IU/L (ref 40–129)
ALT SERPL-CCNC: 5 U/L (ref 10–40)
ANION GAP SERPL CALCULATED.3IONS-SCNC: 9 MMOL/L (ref 4–16)
AST SERPL-CCNC: 26 IU/L (ref 15–37)
B PARAP IS1001 DNA NPH QL NAA+NON-PROBE: NOT DETECTED
B PERT.PT PRMT NPH QL NAA+NON-PROBE: NOT DETECTED
BILIRUB SERPL-MCNC: 0.2 MG/DL (ref 0–1)
BUN SERPL-MCNC: 16 MG/DL (ref 6–23)
C PNEUM DNA NPH QL NAA+NON-PROBE: NOT DETECTED
CALCIUM SERPL-MCNC: 8 MG/DL (ref 8.3–10.6)
CHLORIDE BLD-SCNC: 104 MMOL/L (ref 99–110)
CO2: 28 MMOL/L (ref 21–32)
CREAT SERPL-MCNC: 0.9 MG/DL (ref 0.6–1.1)
EKG ATRIAL RATE: 57 BPM
EKG DIAGNOSIS: NORMAL
EKG P AXIS: 14 DEGREES
EKG P-R INTERVAL: 160 MS
EKG Q-T INTERVAL: 430 MS
EKG QRS DURATION: 78 MS
EKG QTC CALCULATION (BAZETT): 418 MS
EKG R AXIS: -17 DEGREES
EKG T AXIS: 21 DEGREES
EKG VENTRICULAR RATE: 57 BPM
FLUAV H1 2009 PAN RNA NPH NAA+NON-PROBE: NOT DETECTED
FLUAV H1 RNA NPH QL NAA+NON-PROBE: NOT DETECTED
FLUAV H3 RNA NPH QL NAA+NON-PROBE: NOT DETECTED
FLUAV RNA NPH QL NAA+NON-PROBE: NOT DETECTED
FLUBV RNA NPH QL NAA+NON-PROBE: NOT DETECTED
GFR SERPL CREATININE-BSD FRML MDRD: >60 ML/MIN/1.73M2
GLUCOSE SERPL-MCNC: 117 MG/DL (ref 70–99)
HADV DNA NPH QL NAA+NON-PROBE: NOT DETECTED
HCOV 229E RNA NPH QL NAA+NON-PROBE: NOT DETECTED
HCOV HKU1 RNA NPH QL NAA+NON-PROBE: NOT DETECTED
HCOV NL63 RNA NPH QL NAA+NON-PROBE: NOT DETECTED
HCOV OC43 RNA NPH QL NAA+NON-PROBE: NOT DETECTED
HCT VFR BLD CALC: 31 % (ref 37–47)
HEMOGLOBIN: 8.1 GM/DL (ref 12.5–16)
HMPV RNA NPH QL NAA+NON-PROBE: NOT DETECTED
HPIV1 RNA NPH QL NAA+NON-PROBE: NOT DETECTED
HPIV2 RNA NPH QL NAA+NON-PROBE: NOT DETECTED
HPIV3 RNA NPH QL NAA+NON-PROBE: NOT DETECTED
HPIV4 RNA NPH QL NAA+NON-PROBE: NOT DETECTED
M PNEUMO DNA NPH QL NAA+NON-PROBE: NOT DETECTED
MCH RBC QN AUTO: 25.2 PG (ref 27–31)
MCHC RBC AUTO-ENTMCNC: 26.1 % (ref 32–36)
MCV RBC AUTO: 96.3 FL (ref 78–100)
PDW BLD-RTO: 17.2 % (ref 11.7–14.9)
PLATELET # BLD: 278 K/CU MM (ref 140–440)
PMV BLD AUTO: 11.8 FL (ref 7.5–11.1)
POTASSIUM SERPL-SCNC: 5.5 MMOL/L (ref 3.5–5.1)
PRO-BNP: 1200 PG/ML
RBC # BLD: 3.22 M/CU MM (ref 4.2–5.4)
RSV RNA NPH QL NAA+NON-PROBE: NOT DETECTED
RV+EV RNA NPH QL NAA+NON-PROBE: NOT DETECTED
SARS-COV-2 RNA NPH QL NAA+NON-PROBE: NOT DETECTED
SODIUM BLD-SCNC: 141 MMOL/L (ref 135–145)
TOTAL PROTEIN: 6.5 GM/DL (ref 6.4–8.2)
TROPONIN T: <0.01 NG/ML
TROPONIN T: <0.01 NG/ML
WBC # BLD: 8.3 K/CU MM (ref 4–10.5)

## 2023-06-13 PROCEDURE — 99285 EMERGENCY DEPT VISIT HI MDM: CPT

## 2023-06-13 PROCEDURE — 76937 US GUIDE VASCULAR ACCESS: CPT

## 2023-06-13 PROCEDURE — 0202U NFCT DS 22 TRGT SARS-COV-2: CPT

## 2023-06-13 PROCEDURE — 93010 ELECTROCARDIOGRAM REPORT: CPT | Performed by: INTERNAL MEDICINE

## 2023-06-13 PROCEDURE — 6370000000 HC RX 637 (ALT 250 FOR IP): Performed by: EMERGENCY MEDICINE

## 2023-06-13 PROCEDURE — 2580000003 HC RX 258: Performed by: EMERGENCY MEDICINE

## 2023-06-13 PROCEDURE — 94644 CONT INHLJ TX 1ST HOUR: CPT

## 2023-06-13 PROCEDURE — 93005 ELECTROCARDIOGRAM TRACING: CPT | Performed by: EMERGENCY MEDICINE

## 2023-06-13 PROCEDURE — 85027 COMPLETE CBC AUTOMATED: CPT

## 2023-06-13 PROCEDURE — 80053 COMPREHEN METABOLIC PANEL: CPT

## 2023-06-13 PROCEDURE — 84484 ASSAY OF TROPONIN QUANT: CPT

## 2023-06-13 PROCEDURE — 85379 FIBRIN DEGRADATION QUANT: CPT

## 2023-06-13 PROCEDURE — 82805 BLOOD GASES W/O2 SATURATION: CPT

## 2023-06-13 PROCEDURE — 96360 HYDRATION IV INFUSION INIT: CPT

## 2023-06-13 PROCEDURE — 71275 CT ANGIOGRAPHY CHEST: CPT

## 2023-06-13 PROCEDURE — 82803 BLOOD GASES ANY COMBINATION: CPT

## 2023-06-13 PROCEDURE — 83880 ASSAY OF NATRIURETIC PEPTIDE: CPT

## 2023-06-13 PROCEDURE — 71045 X-RAY EXAM CHEST 1 VIEW: CPT

## 2023-06-13 PROCEDURE — 96361 HYDRATE IV INFUSION ADD-ON: CPT

## 2023-06-13 PROCEDURE — 6360000004 HC RX CONTRAST MEDICATION: Performed by: EMERGENCY MEDICINE

## 2023-06-13 RX ORDER — IBUPROFEN 200 MG
1 CAPSULE ORAL DAILY
COMMUNITY

## 2023-06-13 RX ORDER — CARVEDILOL 6.25 MG/1
6.25 TABLET ORAL 2 TIMES DAILY WITH MEALS
COMMUNITY

## 2023-06-13 RX ORDER — FUROSEMIDE 20 MG/1
20 TABLET ORAL DAILY
COMMUNITY

## 2023-06-13 RX ORDER — 0.9 % SODIUM CHLORIDE 0.9 %
1000 INTRAVENOUS SOLUTION INTRAVENOUS ONCE
Status: COMPLETED | OUTPATIENT
Start: 2023-06-13 | End: 2023-06-13

## 2023-06-13 RX ORDER — ASPIRIN 81 MG/1
81 TABLET, CHEWABLE ORAL DAILY
COMMUNITY

## 2023-06-13 RX ORDER — GABAPENTIN 100 MG/1
200 CAPSULE ORAL 3 TIMES DAILY
COMMUNITY

## 2023-06-13 RX ORDER — SERTRALINE HYDROCHLORIDE 100 MG/1
100 TABLET, FILM COATED ORAL DAILY
COMMUNITY

## 2023-06-13 RX ORDER — FLUTICASONE PROPIONATE AND SALMETEROL 500; 50 UG/1; UG/1
1 POWDER RESPIRATORY (INHALATION) EVERY 12 HOURS
COMMUNITY

## 2023-06-13 RX ORDER — DOCUSATE SODIUM 100 MG/1
100 CAPSULE, LIQUID FILLED ORAL 2 TIMES DAILY PRN
COMMUNITY

## 2023-06-13 RX ORDER — HYDROCODONE BITARTRATE AND ACETAMINOPHEN 5; 325 MG/1; MG/1
1 TABLET ORAL 3 TIMES DAILY
COMMUNITY

## 2023-06-13 RX ORDER — IPRATROPIUM BROMIDE AND ALBUTEROL SULFATE 2.5; .5 MG/3ML; MG/3ML
1 SOLUTION RESPIRATORY (INHALATION) 4 TIMES DAILY
COMMUNITY

## 2023-06-13 RX ORDER — LIDOCAINE 4 G/G
1 PATCH TOPICAL DAILY
COMMUNITY

## 2023-06-13 RX ORDER — IPRATROPIUM BROMIDE AND ALBUTEROL SULFATE 2.5; .5 MG/3ML; MG/3ML
3 SOLUTION RESPIRATORY (INHALATION) ONCE
Status: COMPLETED | OUTPATIENT
Start: 2023-06-13 | End: 2023-06-13

## 2023-06-13 RX ADMIN — SODIUM CHLORIDE 1000 ML: 9 INJECTION, SOLUTION INTRAVENOUS at 14:21

## 2023-06-13 RX ADMIN — IOPAMIDOL 75 ML: 755 INJECTION, SOLUTION INTRAVENOUS at 16:50

## 2023-06-13 RX ADMIN — IPRATROPIUM BROMIDE AND ALBUTEROL SULFATE 3 DOSE: .5; 2.5 SOLUTION RESPIRATORY (INHALATION) at 14:11

## 2023-06-13 NOTE — ED NOTES
1912 called [facility] for BLS unit to transport returning patient back to [facility]. Spoke with Lilo Justice at dispatch.  ETA Garret Valencia  06/13/23 1917

## 2023-06-13 NOTE — ED PROVIDER NOTES
atelectasis noted along the posterior aspects of the   lower lobes, more prominent noted on the the right. A right basal pneumonia   cannot be excluded. There is no change in the small bilateral pulmonary   nodules. XR CHEST PORTABLE   Final Result   1. Low lung volumes with bibasilar atelectasis. Procedures:  12 lead EKG per my interpretation:  Normal Sinus Rhythm  Rate: 57  QTc is  normal  There are no T wave changes appreciated. There are no ST wave changes appreciated. Prior EKG to compare with was available and is the same    (All EKG's are interpreted by myself in the absence of a cardiologist)      Chart review shows recent radiographs:  XR CHEST PORTABLE    Result Date: 6/13/2023  EXAMINATION: ONE XRAY VIEW OF THE CHEST 6/13/2023 1:25 pm COMPARISON: 09/30/2021. HISTORY: ORDERING SYSTEM PROVIDED HISTORY: Chest Pain TECHNOLOGIST PROVIDED HISTORY: Reason for exam:->Chest Pain Reason for Exam: chest pain FINDINGS: The heart size is enlarged but unchanged. The pulmonary vasculature is within normal limits. No acute infiltrates are seen. No pneumothoraces are noted. There is density involving the lower lung zones. There are low lung volumes. 1. Low lung volumes with bibasilar atelectasis. MDM:     Discussion with Other Professionals : None    Social Determinants: None    Records Reviewed :  Outpatient Notes shows the patient had EGD that was done on 8/22/2022    Chronic conditions affecting care: CHF, COPD    Labs ordered and results as above (interpreted by myself), CBC showed no significant concerning anemia, chemistry showed no significant concerning electrolyte derangements or acute renal failure, hepatic function panel showed no transaminitis to suggest hepatic dysfunction/inflammation, and troponin was either normal or close to baseline so lower suspicion for acute MI, myocarditis, pericarditis  Imaging interpreted and reviewed by myself: CXR showed no evidence of

## 2023-06-13 NOTE — CARE COORDINATION
- Room # TR-1 -pt is a resident of [facility] --was seen today for CP . Pt to be released after consecutive negative cardiac marker tests.

## 2023-06-13 NOTE — ED TRIAGE NOTES
Pt from [facility], they called EMS for CP. Gave 324 ASA. Pt BP was low did not give Nitro.  On 4L home O2

## 2023-06-13 NOTE — ED NOTES
Medication History  [facility]    Patient Name: Chay Lynch 1940     Medication history has been completed by: Jewels Hayden CPhT    Source(s) of information: Medication list provided by [address] [place] [address]     Primary Care Physician: Valeri Barker MD     Pharmacy:     Allergies as of 06/13/2023 - Fully Reviewed 06/13/2023   Allergen Reaction Noted    Codeine Itching 08/20/2015    Moxifloxacin Other (See Comments) 08/20/2015    Oxycodone Other (See Comments) 08/20/2015    Pcn [penicillins] Hives and Rash 08/20/2015    Warfarin and related Other (See Comments) 08/20/2015        Prior to Admission medications    Medication Sig Start Date End Date Taking? Authorizing Provider   aspirin 81 MG chewable tablet Take 1 tablet by mouth daily   Yes Historical Provider, MD   carvedilol (COREG) 6.25 MG tablet Take 1 tablet by mouth 2 times daily (with meals)   Yes Historical Provider, MD   docusate sodium (COLACE) 100 MG capsule Take 1 capsule by mouth 2 times daily as needed for Constipation   Yes Historical Provider, MD   ipratropium 0.5 mg-albuterol 2.5 mg (DUONEB) 0.5-2.5 (3) MG/3ML SOLN nebulizer solution Inhale 3 mLs into the lungs 4 times daily   Yes Historical Provider, MD   fluticasone-salmeterol (ADVAIR) 500-50 MCG/ACT AEPB diskus inhaler Inhale 1 puff into the lungs in the morning and 1 puff in the evening. Yes Historical Provider, MD   furosemide (LASIX) 20 MG tablet Take 1 tablet by mouth daily   Yes Historical Provider, MD   gabapentin (NEURONTIN) 100 MG capsule Take 2 capsules by mouth 3 times daily. Yes Historical Provider, MD   HYDROcodone-acetaminophen (NORCO) 5-325 MG per tablet Take 1 tablet by mouth 3 times daily.  Max Daily Amount: 3 tablets   Yes Historical Provider, MD   lidocaine 4 % external patch Place 1 patch onto the skin daily Apply to Rt knee   Yes Historical Provider, MD   calcium carbonate (OYSTER SHELL CALCIUM 500 MG) 1250 (500 Ca) MG tablet Take

## 2023-06-13 NOTE — CONSULTS
IV Consult complete. Two failed attempts for PIV in left FA. Introcan DV 20g 2.5\" PIV inserted into JERMAN Distal Basilic Vessel x 1 attempt with ultrasound guidance. Brisk blood return, flushes easy.

## 2023-07-06 ENCOUNTER — HOSPITAL ENCOUNTER (INPATIENT)
Age: 83
LOS: 8 days | Discharge: NURSING, REHAB OR LONG-TERM CARE | DRG: 291 | End: 2023-07-14
Attending: EMERGENCY MEDICINE | Admitting: STUDENT IN AN ORGANIZED HEALTH CARE EDUCATION/TRAINING PROGRAM
Payer: MEDICARE

## 2023-07-06 ENCOUNTER — APPOINTMENT (OUTPATIENT)
Dept: GENERAL RADIOLOGY | Age: 83
DRG: 291 | End: 2023-07-06
Payer: MEDICARE

## 2023-07-06 DIAGNOSIS — J96.22 ACUTE ON CHRONIC RESPIRATORY FAILURE WITH HYPOXIA AND HYPERCAPNIA (HCC): Primary | ICD-10-CM

## 2023-07-06 DIAGNOSIS — E87.5 HYPERKALEMIA: ICD-10-CM

## 2023-07-06 DIAGNOSIS — J44.1 COPD WITH ACUTE EXACERBATION (HCC): ICD-10-CM

## 2023-07-06 DIAGNOSIS — J96.21 ACUTE ON CHRONIC RESPIRATORY FAILURE WITH HYPOXIA AND HYPERCAPNIA (HCC): Primary | ICD-10-CM

## 2023-07-06 PROBLEM — J96.02 ACUTE HYPERCAPNIC RESPIRATORY FAILURE (HCC): Status: ACTIVE | Noted: 2023-07-06

## 2023-07-06 LAB
ALBUMIN SERPL-MCNC: 3.7 GM/DL (ref 3.4–5)
ALP BLD-CCNC: 97 IU/L (ref 40–129)
ALT SERPL-CCNC: 7 U/L (ref 10–40)
ANION GAP SERPL CALCULATED.3IONS-SCNC: 10 MMOL/L (ref 4–16)
AST SERPL-CCNC: 28 IU/L (ref 15–37)
BASE EXCESS MIXED: 6.1 (ref 0–2.3)
BASE EXCESS MIXED: 7 (ref 0–2.3)
BASOPHILS ABSOLUTE: 0 K/CU MM
BASOPHILS RELATIVE PERCENT: 0.1 % (ref 0–1)
BILIRUB SERPL-MCNC: 0.3 MG/DL (ref 0–1)
BUN SERPL-MCNC: 18 MG/DL (ref 6–23)
CALCIUM SERPL-MCNC: 8.2 MG/DL (ref 8.3–10.6)
CHLORIDE BLD-SCNC: 101 MMOL/L (ref 99–110)
CHP ED QC CHECK: YES
CO2: 30 MMOL/L (ref 21–32)
COMMENT: ABNORMAL
COMMENT: ABNORMAL
CREAT SERPL-MCNC: 0.9 MG/DL (ref 0.6–1.1)
DIFFERENTIAL TYPE: ABNORMAL
EOSINOPHILS ABSOLUTE: 0.4 K/CU MM
EOSINOPHILS RELATIVE PERCENT: 2.9 % (ref 0–3)
GFR SERPL CREATININE-BSD FRML MDRD: >60 ML/MIN/1.73M2
GLUCOSE BLD-MCNC: 173 MG/DL
GLUCOSE BLD-MCNC: 173 MG/DL (ref 70–99)
GLUCOSE SERPL-MCNC: 95 MG/DL (ref 70–99)
HCO3 VENOUS: 33.7 MMOL/L (ref 19–25)
HCO3 VENOUS: 36.7 MMOL/L (ref 19–25)
HCT VFR BLD CALC: 31.6 % (ref 37–47)
HEMOGLOBIN: 8.4 GM/DL (ref 12.5–16)
IMMATURE NEUTROPHIL %: 0.2 % (ref 0–0.43)
LYMPHOCYTES ABSOLUTE: 0.6 K/CU MM
LYMPHOCYTES RELATIVE PERCENT: 4.9 % (ref 24–44)
MCH RBC QN AUTO: 25.5 PG (ref 27–31)
MCHC RBC AUTO-ENTMCNC: 26.6 % (ref 32–36)
MCV RBC AUTO: 96 FL (ref 78–100)
MONOCYTES ABSOLUTE: 0.5 K/CU MM
MONOCYTES RELATIVE PERCENT: 4.2 % (ref 0–4)
NUCLEATED RBC %: 0 %
O2 SAT, VEN: 36.6 % (ref 50–70)
O2 SAT, VEN: 93.5 % (ref 50–70)
PCO2, VEN: 61 MMHG (ref 38–52)
PCO2, VEN: 78 MMHG (ref 38–52)
PDW BLD-RTO: 16.2 % (ref 11.7–14.9)
PH VENOUS: 7.28 (ref 7.32–7.42)
PH VENOUS: 7.35 (ref 7.32–7.42)
PLATELET # BLD: 262 K/CU MM (ref 140–440)
PMV BLD AUTO: 11.8 FL (ref 7.5–11.1)
PO2, VEN: 222 MMHG (ref 28–48)
PO2, VEN: 30 MMHG (ref 28–48)
POTASSIUM SERPL-SCNC: 5.9 MMOL/L (ref 3.5–5.1)
RBC # BLD: 3.29 M/CU MM (ref 4.2–5.4)
SEGMENTED NEUTROPHILS ABSOLUTE COUNT: 11.3 K/CU MM
SEGMENTED NEUTROPHILS RELATIVE PERCENT: 87.7 % (ref 36–66)
SODIUM BLD-SCNC: 141 MMOL/L (ref 135–145)
TOTAL IMMATURE NEUTOROPHIL: 0.03 K/CU MM
TOTAL NUCLEATED RBC: 0 K/CU MM
TOTAL PROTEIN: 6.9 GM/DL (ref 6.4–8.2)
TROPONIN T: <0.01 NG/ML
WBC # BLD: 12.9 K/CU MM (ref 4–10.5)

## 2023-07-06 PROCEDURE — 5A09557 ASSISTANCE WITH RESPIRATORY VENTILATION, GREATER THAN 96 CONSECUTIVE HOURS, CONTINUOUS POSITIVE AIRWAY PRESSURE: ICD-10-PCS | Performed by: INTERNAL MEDICINE

## 2023-07-06 PROCEDURE — 0202U NFCT DS 22 TRGT SARS-COV-2: CPT

## 2023-07-06 PROCEDURE — 2700000000 HC OXYGEN THERAPY PER DAY

## 2023-07-06 PROCEDURE — 2140000000 HC CCU INTERMEDIATE R&B

## 2023-07-06 PROCEDURE — 83880 ASSAY OF NATRIURETIC PEPTIDE: CPT

## 2023-07-06 PROCEDURE — 94640 AIRWAY INHALATION TREATMENT: CPT

## 2023-07-06 PROCEDURE — 85025 COMPLETE CBC W/AUTO DIFF WBC: CPT

## 2023-07-06 PROCEDURE — 2500000003 HC RX 250 WO HCPCS: Performed by: EMERGENCY MEDICINE

## 2023-07-06 PROCEDURE — 99285 EMERGENCY DEPT VISIT HI MDM: CPT

## 2023-07-06 PROCEDURE — 82805 BLOOD GASES W/O2 SATURATION: CPT

## 2023-07-06 PROCEDURE — 84484 ASSAY OF TROPONIN QUANT: CPT

## 2023-07-06 PROCEDURE — 6370000000 HC RX 637 (ALT 250 FOR IP): Performed by: EMERGENCY MEDICINE

## 2023-07-06 PROCEDURE — 6360000002 HC RX W HCPCS: Performed by: EMERGENCY MEDICINE

## 2023-07-06 PROCEDURE — 96365 THER/PROPH/DIAG IV INF INIT: CPT

## 2023-07-06 PROCEDURE — 93005 ELECTROCARDIOGRAM TRACING: CPT | Performed by: EMERGENCY MEDICINE

## 2023-07-06 PROCEDURE — 71045 X-RAY EXAM CHEST 1 VIEW: CPT

## 2023-07-06 PROCEDURE — 2580000003 HC RX 258: Performed by: EMERGENCY MEDICINE

## 2023-07-06 PROCEDURE — 82962 GLUCOSE BLOOD TEST: CPT

## 2023-07-06 PROCEDURE — 80053 COMPREHEN METABOLIC PANEL: CPT

## 2023-07-06 PROCEDURE — 96375 TX/PRO/DX INJ NEW DRUG ADDON: CPT

## 2023-07-06 RX ORDER — FUROSEMIDE 10 MG/ML
40 INJECTION INTRAMUSCULAR; INTRAVENOUS ONCE
Status: COMPLETED | OUTPATIENT
Start: 2023-07-06 | End: 2023-07-06

## 2023-07-06 RX ORDER — IPRATROPIUM BROMIDE AND ALBUTEROL SULFATE 2.5; .5 MG/3ML; MG/3ML
3 SOLUTION RESPIRATORY (INHALATION) ONCE
Status: COMPLETED | OUTPATIENT
Start: 2023-07-06 | End: 2023-07-06

## 2023-07-06 RX ORDER — GLUCAGON 1 MG/ML
1 KIT INJECTION PRN
Status: DISCONTINUED | OUTPATIENT
Start: 2023-07-06 | End: 2023-07-14 | Stop reason: HOSPADM

## 2023-07-06 RX ORDER — NALOXONE HYDROCHLORIDE 1 MG/ML
2 INJECTION INTRAMUSCULAR; INTRAVENOUS; SUBCUTANEOUS ONCE
Status: COMPLETED | OUTPATIENT
Start: 2023-07-06 | End: 2023-07-06

## 2023-07-06 RX ORDER — DEXTROSE MONOHYDRATE 100 MG/ML
INJECTION, SOLUTION INTRAVENOUS CONTINUOUS PRN
Status: DISCONTINUED | OUTPATIENT
Start: 2023-07-06 | End: 2023-07-14 | Stop reason: HOSPADM

## 2023-07-06 RX ORDER — MAGNESIUM SULFATE IN WATER 40 MG/ML
2000 INJECTION, SOLUTION INTRAVENOUS ONCE
Status: COMPLETED | OUTPATIENT
Start: 2023-07-06 | End: 2023-07-07

## 2023-07-06 RX ADMIN — MAGNESIUM SULFATE HEPTAHYDRATE 2000 MG: 40 INJECTION, SOLUTION INTRAVENOUS at 22:16

## 2023-07-06 RX ADMIN — DEXTROSE MONOHYDRATE 250 ML: 100 INJECTION, SOLUTION INTRAVENOUS at 23:38

## 2023-07-06 RX ADMIN — IPRATROPIUM BROMIDE AND ALBUTEROL SULFATE 3 DOSE: .5; 2.5 SOLUTION RESPIRATORY (INHALATION) at 22:00

## 2023-07-06 RX ADMIN — FUROSEMIDE 40 MG: 10 INJECTION, SOLUTION INTRAMUSCULAR; INTRAVENOUS at 23:25

## 2023-07-06 RX ADMIN — SODIUM BICARBONATE 50 MEQ: 84 INJECTION, SOLUTION INTRAVENOUS at 23:27

## 2023-07-06 RX ADMIN — METHYLPREDNISOLONE SODIUM SUCCINATE 125 MG: 125 INJECTION, POWDER, FOR SOLUTION INTRAMUSCULAR; INTRAVENOUS at 22:08

## 2023-07-06 RX ADMIN — INSULIN HUMAN 10 UNITS: 100 INJECTION, SOLUTION PARENTERAL at 23:28

## 2023-07-06 RX ADMIN — NALOXONE HYDROCHLORIDE 2 MG: 1 INJECTION, SOLUTION INTRAMUSCULAR; INTRAVENOUS; SUBCUTANEOUS at 22:12

## 2023-07-07 ENCOUNTER — APPOINTMENT (OUTPATIENT)
Dept: CT IMAGING | Age: 83
DRG: 291 | End: 2023-07-07
Payer: MEDICARE

## 2023-07-07 LAB
AMMONIA: 19 UMOL/L (ref 11–51)
ANION GAP SERPL CALCULATED.3IONS-SCNC: 11 MMOL/L (ref 4–16)
B PARAP IS1001 DNA NPH QL NAA+NON-PROBE: NOT DETECTED
B PERT.PT PRMT NPH QL NAA+NON-PROBE: NOT DETECTED
BASE EXCESS MIXED: 10.2 (ref 0–2.3)
BASE EXCESS MIXED: 6.9 (ref 0–2.3)
BASE EXCESS MIXED: 7 (ref 0–2.3)
BUN SERPL-MCNC: 19 MG/DL (ref 6–23)
C PNEUM DNA NPH QL NAA+NON-PROBE: NOT DETECTED
CALCIUM SERPL-MCNC: 8.4 MG/DL (ref 8.3–10.6)
CARBON MONOXIDE, BLOOD: 3 % (ref 0–5)
CARBON MONOXIDE, BLOOD: 3.2 % (ref 0–5)
CHLORIDE BLD-SCNC: 98 MMOL/L (ref 99–110)
CO2 CONTENT: 37.4 MMOL/L (ref 19–24)
CO2 CONTENT: 39.9 MMOL/L (ref 19–24)
CO2: 31 MMOL/L (ref 21–32)
COMMENT: ABNORMAL
CREAT SERPL-MCNC: 0.9 MG/DL (ref 0.6–1.1)
EKG ATRIAL RATE: 63 BPM
EKG DIAGNOSIS: NORMAL
EKG P AXIS: 13 DEGREES
EKG P-R INTERVAL: 154 MS
EKG Q-T INTERVAL: 416 MS
EKG QRS DURATION: 76 MS
EKG QTC CALCULATION (BAZETT): 425 MS
EKG R AXIS: -20 DEGREES
EKG T AXIS: 4 DEGREES
EKG VENTRICULAR RATE: 63 BPM
FLUAV H1 2009 PAN RNA NPH NAA+NON-PROBE: NOT DETECTED
FLUAV H1 RNA NPH QL NAA+NON-PROBE: NOT DETECTED
FLUAV H3 RNA NPH QL NAA+NON-PROBE: NOT DETECTED
FLUAV RNA NPH QL NAA+NON-PROBE: NOT DETECTED
FLUBV RNA NPH QL NAA+NON-PROBE: NOT DETECTED
FOLATE SERPL-MCNC: 10.7 NG/ML (ref 3.1–17.5)
GFR SERPL CREATININE-BSD FRML MDRD: >60 ML/MIN/1.73M2
GLUCOSE SERPL-MCNC: 127 MG/DL (ref 70–99)
HADV DNA NPH QL NAA+NON-PROBE: NOT DETECTED
HCO3 ARTERIAL: 35.3 MMOL/L (ref 18–23)
HCO3 ARTERIAL: 37.9 MMOL/L (ref 18–23)
HCO3 VENOUS: 34.1 MMOL/L (ref 19–25)
HCOV 229E RNA NPH QL NAA+NON-PROBE: NOT DETECTED
HCOV HKU1 RNA NPH QL NAA+NON-PROBE: NOT DETECTED
HCOV NL63 RNA NPH QL NAA+NON-PROBE: NOT DETECTED
HCOV OC43 RNA NPH QL NAA+NON-PROBE: NOT DETECTED
HMPV RNA NPH QL NAA+NON-PROBE: NOT DETECTED
HPIV1 RNA NPH QL NAA+NON-PROBE: NOT DETECTED
HPIV2 RNA NPH QL NAA+NON-PROBE: NOT DETECTED
HPIV3 RNA NPH QL NAA+NON-PROBE: NOT DETECTED
HPIV4 RNA NPH QL NAA+NON-PROBE: NOT DETECTED
M PNEUMO DNA NPH QL NAA+NON-PROBE: NOT DETECTED
METHEMOGLOBIN ARTERIAL: 0.8 %
METHEMOGLOBIN ARTERIAL: 1.7 %
O2 SAT, VEN: 88.8 % (ref 50–70)
O2 SATURATION: 88.2 % (ref 96–97)
O2 SATURATION: 93.4 % (ref 96–97)
PCO2 ARTERIAL: 64 MMHG (ref 32–45)
PCO2 ARTERIAL: 67 MMHG (ref 32–45)
PCO2, VEN: 59 MMHG (ref 38–52)
PH BLOOD: 7.33 (ref 7.34–7.45)
PH BLOOD: 7.38 (ref 7.34–7.45)
PH VENOUS: 7.37 (ref 7.32–7.42)
PO2 ARTERIAL: 59 MMHG (ref 75–100)
PO2 ARTERIAL: 93 MMHG (ref 75–100)
PO2, VEN: 65 MMHG (ref 28–48)
POTASSIUM SERPL-SCNC: 4.9 MMOL/L (ref 3.5–5.1)
PRO-BNP: 3298 PG/ML
PRO-BNP: 3673 PG/ML
REASON FOR REJECTION: NORMAL
REJECTED TEST: NORMAL
RSV RNA NPH QL NAA+NON-PROBE: NOT DETECTED
RV+EV RNA NPH QL NAA+NON-PROBE: NOT DETECTED
SARS-COV-2 RNA NPH QL NAA+NON-PROBE: NOT DETECTED
SODIUM BLD-SCNC: 140 MMOL/L (ref 135–145)
T4 FREE SERPL-MCNC: 1.39 NG/DL (ref 0.9–1.8)
TSH SERPL DL<=0.005 MIU/L-ACNC: 0.69 UIU/ML (ref 0.27–4.2)
VITAMIN B-12: 1026 PG/ML (ref 211–911)

## 2023-07-07 PROCEDURE — 6370000000 HC RX 637 (ALT 250 FOR IP): Performed by: STUDENT IN AN ORGANIZED HEALTH CARE EDUCATION/TRAINING PROGRAM

## 2023-07-07 PROCEDURE — 82803 BLOOD GASES ANY COMBINATION: CPT

## 2023-07-07 PROCEDURE — 80048 BASIC METABOLIC PNL TOTAL CA: CPT

## 2023-07-07 PROCEDURE — 82607 VITAMIN B-12: CPT

## 2023-07-07 PROCEDURE — 76937 US GUIDE VASCULAR ACCESS: CPT

## 2023-07-07 PROCEDURE — 82746 ASSAY OF FOLIC ACID SERUM: CPT

## 2023-07-07 PROCEDURE — 94664 DEMO&/EVAL PT USE INHALER: CPT

## 2023-07-07 PROCEDURE — 97162 PT EVAL MOD COMPLEX 30 MIN: CPT

## 2023-07-07 PROCEDURE — 94640 AIRWAY INHALATION TREATMENT: CPT

## 2023-07-07 PROCEDURE — 84439 ASSAY OF FREE THYROXINE: CPT

## 2023-07-07 PROCEDURE — 83880 ASSAY OF NATRIURETIC PEPTIDE: CPT

## 2023-07-07 PROCEDURE — 94761 N-INVAS EAR/PLS OXIMETRY MLT: CPT

## 2023-07-07 PROCEDURE — 36415 COLL VENOUS BLD VENIPUNCTURE: CPT

## 2023-07-07 PROCEDURE — 93010 ELECTROCARDIOGRAM REPORT: CPT | Performed by: INTERNAL MEDICINE

## 2023-07-07 PROCEDURE — 2700000000 HC OXYGEN THERAPY PER DAY

## 2023-07-07 PROCEDURE — 84443 ASSAY THYROID STIM HORMONE: CPT

## 2023-07-07 PROCEDURE — 2140000000 HC CCU INTERMEDIATE R&B

## 2023-07-07 PROCEDURE — 36600 WITHDRAWAL OF ARTERIAL BLOOD: CPT

## 2023-07-07 PROCEDURE — 82140 ASSAY OF AMMONIA: CPT

## 2023-07-07 PROCEDURE — 6370000000 HC RX 637 (ALT 250 FOR IP): Performed by: NURSE PRACTITIONER

## 2023-07-07 PROCEDURE — 97167 OT EVAL HIGH COMPLEX 60 MIN: CPT

## 2023-07-07 PROCEDURE — 51798 US URINE CAPACITY MEASURE: CPT

## 2023-07-07 PROCEDURE — 6360000002 HC RX W HCPCS: Performed by: STUDENT IN AN ORGANIZED HEALTH CARE EDUCATION/TRAINING PROGRAM

## 2023-07-07 PROCEDURE — 6370000000 HC RX 637 (ALT 250 FOR IP): Performed by: INTERNAL MEDICINE

## 2023-07-07 PROCEDURE — 82805 BLOOD GASES W/O2 SATURATION: CPT

## 2023-07-07 PROCEDURE — 94660 CPAP INITIATION&MGMT: CPT

## 2023-07-07 RX ORDER — ONDANSETRON 2 MG/ML
4 INJECTION INTRAMUSCULAR; INTRAVENOUS EVERY 6 HOURS PRN
Status: DISCONTINUED | OUTPATIENT
Start: 2023-07-07 | End: 2023-07-14 | Stop reason: HOSPADM

## 2023-07-07 RX ORDER — IPRATROPIUM BROMIDE AND ALBUTEROL SULFATE 2.5; .5 MG/3ML; MG/3ML
1 SOLUTION RESPIRATORY (INHALATION)
Status: DISCONTINUED | OUTPATIENT
Start: 2023-07-07 | End: 2023-07-14 | Stop reason: HOSPADM

## 2023-07-07 RX ORDER — ACETAMINOPHEN 650 MG/1
650 SUPPOSITORY RECTAL EVERY 6 HOURS PRN
Status: DISCONTINUED | OUTPATIENT
Start: 2023-07-07 | End: 2023-07-14 | Stop reason: HOSPADM

## 2023-07-07 RX ORDER — SODIUM CHLORIDE 9 MG/ML
500 INJECTION, SOLUTION INTRAVENOUS PRN
Status: DISCONTINUED | OUTPATIENT
Start: 2023-07-07 | End: 2023-07-14 | Stop reason: HOSPADM

## 2023-07-07 RX ORDER — POTASSIUM CHLORIDE 7.45 MG/ML
10 INJECTION INTRAVENOUS PRN
Status: DISCONTINUED | OUTPATIENT
Start: 2023-07-07 | End: 2023-07-14 | Stop reason: HOSPADM

## 2023-07-07 RX ORDER — ONDANSETRON 4 MG/1
4 TABLET, ORALLY DISINTEGRATING ORAL EVERY 8 HOURS PRN
Status: DISCONTINUED | OUTPATIENT
Start: 2023-07-07 | End: 2023-07-14 | Stop reason: HOSPADM

## 2023-07-07 RX ORDER — FUROSEMIDE 10 MG/ML
20 INJECTION INTRAMUSCULAR; INTRAVENOUS 2 TIMES DAILY
Status: DISCONTINUED | OUTPATIENT
Start: 2023-07-07 | End: 2023-07-12

## 2023-07-07 RX ORDER — ENOXAPARIN SODIUM 100 MG/ML
40 INJECTION SUBCUTANEOUS DAILY
Status: DISCONTINUED | OUTPATIENT
Start: 2023-07-07 | End: 2023-07-14 | Stop reason: HOSPADM

## 2023-07-07 RX ORDER — MONTELUKAST SODIUM 10 MG/1
10 TABLET ORAL NIGHTLY
Status: DISCONTINUED | OUTPATIENT
Start: 2023-07-07 | End: 2023-07-14 | Stop reason: HOSPADM

## 2023-07-07 RX ORDER — ACETAMINOPHEN 325 MG/1
650 TABLET ORAL EVERY 6 HOURS PRN
Status: DISCONTINUED | OUTPATIENT
Start: 2023-07-07 | End: 2023-07-14 | Stop reason: HOSPADM

## 2023-07-07 RX ORDER — POLYETHYLENE GLYCOL 3350 17 G/17G
17 POWDER, FOR SOLUTION ORAL DAILY PRN
Status: DISCONTINUED | OUTPATIENT
Start: 2023-07-07 | End: 2023-07-14 | Stop reason: HOSPADM

## 2023-07-07 RX ORDER — SODIUM CHLORIDE 0.9 % (FLUSH) 0.9 %
5-40 SYRINGE (ML) INJECTION PRN
Status: DISCONTINUED | OUTPATIENT
Start: 2023-07-07 | End: 2023-07-14 | Stop reason: HOSPADM

## 2023-07-07 RX ORDER — POTASSIUM CHLORIDE 20 MEQ/1
40 TABLET, EXTENDED RELEASE ORAL PRN
Status: DISCONTINUED | OUTPATIENT
Start: 2023-07-07 | End: 2023-07-14 | Stop reason: HOSPADM

## 2023-07-07 RX ORDER — SODIUM CHLORIDE 0.9 % (FLUSH) 0.9 %
5-40 SYRINGE (ML) INJECTION EVERY 12 HOURS SCHEDULED
Status: DISCONTINUED | OUTPATIENT
Start: 2023-07-07 | End: 2023-07-14 | Stop reason: HOSPADM

## 2023-07-07 RX ORDER — BUDESONIDE AND FORMOTEROL FUMARATE DIHYDRATE 160; 4.5 UG/1; UG/1
2 AEROSOL RESPIRATORY (INHALATION) 2 TIMES DAILY
Status: DISCONTINUED | OUTPATIENT
Start: 2023-07-07 | End: 2023-07-14 | Stop reason: HOSPADM

## 2023-07-07 RX ORDER — LANOLIN ALCOHOL/MO/W.PET/CERES
6 CREAM (GRAM) TOPICAL NIGHTLY PRN
Status: DISCONTINUED | OUTPATIENT
Start: 2023-07-07 | End: 2023-07-14 | Stop reason: HOSPADM

## 2023-07-07 RX ORDER — MAGNESIUM SULFATE IN WATER 40 MG/ML
2000 INJECTION, SOLUTION INTRAVENOUS PRN
Status: DISCONTINUED | OUTPATIENT
Start: 2023-07-07 | End: 2023-07-14 | Stop reason: HOSPADM

## 2023-07-07 RX ADMIN — MELATONIN 6 MG: at 22:25

## 2023-07-07 RX ADMIN — IPRATROPIUM BROMIDE AND ALBUTEROL SULFATE 1 DOSE: 2.5; .5 SOLUTION RESPIRATORY (INHALATION) at 11:09

## 2023-07-07 RX ADMIN — IPRATROPIUM BROMIDE AND ALBUTEROL SULFATE 1 DOSE: 2.5; .5 SOLUTION RESPIRATORY (INHALATION) at 19:20

## 2023-07-07 RX ADMIN — BUDESONIDE AND FORMOTEROL FUMARATE DIHYDRATE 2 PUFF: 160; 4.5 AEROSOL RESPIRATORY (INHALATION) at 19:20

## 2023-07-07 RX ADMIN — MONTELUKAST 10 MG: 10 TABLET, FILM COATED ORAL at 20:51

## 2023-07-07 RX ADMIN — ENOXAPARIN SODIUM 40 MG: 100 INJECTION SUBCUTANEOUS at 08:06

## 2023-07-07 RX ADMIN — IPRATROPIUM BROMIDE AND ALBUTEROL SULFATE 1 DOSE: 2.5; .5 SOLUTION RESPIRATORY (INHALATION) at 07:37

## 2023-07-07 RX ADMIN — IPRATROPIUM BROMIDE AND ALBUTEROL SULFATE 1 DOSE: 2.5; .5 SOLUTION RESPIRATORY (INHALATION) at 16:05

## 2023-07-07 ASSESSMENT — PAIN SCALES - WONG BAKER
WONGBAKER_NUMERICALRESPONSE: 0
WONGBAKER_NUMERICALRESPONSE: 0

## 2023-07-08 LAB
ALBUMIN SERPL-MCNC: 3 GM/DL (ref 3.4–5)
ALP BLD-CCNC: 75 IU/L (ref 40–128)
ALT SERPL-CCNC: <5 U/L (ref 10–40)
ANION GAP SERPL CALCULATED.3IONS-SCNC: 13 MMOL/L (ref 4–16)
AST SERPL-CCNC: 12 IU/L (ref 15–37)
BASE EXCESS MIXED: 10.1 (ref 0–2.3)
BASE EXCESS MIXED: 10.8 (ref 0–2.3)
BASOPHILS ABSOLUTE: 0 K/CU MM
BASOPHILS ABSOLUTE: 0 K/CU MM
BASOPHILS RELATIVE PERCENT: 0.1 % (ref 0–1)
BASOPHILS RELATIVE PERCENT: 0.2 % (ref 0–1)
BILIRUB SERPL-MCNC: 0.3 MG/DL (ref 0–1)
BUN SERPL-MCNC: 22 MG/DL (ref 6–23)
CALCIUM SERPL-MCNC: 8.2 MG/DL (ref 8.3–10.6)
CARBON MONOXIDE, BLOOD: 3.3 % (ref 0–5)
CHLORIDE BLD-SCNC: 100 MMOL/L (ref 99–110)
CO2 CONTENT: 39.4 MMOL/L (ref 19–24)
CO2: 30 MMOL/L (ref 21–32)
COMMENT: ABNORMAL
COMMENT: ABNORMAL
CREAT SERPL-MCNC: 1 MG/DL (ref 0.6–1.1)
D DIMER: 1.57 UG/ML (FEU)
DIFFERENTIAL TYPE: ABNORMAL
DIFFERENTIAL TYPE: ABNORMAL
EOSINOPHILS ABSOLUTE: 0.1 K/CU MM
EOSINOPHILS ABSOLUTE: 0.2 K/CU MM
EOSINOPHILS RELATIVE PERCENT: 0.9 % (ref 0–3)
EOSINOPHILS RELATIVE PERCENT: 1.5 % (ref 0–3)
FERRITIN: 44 NG/ML (ref 15–150)
GFR SERPL CREATININE-BSD FRML MDRD: 57 ML/MIN/1.73M2
GLUCOSE SERPL-MCNC: 80 MG/DL (ref 70–99)
HCO3 ARTERIAL: 37.6 MMOL/L (ref 18–23)
HCO3 VENOUS: 36.5 MMOL/L (ref 19–25)
HCT VFR BLD CALC: 24.2 % (ref 37–47)
HCT VFR BLD CALC: 24.2 % (ref 37–47)
HCT VFR BLD CALC: 26.9 % (ref 37–47)
HCT VFR BLD CALC: 26.9 % (ref 37–47)
HEMOGLOBIN: 6.7 GM/DL (ref 12.5–16)
HEMOGLOBIN: 6.7 GM/DL (ref 12.5–16)
HEMOGLOBIN: 7.4 GM/DL (ref 12.5–16)
HEMOGLOBIN: 7.6 GM/DL (ref 12.5–16)
IMMATURE NEUTROPHIL %: 0.4 % (ref 0–0.43)
IMMATURE NEUTROPHIL %: 0.4 % (ref 0–0.43)
IRON: 22 UG/DL (ref 37–145)
LYMPHOCYTES ABSOLUTE: 0.6 K/CU MM
LYMPHOCYTES ABSOLUTE: 0.7 K/CU MM
LYMPHOCYTES RELATIVE PERCENT: 6 % (ref 24–44)
LYMPHOCYTES RELATIVE PERCENT: 7.5 % (ref 24–44)
MAGNESIUM: 2.5 MG/DL (ref 1.8–2.4)
MCH RBC QN AUTO: 25.4 PG (ref 27–31)
MCH RBC QN AUTO: 25.5 PG (ref 27–31)
MCHC RBC AUTO-ENTMCNC: 27.5 % (ref 32–36)
MCHC RBC AUTO-ENTMCNC: 27.7 % (ref 32–36)
MCV RBC AUTO: 92 FL (ref 78–100)
MCV RBC AUTO: 92.4 FL (ref 78–100)
METHEMOGLOBIN ARTERIAL: 1.8 %
MONOCYTES ABSOLUTE: 0.8 K/CU MM
MONOCYTES ABSOLUTE: 0.8 K/CU MM
MONOCYTES RELATIVE PERCENT: 8.1 % (ref 0–4)
MONOCYTES RELATIVE PERCENT: 8.1 % (ref 0–4)
NUCLEATED RBC %: 0 %
NUCLEATED RBC %: 0 %
O2 SAT, VEN: 92.6 % (ref 50–70)
O2 SATURATION: 93.5 % (ref 96–97)
PCO2 ARTERIAL: 58 MMHG (ref 32–45)
PCO2, VEN: 55 MMHG (ref 38–52)
PCT TRANSFERRIN: 10 % (ref 10–44)
PDW BLD-RTO: 16.1 % (ref 11.7–14.9)
PDW BLD-RTO: 16.2 % (ref 11.7–14.9)
PH BLOOD: 7.42 (ref 7.34–7.45)
PH VENOUS: 7.43 (ref 7.32–7.42)
PHOSPHORUS: 4.6 MG/DL (ref 2.5–4.9)
PLATELET # BLD: 236 K/CU MM (ref 140–440)
PLATELET # BLD: 236 K/CU MM (ref 140–440)
PMV BLD AUTO: 11.2 FL (ref 7.5–11.1)
PMV BLD AUTO: 11.9 FL (ref 7.5–11.1)
PO2 ARTERIAL: 87 MMHG (ref 75–100)
PO2, VEN: 170 MMHG (ref 28–48)
POTASSIUM SERPL-SCNC: 4.7 MMOL/L (ref 3.5–5.1)
PRO-BNP: 1517 PG/ML
PROCALCITONIN SERPL-MCNC: 0.07 NG/ML
PROCALCITONIN SERPL-MCNC: 0.07 NG/ML
RBC # BLD: 2.63 M/CU MM (ref 4.2–5.4)
RBC # BLD: 2.91 M/CU MM (ref 4.2–5.4)
SEGMENTED NEUTROPHILS ABSOLUTE COUNT: 8.1 K/CU MM
SEGMENTED NEUTROPHILS ABSOLUTE COUNT: 8.7 K/CU MM
SEGMENTED NEUTROPHILS RELATIVE PERCENT: 82.4 % (ref 36–66)
SEGMENTED NEUTROPHILS RELATIVE PERCENT: 84.4 % (ref 36–66)
SODIUM BLD-SCNC: 143 MMOL/L (ref 135–145)
TOTAL IMMATURE NEUTOROPHIL: 0.04 K/CU MM
TOTAL IMMATURE NEUTOROPHIL: 0.04 K/CU MM
TOTAL IRON BINDING CAPACITY: 215 UG/DL (ref 250–450)
TOTAL NUCLEATED RBC: 0 K/CU MM
TOTAL NUCLEATED RBC: 0 K/CU MM
TOTAL PROTEIN: 5.8 GM/DL (ref 6.4–8.2)
UNSATURATED IRON BINDING CAPACITY: 193 UG/DL (ref 110–370)
WBC # BLD: 10.3 K/CU MM (ref 4–10.5)
WBC # BLD: 9.8 K/CU MM (ref 4–10.5)

## 2023-07-08 PROCEDURE — 84145 PROCALCITONIN (PCT): CPT

## 2023-07-08 PROCEDURE — 36415 COLL VENOUS BLD VENIPUNCTURE: CPT

## 2023-07-08 PROCEDURE — 82805 BLOOD GASES W/O2 SATURATION: CPT

## 2023-07-08 PROCEDURE — 36600 WITHDRAWAL OF ARTERIAL BLOOD: CPT

## 2023-07-08 PROCEDURE — 85014 HEMATOCRIT: CPT

## 2023-07-08 PROCEDURE — 2140000000 HC CCU INTERMEDIATE R&B

## 2023-07-08 PROCEDURE — C9113 INJ PANTOPRAZOLE SODIUM, VIA: HCPCS | Performed by: STUDENT IN AN ORGANIZED HEALTH CARE EDUCATION/TRAINING PROGRAM

## 2023-07-08 PROCEDURE — 6370000000 HC RX 637 (ALT 250 FOR IP): Performed by: STUDENT IN AN ORGANIZED HEALTH CARE EDUCATION/TRAINING PROGRAM

## 2023-07-08 PROCEDURE — 94761 N-INVAS EAR/PLS OXIMETRY MLT: CPT

## 2023-07-08 PROCEDURE — 76937 US GUIDE VASCULAR ACCESS: CPT

## 2023-07-08 PROCEDURE — 6370000000 HC RX 637 (ALT 250 FOR IP): Performed by: INTERNAL MEDICINE

## 2023-07-08 PROCEDURE — 86900 BLOOD TYPING SEROLOGIC ABO: CPT

## 2023-07-08 PROCEDURE — 85379 FIBRIN DEGRADATION QUANT: CPT

## 2023-07-08 PROCEDURE — 85025 COMPLETE CBC W/AUTO DIFF WBC: CPT

## 2023-07-08 PROCEDURE — 82728 ASSAY OF FERRITIN: CPT

## 2023-07-08 PROCEDURE — 80053 COMPREHEN METABOLIC PANEL: CPT

## 2023-07-08 PROCEDURE — 2700000000 HC OXYGEN THERAPY PER DAY

## 2023-07-08 PROCEDURE — 94640 AIRWAY INHALATION TREATMENT: CPT

## 2023-07-08 PROCEDURE — 36430 TRANSFUSION BLD/BLD COMPNT: CPT

## 2023-07-08 PROCEDURE — 6360000002 HC RX W HCPCS: Performed by: STUDENT IN AN ORGANIZED HEALTH CARE EDUCATION/TRAINING PROGRAM

## 2023-07-08 PROCEDURE — 86901 BLOOD TYPING SEROLOGIC RH(D): CPT

## 2023-07-08 PROCEDURE — 80048 BASIC METABOLIC PNL TOTAL CA: CPT

## 2023-07-08 PROCEDURE — 83735 ASSAY OF MAGNESIUM: CPT

## 2023-07-08 PROCEDURE — 86850 RBC ANTIBODY SCREEN: CPT

## 2023-07-08 PROCEDURE — 85018 HEMOGLOBIN: CPT

## 2023-07-08 PROCEDURE — 94660 CPAP INITIATION&MGMT: CPT

## 2023-07-08 PROCEDURE — P9016 RBC LEUKOCYTES REDUCED: HCPCS

## 2023-07-08 PROCEDURE — 6370000000 HC RX 637 (ALT 250 FOR IP): Performed by: NURSE PRACTITIONER

## 2023-07-08 PROCEDURE — 86922 COMPATIBILITY TEST ANTIGLOB: CPT

## 2023-07-08 PROCEDURE — 2580000003 HC RX 258: Performed by: STUDENT IN AN ORGANIZED HEALTH CARE EDUCATION/TRAINING PROGRAM

## 2023-07-08 PROCEDURE — 83540 ASSAY OF IRON: CPT

## 2023-07-08 PROCEDURE — 84100 ASSAY OF PHOSPHORUS: CPT

## 2023-07-08 PROCEDURE — 82803 BLOOD GASES ANY COMBINATION: CPT

## 2023-07-08 PROCEDURE — 83550 IRON BINDING TEST: CPT

## 2023-07-08 PROCEDURE — 94664 DEMO&/EVAL PT USE INHALER: CPT

## 2023-07-08 PROCEDURE — 83880 ASSAY OF NATRIURETIC PEPTIDE: CPT

## 2023-07-08 RX ORDER — PANTOPRAZOLE SODIUM 40 MG/10ML
40 INJECTION, POWDER, LYOPHILIZED, FOR SOLUTION INTRAVENOUS 2 TIMES DAILY
Status: DISCONTINUED | OUTPATIENT
Start: 2023-07-08 | End: 2023-07-13

## 2023-07-08 RX ORDER — SODIUM CHLORIDE 9 MG/ML
INJECTION, SOLUTION INTRAVENOUS PRN
Status: COMPLETED | OUTPATIENT
Start: 2023-07-08 | End: 2023-07-12

## 2023-07-08 RX ADMIN — MONTELUKAST 10 MG: 10 TABLET, FILM COATED ORAL at 21:47

## 2023-07-08 RX ADMIN — SODIUM CHLORIDE, PRESERVATIVE FREE 10 ML: 5 INJECTION INTRAVENOUS at 22:03

## 2023-07-08 RX ADMIN — BUDESONIDE AND FORMOTEROL FUMARATE DIHYDRATE 2 PUFF: 160; 4.5 AEROSOL RESPIRATORY (INHALATION) at 08:11

## 2023-07-08 RX ADMIN — IPRATROPIUM BROMIDE AND ALBUTEROL SULFATE 1 DOSE: 2.5; .5 SOLUTION RESPIRATORY (INHALATION) at 08:10

## 2023-07-08 RX ADMIN — IPRATROPIUM BROMIDE AND ALBUTEROL SULFATE 1 DOSE: 2.5; .5 SOLUTION RESPIRATORY (INHALATION) at 15:01

## 2023-07-08 RX ADMIN — MELATONIN 6 MG: at 21:47

## 2023-07-08 RX ADMIN — IPRATROPIUM BROMIDE AND ALBUTEROL SULFATE 1 DOSE: 2.5; .5 SOLUTION RESPIRATORY (INHALATION) at 11:35

## 2023-07-08 RX ADMIN — PANTOPRAZOLE SODIUM 40 MG: 40 INJECTION, POWDER, FOR SOLUTION INTRAVENOUS at 21:47

## 2023-07-08 ASSESSMENT — PAIN SCALES - WONG BAKER
WONGBAKER_NUMERICALRESPONSE: 0
WONGBAKER_NUMERICALRESPONSE: 0

## 2023-07-09 LAB
ABO/RH: NORMAL
ANION GAP SERPL CALCULATED.3IONS-SCNC: 8 MMOL/L (ref 4–16)
ANTIBODY SCREEN: NEGATIVE
APTT: 31 SECONDS (ref 25.1–37.1)
BASOPHILS ABSOLUTE: 0 K/CU MM
BASOPHILS RELATIVE PERCENT: 0.1 % (ref 0–1)
BUN SERPL-MCNC: 14 MG/DL (ref 6–23)
CALCIUM SERPL-MCNC: 7.7 MG/DL (ref 8.3–10.6)
CHLORIDE BLD-SCNC: 102 MMOL/L (ref 99–110)
CO2: 32 MMOL/L (ref 21–32)
COMPONENT: NORMAL
CREAT SERPL-MCNC: 0.8 MG/DL (ref 0.6–1.1)
CROSSMATCH RESULT: NORMAL
DIFFERENTIAL TYPE: ABNORMAL
EOSINOPHILS ABSOLUTE: 0.4 K/CU MM
EOSINOPHILS RELATIVE PERCENT: 4.5 % (ref 0–3)
GFR SERPL CREATININE-BSD FRML MDRD: >60 ML/MIN/1.73M2
GLUCOSE SERPL-MCNC: 93 MG/DL (ref 70–99)
HCT VFR BLD CALC: 26.3 % (ref 37–47)
HCT VFR BLD CALC: 27.4 % (ref 37–47)
HCT VFR BLD CALC: 32 % (ref 37–47)
HEMOGLOBIN: 7.5 GM/DL (ref 12.5–16)
HEMOGLOBIN: 7.7 GM/DL (ref 12.5–16)
HEMOGLOBIN: 8.8 GM/DL (ref 12.5–16)
IMMATURE NEUTROPHIL %: 0.1 % (ref 0–0.43)
INR BLD: 1.1 INDEX
LYMPHOCYTES ABSOLUTE: 0.7 K/CU MM
LYMPHOCYTES RELATIVE PERCENT: 9.1 % (ref 24–44)
MCH RBC QN AUTO: 25.9 PG (ref 27–31)
MCHC RBC AUTO-ENTMCNC: 28.5 % (ref 32–36)
MCV RBC AUTO: 90.7 FL (ref 78–100)
MONOCYTES ABSOLUTE: 0.7 K/CU MM
MONOCYTES RELATIVE PERCENT: 8.7 % (ref 0–4)
NUCLEATED RBC %: 0 %
PDW BLD-RTO: 15.6 % (ref 11.7–14.9)
PLATELET # BLD: 208 K/CU MM (ref 140–440)
PMV BLD AUTO: 11 FL (ref 7.5–11.1)
POTASSIUM SERPL-SCNC: 4.1 MMOL/L (ref 3.5–5.1)
PROTHROMBIN TIME: 14.7 SECONDS (ref 11.7–14.5)
RBC # BLD: 2.9 M/CU MM (ref 4.2–5.4)
SEGMENTED NEUTROPHILS ABSOLUTE COUNT: 6.1 K/CU MM
SEGMENTED NEUTROPHILS RELATIVE PERCENT: 77.5 % (ref 36–66)
SODIUM BLD-SCNC: 142 MMOL/L (ref 135–145)
STATUS: NORMAL
TOTAL IMMATURE NEUTOROPHIL: 0.01 K/CU MM
TOTAL NUCLEATED RBC: 0 K/CU MM
TRANSFUSION STATUS: NORMAL
UNIT DIVISION: 0
UNIT NUMBER: NORMAL
WBC # BLD: 7.9 K/CU MM (ref 4–10.5)

## 2023-07-09 PROCEDURE — 2140000000 HC CCU INTERMEDIATE R&B

## 2023-07-09 PROCEDURE — 6370000000 HC RX 637 (ALT 250 FOR IP): Performed by: FAMILY MEDICINE

## 2023-07-09 PROCEDURE — 85025 COMPLETE CBC W/AUTO DIFF WBC: CPT

## 2023-07-09 PROCEDURE — 85014 HEMATOCRIT: CPT

## 2023-07-09 PROCEDURE — 6370000000 HC RX 637 (ALT 250 FOR IP): Performed by: STUDENT IN AN ORGANIZED HEALTH CARE EDUCATION/TRAINING PROGRAM

## 2023-07-09 PROCEDURE — 2580000003 HC RX 258: Performed by: STUDENT IN AN ORGANIZED HEALTH CARE EDUCATION/TRAINING PROGRAM

## 2023-07-09 PROCEDURE — 6370000000 HC RX 637 (ALT 250 FOR IP): Performed by: INTERNAL MEDICINE

## 2023-07-09 PROCEDURE — 85730 THROMBOPLASTIN TIME PARTIAL: CPT

## 2023-07-09 PROCEDURE — 36415 COLL VENOUS BLD VENIPUNCTURE: CPT

## 2023-07-09 PROCEDURE — 2700000000 HC OXYGEN THERAPY PER DAY

## 2023-07-09 PROCEDURE — 94640 AIRWAY INHALATION TREATMENT: CPT

## 2023-07-09 PROCEDURE — 85610 PROTHROMBIN TIME: CPT

## 2023-07-09 PROCEDURE — 85018 HEMOGLOBIN: CPT

## 2023-07-09 PROCEDURE — 99232 SBSQ HOSP IP/OBS MODERATE 35: CPT | Performed by: INTERNAL MEDICINE

## 2023-07-09 PROCEDURE — 94761 N-INVAS EAR/PLS OXIMETRY MLT: CPT

## 2023-07-09 PROCEDURE — 80048 BASIC METABOLIC PNL TOTAL CA: CPT

## 2023-07-09 PROCEDURE — 6360000002 HC RX W HCPCS: Performed by: STUDENT IN AN ORGANIZED HEALTH CARE EDUCATION/TRAINING PROGRAM

## 2023-07-09 PROCEDURE — 82330 ASSAY OF CALCIUM: CPT

## 2023-07-09 PROCEDURE — 6370000000 HC RX 637 (ALT 250 FOR IP): Performed by: NURSE PRACTITIONER

## 2023-07-09 PROCEDURE — C9113 INJ PANTOPRAZOLE SODIUM, VIA: HCPCS | Performed by: STUDENT IN AN ORGANIZED HEALTH CARE EDUCATION/TRAINING PROGRAM

## 2023-07-09 RX ORDER — LIDOCAINE 4 G/G
1 PATCH TOPICAL DAILY
Status: DISCONTINUED | OUTPATIENT
Start: 2023-07-09 | End: 2023-07-14 | Stop reason: HOSPADM

## 2023-07-09 RX ADMIN — SODIUM CHLORIDE, PRESERVATIVE FREE 10 ML: 5 INJECTION INTRAVENOUS at 07:41

## 2023-07-09 RX ADMIN — PANTOPRAZOLE SODIUM 40 MG: 40 INJECTION, POWDER, FOR SOLUTION INTRAVENOUS at 21:12

## 2023-07-09 RX ADMIN — MONTELUKAST 10 MG: 10 TABLET, FILM COATED ORAL at 21:12

## 2023-07-09 RX ADMIN — PANTOPRAZOLE SODIUM 40 MG: 40 INJECTION, POWDER, FOR SOLUTION INTRAVENOUS at 07:41

## 2023-07-09 RX ADMIN — IPRATROPIUM BROMIDE AND ALBUTEROL SULFATE 1 DOSE: 2.5; .5 SOLUTION RESPIRATORY (INHALATION) at 15:32

## 2023-07-09 RX ADMIN — MELATONIN 6 MG: at 21:12

## 2023-07-09 RX ADMIN — SODIUM CHLORIDE, PRESERVATIVE FREE 10 ML: 5 INJECTION INTRAVENOUS at 21:18

## 2023-07-09 RX ADMIN — BUDESONIDE AND FORMOTEROL FUMARATE DIHYDRATE 2 PUFF: 160; 4.5 AEROSOL RESPIRATORY (INHALATION) at 19:48

## 2023-07-09 RX ADMIN — IPRATROPIUM BROMIDE AND ALBUTEROL SULFATE 1 DOSE: 2.5; .5 SOLUTION RESPIRATORY (INHALATION) at 07:36

## 2023-07-09 RX ADMIN — BUDESONIDE AND FORMOTEROL FUMARATE DIHYDRATE 2 PUFF: 160; 4.5 AEROSOL RESPIRATORY (INHALATION) at 07:37

## 2023-07-09 RX ADMIN — IPRATROPIUM BROMIDE AND ALBUTEROL SULFATE 1 DOSE: 2.5; .5 SOLUTION RESPIRATORY (INHALATION) at 19:48

## 2023-07-09 ASSESSMENT — PAIN DESCRIPTION - ORIENTATION: ORIENTATION: RIGHT

## 2023-07-09 ASSESSMENT — PAIN - FUNCTIONAL ASSESSMENT: PAIN_FUNCTIONAL_ASSESSMENT: ACTIVITIES ARE NOT PREVENTED

## 2023-07-09 ASSESSMENT — PAIN SCALES - WONG BAKER
WONGBAKER_NUMERICALRESPONSE: 0
WONGBAKER_NUMERICALRESPONSE: 4

## 2023-07-09 ASSESSMENT — PAIN DESCRIPTION - DESCRIPTORS: DESCRIPTORS: ACHING

## 2023-07-09 ASSESSMENT — PAIN SCALES - GENERAL: PAINLEVEL_OUTOF10: 0

## 2023-07-09 ASSESSMENT — PAIN DESCRIPTION - PAIN TYPE: TYPE: ACUTE PAIN

## 2023-07-09 ASSESSMENT — PAIN DESCRIPTION - LOCATION: LOCATION: KNEE

## 2023-07-10 LAB
ANION GAP SERPL CALCULATED.3IONS-SCNC: 8 MMOL/L (ref 4–16)
BASOPHILS ABSOLUTE: 0 K/CU MM
BASOPHILS RELATIVE PERCENT: 0.1 % (ref 0–1)
BUN SERPL-MCNC: 12 MG/DL (ref 6–23)
CALCIUM IONIZED: 4.64 MG/DL (ref 4.48–5.28)
CALCIUM SERPL-MCNC: 7.6 MG/DL (ref 8.3–10.6)
CHLORIDE BLD-SCNC: 103 MMOL/L (ref 99–110)
CO2: 31 MMOL/L (ref 21–32)
CREAT SERPL-MCNC: 0.8 MG/DL (ref 0.6–1.1)
DIFFERENTIAL TYPE: ABNORMAL
EOSINOPHILS ABSOLUTE: 0.2 K/CU MM
EOSINOPHILS RELATIVE PERCENT: 2.6 % (ref 0–3)
GFR SERPL CREATININE-BSD FRML MDRD: >60 ML/MIN/1.73M2
GLUCOSE SERPL-MCNC: 101 MG/DL (ref 70–99)
HCT VFR BLD CALC: 27.6 % (ref 37–47)
HCT VFR BLD CALC: 30.2 % (ref 37–47)
HCT VFR BLD CALC: 30.4 % (ref 37–47)
HEMOGLOBIN: 7.6 GM/DL (ref 12.5–16)
HEMOGLOBIN: 8.3 GM/DL (ref 12.5–16)
HEMOGLOBIN: 8.5 GM/DL (ref 12.5–16)
IMMATURE NEUTROPHIL %: 0.4 % (ref 0–0.43)
IONIZED CA: 1.16 MMOL/L (ref 1.12–1.32)
LYMPHOCYTES ABSOLUTE: 0.7 K/CU MM
LYMPHOCYTES RELATIVE PERCENT: 7.9 % (ref 24–44)
MCH RBC QN AUTO: 25.2 PG (ref 27–31)
MCHC RBC AUTO-ENTMCNC: 27.5 % (ref 32–36)
MCV RBC AUTO: 91.7 FL (ref 78–100)
MONOCYTES ABSOLUTE: 0.8 K/CU MM
MONOCYTES RELATIVE PERCENT: 8.8 % (ref 0–4)
NUCLEATED RBC %: 0 %
PDW BLD-RTO: 15.4 % (ref 11.7–14.9)
PLATELET # BLD: 213 K/CU MM (ref 140–440)
PMV BLD AUTO: 11.1 FL (ref 7.5–11.1)
POTASSIUM SERPL-SCNC: 4 MMOL/L (ref 3.5–5.1)
RBC # BLD: 3.01 M/CU MM (ref 4.2–5.4)
SEGMENTED NEUTROPHILS ABSOLUTE COUNT: 6.8 K/CU MM
SEGMENTED NEUTROPHILS RELATIVE PERCENT: 80.2 % (ref 36–66)
SODIUM BLD-SCNC: 142 MMOL/L (ref 135–145)
TOTAL IMMATURE NEUTOROPHIL: 0.03 K/CU MM
TOTAL NUCLEATED RBC: 0 K/CU MM
TROPONIN T: <0.01 NG/ML
WBC # BLD: 8.5 K/CU MM (ref 4–10.5)

## 2023-07-10 PROCEDURE — 6370000000 HC RX 637 (ALT 250 FOR IP): Performed by: INTERNAL MEDICINE

## 2023-07-10 PROCEDURE — 80048 BASIC METABOLIC PNL TOTAL CA: CPT

## 2023-07-10 PROCEDURE — 6370000000 HC RX 637 (ALT 250 FOR IP): Performed by: STUDENT IN AN ORGANIZED HEALTH CARE EDUCATION/TRAINING PROGRAM

## 2023-07-10 PROCEDURE — 82330 ASSAY OF CALCIUM: CPT

## 2023-07-10 PROCEDURE — 94640 AIRWAY INHALATION TREATMENT: CPT

## 2023-07-10 PROCEDURE — 93005 ELECTROCARDIOGRAM TRACING: CPT | Performed by: STUDENT IN AN ORGANIZED HEALTH CARE EDUCATION/TRAINING PROGRAM

## 2023-07-10 PROCEDURE — 2580000003 HC RX 258: Performed by: STUDENT IN AN ORGANIZED HEALTH CARE EDUCATION/TRAINING PROGRAM

## 2023-07-10 PROCEDURE — C9113 INJ PANTOPRAZOLE SODIUM, VIA: HCPCS | Performed by: STUDENT IN AN ORGANIZED HEALTH CARE EDUCATION/TRAINING PROGRAM

## 2023-07-10 PROCEDURE — 36415 COLL VENOUS BLD VENIPUNCTURE: CPT

## 2023-07-10 PROCEDURE — 94761 N-INVAS EAR/PLS OXIMETRY MLT: CPT

## 2023-07-10 PROCEDURE — 2700000000 HC OXYGEN THERAPY PER DAY

## 2023-07-10 PROCEDURE — 85014 HEMATOCRIT: CPT

## 2023-07-10 PROCEDURE — 6360000002 HC RX W HCPCS: Performed by: STUDENT IN AN ORGANIZED HEALTH CARE EDUCATION/TRAINING PROGRAM

## 2023-07-10 PROCEDURE — 85025 COMPLETE CBC W/AUTO DIFF WBC: CPT

## 2023-07-10 PROCEDURE — 94660 CPAP INITIATION&MGMT: CPT

## 2023-07-10 PROCEDURE — 2140000000 HC CCU INTERMEDIATE R&B

## 2023-07-10 PROCEDURE — 84484 ASSAY OF TROPONIN QUANT: CPT

## 2023-07-10 PROCEDURE — 85018 HEMOGLOBIN: CPT

## 2023-07-10 RX ADMIN — IPRATROPIUM BROMIDE AND ALBUTEROL SULFATE 1 DOSE: 2.5; .5 SOLUTION RESPIRATORY (INHALATION) at 15:14

## 2023-07-10 RX ADMIN — MONTELUKAST 10 MG: 10 TABLET, FILM COATED ORAL at 20:22

## 2023-07-10 RX ADMIN — SODIUM CHLORIDE, PRESERVATIVE FREE 10 ML: 5 INJECTION INTRAVENOUS at 20:22

## 2023-07-10 RX ADMIN — FUROSEMIDE 20 MG: 10 INJECTION, SOLUTION INTRAMUSCULAR; INTRAVENOUS at 18:42

## 2023-07-10 RX ADMIN — IPRATROPIUM BROMIDE AND ALBUTEROL SULFATE 1 DOSE: 2.5; .5 SOLUTION RESPIRATORY (INHALATION) at 11:03

## 2023-07-10 RX ADMIN — IPRATROPIUM BROMIDE AND ALBUTEROL SULFATE 1 DOSE: 2.5; .5 SOLUTION RESPIRATORY (INHALATION) at 19:17

## 2023-07-10 RX ADMIN — SODIUM CHLORIDE, PRESERVATIVE FREE 10 ML: 5 INJECTION INTRAVENOUS at 07:59

## 2023-07-10 RX ADMIN — PANTOPRAZOLE SODIUM 40 MG: 40 INJECTION, POWDER, FOR SOLUTION INTRAVENOUS at 20:22

## 2023-07-10 RX ADMIN — IPRATROPIUM BROMIDE AND ALBUTEROL SULFATE 1 DOSE: 2.5; .5 SOLUTION RESPIRATORY (INHALATION) at 07:02

## 2023-07-10 RX ADMIN — PANTOPRAZOLE SODIUM 40 MG: 40 INJECTION, POWDER, FOR SOLUTION INTRAVENOUS at 08:00

## 2023-07-10 RX ADMIN — BUDESONIDE AND FORMOTEROL FUMARATE DIHYDRATE 2 PUFF: 160; 4.5 AEROSOL RESPIRATORY (INHALATION) at 19:18

## 2023-07-10 ASSESSMENT — PAIN SCALES - WONG BAKER
WONGBAKER_NUMERICALRESPONSE: 0

## 2023-07-10 NOTE — CARE COORDINATION
D/c plan is to return to LTC at [facility] when ever medically ready. NO pre-cert required. D/C INSTRUCTIONS ARE ON FRONT OF PACKET LOCATED WITH THE SOFT CHART.   TE

## 2023-07-11 ENCOUNTER — ANESTHESIA EVENT (OUTPATIENT)
Dept: ENDOSCOPY | Age: 83
End: 2023-07-11
Payer: MEDICARE

## 2023-07-11 LAB
EKG ATRIAL RATE: 72 BPM
EKG DIAGNOSIS: NORMAL
EKG P AXIS: 44 DEGREES
EKG P-R INTERVAL: 162 MS
EKG Q-T INTERVAL: 400 MS
EKG QRS DURATION: 76 MS
EKG QTC CALCULATION (BAZETT): 438 MS
EKG R AXIS: 74 DEGREES
EKG T AXIS: 55 DEGREES
EKG VENTRICULAR RATE: 72 BPM
HCT VFR BLD CALC: 29.7 % (ref 37–47)
HCT VFR BLD CALC: 32.8 % (ref 37–47)
HEMOGLOBIN: 8.3 GM/DL (ref 12.5–16)
HEMOGLOBIN: 8.8 GM/DL (ref 12.5–16)

## 2023-07-11 PROCEDURE — 94640 AIRWAY INHALATION TREATMENT: CPT

## 2023-07-11 PROCEDURE — 97110 THERAPEUTIC EXERCISES: CPT

## 2023-07-11 PROCEDURE — 2580000003 HC RX 258: Performed by: STUDENT IN AN ORGANIZED HEALTH CARE EDUCATION/TRAINING PROGRAM

## 2023-07-11 PROCEDURE — 6370000000 HC RX 637 (ALT 250 FOR IP): Performed by: STUDENT IN AN ORGANIZED HEALTH CARE EDUCATION/TRAINING PROGRAM

## 2023-07-11 PROCEDURE — 93010 ELECTROCARDIOGRAM REPORT: CPT | Performed by: INTERNAL MEDICINE

## 2023-07-11 PROCEDURE — 6360000002 HC RX W HCPCS: Performed by: STUDENT IN AN ORGANIZED HEALTH CARE EDUCATION/TRAINING PROGRAM

## 2023-07-11 PROCEDURE — 85014 HEMATOCRIT: CPT

## 2023-07-11 PROCEDURE — 85018 HEMOGLOBIN: CPT

## 2023-07-11 PROCEDURE — 36415 COLL VENOUS BLD VENIPUNCTURE: CPT

## 2023-07-11 PROCEDURE — 1200000000 HC SEMI PRIVATE

## 2023-07-11 PROCEDURE — 94761 N-INVAS EAR/PLS OXIMETRY MLT: CPT

## 2023-07-11 PROCEDURE — C9113 INJ PANTOPRAZOLE SODIUM, VIA: HCPCS | Performed by: STUDENT IN AN ORGANIZED HEALTH CARE EDUCATION/TRAINING PROGRAM

## 2023-07-11 PROCEDURE — 6370000000 HC RX 637 (ALT 250 FOR IP): Performed by: INTERNAL MEDICINE

## 2023-07-11 PROCEDURE — 2700000000 HC OXYGEN THERAPY PER DAY

## 2023-07-11 RX ADMIN — PANTOPRAZOLE SODIUM 40 MG: 40 INJECTION, POWDER, FOR SOLUTION INTRAVENOUS at 20:13

## 2023-07-11 RX ADMIN — MONTELUKAST 10 MG: 10 TABLET, FILM COATED ORAL at 20:13

## 2023-07-11 RX ADMIN — IPRATROPIUM BROMIDE AND ALBUTEROL SULFATE 1 DOSE: 2.5; .5 SOLUTION RESPIRATORY (INHALATION) at 19:52

## 2023-07-11 RX ADMIN — BUDESONIDE AND FORMOTEROL FUMARATE DIHYDRATE 2 PUFF: 160; 4.5 AEROSOL RESPIRATORY (INHALATION) at 07:26

## 2023-07-11 RX ADMIN — FUROSEMIDE 20 MG: 10 INJECTION, SOLUTION INTRAMUSCULAR; INTRAVENOUS at 18:41

## 2023-07-11 RX ADMIN — BUDESONIDE AND FORMOTEROL FUMARATE DIHYDRATE 2 PUFF: 160; 4.5 AEROSOL RESPIRATORY (INHALATION) at 19:53

## 2023-07-11 RX ADMIN — IPRATROPIUM BROMIDE AND ALBUTEROL SULFATE 1 DOSE: 2.5; .5 SOLUTION RESPIRATORY (INHALATION) at 11:12

## 2023-07-11 RX ADMIN — SODIUM CHLORIDE, PRESERVATIVE FREE 10 ML: 5 INJECTION INTRAVENOUS at 08:07

## 2023-07-11 RX ADMIN — ACETAMINOPHEN 650 MG: 325 TABLET ORAL at 20:12

## 2023-07-11 RX ADMIN — SODIUM CHLORIDE, PRESERVATIVE FREE 10 ML: 5 INJECTION INTRAVENOUS at 20:13

## 2023-07-11 RX ADMIN — IPRATROPIUM BROMIDE AND ALBUTEROL SULFATE 1 DOSE: 2.5; .5 SOLUTION RESPIRATORY (INHALATION) at 15:11

## 2023-07-11 RX ADMIN — PANTOPRAZOLE SODIUM 40 MG: 40 INJECTION, POWDER, FOR SOLUTION INTRAVENOUS at 08:07

## 2023-07-11 RX ADMIN — IPRATROPIUM BROMIDE AND ALBUTEROL SULFATE 1 DOSE: 2.5; .5 SOLUTION RESPIRATORY (INHALATION) at 07:26

## 2023-07-11 RX ADMIN — FUROSEMIDE 20 MG: 10 INJECTION, SOLUTION INTRAMUSCULAR; INTRAVENOUS at 08:07

## 2023-07-11 ASSESSMENT — PAIN SCALES - WONG BAKER
WONGBAKER_NUMERICALRESPONSE: 0

## 2023-07-11 ASSESSMENT — PAIN DESCRIPTION - LOCATION: LOCATION: BACK

## 2023-07-11 ASSESSMENT — PAIN SCALES - GENERAL: PAINLEVEL_OUTOF10: 10

## 2023-07-11 ASSESSMENT — PAIN DESCRIPTION - DESCRIPTORS: DESCRIPTORS: ACHING

## 2023-07-11 ASSESSMENT — COPD QUESTIONNAIRES: CAT_SEVERITY: MODERATE

## 2023-07-11 ASSESSMENT — PAIN DESCRIPTION - ORIENTATION: ORIENTATION: RIGHT

## 2023-07-11 NOTE — ADT AUTH CERT
7/7  by María Meneses RN       Review Status Review Entered   In Primary 7/10/2023 1426       Created By   María Meneses RN      Criteria Review   DATE: 7/7 intermed tele        RELEVANT BASELINES: (lab values, vitals, o2 amount/delivery, etc.)  Baseline o2 at 5L nc     PERTINENT UPDATES:  Still requiring bipap. VITALS:  99.1   31   72   97/46, 89/46   94% on 40% Fio2 bipap     ABNL/PERTINENT LABS/RADIOLOGY/DIAGNOSTIC STUDIES:  cl 98  bnp 3,298     ABG on 40% Fio2 bipap:  ph 7.38  sat 88.2%  pco2 64.0  po2 59  hco2 37.9        PHYSICAL EXAM:  LUNGS: Exam revealed diminished breath sounds. NEUROLOGIC:  Exam reveals that she is awake and responsive, lying  comfortably in bed, on BiPAP. EXTREMITIES:  Exam showed 2+ pedal edema.         MD CONSULTS/ASSESSMENT AND PLAN:  IM:  Acute metabolic encephalopathy  Gradual change in mental status x3 days by RN at nursing home  No focal symptoms or lateralization noted  Probably related to hypercapnia, + Asterixis  B12 and folate not decreased, TSH/Ft4 and ammonia normal  Management as below     Acute on chronic hypercapnic respiratory failure requiring NIPPV  Probably a combination of diastolic heart failure and COPD exacerbation  VBG on arrival with pH 7.28/PCO2 78 and bicarbonate of 36.7  X-ray chest without any acute abnormalities  Place on BiPAP and repeat ABG in 2 hours  Scheduled Duonebs  Lasix 20mg IV BID  CTA chest still pending as IV infiltrated, discussed with RN immediate need for new IV as pt high risk for decline and must undergo imaging and IV meds     Hyperkalemia -resolved  Likely due to acidosis  Lokelma/Insulin D50/Lasix and Bicarbonate ordered  Maintain telemetry and follow daily K levels     Non-cardiac chest pain -resolved  Reproducible on palpation  EKG with normal sinus rhythm, 63/min, no significant change from prior  Troponin (-)     Paroxysmal atrial fibrillation  Has been off Eliquis due to GI bleed  Hold Coreg due to low blood pressure trend

## 2023-07-11 NOTE — CONSULTS
[address]  Gastroenterology Consultation    2023  7:40 AM  _________________________________________________________________________  Patient:    Leesa Adams  : 1940   81 y.o. MRN: [id number]  Admitted: 2023  9:21 PM ATT: Cesar Savage MD   3101/3101-A  AdmitDx: Hyperkalemia [E87.5]  COPD with acute exacerbation ([address]) [J44.1]  Acute on chronic respiratory failure with hypoxia and hypercapnia ([address]) [J96.21, J96.22]  Acute hypercapnic respiratory failure ([address]) [J96.02] PCP: Aleida Nguyen MD  _________________________________________________________________________    Reason for Consult:  Concern for GIB with Dark/tarry BM, drop in Hgb. Has hx of GIB. Requesting Physician:  Cesar Savage MD    _________________________________________________________________________  IMPRESSION and RECOMMENDATIONS:    The patient is a 81 y.o. female with hx per HPI. GI consulted for Concern for GIB with Dark/tarry BM, drop in Hgb. Has hx of GIB. .     Impression:   Concern for GIB with Dark/tarry BM, drop in Hgb. Has hx of GIB. COPD  Respiratory Failure, on 4L oxygen  A. Fib  GERD    Discussion:  The patient is a 81 y.o. female with h/o COPD, chonic respiratory failure on 4 L, a. Fib, GERD, chronic diastolic heart failure who presented to hospital w/ respiratory failure. GI consulted for Concern for GIB with Dark/tarry BM, drop in Hgb. Has hx of GIB. Santiago Pena Patient presented from nursing home with increased drowsiness  On  had drop in Hgb from 8.4 to 6.7 and dark,tarry stools, held Eliquis, transfused, held steroids/AC/AP.     On  - no dark tarry stools, Hgb - 8.8    Plan:  - Continue to monitor H&H, transfuse <7   - Monitor for bleeding  - PPI IV BID  - NPO after midnight, plan for EGD tomorrow      Patient Active Problem List   Diagnosis Code    Coronary atherosclerosis of native coronary artery I25.10    Anemia D64.9    Chronic

## 2023-07-12 ENCOUNTER — ANESTHESIA (OUTPATIENT)
Dept: ENDOSCOPY | Age: 83
End: 2023-07-12
Payer: MEDICARE

## 2023-07-12 PROBLEM — E43 SEVERE MALNUTRITION (HCC): Status: ACTIVE | Noted: 2023-07-12

## 2023-07-12 PROCEDURE — 6370000000 HC RX 637 (ALT 250 FOR IP): Performed by: INTERNAL MEDICINE

## 2023-07-12 PROCEDURE — 2700000000 HC OXYGEN THERAPY PER DAY

## 2023-07-12 PROCEDURE — 2709999900 HC NON-CHARGEABLE SUPPLY: Performed by: INTERNAL MEDICINE

## 2023-07-12 PROCEDURE — 2580000003 HC RX 258: Performed by: STUDENT IN AN ORGANIZED HEALTH CARE EDUCATION/TRAINING PROGRAM

## 2023-07-12 PROCEDURE — 3700000000 HC ANESTHESIA ATTENDED CARE: Performed by: INTERNAL MEDICINE

## 2023-07-12 PROCEDURE — 3609017100 HC EGD: Performed by: INTERNAL MEDICINE

## 2023-07-12 PROCEDURE — 0DJ08ZZ INSPECTION OF UPPER INTESTINAL TRACT, VIA NATURAL OR ARTIFICIAL OPENING ENDOSCOPIC: ICD-10-PCS | Performed by: INTERNAL MEDICINE

## 2023-07-12 PROCEDURE — 6360000002 HC RX W HCPCS: Performed by: STUDENT IN AN ORGANIZED HEALTH CARE EDUCATION/TRAINING PROGRAM

## 2023-07-12 PROCEDURE — 6360000002 HC RX W HCPCS: Performed by: INTERNAL MEDICINE

## 2023-07-12 PROCEDURE — 2580000003 HC RX 258: Performed by: INTERNAL MEDICINE

## 2023-07-12 PROCEDURE — 6360000002 HC RX W HCPCS

## 2023-07-12 PROCEDURE — C9113 INJ PANTOPRAZOLE SODIUM, VIA: HCPCS | Performed by: INTERNAL MEDICINE

## 2023-07-12 PROCEDURE — 94640 AIRWAY INHALATION TREATMENT: CPT

## 2023-07-12 PROCEDURE — 6370000000 HC RX 637 (ALT 250 FOR IP): Performed by: STUDENT IN AN ORGANIZED HEALTH CARE EDUCATION/TRAINING PROGRAM

## 2023-07-12 PROCEDURE — 1200000000 HC SEMI PRIVATE

## 2023-07-12 PROCEDURE — 6370000000 HC RX 637 (ALT 250 FOR IP): Performed by: FAMILY MEDICINE

## 2023-07-12 PROCEDURE — 3700000001 HC ADD 15 MINUTES (ANESTHESIA): Performed by: INTERNAL MEDICINE

## 2023-07-12 PROCEDURE — C9113 INJ PANTOPRAZOLE SODIUM, VIA: HCPCS | Performed by: STUDENT IN AN ORGANIZED HEALTH CARE EDUCATION/TRAINING PROGRAM

## 2023-07-12 PROCEDURE — 6370000000 HC RX 637 (ALT 250 FOR IP)

## 2023-07-12 RX ORDER — PROPOFOL 10 MG/ML
INJECTION, EMULSION INTRAVENOUS PRN
Status: DISCONTINUED | OUTPATIENT
Start: 2023-07-12 | End: 2023-07-12 | Stop reason: SDUPTHER

## 2023-07-12 RX ORDER — LIDOCAINE HYDROCHLORIDE 20 MG/ML
INJECTION, SOLUTION INTRAVENOUS PRN
Status: DISCONTINUED | OUTPATIENT
Start: 2023-07-12 | End: 2023-07-12 | Stop reason: SDUPTHER

## 2023-07-12 RX ORDER — FUROSEMIDE 10 MG/ML
20 INJECTION INTRAMUSCULAR; INTRAVENOUS ONCE
Status: DISCONTINUED | OUTPATIENT
Start: 2023-07-12 | End: 2023-07-12

## 2023-07-12 RX ORDER — FUROSEMIDE 20 MG/1
20 TABLET ORAL DAILY
Status: DISCONTINUED | OUTPATIENT
Start: 2023-07-12 | End: 2023-07-14 | Stop reason: HOSPADM

## 2023-07-12 RX ORDER — SODIUM CHLORIDE, SODIUM LACTATE, POTASSIUM CHLORIDE, CALCIUM CHLORIDE 600; 310; 30; 20 MG/100ML; MG/100ML; MG/100ML; MG/100ML
INJECTION, SOLUTION INTRAVENOUS ONCE
Status: COMPLETED | OUTPATIENT
Start: 2023-07-12 | End: 2023-07-12

## 2023-07-12 RX ORDER — ALBUTEROL SULFATE 90 UG/1
AEROSOL, METERED RESPIRATORY (INHALATION) PRN
Status: DISCONTINUED | OUTPATIENT
Start: 2023-07-12 | End: 2023-07-12 | Stop reason: SDUPTHER

## 2023-07-12 RX ADMIN — MONTELUKAST 10 MG: 10 TABLET, FILM COATED ORAL at 21:00

## 2023-07-12 RX ADMIN — FUROSEMIDE 20 MG: 20 TABLET ORAL at 21:00

## 2023-07-12 RX ADMIN — IPRATROPIUM BROMIDE AND ALBUTEROL SULFATE 1 DOSE: 2.5; .5 SOLUTION RESPIRATORY (INHALATION) at 20:15

## 2023-07-12 RX ADMIN — SODIUM CHLORIDE, POTASSIUM CHLORIDE, SODIUM LACTATE AND CALCIUM CHLORIDE: 600; 310; 30; 20 INJECTION, SOLUTION INTRAVENOUS at 12:50

## 2023-07-12 RX ADMIN — PANTOPRAZOLE SODIUM 40 MG: 40 INJECTION, POWDER, FOR SOLUTION INTRAVENOUS at 09:46

## 2023-07-12 RX ADMIN — FUROSEMIDE 20 MG: 10 INJECTION, SOLUTION INTRAMUSCULAR; INTRAVENOUS at 09:46

## 2023-07-12 RX ADMIN — SODIUM CHLORIDE, PRESERVATIVE FREE 10 ML: 5 INJECTION INTRAVENOUS at 20:59

## 2023-07-12 RX ADMIN — FUROSEMIDE 20 MG: 10 INJECTION, SOLUTION INTRAMUSCULAR; INTRAVENOUS at 17:04

## 2023-07-12 RX ADMIN — BUDESONIDE AND FORMOTEROL FUMARATE DIHYDRATE 2 PUFF: 160; 4.5 AEROSOL RESPIRATORY (INHALATION) at 20:16

## 2023-07-12 RX ADMIN — PANTOPRAZOLE SODIUM 40 MG: 40 INJECTION, POWDER, FOR SOLUTION INTRAVENOUS at 20:59

## 2023-07-12 RX ADMIN — ALBUTEROL SULFATE 4 PUFF: 90 AEROSOL, METERED RESPIRATORY (INHALATION) at 13:29

## 2023-07-12 RX ADMIN — SODIUM CHLORIDE: 9 INJECTION, SOLUTION INTRAVENOUS at 13:23

## 2023-07-12 RX ADMIN — SODIUM CHLORIDE, PRESERVATIVE FREE 10 ML: 5 INJECTION INTRAVENOUS at 09:46

## 2023-07-12 RX ADMIN — MELATONIN 6 MG: at 20:59

## 2023-07-12 RX ADMIN — LIDOCAINE HYDROCHLORIDE 100 MG: 20 INJECTION, SOLUTION INTRAVENOUS at 13:32

## 2023-07-12 RX ADMIN — PROPOFOL 100 MG: 10 INJECTION, EMULSION INTRAVENOUS at 13:32

## 2023-07-12 ASSESSMENT — PAIN DESCRIPTION - PAIN TYPE: TYPE: ACUTE PAIN

## 2023-07-12 ASSESSMENT — PAIN DESCRIPTION - ONSET: ONSET: ON-GOING

## 2023-07-12 ASSESSMENT — PAIN - FUNCTIONAL ASSESSMENT: PAIN_FUNCTIONAL_ASSESSMENT: ACTIVITIES ARE NOT PREVENTED

## 2023-07-12 ASSESSMENT — PAIN DESCRIPTION - ORIENTATION: ORIENTATION: LOWER

## 2023-07-12 ASSESSMENT — PAIN SCALES - GENERAL: PAINLEVEL_OUTOF10: 10

## 2023-07-12 ASSESSMENT — COPD QUESTIONNAIRES: CAT_SEVERITY: MODERATE

## 2023-07-12 ASSESSMENT — PAIN DESCRIPTION - DIRECTION: RADIATING_TOWARDS: ACROSS

## 2023-07-12 ASSESSMENT — PAIN DESCRIPTION - DESCRIPTORS: DESCRIPTORS: ACHING

## 2023-07-12 ASSESSMENT — PAIN DESCRIPTION - LOCATION: LOCATION: BACK

## 2023-07-12 ASSESSMENT — PAIN SCALES - WONG BAKER: WONGBAKER_NUMERICALRESPONSE: 0

## 2023-07-12 ASSESSMENT — PAIN DESCRIPTION - FREQUENCY: FREQUENCY: CONTINUOUS

## 2023-07-12 NOTE — CARE COORDINATION
D/c plan is to return to LTC at [facility] when ever medically ready. NO pre-cert required. D/C INSTRUCTIONS ARE ON FRONT OF PACKET LOCATED WITH THE SOFT CHART.   TE

## 2023-07-12 NOTE — OP NOTE
Operative Note      Patient: Annabelle Rodríguez  YOB: 1940  MRN: [id number]    Date of Procedure: 7/12/2023    Pre-Op Diagnosis Codes:     * Gastrointestinal hemorrhage, unspecified gastrointestinal hemorrhage type [K92.2]    [address]      BRIEF OP REPORT:    Impression:    1) EGD showing mild esophagitis otherwise normal esophageal mucosa   2) stomach antrum showing mild patches of erythema otherwise mucosa nondiagnostic   3) duodenum normal        Suggest:   1) no evidence of upper GI bleed   2) continue PPI once a day   3) advance diet as tolerated     Full EGD/COLONOSCOPY report available by going to \"chart review\" then \"procedures\" then  \"EGD/Colonoscopy\"  then \"View Endoscopy Report\"      Electronically signed by Elba Doan MD on 7/12/2023 at 1:39 PM

## 2023-07-12 NOTE — PLAN OF CARE
Problem: Discharge Planning  Goal: Discharge to home or other facility with appropriate resources  Outcome: Progressing     Problem: Pain  Goal: Verbalizes/displays adequate comfort level or baseline comfort level  Outcome: Progressing     Problem: Safety - Adult  Goal: Free from fall injury  Outcome: Progressing     Problem: Nutrition Deficit:  Goal: Optimize nutritional status  Outcome: Progressing     Problem: Skin/Tissue Integrity  Goal: Absence of new skin breakdown  Description: 1. Monitor for areas of redness and/or skin breakdown  2. Assess vascular access sites hourly  3. Every 4-6 hours minimum:  Change oxygen saturation probe site  4. Every 4-6 hours:  If on nasal continuous positive airway pressure, respiratory therapy assess nares and determine need for appliance change or resting period.   Outcome: Progressing     Problem: ABCDS Injury Assessment  Goal: Absence of physical injury  Outcome: Progressing     Problem: Chronic Conditions and Co-morbidities  Goal: Patient's chronic conditions and co-morbidity symptoms are monitored and maintained or improved  7/12/2023 0958 by Jeovany Arias RN  Outcome: Progressing  7/12/2023 0958 by Jeovany Arias RN  Outcome: Progressing     Problem: Musculoskeletal - Adult  Goal: Maintain proper alignment of affected body part  Outcome: Progressing  Goal: Return ADL status to a safe level of function  Outcome: Progressing     Problem: Gastrointestinal - Adult  Goal: Minimal or absence of nausea and vomiting  Outcome: Progressing  Goal: Maintains or returns to baseline bowel function  Outcome: Progressing  Goal: Maintains adequate nutritional intake  Outcome: Progressing     Problem: Hematologic - Adult  Goal: Maintains hematologic stability  Outcome: Progressing

## 2023-07-12 NOTE — ANESTHESIA POSTPROCEDURE EVALUATION
Department of Anesthesiology  Postprocedure Note    Patient: Tara Person  MRN: [id number]  YOB: 1940  Date of evaluation: 7/12/2023      Procedure Summary     Date: 07/12/23 Room / Location: [facility]    Anesthesia Start: 2224 Anesthesia Stop: 1344    Procedure: EGD ESOPHAGOGASTRODUODENOSCOPY Diagnosis:       Gastrointestinal hemorrhage, unspecified gastrointestinal hemorrhage type      (Gastrointestinal hemorrhage, unspecified gastrointestinal hemorrhage type [K92.2])    Surgeons: Zahira Owens MD Responsible Provider: Donald Suh MD    Anesthesia Type: MAC ASA Status: 3          Anesthesia Type: No value filed.     Santiago Phase I:      Santiago Phase II:        Anesthesia Post Evaluation    Patient location during evaluation: bedside  Patient participation: complete - patient participated  Level of consciousness: awake  Pain score: 0  Airway patency: patent  Nausea & Vomiting: no nausea and no vomiting  Complications: no  Cardiovascular status: blood pressure returned to baseline and hemodynamically stable  Respiratory status: acceptable and room air  Hydration status: euvolemic

## 2023-07-13 ENCOUNTER — APPOINTMENT (OUTPATIENT)
Dept: GENERAL RADIOLOGY | Age: 83
DRG: 291 | End: 2023-07-13
Payer: MEDICARE

## 2023-07-13 LAB
ANION GAP SERPL CALCULATED.3IONS-SCNC: 12 MMOL/L (ref 4–16)
BASE EXCESS MIXED: 10 (ref 0–2.3)
BASOPHILS ABSOLUTE: 0 K/CU MM
BASOPHILS RELATIVE PERCENT: 0.1 % (ref 0–1)
BUN SERPL-MCNC: 11 MG/DL (ref 6–23)
CALCIUM SERPL-MCNC: 7.5 MG/DL (ref 8.3–10.6)
CHLORIDE BLD-SCNC: 95 MMOL/L (ref 99–110)
CO2: 29 MMOL/L (ref 21–32)
COMMENT: ABNORMAL
CREAT SERPL-MCNC: 1.1 MG/DL (ref 0.6–1.1)
DIFFERENTIAL TYPE: ABNORMAL
EOSINOPHILS ABSOLUTE: 0.1 K/CU MM
EOSINOPHILS RELATIVE PERCENT: 0.5 % (ref 0–3)
GFR SERPL CREATININE-BSD FRML MDRD: 50 ML/MIN/1.73M2
GLUCOSE BLD-MCNC: 106 MG/DL (ref 70–99)
GLUCOSE BLD-MCNC: 109 MG/DL (ref 70–99)
GLUCOSE BLD-MCNC: 111 MG/DL (ref 70–99)
GLUCOSE BLD-MCNC: 86 MG/DL (ref 70–99)
GLUCOSE BLD-MCNC: 97 MG/DL (ref 70–99)
GLUCOSE SERPL-MCNC: 127 MG/DL (ref 70–99)
HCO3 VENOUS: 37.2 MMOL/L (ref 19–25)
HCT VFR BLD CALC: 31.8 % (ref 37–47)
HEMOGLOBIN: 8.9 GM/DL (ref 12.5–16)
IMMATURE NEUTROPHIL %: 0.4 % (ref 0–0.43)
LYMPHOCYTES ABSOLUTE: 0.5 K/CU MM
LYMPHOCYTES RELATIVE PERCENT: 2.7 % (ref 24–44)
MCH RBC QN AUTO: 24.9 PG (ref 27–31)
MCHC RBC AUTO-ENTMCNC: 28 % (ref 32–36)
MCV RBC AUTO: 88.8 FL (ref 78–100)
MONOCYTES ABSOLUTE: 1 K/CU MM
MONOCYTES RELATIVE PERCENT: 5.2 % (ref 0–4)
NUCLEATED RBC %: 0 %
O2 SAT, VEN: 87.6 % (ref 50–70)
PCO2, VEN: 60 MMHG (ref 38–52)
PDW BLD-RTO: 15.6 % (ref 11.7–14.9)
PH VENOUS: 7.4 (ref 7.32–7.42)
PLATELET # BLD: 247 K/CU MM (ref 140–440)
PMV BLD AUTO: 11.5 FL (ref 7.5–11.1)
PO2, VEN: 63 MMHG (ref 28–48)
POTASSIUM SERPL-SCNC: 4.1 MMOL/L (ref 3.5–5.1)
RBC # BLD: 3.58 M/CU MM (ref 4.2–5.4)
SEGMENTED NEUTROPHILS ABSOLUTE COUNT: 17.5 K/CU MM
SEGMENTED NEUTROPHILS RELATIVE PERCENT: 91.1 % (ref 36–66)
SODIUM BLD-SCNC: 136 MMOL/L (ref 135–145)
TOTAL IMMATURE NEUTOROPHIL: 0.07 K/CU MM
TOTAL NUCLEATED RBC: 0 K/CU MM
WBC # BLD: 19.2 K/CU MM (ref 4–10.5)

## 2023-07-13 PROCEDURE — 2700000000 HC OXYGEN THERAPY PER DAY

## 2023-07-13 PROCEDURE — 2580000003 HC RX 258: Performed by: INTERNAL MEDICINE

## 2023-07-13 PROCEDURE — 1200000000 HC SEMI PRIVATE

## 2023-07-13 PROCEDURE — 82805 BLOOD GASES W/O2 SATURATION: CPT

## 2023-07-13 PROCEDURE — 80048 BASIC METABOLIC PNL TOTAL CA: CPT

## 2023-07-13 PROCEDURE — 2580000003 HC RX 258: Performed by: STUDENT IN AN ORGANIZED HEALTH CARE EDUCATION/TRAINING PROGRAM

## 2023-07-13 PROCEDURE — 6370000000 HC RX 637 (ALT 250 FOR IP): Performed by: INTERNAL MEDICINE

## 2023-07-13 PROCEDURE — 94640 AIRWAY INHALATION TREATMENT: CPT

## 2023-07-13 PROCEDURE — 71045 X-RAY EXAM CHEST 1 VIEW: CPT

## 2023-07-13 PROCEDURE — 85025 COMPLETE CBC W/AUTO DIFF WBC: CPT

## 2023-07-13 PROCEDURE — 6360000002 HC RX W HCPCS: Performed by: STUDENT IN AN ORGANIZED HEALTH CARE EDUCATION/TRAINING PROGRAM

## 2023-07-13 PROCEDURE — 94660 CPAP INITIATION&MGMT: CPT

## 2023-07-13 PROCEDURE — 82962 GLUCOSE BLOOD TEST: CPT

## 2023-07-13 PROCEDURE — 6370000000 HC RX 637 (ALT 250 FOR IP): Performed by: NURSE PRACTITIONER

## 2023-07-13 PROCEDURE — 6370000000 HC RX 637 (ALT 250 FOR IP): Performed by: STUDENT IN AN ORGANIZED HEALTH CARE EDUCATION/TRAINING PROGRAM

## 2023-07-13 PROCEDURE — 36415 COLL VENOUS BLD VENIPUNCTURE: CPT

## 2023-07-13 PROCEDURE — 94761 N-INVAS EAR/PLS OXIMETRY MLT: CPT

## 2023-07-13 RX ORDER — PANTOPRAZOLE SODIUM 40 MG/1
40 TABLET, DELAYED RELEASE ORAL
Status: DISCONTINUED | OUTPATIENT
Start: 2023-07-13 | End: 2023-07-14 | Stop reason: HOSPADM

## 2023-07-13 RX ORDER — 0.9 % SODIUM CHLORIDE 0.9 %
250 INTRAVENOUS SOLUTION INTRAVENOUS ONCE
Status: COMPLETED | OUTPATIENT
Start: 2023-07-13 | End: 2023-07-13

## 2023-07-13 RX ORDER — FUROSEMIDE 10 MG/ML
20 INJECTION INTRAMUSCULAR; INTRAVENOUS ONCE
Status: COMPLETED | OUTPATIENT
Start: 2023-07-13 | End: 2023-07-13

## 2023-07-13 RX ORDER — HYDROCODONE BITARTRATE AND ACETAMINOPHEN 5; 325 MG/1; MG/1
1 TABLET ORAL ONCE
Status: COMPLETED | OUTPATIENT
Start: 2023-07-13 | End: 2023-07-13

## 2023-07-13 RX ORDER — HYDROCODONE BITARTRATE AND ACETAMINOPHEN 5; 325 MG/1; MG/1
1 TABLET ORAL EVERY 6 HOURS PRN
Status: DISCONTINUED | OUTPATIENT
Start: 2023-07-13 | End: 2023-07-14 | Stop reason: HOSPADM

## 2023-07-13 RX ADMIN — FUROSEMIDE 20 MG: 10 INJECTION, SOLUTION INTRAMUSCULAR; INTRAVENOUS at 11:49

## 2023-07-13 RX ADMIN — IPRATROPIUM BROMIDE AND ALBUTEROL SULFATE 1 DOSE: 2.5; .5 SOLUTION RESPIRATORY (INHALATION) at 22:00

## 2023-07-13 RX ADMIN — ACETAMINOPHEN 650 MG: 325 TABLET ORAL at 08:41

## 2023-07-13 RX ADMIN — HYDROCODONE BITARTRATE AND ACETAMINOPHEN 1 TABLET: 5; 325 TABLET ORAL at 20:34

## 2023-07-13 RX ADMIN — SODIUM CHLORIDE, PRESERVATIVE FREE 10 ML: 5 INJECTION INTRAVENOUS at 11:50

## 2023-07-13 RX ADMIN — ENOXAPARIN SODIUM 40 MG: 100 INJECTION SUBCUTANEOUS at 08:37

## 2023-07-13 RX ADMIN — FUROSEMIDE 20 MG: 20 TABLET ORAL at 08:36

## 2023-07-13 RX ADMIN — SODIUM CHLORIDE, PRESERVATIVE FREE 10 ML: 5 INJECTION INTRAVENOUS at 20:33

## 2023-07-13 RX ADMIN — BUDESONIDE AND FORMOTEROL FUMARATE DIHYDRATE 2 PUFF: 160; 4.5 AEROSOL RESPIRATORY (INHALATION) at 22:11

## 2023-07-13 RX ADMIN — MONTELUKAST 10 MG: 10 TABLET, FILM COATED ORAL at 20:33

## 2023-07-13 RX ADMIN — PANTOPRAZOLE SODIUM 40 MG: 40 TABLET, DELAYED RELEASE ORAL at 08:41

## 2023-07-13 RX ADMIN — SODIUM CHLORIDE 250 ML: 9 INJECTION, SOLUTION INTRAVENOUS at 17:48

## 2023-07-13 RX ADMIN — HYDROCODONE BITARTRATE AND ACETAMINOPHEN 1 TABLET: 5; 325 TABLET ORAL at 04:06

## 2023-07-13 RX ADMIN — SODIUM CHLORIDE, PRESERVATIVE FREE 10 ML: 5 INJECTION INTRAVENOUS at 08:37

## 2023-07-13 ASSESSMENT — PAIN DESCRIPTION - ORIENTATION: ORIENTATION: RIGHT

## 2023-07-13 ASSESSMENT — PAIN SCALES - GENERAL
PAINLEVEL_OUTOF10: 10
PAINLEVEL_OUTOF10: 10
PAINLEVEL_OUTOF10: 0
PAINLEVEL_OUTOF10: 0
PAINLEVEL_OUTOF10: 3

## 2023-07-13 ASSESSMENT — PAIN DESCRIPTION - LOCATION
LOCATION: BACK
LOCATION: BACK;LEG
LOCATION: FOOT

## 2023-07-13 ASSESSMENT — PAIN - FUNCTIONAL ASSESSMENT: PAIN_FUNCTIONAL_ASSESSMENT: ACTIVITIES ARE NOT PREVENTED

## 2023-07-13 ASSESSMENT — PAIN DESCRIPTION - ONSET: ONSET: GRADUAL

## 2023-07-13 ASSESSMENT — PAIN DESCRIPTION - DESCRIPTORS
DESCRIPTORS: ACHING
DESCRIPTORS: ACHING

## 2023-07-13 ASSESSMENT — PAIN DESCRIPTION - PAIN TYPE: TYPE: ACUTE PAIN

## 2023-07-13 ASSESSMENT — PAIN DESCRIPTION - DIRECTION: RADIATING_TOWARDS: HIP

## 2023-07-13 ASSESSMENT — PAIN DESCRIPTION - FREQUENCY: FREQUENCY: CONTINUOUS

## 2023-07-14 VITALS
HEIGHT: 61 IN | SYSTOLIC BLOOD PRESSURE: 104 MMHG | DIASTOLIC BLOOD PRESSURE: 43 MMHG | RESPIRATION RATE: 11 BRPM | OXYGEN SATURATION: 93 % | HEART RATE: 73 BPM | WEIGHT: 174.5 LBS | TEMPERATURE: 97.8 F | BODY MASS INDEX: 32.94 KG/M2

## 2023-07-14 LAB
ANION GAP SERPL CALCULATED.3IONS-SCNC: 13 MMOL/L (ref 4–16)
BASE EXCESS MIXED: 7.5 (ref 0–2.3)
BUN SERPL-MCNC: 15 MG/DL (ref 6–23)
CALCIUM SERPL-MCNC: 8 MG/DL (ref 8.3–10.6)
CHLORIDE BLD-SCNC: 96 MMOL/L (ref 99–110)
CO2: 27 MMOL/L (ref 21–32)
CREAT SERPL-MCNC: 1 MG/DL (ref 0.6–1.1)
DIFFERENTIAL TYPE: ABNORMAL
EOSINOPHILS ABSOLUTE: 0.6 K/CU MM
EOSINOPHILS RELATIVE PERCENT: 5 % (ref 0–3)
GFR SERPL CREATININE-BSD FRML MDRD: 56 ML/MIN/1.73M2
GLUCOSE BLD-MCNC: 116 MG/DL (ref 70–99)
GLUCOSE BLD-MCNC: 95 MG/DL (ref 70–99)
GLUCOSE SERPL-MCNC: 93 MG/DL (ref 70–99)
HCO3 VENOUS: 32.7 MMOL/L (ref 19–25)
HCT VFR BLD CALC: 31.2 % (ref 37–47)
HEMOGLOBIN: 8.9 GM/DL (ref 12.5–16)
LYMPHOCYTES ABSOLUTE: 0.9 K/CU MM
LYMPHOCYTES RELATIVE PERCENT: 8 % (ref 24–44)
MCH RBC QN AUTO: 25.4 PG (ref 27–31)
MCHC RBC AUTO-ENTMCNC: 28.5 % (ref 32–36)
MCV RBC AUTO: 88.9 FL (ref 78–100)
MONOCYTES ABSOLUTE: 0.5 K/CU MM
MONOCYTES RELATIVE PERCENT: 4 % (ref 0–4)
O2 SAT, VEN: 88.6 % (ref 50–70)
PCO2, VEN: 47 MMHG (ref 38–52)
PDW BLD-RTO: 15.6 % (ref 11.7–14.9)
PH VENOUS: 7.45 (ref 7.32–7.42)
PLATELET # BLD: 231 K/CU MM (ref 140–440)
PLT MORPHOLOGY: ABNORMAL
PMV BLD AUTO: 11.3 FL (ref 7.5–11.1)
PO2, VEN: 63 MMHG (ref 28–48)
POTASSIUM SERPL-SCNC: 3.6 MMOL/L (ref 3.5–5.1)
PRO-BNP: 323.3 PG/ML
PROCALCITONIN SERPL-MCNC: 0.17 NG/ML
RBC # BLD: 3.51 M/CU MM (ref 4.2–5.4)
REASON FOR REJECTION: NORMAL
REJECTED TEST: NORMAL
SEGMENTED NEUTROPHILS ABSOLUTE COUNT: 9.3 K/CU MM
SEGMENTED NEUTROPHILS RELATIVE PERCENT: 83 % (ref 36–66)
SODIUM BLD-SCNC: 136 MMOL/L (ref 135–145)
WBC # BLD: 11.3 K/CU MM (ref 4–10.5)

## 2023-07-14 PROCEDURE — 85007 BL SMEAR W/DIFF WBC COUNT: CPT

## 2023-07-14 PROCEDURE — 85027 COMPLETE CBC AUTOMATED: CPT

## 2023-07-14 PROCEDURE — 6370000000 HC RX 637 (ALT 250 FOR IP): Performed by: INTERNAL MEDICINE

## 2023-07-14 PROCEDURE — 84145 PROCALCITONIN (PCT): CPT

## 2023-07-14 PROCEDURE — 6370000000 HC RX 637 (ALT 250 FOR IP): Performed by: STUDENT IN AN ORGANIZED HEALTH CARE EDUCATION/TRAINING PROGRAM

## 2023-07-14 PROCEDURE — 2580000003 HC RX 258: Performed by: INTERNAL MEDICINE

## 2023-07-14 PROCEDURE — 94664 DEMO&/EVAL PT USE INHALER: CPT

## 2023-07-14 PROCEDURE — 36415 COLL VENOUS BLD VENIPUNCTURE: CPT

## 2023-07-14 PROCEDURE — 94761 N-INVAS EAR/PLS OXIMETRY MLT: CPT

## 2023-07-14 PROCEDURE — 6360000002 HC RX W HCPCS: Performed by: STUDENT IN AN ORGANIZED HEALTH CARE EDUCATION/TRAINING PROGRAM

## 2023-07-14 PROCEDURE — 80048 BASIC METABOLIC PNL TOTAL CA: CPT

## 2023-07-14 PROCEDURE — 94640 AIRWAY INHALATION TREATMENT: CPT

## 2023-07-14 PROCEDURE — 2700000000 HC OXYGEN THERAPY PER DAY

## 2023-07-14 PROCEDURE — 83880 ASSAY OF NATRIURETIC PEPTIDE: CPT

## 2023-07-14 PROCEDURE — 82962 GLUCOSE BLOOD TEST: CPT

## 2023-07-14 RX ORDER — MONTELUKAST SODIUM 10 MG/1
10 TABLET ORAL NIGHTLY
Qty: 30 TABLET | Refills: 3 | Status: SHIPPED | OUTPATIENT
Start: 2023-07-14

## 2023-07-14 RX ORDER — PANTOPRAZOLE SODIUM 40 MG/1
40 TABLET, DELAYED RELEASE ORAL
Qty: 30 TABLET | Refills: 3 | Status: SHIPPED | OUTPATIENT
Start: 2023-07-15

## 2023-07-14 RX ADMIN — SODIUM CHLORIDE, PRESERVATIVE FREE 10 ML: 5 INJECTION INTRAVENOUS at 10:39

## 2023-07-14 RX ADMIN — MELATONIN 6 MG: at 00:29

## 2023-07-14 RX ADMIN — FUROSEMIDE 20 MG: 20 TABLET ORAL at 10:38

## 2023-07-14 RX ADMIN — BUDESONIDE AND FORMOTEROL FUMARATE DIHYDRATE 2 PUFF: 160; 4.5 AEROSOL RESPIRATORY (INHALATION) at 08:07

## 2023-07-14 RX ADMIN — HYDROCODONE BITARTRATE AND ACETAMINOPHEN 1 TABLET: 5; 325 TABLET ORAL at 06:18

## 2023-07-14 RX ADMIN — IPRATROPIUM BROMIDE AND ALBUTEROL SULFATE 1 DOSE: 2.5; .5 SOLUTION RESPIRATORY (INHALATION) at 07:55

## 2023-07-14 RX ADMIN — PANTOPRAZOLE SODIUM 40 MG: 40 TABLET, DELAYED RELEASE ORAL at 06:12

## 2023-07-14 RX ADMIN — ENOXAPARIN SODIUM 40 MG: 100 INJECTION SUBCUTANEOUS at 10:39

## 2023-07-14 ASSESSMENT — PAIN SCALES - GENERAL: PAINLEVEL_OUTOF10: 8

## 2023-07-14 ASSESSMENT — PAIN DESCRIPTION - LOCATION: LOCATION: BACK

## 2023-07-14 ASSESSMENT — PAIN DESCRIPTION - DESCRIPTORS: DESCRIPTORS: ACHING

## 2023-07-14 NOTE — DISCHARGE SUMMARY
effusion. Atelectasis is noted in the lung bases. No pneumothorax. The visualized osseous structures are unremarkable. 1.  Cardiomegaly with pulmonary vascular congestion and small left and trace right pleural effusions with associated bibasilar atelectasis. EGD    Result Date: 2023  [facility] Patient: Ronda Olivares MRN: [id number] : 1940 Gender: Female Age: 81 Years Procedure: Upper GI endoscopy Date: 2023 Attending Physician: Niru Cassidy Indications:        -  Melena Medications:        -  See the Anesthesia note for documentation of the administered medications Complications:        -  No immediate complications. Estimated Blood Loss:        -  Estimated blood loss: none. Procedure:        - The scope was introduced through the mouth and advanced to the second part           of the duodenum.        -  The upper GI endoscopy was accomplished without difficulty. -  The patient tolerated the procedure well. Findings:        -  LA Grade A (one or more mucosal breaks less than 5 mm, not extending           between tops of 2 mucosal folds) esophagitis with no bleeding was found in the           lower third of the esophagus.        -  No other significant abnormalities were identified in a careful examination           of the esophagus.        -  Patchy mildly erythematous mucosa without bleeding was found in the gastric           antrum.        -  No other significant abnormalities were identified in a careful examination           of the stomach. -  The examined duodenum was normal. Impression:        -  LA Grade A reflux esophagitis with no bleeding.        -  Erythematous mucosa in the gastric antrum.        -  Normal examined duodenum.        -  No specimens collected. Recommendation:        -  Resume previous diet. -  Continue present medications. -  Return patient to hospital [facility] for ongoing care.  Procedure Code(s):        - [id number],

## 2023-07-14 NOTE — CARE COORDINATION
Transport arranged with [facility]. ETA is 1300. Notified pt, pt's nurse, pt's spouse and Nahomy/Pili.   ZACHERY

## 2023-12-21 ENCOUNTER — HOSPITAL ENCOUNTER (EMERGENCY)
Age: 83
Discharge: NURSING, REHAB OR LONG-TERM CARE | End: 2023-12-22
Payer: COMMERCIAL

## 2023-12-21 VITALS
HEART RATE: 69 BPM | TEMPERATURE: 98.7 F | SYSTOLIC BLOOD PRESSURE: 104 MMHG | OXYGEN SATURATION: 94 % | WEIGHT: 199 LBS | BODY MASS INDEX: 37.57 KG/M2 | DIASTOLIC BLOOD PRESSURE: 57 MMHG | RESPIRATION RATE: 16 BRPM | HEIGHT: 61 IN

## 2023-12-21 DIAGNOSIS — D64.9 ANEMIA, UNSPECIFIED TYPE: Primary | ICD-10-CM

## 2023-12-21 LAB
ABO/RH: NORMAL
ALBUMIN SERPL-MCNC: 3.6 GM/DL (ref 3.4–5)
ALP BLD-CCNC: 87 IU/L (ref 40–129)
ALT SERPL-CCNC: 7 U/L (ref 10–40)
ANION GAP SERPL CALCULATED.3IONS-SCNC: 10 MMOL/L (ref 7–16)
ANTIBODY SCREEN: NEGATIVE
AST SERPL-CCNC: 32 IU/L (ref 15–37)
BASOPHILS ABSOLUTE: 0 K/CU MM
BASOPHILS RELATIVE PERCENT: 0.2 % (ref 0–1)
BILIRUB SERPL-MCNC: 0.2 MG/DL (ref 0–1)
BUN SERPL-MCNC: 19 MG/DL (ref 6–23)
CALCIUM SERPL-MCNC: 8.4 MG/DL (ref 8.3–10.6)
CHLORIDE BLD-SCNC: 101 MMOL/L (ref 99–110)
CO2: 28 MMOL/L (ref 21–32)
CREAT SERPL-MCNC: 1 MG/DL (ref 0.6–1.1)
DIFFERENTIAL TYPE: ABNORMAL
EOSINOPHILS ABSOLUTE: 0.4 K/CU MM
EOSINOPHILS RELATIVE PERCENT: 7.1 % (ref 0–3)
GFR SERPL CREATININE-BSD FRML MDRD: 56 ML/MIN/1.73M2
GLUCOSE SERPL-MCNC: 82 MG/DL (ref 70–99)
HCT VFR BLD CALC: 27.9 % (ref 37–47)
HEMOGLOBIN: 7.5 GM/DL (ref 12.5–16)
IMMATURE NEUTROPHIL %: 0.3 % (ref 0–0.43)
LYMPHOCYTES ABSOLUTE: 1.3 K/CU MM
LYMPHOCYTES RELATIVE PERCENT: 21.9 % (ref 24–44)
MCH RBC QN AUTO: 21.1 PG (ref 27–31)
MCHC RBC AUTO-ENTMCNC: 26.9 % (ref 32–36)
MCV RBC AUTO: 78.6 FL (ref 78–100)
MONOCYTES ABSOLUTE: 0.8 K/CU MM
MONOCYTES RELATIVE PERCENT: 12.7 % (ref 0–4)
NUCLEATED RBC %: 0 %
PDW BLD-RTO: 18.4 % (ref 11.7–14.9)
PLATELET # BLD: 258 K/CU MM (ref 140–440)
PMV BLD AUTO: 10.8 FL (ref 7.5–11.1)
POTASSIUM SERPL-SCNC: 5.4 MMOL/L (ref 3.5–5.1)
RBC # BLD: 3.55 M/CU MM (ref 4.2–5.4)
SEGMENTED NEUTROPHILS ABSOLUTE COUNT: 3.5 K/CU MM
SEGMENTED NEUTROPHILS RELATIVE PERCENT: 57.8 % (ref 36–66)
SODIUM BLD-SCNC: 139 MMOL/L (ref 135–145)
TOTAL IMMATURE NEUTOROPHIL: 0.02 K/CU MM
TOTAL NUCLEATED RBC: 0 K/CU MM
TOTAL PROTEIN: 7.8 GM/DL (ref 6.4–8.2)
WBC # BLD: 6.1 K/CU MM (ref 4–10.5)

## 2023-12-21 PROCEDURE — 85025 COMPLETE CBC W/AUTO DIFF WBC: CPT

## 2023-12-21 PROCEDURE — 93005 ELECTROCARDIOGRAM TRACING: CPT

## 2023-12-21 PROCEDURE — 80053 COMPREHEN METABOLIC PANEL: CPT

## 2023-12-21 PROCEDURE — 99284 EMERGENCY DEPT VISIT MOD MDM: CPT

## 2023-12-21 PROCEDURE — 86901 BLOOD TYPING SEROLOGIC RH(D): CPT

## 2023-12-21 PROCEDURE — 86900 BLOOD TYPING SEROLOGIC ABO: CPT

## 2023-12-21 PROCEDURE — 6370000000 HC RX 637 (ALT 250 FOR IP): Performed by: PHYSICIAN ASSISTANT

## 2023-12-21 PROCEDURE — 86850 RBC ANTIBODY SCREEN: CPT

## 2023-12-21 PROCEDURE — 94640 AIRWAY INHALATION TREATMENT: CPT

## 2023-12-21 RX ORDER — HYDROCODONE BITARTRATE AND ACETAMINOPHEN 5; 325 MG/1; MG/1
1 TABLET ORAL ONCE
Status: COMPLETED | OUTPATIENT
Start: 2023-12-21 | End: 2023-12-21

## 2023-12-21 RX ORDER — IPRATROPIUM BROMIDE AND ALBUTEROL SULFATE 2.5; .5 MG/3ML; MG/3ML
1 SOLUTION RESPIRATORY (INHALATION) ONCE
Status: COMPLETED | OUTPATIENT
Start: 2023-12-21 | End: 2023-12-21

## 2023-12-21 RX ADMIN — HYDROCODONE BITARTRATE AND ACETAMINOPHEN 1 TABLET: 5; 325 TABLET ORAL at 20:26

## 2023-12-21 RX ADMIN — IPRATROPIUM BROMIDE AND ALBUTEROL SULFATE 1 DOSE: .5; 3 SOLUTION RESPIRATORY (INHALATION) at 19:56

## 2023-12-21 NOTE — ED TRIAGE NOTES
Pt to ED via EMS from [address] [place] [address] after having her labs drawn this afternoon and having her hgb come back at 6.6. Pt arrives on 4L NC which is baseline for her.

## 2023-12-21 NOTE — ED PROVIDER NOTES
12 lead EKG per my interpretation:  Normal Sinus Rhythm 64  Axis is   Left  QTc is   425  There is no specific T wave changes appreciated. There is no specific ST wave changes appreciated.     Prior EKG to compare with was not available         Prashant Phillips DO  12/21/23 3563

## 2023-12-22 NOTE — DISCHARGE INSTRUCTIONS
Your hemoglobin today was 7.5. Please discuss this result with your primary doctor and to schedule reevaluation and reassessment. Additionally follow-up with your primary care provider for reevaluation of your breathing. Return with any shortness of breath, chest pain, passing out, worsening symptoms or any new concerns.

## 2023-12-23 LAB
EKG ATRIAL RATE: 64 BPM
EKG DIAGNOSIS: NORMAL
EKG P AXIS: 81 DEGREES
EKG P-R INTERVAL: 174 MS
EKG Q-T INTERVAL: 412 MS
EKG QRS DURATION: 70 MS
EKG QTC CALCULATION (BAZETT): 425 MS
EKG R AXIS: -30 DEGREES
EKG T AXIS: 15 DEGREES
EKG VENTRICULAR RATE: 64 BPM

## 2024-03-15 NOTE — PLAN OF CARE
LMOM schedule f/up   Problem: Falls - Risk of:  Goal: Will remain free from falls  Description: Will remain free from falls  Outcome: Ongoing  Goal: Absence of physical injury  Description: Absence of physical injury  Outcome: Ongoing     Problem: Pain:  Goal: Pain level will decrease  Description: Pain level will decrease  Outcome: Ongoing  Goal: Control of acute pain  Description: Control of acute pain  Outcome: Ongoing  Goal: Control of chronic pain  Description: Control of chronic pain  Outcome: Ongoing     Problem: Discharge Planning:  Goal: Discharged to appropriate level of care  Description: Discharged to appropriate level of care  Outcome: Ongoing     Problem:  Activity Intolerance:  Goal: Ability to tolerate increased activity will improve  Description: Ability to tolerate increased activity will improve  Outcome: Ongoing     Problem: Airway Clearance - Ineffective:  Goal: Ability to maintain a clear airway will improve  Description: Ability to maintain a clear airway will improve  Outcome: Ongoing     Problem: Breathing Pattern - Ineffective:  Goal: Ability to achieve and maintain a regular respiratory rate will improve  Description: Ability to achieve and maintain a regular respiratory rate will improve  Outcome: Ongoing     Problem: Gas Exchange - Impaired:  Goal: Levels of oxygenation will improve  Description: Levels of oxygenation will improve  Outcome: Ongoing

## 2024-04-18 ENCOUNTER — APPOINTMENT (OUTPATIENT)
Dept: GENERAL RADIOLOGY | Age: 84
DRG: 871 | End: 2024-04-18
Payer: MEDICARE

## 2024-04-18 ENCOUNTER — HOSPITAL ENCOUNTER (INPATIENT)
Age: 84
LOS: 5 days | Discharge: HOSPICE | DRG: 871 | End: 2024-04-23
Attending: EMERGENCY MEDICINE | Admitting: FAMILY MEDICINE
Payer: MEDICARE

## 2024-04-18 DIAGNOSIS — J96.02 ACUTE HYPERCAPNIC RESPIRATORY FAILURE (HCC): ICD-10-CM

## 2024-04-18 DIAGNOSIS — R09.02 HYPOXEMIA: Primary | ICD-10-CM

## 2024-04-18 DIAGNOSIS — J18.9 PNEUMONIA OF BOTH LUNGS DUE TO INFECTIOUS ORGANISM, UNSPECIFIED PART OF LUNG: ICD-10-CM

## 2024-04-18 DIAGNOSIS — J81.0 ACUTE PULMONARY EDEMA (HCC): ICD-10-CM

## 2024-04-18 DIAGNOSIS — I50.9 ACUTE ON CHRONIC CONGESTIVE HEART FAILURE, UNSPECIFIED HEART FAILURE TYPE (HCC): ICD-10-CM

## 2024-04-18 DIAGNOSIS — R61 DIAPHORESIS: ICD-10-CM

## 2024-04-18 DIAGNOSIS — R06.89 DYSPNEA AND RESPIRATORY ABNORMALITIES: ICD-10-CM

## 2024-04-18 DIAGNOSIS — R06.00 DYSPNEA AND RESPIRATORY ABNORMALITIES: ICD-10-CM

## 2024-04-18 LAB
ALBUMIN SERPL-MCNC: 3.2 GM/DL (ref 3.4–5)
ALP BLD-CCNC: 124 IU/L (ref 40–128)
ALT SERPL-CCNC: 7 U/L (ref 10–40)
ANION GAP SERPL CALCULATED.3IONS-SCNC: 11 MMOL/L (ref 7–16)
AST SERPL-CCNC: 20 IU/L (ref 15–37)
BASE EXCESS MIXED: 1.2 (ref 0–3)
BASE EXCESS: 3 (ref 0–3)
BASOPHILS ABSOLUTE: 0 K/CU MM
BASOPHILS RELATIVE PERCENT: 0.3 % (ref 0–1)
BILIRUB SERPL-MCNC: 0.2 MG/DL (ref 0–1)
BUN SERPL-MCNC: 26 MG/DL (ref 6–23)
CALCIUM SERPL-MCNC: 8.3 MG/DL (ref 8.3–10.6)
CHLORIDE BLD-SCNC: 97 MMOL/L (ref 99–110)
CO2: 26 MMOL/L (ref 21–32)
COMMENT: ABNORMAL
COMMENT: ABNORMAL
CREAT SERPL-MCNC: 1.1 MG/DL (ref 0.6–1.1)
DIFFERENTIAL TYPE: ABNORMAL
EOSINOPHILS ABSOLUTE: 0.1 K/CU MM
EOSINOPHILS RELATIVE PERCENT: 0.9 % (ref 0–3)
GFR SERPL CREATININE-BSD FRML MDRD: 50 ML/MIN/1.73M2
GLUCOSE SERPL-MCNC: 123 MG/DL (ref 70–99)
HCO3 VENOUS: 20.8 MMOL/L (ref 22–29)
HCO3 VENOUS: 29.3 MMOL/L (ref 22–29)
HCT VFR BLD CALC: 32.1 % (ref 37–47)
HEMOGLOBIN: 8.9 GM/DL (ref 12.5–16)
IMMATURE NEUTROPHIL %: 1.9 % (ref 0–0.43)
INFLUENZA A ANTIGEN: NOT DETECTED
INFLUENZA B ANTIGEN: NOT DETECTED
IRON: 14 UG/DL (ref 37–145)
LACTATE: 1.7 MMOL/L (ref 0.5–1.9)
LIPASE: 10 IU/L (ref 13–60)
LYMPHOCYTES ABSOLUTE: 1.3 K/CU MM
LYMPHOCYTES RELATIVE PERCENT: 9.5 % (ref 24–44)
MAGNESIUM: 2.4 MG/DL (ref 1.8–2.4)
MCH RBC QN AUTO: 25.3 PG (ref 27–31)
MCHC RBC AUTO-ENTMCNC: 27.7 % (ref 32–36)
MCV RBC AUTO: 91.2 FL (ref 78–100)
MONOCYTES ABSOLUTE: 1 K/CU MM
MONOCYTES RELATIVE PERCENT: 7.3 % (ref 0–4)
NEUTROPHILS RELATIVE PERCENT: 80.1 % (ref 36–66)
NUCLEATED RBC %: 0 %
O2 SAT, VEN: 87.9 % (ref 50–70)
O2 SAT, VEN: 92.5 % (ref 50–70)
PCO2, VEN: 32 MMHG (ref 41–51)
PCO2, VEN: 61 MMHG (ref 41–51)
PCT TRANSFERRIN: 8 % (ref 10–44)
PDW BLD-RTO: 15.9 % (ref 11.7–14.9)
PH VENOUS: 7.29 (ref 7.32–7.43)
PH VENOUS: 7.42 (ref 7.32–7.43)
PLATELET # BLD: 313 K/CU MM (ref 140–440)
PMV BLD AUTO: 11.9 FL (ref 7.5–11.1)
PO2, VEN: 119 MMHG (ref 28–48)
PO2, VEN: 123 MMHG (ref 28–48)
POTASSIUM SERPL-SCNC: 5.3 MMOL/L (ref 3.5–5.1)
PRO-BNP: 7078 PG/ML
RBC # BLD: 3.52 M/CU MM (ref 4.2–5.4)
SARS-COV-2 RDRP RESP QL NAA+PROBE: NOT DETECTED
SEGMENTED NEUTROPHILS ABSOLUTE COUNT: 11.2 K/CU MM
SODIUM BLD-SCNC: 134 MMOL/L (ref 135–145)
SOURCE: NORMAL
TOTAL CK: 49 IU/L (ref 26–140)
TOTAL IMMATURE NEUTOROPHIL: 0.27 K/CU MM
TOTAL IRON BINDING CAPACITY: 186 UG/DL (ref 250–450)
TOTAL NUCLEATED RBC: 0 K/CU MM
TOTAL PROTEIN: 8.1 GM/DL (ref 6.4–8.2)
TROPONIN, HIGH SENSITIVITY: 27 NG/L (ref 0–14)
TROPONIN, HIGH SENSITIVITY: 34 NG/L (ref 0–14)
TSH SERPL DL<=0.005 MIU/L-ACNC: 1.52 UIU/ML (ref 0.27–4.2)
UNSATURATED IRON BINDING CAPACITY: 172 UG/DL (ref 110–370)
WBC # BLD: 14 K/CU MM (ref 4–10.5)

## 2024-04-18 PROCEDURE — 2060000000 HC ICU INTERMEDIATE R&B

## 2024-04-18 PROCEDURE — 5A09557 ASSISTANCE WITH RESPIRATORY VENTILATION, GREATER THAN 96 CONSECUTIVE HOURS, CONTINUOUS POSITIVE AIRWAY PRESSURE: ICD-10-PCS | Performed by: FAMILY MEDICINE

## 2024-04-18 PROCEDURE — 83550 IRON BINDING TEST: CPT

## 2024-04-18 PROCEDURE — 71045 X-RAY EXAM CHEST 1 VIEW: CPT

## 2024-04-18 PROCEDURE — 83690 ASSAY OF LIPASE: CPT

## 2024-04-18 PROCEDURE — 87086 URINE CULTURE/COLONY COUNT: CPT

## 2024-04-18 PROCEDURE — 96374 THER/PROPH/DIAG INJ IV PUSH: CPT

## 2024-04-18 PROCEDURE — 6360000002 HC RX W HCPCS: Performed by: FAMILY MEDICINE

## 2024-04-18 PROCEDURE — 84443 ASSAY THYROID STIM HORMONE: CPT

## 2024-04-18 PROCEDURE — 6370000000 HC RX 637 (ALT 250 FOR IP): Performed by: EMERGENCY MEDICINE

## 2024-04-18 PROCEDURE — 83880 ASSAY OF NATRIURETIC PEPTIDE: CPT

## 2024-04-18 PROCEDURE — 36415 COLL VENOUS BLD VENIPUNCTURE: CPT

## 2024-04-18 PROCEDURE — 99285 EMERGENCY DEPT VISIT HI MDM: CPT

## 2024-04-18 PROCEDURE — 82550 ASSAY OF CK (CPK): CPT

## 2024-04-18 PROCEDURE — 6360000002 HC RX W HCPCS: Performed by: EMERGENCY MEDICINE

## 2024-04-18 PROCEDURE — 87635 SARS-COV-2 COVID-19 AMP PRB: CPT

## 2024-04-18 PROCEDURE — 6370000000 HC RX 637 (ALT 250 FOR IP): Performed by: FAMILY MEDICINE

## 2024-04-18 PROCEDURE — 96375 TX/PRO/DX INJ NEW DRUG ADDON: CPT

## 2024-04-18 PROCEDURE — 85025 COMPLETE CBC W/AUTO DIFF WBC: CPT

## 2024-04-18 PROCEDURE — 83735 ASSAY OF MAGNESIUM: CPT

## 2024-04-18 PROCEDURE — 83540 ASSAY OF IRON: CPT

## 2024-04-18 PROCEDURE — 94640 AIRWAY INHALATION TREATMENT: CPT

## 2024-04-18 PROCEDURE — 80053 COMPREHEN METABOLIC PANEL: CPT

## 2024-04-18 PROCEDURE — 93005 ELECTROCARDIOGRAM TRACING: CPT | Performed by: EMERGENCY MEDICINE

## 2024-04-18 PROCEDURE — 87040 BLOOD CULTURE FOR BACTERIA: CPT

## 2024-04-18 PROCEDURE — 94761 N-INVAS EAR/PLS OXIMETRY MLT: CPT

## 2024-04-18 PROCEDURE — 83605 ASSAY OF LACTIC ACID: CPT

## 2024-04-18 PROCEDURE — 84484 ASSAY OF TROPONIN QUANT: CPT

## 2024-04-18 PROCEDURE — 82805 BLOOD GASES W/O2 SATURATION: CPT

## 2024-04-18 PROCEDURE — 87502 INFLUENZA DNA AMP PROBE: CPT

## 2024-04-18 PROCEDURE — 2580000003 HC RX 258: Performed by: FAMILY MEDICINE

## 2024-04-18 PROCEDURE — 94660 CPAP INITIATION&MGMT: CPT

## 2024-04-18 RX ORDER — ESCITALOPRAM OXALATE 10 MG/1
10 TABLET ORAL DAILY
COMMUNITY

## 2024-04-18 RX ORDER — ONDANSETRON 4 MG/1
4 TABLET, ORALLY DISINTEGRATING ORAL EVERY 8 HOURS PRN
Status: DISCONTINUED | OUTPATIENT
Start: 2024-04-18 | End: 2024-04-23 | Stop reason: HOSPADM

## 2024-04-18 RX ORDER — DOXYCYCLINE HYCLATE 100 MG
100 TABLET ORAL 2 TIMES DAILY
COMMUNITY
End: 2024-04-18

## 2024-04-18 RX ORDER — BUDESONIDE AND FORMOTEROL FUMARATE DIHYDRATE 160; 4.5 UG/1; UG/1
2 AEROSOL RESPIRATORY (INHALATION)
Status: DISCONTINUED | OUTPATIENT
Start: 2024-04-18 | End: 2024-04-23 | Stop reason: HOSPADM

## 2024-04-18 RX ORDER — POTASSIUM CHLORIDE 20 MEQ/1
40 TABLET, EXTENDED RELEASE ORAL PRN
Status: DISCONTINUED | OUTPATIENT
Start: 2024-04-18 | End: 2024-04-21

## 2024-04-18 RX ORDER — SENNA AND DOCUSATE SODIUM 50; 8.6 MG/1; MG/1
2 TABLET, FILM COATED ORAL DAILY PRN
Status: DISCONTINUED | OUTPATIENT
Start: 2024-04-18 | End: 2024-04-23 | Stop reason: HOSPADM

## 2024-04-18 RX ORDER — SODIUM CHLORIDE 9 MG/ML
500 INJECTION, SOLUTION INTRAVENOUS PRN
Status: DISCONTINUED | OUTPATIENT
Start: 2024-04-18 | End: 2024-04-23 | Stop reason: HOSPADM

## 2024-04-18 RX ORDER — CEFTRIAXONE 1 G/1
1000 INJECTION, POWDER, FOR SOLUTION INTRAMUSCULAR; INTRAVENOUS EVERY 24 HOURS
COMMUNITY
End: 2024-04-18

## 2024-04-18 RX ORDER — IPRATROPIUM BROMIDE AND ALBUTEROL SULFATE 2.5; .5 MG/3ML; MG/3ML
1 SOLUTION RESPIRATORY (INHALATION) ONCE
Status: COMPLETED | OUTPATIENT
Start: 2024-04-18 | End: 2024-04-18

## 2024-04-18 RX ORDER — IPRATROPIUM BROMIDE AND ALBUTEROL SULFATE 2.5; .5 MG/3ML; MG/3ML
1 SOLUTION RESPIRATORY (INHALATION)
Status: DISCONTINUED | OUTPATIENT
Start: 2024-04-18 | End: 2024-04-21

## 2024-04-18 RX ORDER — FERROUS SULFATE 325(65) MG
325 TABLET ORAL NIGHTLY
COMMUNITY

## 2024-04-18 RX ORDER — ESCITALOPRAM OXALATE 10 MG/1
10 TABLET ORAL DAILY
Status: DISCONTINUED | OUTPATIENT
Start: 2024-04-19 | End: 2024-04-23 | Stop reason: HOSPADM

## 2024-04-18 RX ORDER — MAGNESIUM SULFATE IN WATER 40 MG/ML
2000 INJECTION, SOLUTION INTRAVENOUS PRN
Status: DISCONTINUED | OUTPATIENT
Start: 2024-04-18 | End: 2024-04-21

## 2024-04-18 RX ORDER — FERROUS SULFATE 325(65) MG
325 TABLET ORAL NIGHTLY
Status: DISCONTINUED | OUTPATIENT
Start: 2024-04-18 | End: 2024-04-23 | Stop reason: HOSPADM

## 2024-04-18 RX ORDER — GUAIFENESIN 600 MG/1
600 TABLET, EXTENDED RELEASE ORAL 2 TIMES DAILY
COMMUNITY

## 2024-04-18 RX ORDER — POLYETHYLENE GLYCOL 3350 17 G/17G
17 POWDER, FOR SOLUTION ORAL DAILY
COMMUNITY

## 2024-04-18 RX ORDER — POLYETHYLENE GLYCOL 3350 17 G/17G
17 POWDER, FOR SOLUTION ORAL DAILY PRN
Status: DISCONTINUED | OUTPATIENT
Start: 2024-04-18 | End: 2024-04-23 | Stop reason: HOSPADM

## 2024-04-18 RX ORDER — POTASSIUM CHLORIDE 7.45 MG/ML
10 INJECTION INTRAVENOUS PRN
Status: DISCONTINUED | OUTPATIENT
Start: 2024-04-18 | End: 2024-04-21

## 2024-04-18 RX ORDER — MONTELUKAST SODIUM 10 MG/1
10 TABLET ORAL NIGHTLY
Status: DISCONTINUED | OUTPATIENT
Start: 2024-04-18 | End: 2024-04-23 | Stop reason: HOSPADM

## 2024-04-18 RX ORDER — PANTOPRAZOLE SODIUM 40 MG/1
40 TABLET, DELAYED RELEASE ORAL
Status: DISCONTINUED | OUTPATIENT
Start: 2024-04-19 | End: 2024-04-23 | Stop reason: HOSPADM

## 2024-04-18 RX ORDER — SODIUM CHLORIDE 0.9 % (FLUSH) 0.9 %
5-40 SYRINGE (ML) INJECTION PRN
Status: DISCONTINUED | OUTPATIENT
Start: 2024-04-18 | End: 2024-04-23 | Stop reason: HOSPADM

## 2024-04-18 RX ORDER — LEVOTHYROXINE SODIUM 112 UG/1
112 TABLET ORAL DAILY
Status: DISCONTINUED | OUTPATIENT
Start: 2024-04-19 | End: 2024-04-21

## 2024-04-18 RX ORDER — ENOXAPARIN SODIUM 100 MG/ML
40 INJECTION SUBCUTANEOUS
Status: DISCONTINUED | OUTPATIENT
Start: 2024-04-18 | End: 2024-04-23 | Stop reason: HOSPADM

## 2024-04-18 RX ORDER — FUROSEMIDE 10 MG/ML
60 INJECTION INTRAMUSCULAR; INTRAVENOUS ONCE
Status: COMPLETED | OUTPATIENT
Start: 2024-04-18 | End: 2024-04-18

## 2024-04-18 RX ORDER — LIDOCAINE 4 G/G
1 PATCH TOPICAL DAILY
Status: DISCONTINUED | OUTPATIENT
Start: 2024-04-19 | End: 2024-04-23 | Stop reason: HOSPADM

## 2024-04-18 RX ORDER — SENNA AND DOCUSATE SODIUM 50; 8.6 MG/1; MG/1
2 TABLET, FILM COATED ORAL DAILY PRN
COMMUNITY

## 2024-04-18 RX ORDER — ONDANSETRON 2 MG/ML
4 INJECTION INTRAMUSCULAR; INTRAVENOUS EVERY 6 HOURS PRN
Status: DISCONTINUED | OUTPATIENT
Start: 2024-04-18 | End: 2024-04-23 | Stop reason: HOSPADM

## 2024-04-18 RX ORDER — HYDROCODONE BITARTRATE AND ACETAMINOPHEN 5; 325 MG/1; MG/1
1 TABLET ORAL 3 TIMES DAILY PRN
Status: DISCONTINUED | OUTPATIENT
Start: 2024-04-18 | End: 2024-04-23 | Stop reason: HOSPADM

## 2024-04-18 RX ORDER — FUROSEMIDE 10 MG/ML
40 INJECTION INTRAMUSCULAR; INTRAVENOUS 2 TIMES DAILY
Status: DISCONTINUED | OUTPATIENT
Start: 2024-04-19 | End: 2024-04-23 | Stop reason: HOSPADM

## 2024-04-18 RX ORDER — ALBUTEROL SULFATE 2.5 MG/3ML
SOLUTION RESPIRATORY (INHALATION)
Status: DISPENSED
Start: 2024-04-18 | End: 2024-04-19

## 2024-04-18 RX ORDER — ACETAMINOPHEN 650 MG/1
650 SUPPOSITORY RECTAL EVERY 6 HOURS PRN
Status: DISCONTINUED | OUTPATIENT
Start: 2024-04-18 | End: 2024-04-23 | Stop reason: HOSPADM

## 2024-04-18 RX ORDER — SODIUM CHLORIDE 0.9 % (FLUSH) 0.9 %
5-40 SYRINGE (ML) INJECTION EVERY 12 HOURS SCHEDULED
Status: DISCONTINUED | OUTPATIENT
Start: 2024-04-18 | End: 2024-04-23 | Stop reason: HOSPADM

## 2024-04-18 RX ORDER — POTASSIUM CHLORIDE 750 MG/1
10 TABLET, FILM COATED, EXTENDED RELEASE ORAL DAILY
Status: DISCONTINUED | OUTPATIENT
Start: 2024-04-19 | End: 2024-04-21

## 2024-04-18 RX ORDER — GUAIFENESIN 600 MG/1
600 TABLET, EXTENDED RELEASE ORAL 2 TIMES DAILY
Status: DISCONTINUED | OUTPATIENT
Start: 2024-04-18 | End: 2024-04-23 | Stop reason: HOSPADM

## 2024-04-18 RX ORDER — ACETAMINOPHEN 325 MG/1
650 TABLET ORAL EVERY 6 HOURS PRN
Status: DISCONTINUED | OUTPATIENT
Start: 2024-04-18 | End: 2024-04-23 | Stop reason: HOSPADM

## 2024-04-18 RX ORDER — LANOLIN ALCOHOL/MO/W.PET/CERES
6 CREAM (GRAM) TOPICAL NIGHTLY
COMMUNITY

## 2024-04-18 RX ORDER — LANOLIN ALCOHOL/MO/W.PET/CERES
6 CREAM (GRAM) TOPICAL NIGHTLY
Status: DISCONTINUED | OUTPATIENT
Start: 2024-04-18 | End: 2024-04-23 | Stop reason: HOSPADM

## 2024-04-18 RX ADMIN — Medication 6 MG: at 22:41

## 2024-04-18 RX ADMIN — GUAIFENESIN 600 MG: 600 TABLET, EXTENDED RELEASE ORAL at 22:41

## 2024-04-18 RX ADMIN — FUROSEMIDE 60 MG: 10 INJECTION, SOLUTION INTRAMUSCULAR; INTRAVENOUS at 17:01

## 2024-04-18 RX ADMIN — VANCOMYCIN HYDROCHLORIDE 2000 MG: 1 INJECTION, POWDER, LYOPHILIZED, FOR SOLUTION INTRAVENOUS at 22:34

## 2024-04-18 RX ADMIN — IPRATROPIUM BROMIDE AND ALBUTEROL SULFATE 1 DOSE: 2.5; .5 SOLUTION RESPIRATORY (INHALATION) at 16:07

## 2024-04-18 RX ADMIN — METHYLPREDNISOLONE SODIUM SUCCINATE 125 MG: 125 INJECTION, POWDER, FOR SOLUTION INTRAMUSCULAR; INTRAVENOUS at 17:01

## 2024-04-18 RX ADMIN — MONTELUKAST 10 MG: 10 TABLET, FILM COATED ORAL at 22:41

## 2024-04-18 RX ADMIN — ENOXAPARIN SODIUM 40 MG: 100 INJECTION SUBCUTANEOUS at 22:40

## 2024-04-18 RX ADMIN — FERROUS SULFATE TAB 325 MG (65 MG ELEMENTAL FE) 325 MG: 325 (65 FE) TAB at 22:41

## 2024-04-18 RX ADMIN — MEROPENEM 1000 MG: 1 INJECTION, POWDER, FOR SOLUTION INTRAVENOUS at 20:17

## 2024-04-18 ASSESSMENT — LIFESTYLE VARIABLES
HOW OFTEN DO YOU HAVE A DRINK CONTAINING ALCOHOL: NEVER
HOW MANY STANDARD DRINKS CONTAINING ALCOHOL DO YOU HAVE ON A TYPICAL DAY: PATIENT DOES NOT DRINK

## 2024-04-18 NOTE — H&P
Other (See Comments)     Listed as allergy from [facility]    Oxycodone Other (See Comments)     Listed as allergy from [facility]     Pcn [Penicillins] Hives and Rash    Warfarin And Related Other (See Comments)     listed as allergy from [facility]     Fam HX:  family history includes Diabetes in her sister; Early Death in her father; Heart Disease in her mother; High Blood Pressure in her mother; Substance Abuse in her father.  Soc HX:   Social History     Socioeconomic History    Marital status:      Spouse name: Jassi    Number of children: 4   Tobacco Use    Smoking status: Former     Current packs/day: 0.00     Types: Cigarettes     Quit date: 2010     Years since quittin.6    Smokeless tobacco: Never   Substance and Sexual Activity    Alcohol use: Yes     Alcohol/week: 0.0 standard drinks of alcohol     Comment: wine twice a year    Drug use: No    Sexual activity: Yes     Partners: Male       Medications:   Medications:    albuterol        ipratropium        vancomycin  2,000 mg IntraVENous Once    meropenem  1,000 mg IntraVENous Q8H      Infusions:   PRN Meds: albuterol, ,   ipratropium, ,         Labs      CBC:   Recent Labs     24  1547   WBC 14.0*   HGB 8.9*        BMP:    Recent Labs     24  1547   *   K 5.3*   CL 97*   CO2 26   BUN 26*   CREATININE 1.1   GLUCOSE 123*     Hepatic:   Recent Labs     24  1547   AST 20   ALT 7*   BILITOT 0.2   ALKPHOS 124     Lipids:   Lab Results   Component Value Date/Time    CHOL 114 2019 07:00 AM    HDL 58 2019 07:00 AM    TRIG 58 2019 07:00 AM     Hemoglobin A1C:   Lab Results   Component Value Date/Time    LABA1C 4.8 2016 04:36 AM     TSH: No results found for: \"TSH\"  Troponin:   Lab Results   Component Value Date/Time    TROPONINT <0.010 07/10/2023 09:13 PM    TROPONINT <0.010 2023 09:46 PM    TROPONINT <0.010 2023 06:03 PM     Lactic Acid: No results for input(s): \"LACTA\" in the last 72 hours.  BNP:

## 2024-04-18 NOTE — ED PROVIDER NOTES
[facility]      TRIAGE CHIEF COMPLAINT:   Shortness of Breath      Crow:  Erika Smith is a 83 y.o. female that presents with complaint of shortness of breath.  Patient came by EMS from nursing home she is a DNR [facility] history of CHF she arrives hypoxic and diaphoretic.  She is on nasal cannula, tachypneic.  Denies any pain just cough shortness of breath.  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  She is hypertensive no further HPI available.  Family not at bedside nursing staff does not recognize her..    REVIEW OF SYSTEMS:  At least 10 systems reviewed and otherwise acutely negative except as in the Crow.    Review of Systems   Constitutional:  Positive for diaphoresis and fatigue.   HENT: Negative.     Eyes: Negative.    Respiratory:  Positive for shortness of breath.    Cardiovascular: Negative.    Gastrointestinal: Negative.    Endocrine: Negative.    Genitourinary: Negative.    Musculoskeletal: Negative.    Skin: Negative.    Allergic/Immunologic: Negative.    Neurological:  Positive for weakness.   Hematological: Negative.    Psychiatric/Behavioral: Negative.     All other systems reviewed and are negative.      Past Medical History:   Diagnosis Date    ASHD (arteriosclerotic heart disease)     Bilateral edema of lower extremity 12/16/2015    CHF (congestive heart failure) ([facility])     COPD (chronic obstructive pulmonary disease) ([facility])     Depression     Hypertension     MRSA (methicillin resistant staph aureus) culture positive 09/09/2021    Nasal    MRSA (methicillin resistant staph aureus) culture positive 09/09/2021    NASAL     Past Surgical History:   Procedure Laterality Date    ANUS SURGERY      fistula repair    APPENDECTOMY      COLONOSCOPY N/A 11/18/2021    COLONOSCOPY WITH BIOPSY performed by Javier Craig MD at [facility] ENDOSCOPY    HYSTERECTOMY (CERVIX STATUS UNKNOWN)      TONSILLECTOMY      UPPER GASTROINTESTINAL ENDOSCOPY N/A 11/25/2020    EGD BIOPSY performed by Javier Craig MD at

## 2024-04-18 NOTE — ED NOTES
normal limits   BLOOD GAS, VENOUS - Abnormal; Notable for the following components:    pH, David 7.29 (*)     pCO2, David 61 (*)     pO2, David 123 (*)     HCO3, Venous 29.3 (*)     O2 Sat, David 92.5 (*)     All other components within normal limits       Background  History:   Past Medical History:   Diagnosis Date    ASHD (arteriosclerotic heart disease)     Bilateral edema of lower extremity 12/16/2015    CHF (congestive heart failure) ([facility])     COPD (chronic obstructive pulmonary disease) ([facility])     Depression     Hypertension     MRSA (methicillin resistant staph aureus) culture positive 09/09/2021    Nasal    MRSA (methicillin resistant staph aureus) culture positive 09/09/2021    NASAL       Assessment    Vitals:        Vitals:    04/18/24 1630 04/18/24 1708 04/18/24 1730 04/18/24 1800   BP: 131/74  128/80 135/81   Pulse: 80 81 77 78   Resp: 13 15 15 15   Temp:       TempSrc:       SpO2: 97% 96% 93% 95%     PO Status: Regular  O2 Flow Rate: O2 Device: Non-rebreather mask O2 Flow Rate (L/min): 15 L/min  Cardiac Rhythm: ST  Last documented pain medication administered: n/a  NIH Score: NIH     Active LDA's:   Peripheral IV 04/18/24 Posterior;Right Hand (Active)   Dressing Status Clean, dry & intact 04/18/24 1601       Pertinent or High Risk Medications/Drips: no   If Yes, please provide details:   Blood Product Administration: no  If Yes, please provide details:     Recommendation    Incomplete orders   Additional Comments:    If any further questions, please call Sending RN at [phone]    Electronically signed by: Electronically signed by Karolyn Medina RN on 4/18/2024 at 6:52 PM

## 2024-04-19 ENCOUNTER — APPOINTMENT (OUTPATIENT)
Dept: NON INVASIVE DIAGNOSTICS | Age: 84
DRG: 871 | End: 2024-04-19
Attending: FAMILY MEDICINE
Payer: MEDICARE

## 2024-04-19 PROBLEM — R09.02 HYPOXEMIA: Status: ACTIVE | Noted: 2024-04-19

## 2024-04-19 LAB
ANION GAP SERPL CALCULATED.3IONS-SCNC: 11 MMOL/L (ref 7–16)
BANDED NEUTROPHILS ABSOLUTE COUNT: 0.14 K/CU MM
BANDED NEUTROPHILS RELATIVE PERCENT: 1 % (ref 5–11)
BASOPHILIC STIPPLING: ABNORMAL
BUN SERPL-MCNC: 26 MG/DL (ref 6–23)
CALCIUM SERPL-MCNC: 7.5 MG/DL (ref 8.3–10.6)
CHLORIDE BLD-SCNC: 100 MMOL/L (ref 99–110)
CHOLEST SERPL-MCNC: 121 MG/DL
CO2: 27 MMOL/L (ref 21–32)
CREAT SERPL-MCNC: 1 MG/DL (ref 0.6–1.1)
DIFFERENTIAL TYPE: ABNORMAL
ECHO AO ROOT DIAM: 2.8 CM
ECHO AO ROOT INDEX: 1.55 CM/M2
ECHO AV AREA PEAK VELOCITY: 2.4 CM2
ECHO AV AREA VTI: 2.8 CM2
ECHO AV AREA/BSA PEAK VELOCITY: 1.3 CM2/M2
ECHO AV AREA/BSA VTI: 1.5 CM2/M2
ECHO AV MEAN GRADIENT: 8 MMHG
ECHO AV MEAN VELOCITY: 1.3 M/S
ECHO AV PEAK GRADIENT: 16 MMHG
ECHO AV PEAK VELOCITY: 2 M/S
ECHO AV VELOCITY RATIO: 0.8
ECHO AV VTI: 38.4 CM
ECHO BSA: 1.88 M2
ECHO EST RA PRESSURE: 15 MMHG
ECHO IVC PROX: 2.5 CM
ECHO LA AREA 4C: 16.9 CM2
ECHO LA DIAMETER INDEX: 2.15 CM/M2
ECHO LA DIAMETER: 3.9 CM
ECHO LA MAJOR AXIS: 6.4 CM
ECHO LA TO AORTIC ROOT RATIO: 1.39
ECHO LA VOL MOD A4C: 38 ML (ref 22–52)
ECHO LA VOLUME INDEX MOD A4C: 21 ML/M2 (ref 16–34)
ECHO LV E' LATERAL VELOCITY: 10 CM/S
ECHO LV E' SEPTAL VELOCITY: 10 CM/S
ECHO LV EDV A4C: 61 ML
ECHO LV EDV INDEX A4C: 34 ML/M2
ECHO LV EJECTION FRACTION A4C: 71 %
ECHO LV ESV A4C: 18 ML
ECHO LV ESV INDEX A4C: 10 ML/M2
ECHO LV FRACTIONAL SHORTENING: 36 % (ref 28–44)
ECHO LV INTERNAL DIMENSION DIASTOLE INDEX: 2.32 CM/M2
ECHO LV INTERNAL DIMENSION DIASTOLIC: 4.2 CM (ref 3.9–5.3)
ECHO LV INTERNAL DIMENSION SYSTOLIC INDEX: 1.49 CM/M2
ECHO LV INTERNAL DIMENSION SYSTOLIC: 2.7 CM
ECHO LV IVSD: 0.7 CM (ref 0.6–0.9)
ECHO LV MASS 2D: 93 G (ref 67–162)
ECHO LV MASS INDEX 2D: 51.4 G/M2 (ref 43–95)
ECHO LV POSTERIOR WALL DIASTOLIC: 0.8 CM (ref 0.6–0.9)
ECHO LV RELATIVE WALL THICKNESS RATIO: 0.38
ECHO LVOT AREA: 3.1 CM2
ECHO LVOT AV VTI INDEX: 0.9
ECHO LVOT DIAM: 2 CM
ECHO LVOT MEAN GRADIENT: 5 MMHG
ECHO LVOT PEAK GRADIENT: 10 MMHG
ECHO LVOT PEAK VELOCITY: 1.6 M/S
ECHO LVOT STROKE VOLUME INDEX: 59.7 ML/M2
ECHO LVOT SV: 108 ML
ECHO LVOT VTI: 34.4 CM
ECHO MV A VELOCITY: 1.26 M/S
ECHO MV E DECELERATION TIME (DT): 176 MS
ECHO MV E VELOCITY: 0.85 M/S
ECHO MV E/A RATIO: 0.67
ECHO MV E/E' LATERAL: 8.5
ECHO MV E/E' RATIO (AVERAGED): 8.5
ECHO RIGHT VENTRICULAR SYSTOLIC PRESSURE (RVSP): 103 MMHG
ECHO RV MID DIMENSION: 3.9 CM
ECHO TV REGURGITANT MAX VELOCITY: 4.69 M/S
ECHO TV REGURGITANT PEAK GRADIENT: 88 MMHG
EKG ATRIAL RATE: 88 BPM
EKG DIAGNOSIS: NORMAL
EKG P AXIS: 44 DEGREES
EKG P-R INTERVAL: 168 MS
EKG Q-T INTERVAL: 364 MS
EKG QRS DURATION: 78 MS
EKG QTC CALCULATION (BAZETT): 440 MS
EKG R AXIS: -18 DEGREES
EKG T AXIS: 28 DEGREES
EKG VENTRICULAR RATE: 88 BPM
GFR SERPL CREATININE-BSD FRML MDRD: 56 ML/MIN/1.73M2
GIANT PLATELETS: ABNORMAL
GLUCOSE SERPL-MCNC: 146 MG/DL (ref 70–99)
HCT VFR BLD CALC: 30.3 % (ref 37–47)
HDLC SERPL-MCNC: 56 MG/DL
HEMOGLOBIN: 8.5 GM/DL (ref 12.5–16)
LDLC SERPL CALC-MCNC: 54 MG/DL
LYMPHOCYTES ABSOLUTE: 0.3 K/CU MM
LYMPHOCYTES RELATIVE PERCENT: 2 % (ref 24–44)
MACROCYTES: ABNORMAL
MAGNESIUM: 2.1 MG/DL (ref 1.8–2.4)
MCH RBC QN AUTO: 25.5 PG (ref 27–31)
MCHC RBC AUTO-ENTMCNC: 28.1 % (ref 32–36)
MCV RBC AUTO: 91 FL (ref 78–100)
MICROCYTES: ABNORMAL
NEUTROPHILS RELATIVE PERCENT: 97 % (ref 36–66)
PDW BLD-RTO: 15.9 % (ref 11.7–14.9)
PLATELET # BLD: 293 K/CU MM (ref 140–440)
PMV BLD AUTO: 11.4 FL (ref 7.5–11.1)
POLYCHROMASIA: ABNORMAL
POTASSIUM SERPL-SCNC: 5.2 MMOL/L (ref 3.5–5.1)
POTASSIUM SERPL-SCNC: 5.5 MMOL/L (ref 3.5–5.1)
PROCALCITONIN SERPL-MCNC: 2.02 NG/ML
RBC # BLD: 3.33 M/CU MM (ref 4.2–5.4)
SEGMENTED NEUTROPHILS ABSOLUTE COUNT: 13.5 K/CU MM
SODIUM BLD-SCNC: 138 MMOL/L (ref 135–145)
TARGET CELLS: ABNORMAL
TOXIC GRANULATION: PRESENT
TRIGL SERPL-MCNC: 53 MG/DL
WBC # BLD: 13.9 K/CU MM (ref 4–10.5)

## 2024-04-19 PROCEDURE — 80048 BASIC METABOLIC PNL TOTAL CA: CPT

## 2024-04-19 PROCEDURE — 80061 LIPID PANEL: CPT

## 2024-04-19 PROCEDURE — 94664 DEMO&/EVAL PT USE INHALER: CPT

## 2024-04-19 PROCEDURE — 2580000003 HC RX 258: Performed by: FAMILY MEDICINE

## 2024-04-19 PROCEDURE — 84145 PROCALCITONIN (PCT): CPT

## 2024-04-19 PROCEDURE — 87899 AGENT NOS ASSAY W/OPTIC: CPT

## 2024-04-19 PROCEDURE — 2060000000 HC ICU INTERMEDIATE R&B

## 2024-04-19 PROCEDURE — 83735 ASSAY OF MAGNESIUM: CPT

## 2024-04-19 PROCEDURE — 6370000000 HC RX 637 (ALT 250 FOR IP): Performed by: FAMILY MEDICINE

## 2024-04-19 PROCEDURE — 93010 ELECTROCARDIOGRAM REPORT: CPT | Performed by: INTERNAL MEDICINE

## 2024-04-19 PROCEDURE — 2700000000 HC OXYGEN THERAPY PER DAY

## 2024-04-19 PROCEDURE — 94761 N-INVAS EAR/PLS OXIMETRY MLT: CPT

## 2024-04-19 PROCEDURE — 6360000002 HC RX W HCPCS: Performed by: FAMILY MEDICINE

## 2024-04-19 PROCEDURE — 99223 1ST HOSP IP/OBS HIGH 75: CPT | Performed by: INTERNAL MEDICINE

## 2024-04-19 PROCEDURE — 93306 TTE W/DOPPLER COMPLETE: CPT

## 2024-04-19 PROCEDURE — 87449 NOS EACH ORGANISM AG IA: CPT

## 2024-04-19 PROCEDURE — 94660 CPAP INITIATION&MGMT: CPT

## 2024-04-19 PROCEDURE — 84132 ASSAY OF SERUM POTASSIUM: CPT

## 2024-04-19 PROCEDURE — 36415 COLL VENOUS BLD VENIPUNCTURE: CPT

## 2024-04-19 PROCEDURE — 85007 BL SMEAR W/DIFF WBC COUNT: CPT

## 2024-04-19 PROCEDURE — 85027 COMPLETE CBC AUTOMATED: CPT

## 2024-04-19 PROCEDURE — 93306 TTE W/DOPPLER COMPLETE: CPT | Performed by: INTERNAL MEDICINE

## 2024-04-19 PROCEDURE — 94640 AIRWAY INHALATION TREATMENT: CPT

## 2024-04-19 RX ADMIN — HYDROCODONE BITARTRATE AND ACETAMINOPHEN 1 TABLET: 5; 325 TABLET ORAL at 12:28

## 2024-04-19 RX ADMIN — WATER 40 MG: 1 INJECTION INTRAMUSCULAR; INTRAVENOUS; SUBCUTANEOUS at 08:21

## 2024-04-19 RX ADMIN — IPRATROPIUM BROMIDE AND ALBUTEROL SULFATE 1 DOSE: 2.5; .5 SOLUTION RESPIRATORY (INHALATION) at 07:59

## 2024-04-19 RX ADMIN — IPRATROPIUM BROMIDE AND ALBUTEROL SULFATE 1 DOSE: 2.5; .5 SOLUTION RESPIRATORY (INHALATION) at 13:20

## 2024-04-19 RX ADMIN — GUAIFENESIN 600 MG: 600 TABLET, EXTENDED RELEASE ORAL at 08:21

## 2024-04-19 RX ADMIN — MEROPENEM 1000 MG: 1 INJECTION, POWDER, FOR SOLUTION INTRAVENOUS at 02:12

## 2024-04-19 RX ADMIN — Medication 6 MG: at 20:55

## 2024-04-19 RX ADMIN — SODIUM ZIRCONIUM CYCLOSILICATE 10 G: 10 POWDER, FOR SUSPENSION ORAL at 04:04

## 2024-04-19 RX ADMIN — BUDESONIDE AND FORMOTEROL FUMARATE DIHYDRATE 2 PUFF: 160; 4.5 AEROSOL RESPIRATORY (INHALATION) at 20:30

## 2024-04-19 RX ADMIN — FUROSEMIDE 40 MG: 10 INJECTION, SOLUTION INTRAMUSCULAR; INTRAVENOUS at 08:21

## 2024-04-19 RX ADMIN — VANCOMYCIN HYDROCHLORIDE 750 MG: 750 INJECTION, POWDER, LYOPHILIZED, FOR SOLUTION INTRAVENOUS at 21:11

## 2024-04-19 RX ADMIN — PANTOPRAZOLE SODIUM 40 MG: 40 TABLET, DELAYED RELEASE ORAL at 05:38

## 2024-04-19 RX ADMIN — MEROPENEM 1000 MG: 1 INJECTION, POWDER, FOR SOLUTION INTRAVENOUS at 17:35

## 2024-04-19 RX ADMIN — ENOXAPARIN SODIUM 40 MG: 100 INJECTION SUBCUTANEOUS at 20:55

## 2024-04-19 RX ADMIN — MEROPENEM 1000 MG: 1 INJECTION, POWDER, FOR SOLUTION INTRAVENOUS at 10:44

## 2024-04-19 RX ADMIN — SODIUM CHLORIDE, PRESERVATIVE FREE 10 ML: 5 INJECTION INTRAVENOUS at 08:21

## 2024-04-19 RX ADMIN — ESCITALOPRAM OXALATE 10 MG: 10 TABLET ORAL at 08:21

## 2024-04-19 RX ADMIN — HYDROCODONE BITARTRATE AND ACETAMINOPHEN 1 TABLET: 5; 325 TABLET ORAL at 20:19

## 2024-04-19 RX ADMIN — MONTELUKAST 10 MG: 10 TABLET, FILM COATED ORAL at 20:55

## 2024-04-19 RX ADMIN — IPRATROPIUM BROMIDE AND ALBUTEROL SULFATE 1 DOSE: 2.5; .5 SOLUTION RESPIRATORY (INHALATION) at 20:30

## 2024-04-19 RX ADMIN — BUDESONIDE AND FORMOTEROL FUMARATE DIHYDRATE 2 PUFF: 160; 4.5 AEROSOL RESPIRATORY (INHALATION) at 08:25

## 2024-04-19 RX ADMIN — FERROUS SULFATE TAB 325 MG (65 MG ELEMENTAL FE) 325 MG: 325 (65 FE) TAB at 20:55

## 2024-04-19 RX ADMIN — GUAIFENESIN 600 MG: 600 TABLET, EXTENDED RELEASE ORAL at 20:55

## 2024-04-19 RX ADMIN — LEVOTHYROXINE SODIUM 112 MCG: 112 TABLET ORAL at 05:37

## 2024-04-19 RX ADMIN — FUROSEMIDE 40 MG: 10 INJECTION, SOLUTION INTRAMUSCULAR; INTRAVENOUS at 17:31

## 2024-04-19 ASSESSMENT — PAIN DESCRIPTION - ORIENTATION
ORIENTATION: LOWER
ORIENTATION: RIGHT;LEFT

## 2024-04-19 ASSESSMENT — PAIN SCALES - GENERAL
PAINLEVEL_OUTOF10: 6
PAINLEVEL_OUTOF10: 10

## 2024-04-19 ASSESSMENT — PAIN DESCRIPTION - DESCRIPTORS
DESCRIPTORS: ACHING
DESCRIPTORS: ACHING

## 2024-04-19 ASSESSMENT — PAIN - FUNCTIONAL ASSESSMENT: PAIN_FUNCTIONAL_ASSESSMENT: ACTIVITIES ARE NOT PREVENTED

## 2024-04-19 ASSESSMENT — PAIN DESCRIPTION - LOCATION
LOCATION: OTHER (COMMENT)
LOCATION: BACK

## 2024-04-19 NOTE — DISCHARGE INSTR - COC
coronary artery I25.10    Anemia D64.9    Chronic hypoxemic respiratory failure ([facility]) J96.11    Bilateral edema of lower extremity R60.0    Moderate COPD (chronic obstructive pulmonary disease) ([facility]) J44.9    COPD exacerbation ([facility]) J44.1    Acute on chronic respiratory failure with hypoxia and hypercapnia ([facility]) J96.21, J96.22    Hypoxia R09.02    COPD with acute exacerbation ([facility]) J44.1    Morbid obesity due to excess calories ([facility]) E66.01    Acute on chronic diastolic congestive heart failure ([facility]) I50.33    Depression F32.A    Hypertension I10    Pneumonia J18.9    Chronic diastolic CHF (congestive heart failure) ([facility]) I50.32    GIB (gastrointestinal bleeding) K92.2    Other chest pain R07.89    GI bleed K92.2    Acute hypercapnic respiratory failure ([facility]) J96.02    Severe malnutrition ([facility]) E43       Isolation/Infection:   Isolation            Contact          Patient Infection Status       Infection Onset Added Last Indicated Last Indicated By Review Planned Expiration Resolved Resolved By    MRSA 09/09/21 09/11/21 09/10/21 Culture, MRSA, Screening                           Nurse Assessment:  Last Vital Signs: /71   Pulse 78   Temp 98.3 °F (36.8 °C) (Oral)   Resp 21   Ht 1.549 m (5' 1\")   Wt 82.6 kg (182 lb)   SpO2 95%   BMI 34.39 kg/m²     Last documented pain score (0-10 scale):    Last Weight:   Wt Readings from Last 1 Encounters:   04/19/24 82.6 kg (182 lb)     Mental Status:  {IP PT MENTAL STATUS:22178}    IV Access:  { CLAUDIA IV ACCESS:852115874}    Nursing Mobility/ADLs:  Walking   {CHP DME ADLs:403412692}  Transfer  {CHP DME ADLs:547570794}  Bathing  {CHP DME ADLs:495922253}  Dressing  {CHP DME ADLs:981467443}  Toileting  {CHP DME ADLs:462468765}  Feeding  {CHP DME ADLs:357561997}  Med Admin  {CHP DME ADLs:609843838}  Med Delivery   {[facility] MED Delivery:709250259}    Wound Care Documentation and Therapy:        Elimination:  Continence:   Bowel: {YES / NO:37752}  Bladder: {YES /

## 2024-04-19 NOTE — CARE COORDINATION
CM reviewed chart and discussed in IDR. Pt is from [facility]. Pt is on a bed hold and can return when medically stable. CM spoke with Nahomy with [facility]. Pt is a possible discharge over the weekend.

## 2024-04-20 ENCOUNTER — APPOINTMENT (OUTPATIENT)
Dept: GENERAL RADIOLOGY | Age: 84
DRG: 871 | End: 2024-04-20
Payer: MEDICARE

## 2024-04-20 ENCOUNTER — APPOINTMENT (OUTPATIENT)
Dept: CT IMAGING | Age: 84
DRG: 871 | End: 2024-04-20
Payer: MEDICARE

## 2024-04-20 LAB
ANION GAP SERPL CALCULATED.3IONS-SCNC: 9 MMOL/L (ref 7–16)
BASE EXCESS MIXED: 6.6 (ref 0–3)
BASE EXCESS MIXED: 7.9 (ref 0–3)
BASE EXCESS MIXED: 8.7 (ref 0–3)
BASE EXCESS MIXED: 8.9 (ref 0–3)
BASOPHILS ABSOLUTE: 0 K/CU MM
BASOPHILS RELATIVE PERCENT: 0.1 % (ref 0–1)
BUN SERPL-MCNC: 34 MG/DL (ref 6–23)
CALCIUM SERPL-MCNC: 7.9 MG/DL (ref 8.3–10.6)
CARBON MONOXIDE, BLOOD: 2.4 % (ref 0–5)
CARBON MONOXIDE, BLOOD: 2.5 % (ref 0–5)
CARBON MONOXIDE, BLOOD: 2.8 % (ref 0–5)
CARBON MONOXIDE, BLOOD: 2.8 % (ref 0–5)
CHLORIDE BLD-SCNC: 102 MMOL/L (ref 99–110)
CO2 CONTENT: 38.6 MMOL/L (ref 21–32)
CO2 CONTENT: 39.3 MMOL/L (ref 21–32)
CO2 CONTENT: 39.9 MMOL/L (ref 21–32)
CO2 CONTENT: 41.5 MMOL/L (ref 21–32)
CO2: 31 MMOL/L (ref 21–32)
COMMENT: ABNORMAL
CREAT SERPL-MCNC: 1.1 MG/DL (ref 0.6–1.1)
DIFFERENTIAL TYPE: ABNORMAL
DOSE AMOUNT: NORMAL
DOSE TIME: NORMAL
EOSINOPHILS ABSOLUTE: 0 K/CU MM
EOSINOPHILS RELATIVE PERCENT: 0 % (ref 0–3)
GFR SERPL CREATININE-BSD FRML MDRD: 50 ML/MIN/1.73M2
GLUCOSE SERPL-MCNC: 115 MG/DL (ref 70–99)
HCO3 ARTERIAL: 36.2 MMOL/L (ref 21–28)
HCO3 ARTERIAL: 37.2 MMOL/L (ref 21–28)
HCO3 ARTERIAL: 37.5 MMOL/L (ref 21–28)
HCO3 ARTERIAL: 39 MMOL/L (ref 21–28)
HCT VFR BLD CALC: 26.3 % (ref 37–47)
HEMOGLOBIN: 7.3 GM/DL (ref 12.5–16)
IMMATURE NEUTROPHIL %: 4.4 % (ref 0–0.43)
L PNEUMO AG UR QL IA: NEGATIVE
LYMPHOCYTES ABSOLUTE: 0.5 K/CU MM
LYMPHOCYTES RELATIVE PERCENT: 4.6 % (ref 24–44)
MAGNESIUM: 2.2 MG/DL (ref 1.8–2.4)
MCH RBC QN AUTO: 25.3 PG (ref 27–31)
MCHC RBC AUTO-ENTMCNC: 27.8 % (ref 32–36)
MCV RBC AUTO: 91 FL (ref 78–100)
METHEMOGLOBIN ARTERIAL: 0.9 %
METHEMOGLOBIN ARTERIAL: 1.6 %
METHEMOGLOBIN ARTERIAL: 1.6 %
METHEMOGLOBIN ARTERIAL: 1.9 %
MONOCYTES ABSOLUTE: 0.8 K/CU MM
MONOCYTES RELATIVE PERCENT: 7.6 % (ref 0–4)
NEUTROPHILS RELATIVE PERCENT: 83.3 % (ref 36–66)
NUCLEATED RBC %: 0.3 %
O2 SATURATION: 91.5 % (ref 94–98)
O2 SATURATION: 92.4 % (ref 94–98)
O2 SATURATION: 93.1 % (ref 94–98)
O2 SATURATION: 95.2 % (ref 94–98)
PCO2 ARTERIAL: 69 MMHG (ref 35–48)
PCO2 ARTERIAL: 77 MMHG (ref 35–48)
PCO2 ARTERIAL: 78 MMHG (ref 35–48)
PCO2 ARTERIAL: 83 MMHG (ref 35–48)
PDW BLD-RTO: 16.1 % (ref 11.7–14.9)
PH BLOOD: 7.28 (ref 7.35–7.45)
PH BLOOD: 7.28 (ref 7.35–7.45)
PH BLOOD: 7.29 (ref 7.35–7.45)
PH BLOOD: 7.34 (ref 7.35–7.45)
PLATELET # BLD: 319 K/CU MM (ref 140–440)
PMV BLD AUTO: 10.8 FL (ref 7.5–11.1)
PO2 ARTERIAL: 115 MMHG (ref 83–108)
PO2 ARTERIAL: 73 MMHG (ref 83–108)
PO2 ARTERIAL: 75 MMHG (ref 83–108)
PO2 ARTERIAL: 79 MMHG (ref 83–108)
POTASSIUM SERPL-SCNC: 5.2 MMOL/L (ref 3.5–5.1)
RBC # BLD: 2.89 M/CU MM (ref 4.2–5.4)
S PNEUM AG CSF QL: NORMAL
SEGMENTED NEUTROPHILS ABSOLUTE COUNT: 9 K/CU MM
SODIUM BLD-SCNC: 142 MMOL/L (ref 135–145)
TOTAL IMMATURE NEUTOROPHIL: 0.47 K/CU MM
TOTAL NUCLEATED RBC: 0 K/CU MM
VANCOMYCIN RANDOM: 22.7 UG/ML
WBC # BLD: 10.8 K/CU MM (ref 4–10.5)

## 2024-04-20 PROCEDURE — 94660 CPAP INITIATION&MGMT: CPT

## 2024-04-20 PROCEDURE — 36600 WITHDRAWAL OF ARTERIAL BLOOD: CPT

## 2024-04-20 PROCEDURE — 94640 AIRWAY INHALATION TREATMENT: CPT

## 2024-04-20 PROCEDURE — 82803 BLOOD GASES ANY COMBINATION: CPT

## 2024-04-20 PROCEDURE — 2580000003 HC RX 258: Performed by: FAMILY MEDICINE

## 2024-04-20 PROCEDURE — 71045 X-RAY EXAM CHEST 1 VIEW: CPT

## 2024-04-20 PROCEDURE — 6360000002 HC RX W HCPCS: Performed by: INTERNAL MEDICINE

## 2024-04-20 PROCEDURE — 6370000000 HC RX 637 (ALT 250 FOR IP): Performed by: INTERNAL MEDICINE

## 2024-04-20 PROCEDURE — 36410 VNPNXR 3YR/> PHY/QHP DX/THER: CPT

## 2024-04-20 PROCEDURE — 82805 BLOOD GASES W/O2 SATURATION: CPT

## 2024-04-20 PROCEDURE — 80048 BASIC METABOLIC PNL TOTAL CA: CPT

## 2024-04-20 PROCEDURE — 80202 ASSAY OF VANCOMYCIN: CPT

## 2024-04-20 PROCEDURE — 2060000000 HC ICU INTERMEDIATE R&B

## 2024-04-20 PROCEDURE — 87641 MR-STAPH DNA AMP PROBE: CPT

## 2024-04-20 PROCEDURE — 87070 CULTURE OTHR SPECIMN AEROBIC: CPT

## 2024-04-20 PROCEDURE — 2709999900 HC NON-CHARGEABLE SUPPLY

## 2024-04-20 PROCEDURE — 99233 SBSQ HOSP IP/OBS HIGH 50: CPT | Performed by: INTERNAL MEDICINE

## 2024-04-20 PROCEDURE — 94761 N-INVAS EAR/PLS OXIMETRY MLT: CPT

## 2024-04-20 PROCEDURE — 83735 ASSAY OF MAGNESIUM: CPT

## 2024-04-20 PROCEDURE — 2700000000 HC OXYGEN THERAPY PER DAY

## 2024-04-20 PROCEDURE — 76937 US GUIDE VASCULAR ACCESS: CPT

## 2024-04-20 PROCEDURE — 6370000000 HC RX 637 (ALT 250 FOR IP): Performed by: FAMILY MEDICINE

## 2024-04-20 PROCEDURE — 6360000002 HC RX W HCPCS: Performed by: FAMILY MEDICINE

## 2024-04-20 PROCEDURE — 87205 SMEAR GRAM STAIN: CPT

## 2024-04-20 PROCEDURE — 36415 COLL VENOUS BLD VENIPUNCTURE: CPT

## 2024-04-20 PROCEDURE — 85025 COMPLETE CBC W/AUTO DIFF WBC: CPT

## 2024-04-20 PROCEDURE — 05HF33Z INSERTION OF INFUSION DEVICE INTO LEFT CEPHALIC VEIN, PERCUTANEOUS APPROACH: ICD-10-PCS | Performed by: FAMILY MEDICINE

## 2024-04-20 RX ORDER — ALBUTEROL SULFATE 2.5 MG/3ML
SOLUTION RESPIRATORY (INHALATION)
Status: DISPENSED
Start: 2024-04-20 | End: 2024-04-20

## 2024-04-20 RX ORDER — MORPHINE SULFATE 2 MG/ML
1 INJECTION, SOLUTION INTRAMUSCULAR; INTRAVENOUS ONCE
Status: COMPLETED | OUTPATIENT
Start: 2024-04-20 | End: 2024-04-20

## 2024-04-20 RX ORDER — ALBUTEROL SULFATE 2.5 MG/3ML
2.5 SOLUTION RESPIRATORY (INHALATION) EVERY 6 HOURS PRN
Status: DISCONTINUED | OUTPATIENT
Start: 2024-04-20 | End: 2024-04-23 | Stop reason: HOSPADM

## 2024-04-20 RX ADMIN — FUROSEMIDE 40 MG: 10 INJECTION, SOLUTION INTRAMUSCULAR; INTRAVENOUS at 10:14

## 2024-04-20 RX ADMIN — BUDESONIDE AND FORMOTEROL FUMARATE DIHYDRATE 2 PUFF: 160; 4.5 AEROSOL RESPIRATORY (INHALATION) at 21:01

## 2024-04-20 RX ADMIN — PANTOPRAZOLE SODIUM 40 MG: 40 TABLET, DELAYED RELEASE ORAL at 05:50

## 2024-04-20 RX ADMIN — WATER 40 MG: 1 INJECTION INTRAMUSCULAR; INTRAVENOUS; SUBCUTANEOUS at 10:14

## 2024-04-20 RX ADMIN — MORPHINE SULFATE 1 MG: 2 INJECTION, SOLUTION INTRAMUSCULAR; INTRAVENOUS at 12:32

## 2024-04-20 RX ADMIN — FUROSEMIDE 40 MG: 10 INJECTION, SOLUTION INTRAMUSCULAR; INTRAVENOUS at 17:38

## 2024-04-20 RX ADMIN — BUDESONIDE AND FORMOTEROL FUMARATE DIHYDRATE 2 PUFF: 160; 4.5 AEROSOL RESPIRATORY (INHALATION) at 07:43

## 2024-04-20 RX ADMIN — SODIUM CHLORIDE, PRESERVATIVE FREE 10 ML: 5 INJECTION INTRAVENOUS at 23:34

## 2024-04-20 RX ADMIN — ENOXAPARIN SODIUM 40 MG: 100 INJECTION SUBCUTANEOUS at 23:34

## 2024-04-20 RX ADMIN — VANCOMYCIN HYDROCHLORIDE 750 MG: 750 INJECTION, POWDER, LYOPHILIZED, FOR SOLUTION INTRAVENOUS at 23:33

## 2024-04-20 RX ADMIN — SODIUM CHLORIDE, PRESERVATIVE FREE 10 ML: 5 INJECTION INTRAVENOUS at 10:16

## 2024-04-20 RX ADMIN — ALBUTEROL SULFATE 2.5 MG: 2.5 SOLUTION RESPIRATORY (INHALATION) at 11:04

## 2024-04-20 RX ADMIN — ESCITALOPRAM OXALATE 10 MG: 10 TABLET ORAL at 10:13

## 2024-04-20 RX ADMIN — LEVOTHYROXINE SODIUM 112 MCG: 112 TABLET ORAL at 05:50

## 2024-04-20 RX ADMIN — DILTIAZEM HYDROCHLORIDE 30 MG: 30 TABLET, FILM COATED ORAL at 12:32

## 2024-04-20 RX ADMIN — GUAIFENESIN 600 MG: 600 TABLET, EXTENDED RELEASE ORAL at 10:13

## 2024-04-20 RX ADMIN — HYDROCODONE BITARTRATE AND ACETAMINOPHEN 1 TABLET: 5; 325 TABLET ORAL at 05:32

## 2024-04-20 RX ADMIN — IPRATROPIUM BROMIDE AND ALBUTEROL SULFATE 1 DOSE: 2.5; .5 SOLUTION RESPIRATORY (INHALATION) at 21:00

## 2024-04-20 RX ADMIN — IPRATROPIUM BROMIDE AND ALBUTEROL SULFATE 1 DOSE: 2.5; .5 SOLUTION RESPIRATORY (INHALATION) at 07:38

## 2024-04-20 RX ADMIN — IPRATROPIUM BROMIDE AND ALBUTEROL SULFATE 1 DOSE: 2.5; .5 SOLUTION RESPIRATORY (INHALATION) at 16:04

## 2024-04-20 RX ADMIN — MEROPENEM 1000 MG: 1 INJECTION, POWDER, FOR SOLUTION INTRAVENOUS at 01:58

## 2024-04-20 ASSESSMENT — PAIN SCALES - GENERAL
PAINLEVEL_OUTOF10: 0
PAINLEVEL_OUTOF10: 10
PAINLEVEL_OUTOF10: 7

## 2024-04-20 ASSESSMENT — PAIN DESCRIPTION - ORIENTATION: ORIENTATION: RIGHT;LEFT;LOWER

## 2024-04-20 ASSESSMENT — PAIN DESCRIPTION - DESCRIPTORS
DESCRIPTORS: ACHING
DESCRIPTORS: ACHING

## 2024-04-20 ASSESSMENT — PAIN DESCRIPTION - LOCATION
LOCATION: GENERALIZED
LOCATION: BACK;LEG

## 2024-04-20 ASSESSMENT — PAIN DESCRIPTION - ONSET: ONSET: ON-GOING

## 2024-04-20 ASSESSMENT — PAIN - FUNCTIONAL ASSESSMENT: PAIN_FUNCTIONAL_ASSESSMENT: ACTIVITIES ARE NOT PREVENTED

## 2024-04-20 ASSESSMENT — PAIN DESCRIPTION - PAIN TYPE: TYPE: CHRONIC PAIN

## 2024-04-20 ASSESSMENT — PAIN DESCRIPTION - FREQUENCY: FREQUENCY: CONTINUOUS

## 2024-04-20 NOTE — PLAN OF CARE
Problem: Discharge Planning  Goal: Discharge to home or other facility with appropriate resources  Outcome: Progressing  Flowsheets (Taken 4/20/2024 0800)  Discharge to home or other facility with appropriate resources:   Identify barriers to discharge with patient and caregiver   Arrange for needed discharge resources and transportation as appropriate   Identify discharge learning needs (meds, wound care, etc)   Refer to discharge planning if patient needs post-hospital services based on physician order or complex needs related to functional status, cognitive ability or social support system     Problem: Safety - Adult  Goal: Free from fall injury  Outcome: Progressing     Problem: Skin/Tissue Integrity  Goal: Absence of new skin breakdown  Description: 1.  Monitor for areas of redness and/or skin breakdown  2.  Assess vascular access sites hourly  3.  Every 4-6 hours minimum:  Change oxygen saturation probe site  4.  Every 4-6 hours:  If on nasal continuous positive airway pressure, respiratory therapy assess nares and determine need for appliance change or resting period.  Outcome: Progressing     Problem: Nutrition Deficit:  Goal: Optimize nutritional status  Outcome: Progressing     Problem: Chronic Conditions and Co-morbidities  Goal: Patient's chronic conditions and co-morbidity symptoms are monitored and maintained or improved  Outcome: Progressing  Flowsheets (Taken 4/20/2024 0800)  Care Plan - Patient's Chronic Conditions and Co-Morbidity Symptoms are Monitored and Maintained or Improved:   Monitor and assess patient's chronic conditions and comorbid symptoms for stability, deterioration, or improvement   Collaborate with multidisciplinary team to address chronic and comorbid conditions and prevent exacerbation or deterioration   Update acute care plan with appropriate goals if chronic or comorbid symptoms are exacerbated and prevent overall improvement and discharge     Problem: Pain  Goal:

## 2024-04-21 ENCOUNTER — APPOINTMENT (OUTPATIENT)
Dept: CT IMAGING | Age: 84
DRG: 871 | End: 2024-04-21
Payer: MEDICARE

## 2024-04-21 LAB
ANION GAP SERPL CALCULATED.3IONS-SCNC: 12 MMOL/L (ref 7–16)
BASE EXCESS MIXED: 14.3 (ref 0–3)
BASE EXCESS MIXED: 7.9 (ref 0–3)
BASE EXCESS MIXED: 9.6 (ref 0–3)
BASOPHILS ABSOLUTE: 0 K/CU MM
BASOPHILS RELATIVE PERCENT: 0.2 % (ref 0–1)
BUN SERPL-MCNC: 31 MG/DL (ref 6–23)
CALCIUM SERPL-MCNC: 8.3 MG/DL (ref 8.3–10.6)
CARBON MONOXIDE, BLOOD: 2.1 % (ref 0–5)
CHLORIDE BLD-SCNC: 100 MMOL/L (ref 99–110)
CO2 CONTENT: 43.2 MMOL/L (ref 21–32)
CO2: 29 MMOL/L (ref 21–32)
COMMENT: ABNORMAL
CREAT SERPL-MCNC: 0.9 MG/DL (ref 0.6–1.1)
DIFFERENTIAL TYPE: ABNORMAL
EOSINOPHILS ABSOLUTE: 0 K/CU MM
EOSINOPHILS RELATIVE PERCENT: 0 % (ref 0–3)
GFR SERPL CREATININE-BSD FRML MDRD: 64 ML/MIN/1.73M2
GLUCOSE SERPL-MCNC: 106 MG/DL (ref 70–99)
HCO3 ARTERIAL: 40.4 MMOL/L (ref 21–28)
HCO3 VENOUS: 38.1 MMOL/L (ref 22–29)
HCO3 VENOUS: 43.5 MMOL/L (ref 22–29)
HCT VFR BLD CALC: 30.4 % (ref 37–47)
HEMOGLOBIN: 8.2 GM/DL (ref 12.5–16)
IMMATURE NEUTROPHIL %: 4.5 % (ref 0–0.43)
LYMPHOCYTES ABSOLUTE: 0.6 K/CU MM
LYMPHOCYTES RELATIVE PERCENT: 5.3 % (ref 24–44)
MAGNESIUM: 2.2 MG/DL (ref 1.8–2.4)
MCH RBC QN AUTO: 25 PG (ref 27–31)
MCHC RBC AUTO-ENTMCNC: 27 % (ref 32–36)
MCV RBC AUTO: 92.7 FL (ref 78–100)
METHEMOGLOBIN ARTERIAL: 1.5 %
MONOCYTES ABSOLUTE: 0.7 K/CU MM
MONOCYTES RELATIVE PERCENT: 6.5 % (ref 0–4)
NEUTROPHILS RELATIVE PERCENT: 83.5 % (ref 36–66)
NUCLEATED RBC %: 0.2 %
O2 SAT, VEN: 89.8 % (ref 50–70)
O2 SAT, VEN: 94.3 % (ref 50–70)
O2 SATURATION: 95.8 % (ref 94–98)
PCO2 ARTERIAL: 90 MMHG (ref 35–48)
PCO2, VEN: 77 MMHG (ref 41–51)
PCO2, VEN: 83 MMHG (ref 41–51)
PDW BLD-RTO: 16.1 % (ref 11.7–14.9)
PH BLOOD: 7.26 (ref 7.35–7.45)
PH VENOUS: 7.27 (ref 7.32–7.43)
PH VENOUS: 7.36 (ref 7.32–7.43)
PLATELET # BLD: 406 K/CU MM (ref 140–440)
PMV BLD AUTO: 11.1 FL (ref 7.5–11.1)
PO2 ARTERIAL: 139 MMHG (ref 83–108)
PO2, VEN: 152 MMHG (ref 28–48)
PO2, VEN: 77 MMHG (ref 28–48)
POTASSIUM SERPL-SCNC: 5.4 MMOL/L (ref 3.5–5.1)
PRO-BNP: 4764 PG/ML
RBC # BLD: 3.28 M/CU MM (ref 4.2–5.4)
SEGMENTED NEUTROPHILS ABSOLUTE COUNT: 9 K/CU MM
SODIUM BLD-SCNC: 141 MMOL/L (ref 135–145)
TOTAL IMMATURE NEUTOROPHIL: 0.49 K/CU MM
TOTAL NUCLEATED RBC: 0 K/CU MM
WBC # BLD: 10.8 K/CU MM (ref 4–10.5)

## 2024-04-21 PROCEDURE — 80048 BASIC METABOLIC PNL TOTAL CA: CPT

## 2024-04-21 PROCEDURE — 82805 BLOOD GASES W/O2 SATURATION: CPT

## 2024-04-21 PROCEDURE — 99233 SBSQ HOSP IP/OBS HIGH 50: CPT | Performed by: INTERNAL MEDICINE

## 2024-04-21 PROCEDURE — 6360000002 HC RX W HCPCS: Performed by: INTERNAL MEDICINE

## 2024-04-21 PROCEDURE — 85025 COMPLETE CBC W/AUTO DIFF WBC: CPT

## 2024-04-21 PROCEDURE — 6360000004 HC RX CONTRAST MEDICATION: Performed by: INTERNAL MEDICINE

## 2024-04-21 PROCEDURE — 6370000000 HC RX 637 (ALT 250 FOR IP): Performed by: FAMILY MEDICINE

## 2024-04-21 PROCEDURE — 36415 COLL VENOUS BLD VENIPUNCTURE: CPT

## 2024-04-21 PROCEDURE — 2580000003 HC RX 258: Performed by: INTERNAL MEDICINE

## 2024-04-21 PROCEDURE — 2580000003 HC RX 258: Performed by: FAMILY MEDICINE

## 2024-04-21 PROCEDURE — 36600 WITHDRAWAL OF ARTERIAL BLOOD: CPT

## 2024-04-21 PROCEDURE — 6370000000 HC RX 637 (ALT 250 FOR IP): Performed by: PHYSICIAN ASSISTANT

## 2024-04-21 PROCEDURE — 6360000002 HC RX W HCPCS: Performed by: FAMILY MEDICINE

## 2024-04-21 PROCEDURE — 94640 AIRWAY INHALATION TREATMENT: CPT

## 2024-04-21 PROCEDURE — 2700000000 HC OXYGEN THERAPY PER DAY

## 2024-04-21 PROCEDURE — 94660 CPAP INITIATION&MGMT: CPT

## 2024-04-21 PROCEDURE — 82803 BLOOD GASES ANY COMBINATION: CPT

## 2024-04-21 PROCEDURE — 83735 ASSAY OF MAGNESIUM: CPT

## 2024-04-21 PROCEDURE — 94761 N-INVAS EAR/PLS OXIMETRY MLT: CPT

## 2024-04-21 PROCEDURE — 83880 ASSAY OF NATRIURETIC PEPTIDE: CPT

## 2024-04-21 PROCEDURE — 2060000000 HC ICU INTERMEDIATE R&B

## 2024-04-21 PROCEDURE — 71275 CT ANGIOGRAPHY CHEST: CPT

## 2024-04-21 RX ORDER — GLYCOPYRROLATE 0.2 MG/ML
0.2 INJECTION INTRAMUSCULAR; INTRAVENOUS EVERY 4 HOURS PRN
Status: DISCONTINUED | OUTPATIENT
Start: 2024-04-21 | End: 2024-04-23 | Stop reason: HOSPADM

## 2024-04-21 RX ORDER — IPRATROPIUM BROMIDE AND ALBUTEROL SULFATE 2.5; .5 MG/3ML; MG/3ML
2 SOLUTION RESPIRATORY (INHALATION)
Status: DISCONTINUED | OUTPATIENT
Start: 2024-04-21 | End: 2024-04-21

## 2024-04-21 RX ORDER — MORPHINE SULFATE 2 MG/ML
2 INJECTION, SOLUTION INTRAMUSCULAR; INTRAVENOUS
Status: DISCONTINUED | OUTPATIENT
Start: 2024-04-21 | End: 2024-04-23 | Stop reason: HOSPADM

## 2024-04-21 RX ORDER — IPRATROPIUM BROMIDE AND ALBUTEROL SULFATE 2.5; .5 MG/3ML; MG/3ML
1 SOLUTION RESPIRATORY (INHALATION)
Status: DISCONTINUED | OUTPATIENT
Start: 2024-04-21 | End: 2024-04-21

## 2024-04-21 RX ORDER — IPRATROPIUM BROMIDE AND ALBUTEROL SULFATE 2.5; .5 MG/3ML; MG/3ML
1 SOLUTION RESPIRATORY (INHALATION) EVERY 4 HOURS PRN
Status: DISCONTINUED | OUTPATIENT
Start: 2024-04-21 | End: 2024-04-23 | Stop reason: HOSPADM

## 2024-04-21 RX ORDER — LORAZEPAM 2 MG/ML
1 INJECTION INTRAMUSCULAR EVERY 4 HOURS PRN
Status: DISCONTINUED | OUTPATIENT
Start: 2024-04-21 | End: 2024-04-23 | Stop reason: HOSPADM

## 2024-04-21 RX ORDER — ALBUMIN (HUMAN) 12.5 G/50ML
25 SOLUTION INTRAVENOUS EVERY 8 HOURS
Status: DISCONTINUED | OUTPATIENT
Start: 2024-04-21 | End: 2024-04-23 | Stop reason: HOSPADM

## 2024-04-21 RX ORDER — SODIUM CHLORIDE 0.9 % (FLUSH) 0.9 %
5-40 SYRINGE (ML) INJECTION 2 TIMES DAILY
Status: DISCONTINUED | OUTPATIENT
Start: 2024-04-21 | End: 2024-04-23 | Stop reason: HOSPADM

## 2024-04-21 RX ORDER — MORPHINE SULFATE 2 MG/ML
2 INJECTION, SOLUTION INTRAMUSCULAR; INTRAVENOUS ONCE
Status: COMPLETED | OUTPATIENT
Start: 2024-04-21 | End: 2024-04-21

## 2024-04-21 RX ORDER — LORAZEPAM 2 MG/ML
1 INJECTION INTRAMUSCULAR
Status: ACTIVE | OUTPATIENT
Start: 2024-04-21 | End: 2024-04-21

## 2024-04-21 RX ORDER — SODIUM POLYSTYRENE SULFONATE 15 G/60ML
15 SUSPENSION ORAL; RECTAL ONCE
Status: DISCONTINUED | OUTPATIENT
Start: 2024-04-21 | End: 2024-04-23 | Stop reason: HOSPADM

## 2024-04-21 RX ORDER — MORPHINE SULFATE 4 MG/ML
4 INJECTION, SOLUTION INTRAMUSCULAR; INTRAVENOUS
Status: DISCONTINUED | OUTPATIENT
Start: 2024-04-21 | End: 2024-04-23 | Stop reason: HOSPADM

## 2024-04-21 RX ADMIN — IPRATROPIUM BROMIDE AND ALBUTEROL SULFATE 1 DOSE: 2.5; .5 SOLUTION RESPIRATORY (INHALATION) at 07:05

## 2024-04-21 RX ADMIN — MORPHINE SULFATE 2 MG: 2 INJECTION, SOLUTION INTRAMUSCULAR; INTRAVENOUS at 13:17

## 2024-04-21 RX ADMIN — WATER 40 MG: 1 INJECTION INTRAMUSCULAR; INTRAVENOUS; SUBCUTANEOUS at 09:29

## 2024-04-21 RX ADMIN — SODIUM CHLORIDE, PRESERVATIVE FREE 10 ML: 5 INJECTION INTRAVENOUS at 09:30

## 2024-04-21 RX ADMIN — MEROPENEM 1000 MG: 1 INJECTION, POWDER, FOR SOLUTION INTRAVENOUS at 02:15

## 2024-04-21 RX ADMIN — IOPAMIDOL 75 ML: 755 INJECTION, SOLUTION INTRAVENOUS at 02:00

## 2024-04-21 RX ADMIN — IPRATROPIUM BROMIDE AND ALBUTEROL SULFATE 2 DOSE: .5; 2.5 SOLUTION RESPIRATORY (INHALATION) at 14:15

## 2024-04-21 RX ADMIN — ALBUTEROL SULFATE 2.5 MG: 2.5 SOLUTION RESPIRATORY (INHALATION) at 11:41

## 2024-04-21 RX ADMIN — FUROSEMIDE 40 MG: 10 INJECTION, SOLUTION INTRAMUSCULAR; INTRAVENOUS at 09:29

## 2024-04-21 RX ADMIN — SODIUM CHLORIDE, PRESERVATIVE FREE 10 ML: 5 INJECTION INTRAVENOUS at 21:38

## 2024-04-21 RX ADMIN — IPRATROPIUM BROMIDE AND ALBUTEROL SULFATE 1 DOSE: 2.5; .5 SOLUTION RESPIRATORY (INHALATION) at 11:30

## 2024-04-21 RX ADMIN — FUROSEMIDE 5 MG/HR: 10 INJECTION, SOLUTION INTRAMUSCULAR; INTRAVENOUS at 13:50

## 2024-04-21 ASSESSMENT — PAIN DESCRIPTION - DESCRIPTORS: DESCRIPTORS: ACHING

## 2024-04-21 ASSESSMENT — PAIN DESCRIPTION - ONSET: ONSET: ON-GOING

## 2024-04-21 ASSESSMENT — PAIN DESCRIPTION - PAIN TYPE: TYPE: CHRONIC PAIN

## 2024-04-21 ASSESSMENT — PAIN DESCRIPTION - FREQUENCY: FREQUENCY: CONTINUOUS

## 2024-04-21 ASSESSMENT — PAIN - FUNCTIONAL ASSESSMENT: PAIN_FUNCTIONAL_ASSESSMENT: PREVENTS OR INTERFERES SOME ACTIVE ACTIVITIES AND ADLS

## 2024-04-21 ASSESSMENT — PAIN DESCRIPTION - LOCATION: LOCATION: GENERALIZED

## 2024-04-21 ASSESSMENT — PAIN SCALES - GENERAL: PAINLEVEL_OUTOF10: 10

## 2024-04-21 NOTE — PLAN OF CARE
Problem: Discharge Planning  Goal: Discharge to home or other facility with appropriate resources  4/20/2024 2331 by Britton Ken RN  Outcome: Progressing  4/20/2024 1406 by Mila Kenney RN  Outcome: Progressing  Flowsheets (Taken 4/20/2024 0800)  Discharge to home or other facility with appropriate resources:   Identify barriers to discharge with patient and caregiver   Arrange for needed discharge resources and transportation as appropriate   Identify discharge learning needs (meds, wound care, etc)   Refer to discharge planning if patient needs post-hospital services based on physician order or complex needs related to functional status, cognitive ability or social support system     Problem: Safety - Adult  Goal: Free from fall injury  4/20/2024 2331 by Britton Ken RN  Outcome: Progressing  4/20/2024 1406 by Mila Kenney RN  Outcome: Progressing     Problem: Skin/Tissue Integrity  Goal: Absence of new skin breakdown  Description: 1.  Monitor for areas of redness and/or skin breakdown  2.  Assess vascular access sites hourly  3.  Every 4-6 hours minimum:  Change oxygen saturation probe site  4.  Every 4-6 hours:  If on nasal continuous positive airway pressure, respiratory therapy assess nares and determine need for appliance change or resting period.  4/20/2024 2331 by Britton Ken RN  Outcome: Progressing  4/20/2024 1406 by Mila Kenney RN  Outcome: Progressing     Problem: Nutrition Deficit:  Goal: Optimize nutritional status  4/20/2024 2331 by Britton Ken RN  Outcome: Progressing  4/20/2024 1406 by Mila Kenney RN  Outcome: Progressing     Problem: Chronic Conditions and Co-morbidities  Goal: Patient's chronic conditions and co-morbidity symptoms are monitored and maintained or improved  4/20/2024 2331 by Britton Ken RN  Outcome: Progressing  4/20/2024 1406 by Mila Kenney RN  Outcome: Progressing  Flowsheets (Taken 4/20/2024 0800)  Care Plan - Patient's Chronic

## 2024-04-21 NOTE — CONSULTS
Pulmonary Consult Note      Reason for Consult:     acute on chronic respiratory failure      Requesting Physician:     Alexander Pulido MD       Subjective:   CHIEF COMPLAINT :SOB    Patient Active Problem List    Diagnosis Date Noted    Acute on chronic diastolic congestive heart failure (HCC) 06/17/2016     Priority: High     Overview Note:     Updating Deprecated Diagnoses      Hypoxia 04/14/2016     Priority: High    Moderate COPD (chronic obstructive pulmonary disease) ([facility]) 02/01/2016     Priority: High    Coronary atherosclerosis of native coronary artery 09/01/2015     Priority: High    Hypertension      Priority: Medium    Morbid obesity due to excess calories ([facility]) 04/18/2016     Priority: Medium    Depression      Priority: Low    Bilateral edema of lower extremity 12/16/2015     Priority: Low    Anemia 09/07/2015     Priority: Low    Severe malnutrition ([facility]) 07/12/2023     Class: Acute    Acute hypercapnic respiratory failure ([facility]) 07/06/2023    GI bleed 09/30/2021    Other chest pain 09/10/2021    GIB (gastrointestinal bleeding) 11/24/2020    Chronic diastolic CHF (congestive heart failure) ([facility]) 12/22/2017    Pneumonia 11/20/2017    COPD with acute exacerbation ([facility]) 04/14/2016    COPD exacerbation ([facility]) 04/13/2016    Acute on chronic respiratory failure with hypoxia and hypercapnia ([facility]) 04/13/2016    Chronic hypoxemic respiratory failure ([facility]) 11/02/2015        HPI:                The patient is a 83 y.o. female with significant past medical history of CHF, COPD, chronic respiratory failure, hypertension, depression   presents with complaints of SOB x2 days associated with cough, phlegm-clear to green, low grade fever. No chest pain, no hemoptysis, no loss of weight,SOB, no sick exposure, no recent travel. Her CXR showed pulmonary congestion and suspected multifocal Pneumonia. She is on Abx, inhalers and Diuretics. Await ECHO. At this time she is lying in the bed. She is in mild resp 
Midline meets therapeutic needs at this time.  BARD PowerGlide PRO inserted in RUE x 2 attempts using sterile ultrasound guided technique.  Brisk blood return, flushes without resistance.  Primary RN Britton, wagner and aware.           
Nutrition Education    Educated on Heart Failure Nutrition Therapy  Learners: Patient  Readiness: Pt admitted from SNF. States she follows a low salt diet but did not elaborate. Declined full diet education at visit, but willing to accept handouts. Encouraged not adding salt to meals.   Method: Handout  Response: Verbalizes understanding; Needs Reinforcement  Contact name and number provided.    Anjali Tenorio RD  Contact Number: [phone]    
her father; Heart Disease in her mother; High Blood Pressure in her mother; Substance Abuse in her father.  Soc HX:   Social History     Socioeconomic History    Marital status:      Spouse name: Jassi    Number of children: 4   Tobacco Use    Smoking status: Former     Current packs/day: 0.00     Types: Cigarettes     Quit date: 2010     Years since quittin.6    Smokeless tobacco: Never   Substance and Sexual Activity    Alcohol use: Yes     Alcohol/week: 0.0 standard drinks of alcohol     Comment: wine twice a year    Drug use: No    Sexual activity: Yes     Partners: Male     Social Determinants of Health     Food Insecurity: No Food Insecurity (2024)    Hunger Vital Sign     Worried About Running Out of Food in the Last Year: Never true     Ran Out of Food in the Last Year: Never true   Transportation Needs: No Transportation Needs (2024)    PRAPARE - Transportation     Lack of Transportation (Medical): No     Lack of Transportation (Non-Medical): No   Housing Stability: Low Risk  (2024)    Housing Stability Vital Sign     Unable to Pay for Housing in the Last Year: No     Number of Places Lived in the Last Year: 1     Unstable Housing in the Last Year: No       Medications:   Medications:    sodium chloride flush  5-40 mL IntraVENous BID    sodium polystyrene  15 g Oral Once    albumin human 25%  25 g IntraVENous Q8H    sodium chloride flush  5-40 mL IntraVENous 2 times per day    enoxaparin  40 mg SubCUTAneous QHS    [Held by provider] furosemide  40 mg IntraVENous BID    escitalopram  10 mg Oral Daily    ferrous sulfate  325 mg Oral Nightly    budesonide-formoterol  2 puff Inhalation BID RT    [Held by provider] guaiFENesin  600 mg Oral BID    lidocaine  1 patch TransDERmal Daily    melatonin  6 mg Oral Nightly    montelukast  10 mg Oral Nightly    pantoprazole  40 mg Oral QAM AC      Infusions:    sodium chloride       PRN Meds: morphine, 2 mg, Q2H PRN   Or  morphine, 4 mg, Q2H

## 2024-04-21 NOTE — PLAN OF CARE
Problem: Discharge Planning  Goal: Discharge to home or other facility with appropriate resources  4/21/2024 1327 by Mila Kenney RN  Outcome: Progressing  Flowsheets (Taken 4/21/2024 0800)  Discharge to home or other facility with appropriate resources:   Identify barriers to discharge with patient and caregiver   Arrange for needed discharge resources and transportation as appropriate   Identify discharge learning needs (meds, wound care, etc)   Refer to discharge planning if patient needs post-hospital services based on physician order or complex needs related to functional status, cognitive ability or social support system  4/20/2024 2331 by Britton Ken RN  Outcome: Progressing     Problem: Safety - Adult  Goal: Free from fall injury  4/21/2024 1327 by Mila Kenney RN  Outcome: Progressing  4/20/2024 2331 by Britton Ken RN  Outcome: Progressing     Problem: Skin/Tissue Integrity  Goal: Absence of new skin breakdown  Description: 1.  Monitor for areas of redness and/or skin breakdown  2.  Assess vascular access sites hourly  3.  Every 4-6 hours minimum:  Change oxygen saturation probe site  4.  Every 4-6 hours:  If on nasal continuous positive airway pressure, respiratory therapy assess nares and determine need for appliance change or resting period.  4/21/2024 1327 by Mila Kenney RN  Outcome: Progressing  4/20/2024 2331 by Britton Ken RN  Outcome: Progressing     Problem: Nutrition Deficit:  Goal: Optimize nutritional status  4/21/2024 1327 by Mila Kenney RN  Outcome: Progressing  4/20/2024 2331 by Britton Ken RN  Outcome: Progressing     Problem: Chronic Conditions and Co-morbidities  Goal: Patient's chronic conditions and co-morbidity symptoms are monitored and maintained or improved  4/21/2024 1327 by Mila Kenney RN  Outcome: Progressing  Flowsheets (Taken 4/21/2024 0800)  Care Plan - Patient's Chronic Conditions and Co-Morbidity Symptoms are Monitored and Maintained

## 2024-04-21 NOTE — FLOWSHEET NOTE
[facility] RN rounded on pt  for DI score of 57.  Pt on Bipap 100% O2 12/22.  PT resting quietly with eyes closed.  No C/O pain.  VSS.  BNP is 4764 pt is on lasix BID.  K+ is 5.4 Kayexalate, albuterol ordered.

## 2024-04-22 ENCOUNTER — APPOINTMENT (OUTPATIENT)
Dept: GENERAL RADIOLOGY | Age: 84
DRG: 871 | End: 2024-04-22
Payer: MEDICARE

## 2024-04-22 LAB
CULTURE: NORMAL
GRAM SMEAR: NORMAL
Lab: NORMAL
SPECIMEN: NORMAL

## 2024-04-22 PROCEDURE — 6360000002 HC RX W HCPCS: Performed by: FAMILY MEDICINE

## 2024-04-22 PROCEDURE — 2580000003 HC RX 258: Performed by: FAMILY MEDICINE

## 2024-04-22 PROCEDURE — 2580000003 HC RX 258: Performed by: INTERNAL MEDICINE

## 2024-04-22 PROCEDURE — 6370000000 HC RX 637 (ALT 250 FOR IP): Performed by: FAMILY MEDICINE

## 2024-04-22 PROCEDURE — 94640 AIRWAY INHALATION TREATMENT: CPT

## 2024-04-22 PROCEDURE — 94660 CPAP INITIATION&MGMT: CPT

## 2024-04-22 PROCEDURE — 6360000002 HC RX W HCPCS: Performed by: INTERNAL MEDICINE

## 2024-04-22 PROCEDURE — 2060000000 HC ICU INTERMEDIATE R&B

## 2024-04-22 PROCEDURE — 80048 BASIC METABOLIC PNL TOTAL CA: CPT

## 2024-04-22 PROCEDURE — 99232 SBSQ HOSP IP/OBS MODERATE 35: CPT | Performed by: INTERNAL MEDICINE

## 2024-04-22 PROCEDURE — 71045 X-RAY EXAM CHEST 1 VIEW: CPT

## 2024-04-22 PROCEDURE — 2500000003 HC RX 250 WO HCPCS: Performed by: STUDENT IN AN ORGANIZED HEALTH CARE EDUCATION/TRAINING PROGRAM

## 2024-04-22 RX ORDER — DEXTROSE MONOHYDRATE, SODIUM CHLORIDE, AND POTASSIUM CHLORIDE 50; 1.49; 4.5 G/1000ML; G/1000ML; G/1000ML
INJECTION, SOLUTION INTRAVENOUS CONTINUOUS
Status: DISPENSED | OUTPATIENT
Start: 2024-04-22 | End: 2024-04-23

## 2024-04-22 RX ADMIN — MORPHINE SULFATE 4 MG: 4 INJECTION, SOLUTION INTRAMUSCULAR; INTRAVENOUS at 17:37

## 2024-04-22 RX ADMIN — SODIUM CHLORIDE, PRESERVATIVE FREE 10 ML: 5 INJECTION INTRAVENOUS at 07:55

## 2024-04-22 RX ADMIN — BUDESONIDE AND FORMOTEROL FUMARATE DIHYDRATE 2 PUFF: 160; 4.5 AEROSOL RESPIRATORY (INHALATION) at 19:46

## 2024-04-22 RX ADMIN — MORPHINE SULFATE 4 MG: 4 INJECTION, SOLUTION INTRAMUSCULAR; INTRAVENOUS at 11:48

## 2024-04-22 RX ADMIN — ENOXAPARIN SODIUM 40 MG: 100 INJECTION SUBCUTANEOUS at 20:39

## 2024-04-22 RX ADMIN — SODIUM CHLORIDE, PRESERVATIVE FREE 10 ML: 5 INJECTION INTRAVENOUS at 20:40

## 2024-04-22 RX ADMIN — MORPHINE SULFATE 2 MG: 2 INJECTION, SOLUTION INTRAMUSCULAR; INTRAVENOUS at 20:38

## 2024-04-22 RX ADMIN — MORPHINE SULFATE 4 MG: 4 INJECTION, SOLUTION INTRAMUSCULAR; INTRAVENOUS at 14:27

## 2024-04-22 RX ADMIN — POTASSIUM CHLORIDE, DEXTROSE MONOHYDRATE AND SODIUM CHLORIDE: 150; 5; 450 INJECTION, SOLUTION INTRAVENOUS at 12:32

## 2024-04-22 ASSESSMENT — PAIN - FUNCTIONAL ASSESSMENT
PAIN_FUNCTIONAL_ASSESSMENT: ACTIVITIES ARE NOT PREVENTED
PAIN_FUNCTIONAL_ASSESSMENT: PREVENTS OR INTERFERES SOME ACTIVE ACTIVITIES AND ADLS
PAIN_FUNCTIONAL_ASSESSMENT: PREVENTS OR INTERFERES SOME ACTIVE ACTIVITIES AND ADLS

## 2024-04-22 ASSESSMENT — PAIN DESCRIPTION - ORIENTATION: ORIENTATION: MID

## 2024-04-22 ASSESSMENT — PAIN SCALES - GENERAL
PAINLEVEL_OUTOF10: 7
PAINLEVEL_OUTOF10: 8
PAINLEVEL_OUTOF10: 10
PAINLEVEL_OUTOF10: 4
PAINLEVEL_OUTOF10: 1

## 2024-04-22 ASSESSMENT — PAIN DESCRIPTION - DESCRIPTORS
DESCRIPTORS: ACHING
DESCRIPTORS: ACHING

## 2024-04-22 ASSESSMENT — PAIN DESCRIPTION - LOCATION
LOCATION: GENERALIZED
LOCATION: GENERALIZED
LOCATION: ABDOMEN

## 2024-04-22 NOTE — PLAN OF CARE
Problem: Discharge Planning  Goal: Discharge to home or other facility with appropriate resources  4/21/2024 2143 by Britton Ken RN  Outcome: Progressing  4/21/2024 1327 by Mila Kenney RN  Outcome: Progressing  Flowsheets (Taken 4/21/2024 0800)  Discharge to home or other facility with appropriate resources:   Identify barriers to discharge with patient and caregiver   Arrange for needed discharge resources and transportation as appropriate   Identify discharge learning needs (meds, wound care, etc)   Refer to discharge planning if patient needs post-hospital services based on physician order or complex needs related to functional status, cognitive ability or social support system     Problem: Safety - Adult  Goal: Free from fall injury  4/21/2024 2143 by Britton Ken RN  Outcome: Progressing  4/21/2024 1327 by Mila Kenney RN  Outcome: Progressing     Problem: Skin/Tissue Integrity  Goal: Absence of new skin breakdown  Description: 1.  Monitor for areas of redness and/or skin breakdown  2.  Assess vascular access sites hourly  3.  Every 4-6 hours minimum:  Change oxygen saturation probe site  4.  Every 4-6 hours:  If on nasal continuous positive airway pressure, respiratory therapy assess nares and determine need for appliance change or resting period.  4/21/2024 2143 by Britton Ken RN  Outcome: Progressing  4/21/2024 1327 by Mila Kenney RN  Outcome: Progressing     Problem: Nutrition Deficit:  Goal: Optimize nutritional status  4/21/2024 2143 by Britton Ken RN  Outcome: Progressing  4/21/2024 1327 by Mila Kenney RN  Outcome: Progressing     Problem: Chronic Conditions and Co-morbidities  Goal: Patient's chronic conditions and co-morbidity symptoms are monitored and maintained or improved  4/21/2024 2143 by Britton Ken RN  Outcome: Progressing  4/21/2024 1327 by Mila Kenney RN  Outcome: Progressing  Flowsheets (Taken 4/21/2024 0800)  Care Plan - Patient's Chronic

## 2024-04-22 NOTE — CARE COORDINATION
CM noted hospice consult. CM to pts room. Pt on bipap and mostly asleep. Spouse at bedside. CM gave spouse  the brochures for [facility]. Spouse chose [facility]. Referral given to Blanca with [facility].  JEFF notified Nahomy with [facility] by secure VM.16:08 JEFF received a call from Blanca with [facility] who stated that Yaritza, RN, will be here at 16:45. JEFF called spouse who had already left the hospital and informed spouse of the meeting time.

## 2024-04-22 NOTE — PLAN OF CARE
Problem: Discharge Planning  Goal: Discharge to home or other facility with appropriate resources  4/22/2024 0743 by Zuri Aguirre RN  Outcome: Progressing  4/21/2024 2143 by Britton Ken RN  Outcome: Progressing     Problem: Safety - Adult  Goal: Free from fall injury  4/22/2024 0743 by Zuri Aguirre RN  Outcome: Progressing  4/21/2024 2143 by Britton Ken RN  Outcome: Progressing     Problem: Skin/Tissue Integrity  Goal: Absence of new skin breakdown  Description: 1.  Monitor for areas of redness and/or skin breakdown  2.  Assess vascular access sites hourly  3.  Every 4-6 hours minimum:  Change oxygen saturation probe site  4.  Every 4-6 hours:  If on nasal continuous positive airway pressure, respiratory therapy assess nares and determine need for appliance change or resting period.  4/22/2024 0743 by Zuri Aguirre RN  Outcome: Progressing  4/21/2024 2143 by Britton Ken RN  Outcome: Progressing     Problem: Nutrition Deficit:  Goal: Optimize nutritional status  4/22/2024 0743 by Zuri Aguirre RN  Outcome: Progressing  4/21/2024 2143 by Britton Ken RN  Outcome: Progressing     Problem: Chronic Conditions and Co-morbidities  Goal: Patient's chronic conditions and co-morbidity symptoms are monitored and maintained or improved  4/22/2024 0743 by Zuri Aguirre RN  Outcome: Progressing  4/21/2024 2143 by Britton Ken RN  Outcome: Progressing     Problem: Pain  Goal: Verbalizes/displays adequate comfort level or baseline comfort level  4/22/2024 0743 by Zuri Aguirre RN  Outcome: Progressing  4/21/2024 2143 by Britton Ken RN  Outcome: Progressing

## 2024-04-23 VITALS
DIASTOLIC BLOOD PRESSURE: 61 MMHG | SYSTOLIC BLOOD PRESSURE: 126 MMHG | WEIGHT: 177.69 LBS | HEIGHT: 61 IN | TEMPERATURE: 97.5 F | OXYGEN SATURATION: 92 % | HEART RATE: 76 BPM | RESPIRATION RATE: 17 BRPM | BODY MASS INDEX: 33.55 KG/M2

## 2024-04-23 LAB
CULTURE: NORMAL
CULTURE: NORMAL
Lab: NORMAL
Lab: NORMAL
SPECIMEN: NORMAL
SPECIMEN: NORMAL

## 2024-04-23 PROCEDURE — 94664 DEMO&/EVAL PT USE INHALER: CPT

## 2024-04-23 PROCEDURE — 2700000000 HC OXYGEN THERAPY PER DAY

## 2024-04-23 PROCEDURE — 94761 N-INVAS EAR/PLS OXIMETRY MLT: CPT

## 2024-04-23 PROCEDURE — 94660 CPAP INITIATION&MGMT: CPT

## 2024-04-23 PROCEDURE — 94640 AIRWAY INHALATION TREATMENT: CPT

## 2024-04-23 RX ORDER — GLYCOPYRROLATE 0.2 MG/ML
0.1 INJECTION INTRAMUSCULAR; INTRAVENOUS
Status: CANCELLED | OUTPATIENT
Start: 2024-04-23

## 2024-04-23 RX ORDER — LORAZEPAM 2 MG/ML
1 INJECTION INTRAMUSCULAR
Status: CANCELLED | OUTPATIENT
Start: 2024-04-23

## 2024-04-23 RX ORDER — MORPHINE SULFATE 2 MG/ML
2 INJECTION, SOLUTION INTRAMUSCULAR; INTRAVENOUS
Status: CANCELLED | OUTPATIENT
Start: 2024-04-23

## 2024-04-23 RX ADMIN — BUDESONIDE AND FORMOTEROL FUMARATE DIHYDRATE 2 PUFF: 160; 4.5 AEROSOL RESPIRATORY (INHALATION) at 07:37

## 2024-04-23 NOTE — PROGRESS NOTES
V2.0    [facility] [place] Progress Note      Name:  Erika Smith /Age/Sex: 1940  (83 y.o. female)   MRN & CSN:  [id number] & [id number] Encounter Date/Time: 2024 11:56 AM EDT   Location:  -A PCP: Donovan Granado MD     Attending:Flash Alejo MD       Hospital Day: 5    Assessment and Recommendations   Erika Simth is a 83 y.o. female with pmh as below who presents with Acute on chronic respiratory failure with hypoxia and hypercapnia (HCC)    Past Medical History:   Diagnosis Date    ASHD (arteriosclerotic heart disease)     Bilateral edema of lower extremity 2015    CHF (congestive heart failure) (HCC)     COPD (chronic obstructive pulmonary disease) (HCC)     Depression     Hypertension     MRSA (methicillin resistant staph aureus) culture positive 2021    Nasal    MRSA (methicillin resistant staph aureus) culture positive 2021    NASAL         Plan:   Acute on chronic hypoxemic and hypercapnic respiratory failure:  -CHF versus COPD exacerbation  -pH 7.29, pCO2 61  -Chest x-ray with worsening opacities and vascular congestion  -Mild leukocytosis, patient was on doxycycline/Rocephin recently for possible infection  -Given IV vancomycin/meropenem-will continue  -Will continue IV Solu-Medrol  -Patient having worsening hypoxemia and hypercapnia .  Maxed on Vapotherm, placed on nonrebreather with persistent hypercapnia.  ABG with pH 7.28/pCO2 83, initiated BiPAP again has not pain able to wean off BiPAP.  -CTA was negative for PE, left lower lobe pneumonia and diffuse emphysematous changes  -Further diuretics held as patient is euvolemic  -Pulmonologist on board  -Critical care consulted on , goals of care discussion done at bedside per critical care and patient's CODE STATUS updated to DNR-CC, hospice consulted    Acute on chronic diastolic heart failure-BNP greater than 7K and chest x-ray with vascular congestion, previous echo  with EF 55%.  Patient on on IV 
    V2.0  [facility] [place]      Name:  Erika Smith /Age/Sex: 1940  (83 y.o. female)   MRN & CSN:  [id number] & [id number] Encounter Date/Time: 2024 12:04 PM EDT    Location:  -A PCP: Donovan Granado MD       Hospital Day: 3    Assessment and Plan:   Erika Smith is a 83 y.o. female with pmh of  who presents with Acute on chronic respiratory failure with hypoxia and hypercapnia (HCC)      Plan:      Acute on chronic respiratory failure with hypercapnia and hypoxia: Similar admission in 2023 which was attributed to CHF and probable COPD exacerbation.  pH 7.29, pCO2 61.    Chest x-ray showing worsening opacities and vascular congestion.    Mild leukocytosis and was on doxycycline/Rocephin recently for possible infection.     Given IV Lasix in the emergency room and will continue.    Given IV vancomycin/meropenem and will continue.  Will continue IV Solu-Medrol as well.    Patient had worsening hypoxia and hypercapnia on 2024 AM.  Maxed on vapotherm  Also placed on nonrebreather as her saturations were stable below 90%.  Initiated on BiPAP again  Will follow CTA      Acute on chronic diastolic heart failure: BNP greater than 7000 and chest x-ray with vascular congestion.  Previous echo in 2021 showed EF of 55% and repeating.  Will continue IV Lasix started in the emergency room.  Strict I's and O's and daily weights.  Multifocal pneumonia with sepsis: Possible with worsening opacities and has had purulent cough.  Afebrile.  Mild leukocytosis.  Lactate normal.  Blood cultures pending.  Will check urinary antigens, MRSA DNA, procalcitonin.  Viral studies pending.  Continue vancomycin and meropenem started in the emergency room and will de-escalate as able.  COPD exacerbation: Suspected secondary to above issues.  Symbicort while hospitalized and then can continue home inhalers.  Continue IV Solu-Medrol started in the emergency room and bronchodilators.  Treating 
   04/19/24 1616   Oxygen Therapy/Pulse Ox   O2 Therapy Oxygen humidified   O2 Device Heated high flow cannula   O2 Flow Rate (L/min) 30 L/min   FiO2  75 %   SpO2 94 %       
   04/20/24 1115   NIV Type   $NIV $Daily Charge   NIV Started/Stopped On   Equipment Type v60   Mode Bilevel   Mask Type Full face mask   Mask Size Medium   Assessment   Pulse 81   Heart Rate Source Monitor   Respirations 20   SpO2 100 %   Level of Consciousness 1   Comfort Level Good   Using Accessory Muscles No   Mask Compliance Good   Breath Sounds   Breath Sounds Bilateral Rhonchi   Settings/Measurements   IPAP 12 cmH20   CPAP/EPAP 8 cmH2O   Vt (Measured) 511 mL   Rate Ordered 16   Insp Rise Time (%) 2 %   FiO2  100 %   I Time/ I Time % 0.9 s   Minute Volume (L/min) 10.1 Liters   Mask Leak (lpm) 3 lpm   Patient's Home Machine No   Alarm Settings   Alarms On Y   Low Pressure (cmH2O) 5 cmH2O   High Pressure (cmH2O) 40 cmH2O   Delay Alarm 20 sec(s)   Apnea (secs) 20 secs   RR Low (bpm) 15   RR High (bpm) 50 br/min       
   04/20/24 1336   NIV Type   NIV Started/Stopped On   Equipment Type v60   Mode CPAP   Mask Type Full face mask   Mask Size Medium   Assessment   Pulse 81   Heart Rate Source Monitor   Respirations 24   Level of Consciousness 0   Comfort Level Good   Using Accessory Muscles No   Mask Compliance Good   Breath Sounds   Breath Sounds Bilateral Rhonchi   Settings/Measurements   IPAP 18 cmH20   CPAP/EPAP 10 cmH2O   Vt (Measured) 351 mL   Rate Ordered 16   Insp Rise Time (%) 2 %   FiO2  100 %   I Time/ I Time % 0.9 s   Minute Volume (L/min) 9.2 Liters   Patient's Home Machine No   Alarm Settings   Alarms On Y   Low Pressure (cmH2O) 5 cmH2O   High Pressure (cmH2O) 40 cmH2O   Delay Alarm 20 sec(s)   Apnea (secs) 20 secs   RR Low (bpm) 15   RR High (bpm) 50 br/min       
   04/20/24 1620   NIV Type   NIV Started/Stopped On   Equipment Type v60   Mode Bilevel   Mask Type Full face mask   Mask Size Medium   Assessment   Pulse 79   Heart Rate Source Monitor   Respirations 19   SpO2 99 %   Level of Consciousness 0   Comfort Level Good   Using Accessory Muscles No   Mask Compliance Good   Breath Sounds   Breath Sounds Bilateral Diminished;Inspiratory wheezing   Settings/Measurements   IPAP 18 cmH20   CPAP/EPAP 10 cmH2O   Vt (Measured) 429 mL   Rate Ordered 16   Insp Rise Time (%) 2 %   FiO2  100 %   I Time/ I Time % 0.9 s   Minute Volume (L/min) 8.8 Liters   Mask Leak (lpm) 0 lpm   Patient's Home Machine No   Alarm Settings   Alarms On Y   Low Pressure (cmH2O) 5 cmH2O   High Pressure (cmH2O) 40 cmH2O   Delay Alarm 20 sec(s)   Apnea (secs) 20 secs   RR Low (bpm) 15   RR High (bpm) 50 br/min       
   04/20/24 1735   NIV Type   NIV Started/Stopped On   Equipment Type v60   Mode Bilevel   Mask Type Full face mask   Mask Size Medium   Assessment   Pulse 84   Heart Rate Source Monitor   Respirations 26   SpO2 95 %   Level of Consciousness 1   Comfort Level Good   Using Accessory Muscles No   Mask Compliance Good   Breath Sounds   Breath Sounds Bilateral Rhonchi   Settings/Measurements   IPAP 22 cmH20   CPAP/EPAP 12 cmH2O   Vt (Measured) 406 mL   Rate Ordered 16   Insp Rise Time (%) 2 %   FiO2  75 %   I Time/ I Time % 0.9 s   Minute Volume (L/min) 10.2 Liters   Mask Leak (lpm) 0 lpm   Patient's Home Machine No   Alarm Settings   Alarms On Y   Low Pressure (cmH2O) 5 cmH2O   High Pressure (cmH2O) 40 cmH2O   Delay Alarm 20 sec(s)   Apnea (secs) 20 secs   RR Low (bpm) 15   RR High (bpm) 50 br/min       
   04/21/24 0714   NIV Type   $NIV $Daily Charge   NIV Started/Stopped On   Equipment Type v60   Mode Bilevel   Mask Type Full face mask   Assessment   Pulse 86   Heart Rate Source Monitor   Respirations 22   SpO2 98 %   Level of Consciousness 0   Comfort Level Good   Using Accessory Muscles No   Mask Compliance Good   Breath Sounds   Breath Sounds Bilateral Diminished;Inspiratory wheezing   Settings/Measurements   IPAP 22 cmH20   CPAP/EPAP 12 cmH2O   Vt (Measured) 434 mL   Rate Ordered 16   Insp Rise Time (%) 2 %   FiO2  100 %   I Time/ I Time % 0.75 s   Minute Volume (L/min) 11.7 Liters   Mask Leak (lpm) 0 lpm   Patient's Home Machine No   Alarm Settings   Alarms On Y   Low Pressure (cmH2O) 5 cmH2O   High Pressure (cmH2O) 40 cmH2O   Delay Alarm 20 sec(s)   Apnea (secs) 20 secs   RR Low (bpm) 15   RR High (bpm) 50 br/min       
   04/21/24 1420   NIV Type   NIV Started/Stopped On   Equipment Type v60   Mode Bilevel   Mask Type Full face mask   Mask Size Medium   Assessment   Pulse 86   Heart Rate Source Monitor   Respirations 22   SpO2 98 %   Level of Consciousness 0   Comfort Level Good   Using Accessory Muscles No   Mask Compliance Good   Breath Sounds   Breath Sounds Bilateral Diminished;Inspiratory wheezing   Settings/Measurements   IPAP 20 cmH20   CPAP/EPAP 11 cmH2O   Vt (Measured) 506 mL   Rate Ordered 16   Insp Rise Time (%) 2 %   FiO2  85 %   I Time/ I Time % 0.75 s   Minute Volume (L/min) 11.2 Liters   Mask Leak (lpm) 0 lpm   Patient's Home Machine No   Alarm Settings   Alarms On Y   Low Pressure (cmH2O) 5 cmH2O   High Pressure (cmH2O) 40 cmH2O   Delay Alarm 20 sec(s)   Apnea (secs) 20 secs   RR Low (bpm) 15   RR High (bpm) 50 br/min       
   04/23/24 0738   NIV Type   Equipment Type v60   Mode Bilevel   Mask Type Full face mask   Mask Size Medium   Settings/Measurements   PIP Observed 20 cm H20   IPAP 20 cmH20   CPAP/EPAP 11 cmH2O   Vt (Measured) 421 mL   Rate Ordered 16   Insp Rise Time (%) 2 %   Mask Leak (lpm) 0 lpm   Patient's Home Machine No   Alarm Settings   Alarms On Y   Low Pressure (cmH2O) 5 cmH2O   High Pressure (cmH2O) 40 cmH2O   Apnea (secs) 20 secs   RR Low (bpm) 15   RR High (bpm) 50 br/min       
 notified: Per tele, pt's HR briefly went up to 170's and had a couple beats of vtach before returning the 80's. Thanks  
 notified: Respiratory tried to wean FIO2 down some on bipap from 100% to 65%, pt dropped to 70's, respiratory bumped back up to 100% but pt still staying in the 80's.  Can we get a stat CXR? Thanks  
 notified: Took pt down for CTA, pt's IV went bad when CT performed fast flush, attempted to regain access in CT unsuccessfully.  STAT consult for IV team placed.  Will reattempt CTA as soon as IV access re-established.  Thanks    
1710: NICA Zaragoza requesting PIV insertion at this time. This RN placed 20g PIV in R thumb; coban applied to thumb. No complications; pt tolerated well.   
4 Eyes Skin Assessment     NAME:  Erika Smith  YOB: 1940  MEDICAL RECORD NUMBER:  [id number]    The patient is being assessed for  Admission    I agree that at least one RN has performed a thorough Head to Toe Skin Assessment on the patient. ALL assessment sites listed below have been assessed.      Areas assessed by both nurses:    Head, Face, Ears, Shoulders, Back, Chest, Arms, Elbows, Hands, Sacrum. Buttock, Coccyx, Ischium, Legs. Feet and Heels, and Under Medical Devices         Does the Patient have a Wound? No noted wound(s)       Jeffrey Prevention initiated by RN: No  Wound Care Orders initiated by RN: No    Pressure Injury (Stage 3,4, Unstageable, DTI, NWPT, and Complex wounds) if present, place Wound referral order by RN under : No    New Ostomies, if present place, Ostomy referral order under : No     Nurse 1 eSignature: Electronically signed by Jersey Zarate RN on 4/18/24 at 11:06 PM EDT    **SHARE this note so that the co-signing nurse can place an eSignature**    Nurse 2 eSignature: Electronically signed by Bisi Gonzalez RN on 4/18/24 at 11:08 PM EDT   
Dr notified: Per tele, pt just had some bijeminy and 5 beats of vtach. Thanks  
Dr notified: Respiratory tried taking pt off bipap and placing on vapotherm, pt maxed on vapotherm and nonrebreather and satting in 70's.  Pt complaining of severe pain all over. Thanks    1239: VBG drawn and just resulted. Thanks  
Due to critical values of ABG, bipap settings were increased to 18/10 rate 16  100%.  
Notified by critical care about patient's code status being changed to DNR CC per their discussion with the patient and her .   
PHARMACY VANCOMYCIN MONITORING SERVICE  Pharmacy consulted by Dr. Alexander Pulido for monitoring and adjustment.    Indication for treatment: Multifocal pneumonia with sepsis  Goal trough: Trough Goal: 15-20 mcg/mL  AUC/KARIN: 400-600    Risk Factors for MRSA Identified:   Received IV antibiotics within the past 90 days    Pertinent Laboratory Values:   Temp Readings from Last 3 Encounters:   04/18/24 98.8 °F (37.1 °C) (Axillary)   12/21/23 98.7 °F (37.1 °C)   07/14/23 97.8 °F (36.6 °C) (Oral)     Recent Labs     04/18/24  1547   WBC 14.0*   LACTATE 1.7     Recent Labs     04/18/24  1547   BUN 26*   CREATININE 1.1     CrCl cannot be calculated (Unknown ideal weight.).  No intake or output data in the 24 hours ending 04/18/24 2312  Last Encounter Weight:  Wt Readings from Last 3 Encounters:   12/21/23 90.3 kg (199 lb)   07/14/23 79.2 kg (174 lb 8 oz)   06/13/23 90.7 kg (200 lb)       Pertinent Cultures:   Date    Source    Results  04/18   Urine    To be collected  04/18   Blood    In process  04/18   COVID-19, Rapid  Not detected  04/18   Influenza A/B, Molecular Not detected  04/18   Legionella   To be collected  04/18   Strep Pneumo   To be collected  04/18   MRSA Screen (Nasal) To be collected    Assessment:  HPI: 83 y.o. female with history of CHF, COPD, chronic respiratory failure, and hypertension.  Patient presented to ED with acute on chronic respiratory failure with hypoxia and hypercapnia.  Chest X-ray shows worsening opacities and vascular congestion. Mild leukocytosis present;  patient was recently on doxycycline and Rocephin for possible infection.    SCr = 1.1, BUN = 26, and no I/O data  Day(s) of therapy: 1 of 7  Vancomycin concentration:   04/20 - To be collected    Plan:  Vancomycin 2,000 mg IV initial dose; Follow with Vancomycin 750 mg IV Q 24 Hours  Steady state predictions: AUC: 481; Trough: 14.9  Pharmacy will continue to monitor patient and adjust therapy as indicated    VANCOMYCIN CONCENTRATION 
PHARMACY VANCOMYCIN MONITORING SERVICE  Pharmacy consulted by Dr. Alexander Pulido for monitoring and adjustment.    Indication for treatment: Multifocal pneumonia with sepsis  Goal trough: Trough Goal: 15-20 mcg/mL  AUC/KARIN: 400-600    Risk Factors for MRSA Identified:   Received IV antibiotics within the past 90 days    Pertinent Laboratory Values:   Temp Readings from Last 3 Encounters:   04/20/24 97.3 °F (36.3 °C) (Oral)   12/21/23 98.7 °F (37.1 °C)   07/14/23 97.8 °F (36.6 °C) (Oral)     Recent Labs     04/18/24  1547 04/19/24  0159 04/20/24  0204   WBC 14.0* 13.9* 10.8*   LACTATE 1.7  --   --        Recent Labs     04/18/24  1547 04/19/24  0159 04/20/24  0204   BUN 26* 26* 34*   CREATININE 1.1 1.0 1.1       Estimated Creatinine Clearance: 38 mL/min (based on SCr of 1.1 mg/dL).    Intake/Output Summary (Last 24 hours) at 4/20/2024 1017  Last data filed at 4/20/2024 0600  Gross per 24 hour   Intake 750 ml   Output 600 ml   Net 150 ml     Last Encounter Weight:  Wt Readings from Last 3 Encounters:   04/20/24 81 kg (178 lb 9.2 oz)   12/21/23 90.3 kg (199 lb)   07/14/23 79.2 kg (174 lb 8 oz)       Pertinent Cultures:   Date    Source    Results  04/18   Urine    To be collected  04/18   Blood    NGTD @ 24 hours  04/18   COVID-19, Rapid  Not detected  04/18   Influenza A/B, Molecular Not detected  04/18   Legionella   Sent  04/18   Strep Pneumo   Sent  04/18   MRSA Screen (Nasal)- PCR To be collected    Assessment:  HPI: 83 y.o. female with history of CHF, COPD, chronic respiratory failure, and hypertension.  Patient presented to ED with acute on chronic respiratory failure with hypoxia and hypercapnia.  Chest X-ray shows worsening opacities and vascular congestion. Mild leukocytosis present;  patient was recently on doxycycline and Rocephin for possible infection.    Renal Function:  Scr 1.1 mg/dL today, appears to be at or near baseline.  Day(s) of therapy: 3 of 7  Vancomycin concentration:   04/20 - 22.7 mg/L*hr, 
PHARMACY VANCOMYCIN MONITORING SERVICE  Pharmacy consulted by Dr. Alexander Pulido for monitoring and adjustment.    Indication for treatment: Multifocal pneumonia with sepsis  Goal trough: Trough Goal: 15-20 mcg/mL  AUC/KARIN: 400-600    Risk Factors for MRSA Identified:   Received IV antibiotics within the past 90 days    Pertinent Laboratory Values:   Temp Readings from Last 3 Encounters:   04/21/24 98.2 °F (36.8 °C) (Axillary)   12/21/23 98.7 °F (37.1 °C)   07/14/23 97.8 °F (36.6 °C) (Oral)     Recent Labs     04/18/24  1547 04/19/24  0159 04/20/24  0204 04/21/24  0245   WBC 14.0* 13.9* 10.8* 10.8*   LACTATE 1.7  --   --   --      Recent Labs     04/19/24  0159 04/20/24  0204 04/21/24  0245   BUN 26* 34* 31*   CREATININE 1.0 1.1 0.9     Estimated Creatinine Clearance: 47 mL/min (based on SCr of 0.9 mg/dL).    Intake/Output Summary (Last 24 hours) at 4/21/2024 1259  Last data filed at 4/21/2024 1133  Gross per 24 hour   Intake --   Output 1625 ml   Net -1625 ml     Last Encounter Weight:  Wt Readings from Last 3 Encounters:   04/21/24 81.5 kg (179 lb 10.8 oz)   12/21/23 90.3 kg (199 lb)   07/14/23 79.2 kg (174 lb 8 oz)       Pertinent Cultures:   Date    Source    Results  04/18   Urine    To be collected  04/18   Blood    NGTD @ 72 hours  04/18   COVID-19, Rapid  Not detected  04/18   Influenza A/B, Molecular Not detected  04/18   Legionella   Negative   04/18   Strep Pneumo   Negative  04/20   Resp    Pending   04/18   MRSA Screen (Nasal)- PCR To be collected    Assessment:  HPI: 83 y.o. female with history of CHF, COPD, chronic respiratory failure, and hypertension.  Patient presented to ED with acute on chronic respiratory failure with hypoxia and hypercapnia.  Chest X-ray shows worsening opacities and vascular congestion. Mild leukocytosis present;  patient was recently on doxycycline and Rocephin for possible infection.    Renal Function:  Scr 0.9 mg/dL today, appears to be at or near baseline.  BUN 31  Day(s) 
Patient is evaluated and chart reviewed  Patient is appropriate for hospice care  Discharge readmit orders are written  Pt to be transferred to hospice services once family arrives.     
Per conversation with Dr. Kirk pt was taken off the bipap to give pt a break.  Pt was placed back on Vapotherm at 35L 100% with 02 saturations at 89-93%.  Mouth care completed with a few sips of water. Breathing treatment given. Will continue to monitor pt.  
Pt cannot tolerate being off bipap.  Pt desats shortly after being taken off of the bipap to 73%.  Placed pt back on bipap 22/12 100%.  
Pt started to desat down to 73% on the Vapotherm with settings of 40L 100%.  Placed pt on a nonrebreather mask over Vapotherm with 02 saturations only improving to 86%.  
Pt's O2 sat 84% while on bipap.  FIO2 bumped up to 100%.  Waiting for lasix drip to arrive from pharmacy, will start as soon as it does.   
Pulmonary and Critical Care  Progress Note      VITALS:  /69   Pulse 79   Temp 97.6 °F (36.4 °C) (Axillary)   Resp 18   Ht 1.549 m (5' 1\")   Wt 81.4 kg (179 lb 7.3 oz)   SpO2 99%   BMI 33.91 kg/m²     Subjective:   CHIEF COMPLAINT :SOB     HPI:                The patient is a 83 y.o. female is lying in the bed. She is on 80% fio2 sats 97%. She is on the BIPAP. She is in mild resp distress    Objective:   PHYSICAL EXAM:    LUNGS:Occasional basal crackles  Abd-soft, BS+,NT  Ext- no pedal edema  CVS-s1s2, no murmurs      DATA:    CBC:  Recent Labs     04/20/24  0204 04/21/24  0245   WBC 10.8* 10.8*   RBC 2.89* 3.28*   HGB 7.3* 8.2*   HCT 26.3* 30.4*    406   MCV 91.0 92.7   MCH 25.3* 25.0*   MCHC 27.8* 27.0*   RDW 16.1* 16.1*      BMP:  Recent Labs     04/20/24  0204 04/21/24  0245    141   K 5.2* 5.4*    100   CO2 31 29   BUN 34* 31*   CREATININE 1.1 0.9   CALCIUM 7.9* 8.3   GLUCOSE 115* 106*      ABG:  Recent Labs     04/20/24  1445 04/20/24 2015 04/21/24  1600   PH 7.28* 7.34* 7.26*   PO2ART 115* 75* 139*   VVD7BTF 83.0* 69.0* 90.0*   O2SAT 95.2 93.1* 95.8     BNP  No results found for: \"BNP\"   D-Dimer:  Lab Results   Component Value Date    DDIMER 1.57 (H) 07/08/2023      Radiology: Stable appearance of the chest with left-sided airspace disease.       Assessment/Plan     Patient Active Problem List    Diagnosis Date Noted    Acute on chronic diastolic congestive heart failure (HCC) 06/17/2016     Priority: High     Overview Note:     Updating Deprecated Diagnoses      Hypoxia 04/14/2016     Priority: High    Moderate COPD (chronic obstructive pulmonary disease) (HCC) 02/01/2016     Priority: High    Coronary atherosclerosis of native coronary artery 09/01/2015     Priority: High    Hypertension      Priority: Medium    Morbid obesity due to excess calories (HCC) 04/18/2016     Priority: Medium    Depression      Priority: Low    Bilateral edema of lower extremity 12/16/2015     
Pulmonary and Critical Care  Progress Note      VITALS:  BP (!) 106/54   Pulse 86   Temp 98.2 °F (36.8 °C) (Axillary) Comment (Src): cont bipap  Resp 20   Ht 1.549 m (5' 1\")   Wt 81.5 kg (179 lb 10.8 oz)   SpO2 90%   BMI 33.95 kg/m²     Subjective:   CHIEF COMPLAINT :SOB     HPI:                The patient is a 83 y.o. female is lying in the bed. She is in mild to moderate resp distress. She is off Lasix. She has worsening CXR    Objective:   PHYSICAL EXAM:    LUNGS:Bilateral basal crackles  Abd-soft, BS+,NT  Ext- no pedal edema  CVS-s1s2, no murmurs      DATA:    CBC:  Recent Labs     04/19/24  0159 04/20/24  0204 04/21/24  0245   WBC 13.9* 10.8* 10.8*   RBC 3.33* 2.89* 3.28*   HGB 8.5* 7.3* 8.2*   HCT 30.3* 26.3* 30.4*    319 406   MCV 91.0 91.0 92.7   MCH 25.5* 25.3* 25.0*   MCHC 28.1* 27.8* 27.0*   RDW 15.9* 16.1* 16.1*   BANDSPCT 1*  --   --       BMP:  Recent Labs     04/19/24  0159 04/19/24  1036 04/20/24  0204 04/21/24  0245     --  142 141   K 5.5* 5.2* 5.2* 5.4*     --  102 100   CO2 27  --  31 29   BUN 26*  --  34* 31*   CREATININE 1.0  --  1.1 0.9   CALCIUM 7.5*  --  7.9* 8.3   GLUCOSE 146*  --  115* 106*      ABG:  Recent Labs     04/20/24  1230 04/20/24  1445 04/20/24 2015   PH 7.29* 7.28* 7.34*   PO2ART 73* 115* 75*   MLC8SYD 78.0* 83.0* 69.0*   O2SAT 92.4* 95.2 93.1*     BNP  No results found for: \"BNP\"   D-Dimer:  Lab Results   Component Value Date    DDIMER 1.57 (H) 07/08/2023      Radiology: None      Assessment/Plan     Patient Active Problem List    Diagnosis Date Noted    Acute on chronic diastolic congestive heart failure (HCC) 06/17/2016     Priority: High     Overview Note:     Updating Deprecated Diagnoses      Hypoxia 04/14/2016     Priority: High    Moderate COPD (chronic obstructive pulmonary disease) (HCC) 02/01/2016     Priority: High    Coronary atherosclerosis of native coronary artery 09/01/2015     Priority: High    Hypertension      Priority: Medium    
Pulmonary and Critical Care  Progress Note      VITALS:  BP (!) 151/92   Pulse 85   Temp 97.3 °F (36.3 °C) (Oral)   Resp 22   Ht 1.549 m (5' 1\")   Wt 81 kg (178 lb 9.2 oz)   SpO2 95%   BMI 33.74 kg/m²     Subjective:   CHIEF COMPLAINT :SOB     HPI:                The patient is a 83 y.o. female is lying in the bed. She is in mild resp distress. She has good urine output . She is looking better. She is tolerating BIPAP    Objective:   PHYSICAL EXAM:    LUNGS:Occasional basal crackles  Abd-soft, BS+,NT  Ext- no pedal edema  CVS-s1s2, no murmurs      DATA:    CBC:  Recent Labs     04/18/24  1547 04/19/24  0159 04/20/24  0204   WBC 14.0* 13.9* 10.8*   RBC 3.52* 3.33* 2.89*   HGB 8.9* 8.5* 7.3*   HCT 32.1* 30.3* 26.3*    293 319   MCV 91.2 91.0 91.0   MCH 25.3* 25.5* 25.3*   MCHC 27.7* 28.1* 27.8*   RDW 15.9* 15.9* 16.1*   BANDSPCT  --  1*  --       BMP:  Recent Labs     04/18/24  1547 04/19/24  0159 04/19/24  1036 04/20/24  0204   * 138  --  142   K 5.3* 5.5* 5.2* 5.2*   CL 97* 100  --  102   CO2 26 27  --  31   BUN 26* 26*  --  34*   CREATININE 1.1 1.0  --  1.1   CALCIUM 8.3 7.5*  --  7.9*   GLUCOSE 123* 146*  --  115*      ABG:  No results for input(s): \"PH\", \"PO2ART\", \"KPP4YPZ\", \"HCO3\", \"BEART\", \"O2SAT\" in the last 72 hours.  BNP  No results found for: \"BNP\"   D-Dimer:  Lab Results   Component Value Date    DDIMER 1.57 (H) 07/08/2023      Radiology: Improved pulmonary edema pattern and bibasilar pleural-parenchymal disease.       Assessment/Plan     Patient Active Problem List    Diagnosis Date Noted    Acute on chronic diastolic congestive heart failure (HCC) 06/17/2016     Priority: High     Overview Note:     Updating Deprecated Diagnoses      Hypoxia 04/14/2016     Priority: High    Moderate COPD (chronic obstructive pulmonary disease) (HCC) 02/01/2016     Priority: High    Coronary atherosclerosis of native coronary artery 09/01/2015     Priority: High    Hypertension      Priority: Medium    
RENAL DOSE ADJUSTMENT MADE PER P/T PROTOCOL    PREVIOUS ORDER:  Meropenem 1g EI q8hr    Estimated Creatinine Clearance: 38 mL/min (based on SCr of 1.1 mg/dL).  Recent Labs     04/18/24  1547 04/19/24  0159 04/20/24  0204   BUN 26* 26* 34*   CREATININE 1.1 1.0 1.1    293 319     NEW RENALLY ADJUSTED ORDER:  Meropenem 1g EI q12hr    Deja Jones RPH  4/20/2024 10:35 AM      
This said nurse spoke with Yaritza RN at [facility]. PT family on board with Hospice services. BIPAP to be continued through night and  with Hospice will be at bedside 4/23.  
4/21/2024 1133  Gross per 24 hour   Intake --   Output 1625 ml   Net -1625 ml          Vitals:   Vitals:    04/21/24 1145   BP:    Pulse:    Resp:    Temp:    SpO2: 90%       Physical Exam:     General: NAD  Eyes: EOMI  ENT: neck supple  Cardiovascular: Regular rate.  Respiratory: b/l ronchi   Gastrointestinal: Soft, non tender  Genitourinary: no suprapubic tenderness  Musculoskeletal: No edema  Skin: warm, dry  Neuro: Alert.  Psych: Mood appropriate.     Medications:   Medications:    sodium chloride flush  5-40 mL IntraVENous BID    sodium polystyrene  15 g Oral Once    dilTIAZem  30 mg Oral 4 times per day    meropenem  1,000 mg IntraVENous Q12H    sodium chloride flush  5-40 mL IntraVENous 2 times per day    enoxaparin  40 mg SubCUTAneous QHS    [Held by provider] furosemide  40 mg IntraVENous BID    methylPREDNISolone  40 mg IntraVENous Daily    ipratropium 0.5 mg-albuterol 2.5 mg  1 Dose Inhalation Q4H WA RT    escitalopram  10 mg Oral Daily    ferrous sulfate  325 mg Oral Nightly    budesonide-formoterol  2 puff Inhalation BID RT    guaiFENesin  600 mg Oral BID    levothyroxine  112 mcg Oral Daily    lidocaine  1 patch TransDERmal Daily    melatonin  6 mg Oral Nightly    montelukast  10 mg Oral Nightly    pantoprazole  40 mg Oral QAM AC    [Held by provider] potassium chloride  10 mEq Oral Daily    vancomycin  750 mg IntraVENous Q24H      Infusions:    sodium chloride       PRN Meds: albuterol, 2.5 mg, Q6H PRN  sodium chloride flush, 5-40 mL, PRN  sodium chloride, 500 mL, PRN  ondansetron, 4 mg, Q8H PRN   Or  ondansetron, 4 mg, Q6H PRN  polyethylene glycol, 17 g, Daily PRN  acetaminophen, 650 mg, Q6H PRN   Or  acetaminophen, 650 mg, Q6H PRN  potassium chloride, 40 mEq, PRN   Or  potassium alternative oral replacement, 40 mEq, PRN   Or  potassium chloride, 10 mEq, PRN  magnesium sulfate, 2,000 mg, PRN  HYDROcodone-acetaminophen, 1 tablet, TID PRN  sennosides-docusate sodium, 2 tablet, Daily PRN        Labs  
basilar consolidative opacities from prior study 2. Cardiomegaly and vascular redistribution Electronically signed by Doc Casey MD      Electronically signed by Yelena Parra MD on 4/19/2024 at 12:04 PM

## 2024-04-25 NOTE — ADT AUTH CERT
Patient Demographics  Name Patient ID SSN Gender Identity Birth Date  Erika Penn [id number]  Female [birth year] ()  Address Phone Email Employer   [address] [place] [id number] [phone] (H)  [phone] (M)  [phone] (OTHER PHONE) -- RETIRED   County Race Occupation Emp Status   [place] Black /  -- Retired   Reg Status PCP Date Last Verified Next Review Date   Verified Donovan Granado MD  [phone] 24   Admission Date Discharge Date Admitting Provider     24 Alexander Pulido MD    Marital Status Gnosticism        [place]     Emergency Contact 1 Emergency Contact 2  Jassi Smith (2) [address] [place]  [facility] [address]  [facility]  [phone] (H)  [phone] (M) Keren Smith (Daughter-in-)  [phone] (M)  Physician Progress NotesNotes from 24 through 24  Progress Notes by Hemant Banuelos MD at 2024  7:46 AM  Author: Hemant Banuelos MD Service: Hospice Author Type: Physician  Filed: 2024  7:47 AM Date of Service: 2024  7:46 AM Status: Signed  : Hemant Banuelos MD (Physician)  Patient is evaluated and chart reviewed  Patient is appropriate for hospice care  Discharge readmit orders are written  Pt to be transferred to hospice services once family arrives.      Electronically signed by Hemant Banuelos MD on 2024  7:47 AM  Progress Notes by Pawel Morrissey MD at 2024 10:43 AM  Author: Pawel Morrissey MD Service: Pulmonology Author Type: Physician  Filed: 2024  2:06 PM Date of Service: 2024 10:43 AM Status: Signed  : Pawel Morrissey MD (Physician)  Pulmonary and Critical Care  Progress Note        VITALS:  /69   Pulse 79   Temp 97.6 °F (36.4 °C) (Axillary)   Resp 18   Ht 1.549 m (5' 1\")   Wt 81.4 kg (179 lb 7.3 oz)   SpO2 99%   BMI 33.91 kg/m²

## (undated) DEVICE — Z DISCONTINUED NO SUB IDED TUBING ETCO2 AD L6.5FT NSL ORAL CVD PRNG NONFLARED TIP OVR

## (undated) DEVICE — FORCEPS BX L240CM JAW DIA2.8MM L CAP W/ NDL MIC MESH TOOTH

## (undated) DEVICE — ENDOSCOPY KIT: Brand: MEDLINE INDUSTRIES, INC.

## (undated) DEVICE — Z DISCONTINUED (USE MFG CAT MVABO)  TUBING GAS SAMPLING STD 6.5 FT FEMALE CONN SMRT CAPNOLINE